# Patient Record
Sex: MALE | Race: WHITE | NOT HISPANIC OR LATINO | Employment: OTHER | URBAN - METROPOLITAN AREA
[De-identification: names, ages, dates, MRNs, and addresses within clinical notes are randomized per-mention and may not be internally consistent; named-entity substitution may affect disease eponyms.]

---

## 2019-09-03 ENCOUNTER — TRANSCRIBE ORDERS (OUTPATIENT)
Dept: ADMINISTRATIVE | Facility: HOSPITAL | Age: 64
End: 2019-09-03

## 2019-09-03 ENCOUNTER — TRANSCRIBE ORDERS (OUTPATIENT)
Dept: CARDIOLOGY | Facility: HOSPITAL | Age: 64
End: 2019-09-03

## 2019-09-03 DIAGNOSIS — I20.0 UNSTABLE ANGINA (HCC): Primary | ICD-10-CM

## 2019-09-06 ENCOUNTER — APPOINTMENT (OUTPATIENT)
Dept: GENERAL RADIOLOGY | Facility: HOSPITAL | Age: 64
End: 2019-09-06

## 2019-09-06 ENCOUNTER — HOSPITAL ENCOUNTER (INPATIENT)
Facility: HOSPITAL | Age: 64
LOS: 11 days | Discharge: HOME | End: 2019-09-17
Attending: INTERNAL MEDICINE | Admitting: INTERNAL MEDICINE

## 2019-09-06 ENCOUNTER — APPOINTMENT (OUTPATIENT)
Dept: CARDIOLOGY | Facility: HOSPITAL | Age: 64
End: 2019-09-06

## 2019-09-06 ENCOUNTER — APPOINTMENT (OUTPATIENT)
Dept: PULMONOLOGY | Facility: HOSPITAL | Age: 64
End: 2019-09-06

## 2019-09-06 DIAGNOSIS — I25.118 CORONARY ARTERY DISEASE INVOLVING NATIVE CORONARY ARTERY OF NATIVE HEART WITH OTHER FORM OF ANGINA PECTORIS (HCC): Primary | ICD-10-CM

## 2019-09-06 DIAGNOSIS — I20.0 UNSTABLE ANGINA (HCC): ICD-10-CM

## 2019-09-06 DIAGNOSIS — E11.59 TYPE 2 DIABETES MELLITUS WITH OTHER CIRCULATORY COMPLICATION, WITHOUT LONG-TERM CURRENT USE OF INSULIN (HCC): ICD-10-CM

## 2019-09-06 PROBLEM — I25.10 CORONARY ARTERY DISEASE: Status: ACTIVE | Noted: 2019-09-03

## 2019-09-06 PROBLEM — I10 BENIGN ESSENTIAL HTN: Chronic | Status: ACTIVE | Noted: 2019-09-06

## 2019-09-06 LAB
ANION GAP SERPL CALCULATED.3IONS-SCNC: 13 MMOL/L (ref 5–15)
APTT PPP: 34.2 SECONDS (ref 24–37)
BUN BLD-MCNC: 23 MG/DL (ref 8–23)
BUN BLDA-MCNC: 22 MG/DL (ref 8–26)
BUN/CREAT SERPL: 24 (ref 7–25)
CA-I BLDA-SCNC: 1.23 MMOL/L (ref 1.2–1.32)
CALCIUM SPEC-SCNC: 9.4 MG/DL (ref 8.6–10.5)
CHLORIDE BLDA-SCNC: 100 MMOL/L (ref 98–109)
CHLORIDE SERPL-SCNC: 98 MMOL/L (ref 98–107)
CO2 BLDA-SCNC: 25 MMOL/L (ref 24–29)
CO2 SERPL-SCNC: 25 MMOL/L (ref 22–29)
CREAT BLD-MCNC: 0.96 MG/DL (ref 0.76–1.27)
CREAT BLDA-MCNC: 0.9 MG/DL (ref 0.6–1.3)
DEPRECATED RDW RBC AUTO: 39.4 FL (ref 37–54)
ERYTHROCYTE [DISTWIDTH] IN BLOOD BY AUTOMATED COUNT: 12.2 % (ref 12.3–15.4)
GFR SERPL CREATININE-BSD FRML MDRD: 79 ML/MIN/1.73
GLUCOSE BLD-MCNC: 296 MG/DL (ref 65–99)
GLUCOSE BLDC GLUCOMTR-MCNC: 245 MG/DL (ref 70–130)
GLUCOSE BLDC GLUCOMTR-MCNC: 249 MG/DL (ref 70–130)
GLUCOSE BLDC GLUCOMTR-MCNC: 259 MG/DL (ref 70–130)
GLUCOSE BLDC GLUCOMTR-MCNC: 291 MG/DL (ref 70–130)
GLUCOSE BLDC GLUCOMTR-MCNC: 333 MG/DL (ref 70–130)
HCT VFR BLD AUTO: 40.9 % (ref 37.5–51)
HCT VFR BLDA CALC: 39 % (ref 38–51)
HGB BLD-MCNC: 14.1 G/DL (ref 13–17.7)
HGB BLDA-MCNC: 13.3 G/DL (ref 12–17)
INR PPP: 1.12 (ref 0.85–1.16)
MCH RBC QN AUTO: 30.8 PG (ref 26.6–33)
MCHC RBC AUTO-ENTMCNC: 34.5 G/DL (ref 31.5–35.7)
MCV RBC AUTO: 89.3 FL (ref 79–97)
PA ADP PRP-ACNC: 231 PRU
PLATELET # BLD AUTO: 219 10*3/MM3 (ref 140–450)
PMV BLD AUTO: 9.1 FL (ref 6–12)
POTASSIUM BLD-SCNC: 4.1 MMOL/L (ref 3.5–5.2)
POTASSIUM BLDA-SCNC: 4.2 MMOL/L (ref 3.5–4.9)
PROTHROMBIN TIME: 13.9 SECONDS (ref 11.2–14.3)
RBC # BLD AUTO: 4.58 10*6/MM3 (ref 4.14–5.8)
SODIUM BLD-SCNC: 136 MMOL/L (ref 136–145)
SODIUM BLDA-SCNC: 137 MMOL/L (ref 138–146)
WBC NRBC COR # BLD: 4.99 10*3/MM3 (ref 3.4–10.8)

## 2019-09-06 PROCEDURE — A9270 NON-COVERED ITEM OR SERVICE: HCPCS | Performed by: INTERNAL MEDICINE

## 2019-09-06 PROCEDURE — 63710000001 INSULIN LISPRO (HUMAN) PER 5 UNITS: Performed by: INTERNAL MEDICINE

## 2019-09-06 PROCEDURE — 63710000001 INSULIN LISPRO (HUMAN) PER 5 UNITS: Performed by: PHYSICIAN ASSISTANT

## 2019-09-06 PROCEDURE — B2151ZZ FLUOROSCOPY OF LEFT HEART USING LOW OSMOLAR CONTRAST: ICD-10-PCS | Performed by: INTERNAL MEDICINE

## 2019-09-06 PROCEDURE — 25010000002 FENTANYL CITRATE (PF) 100 MCG/2ML SOLUTION: Performed by: INTERNAL MEDICINE

## 2019-09-06 PROCEDURE — 85576 BLOOD PLATELET AGGREGATION: CPT | Performed by: PHYSICIAN ASSISTANT

## 2019-09-06 PROCEDURE — 0 IOPAMIDOL PER 1 ML: Performed by: INTERNAL MEDICINE

## 2019-09-06 PROCEDURE — 94010 BREATHING CAPACITY TEST: CPT

## 2019-09-06 PROCEDURE — 80048 BASIC METABOLIC PNL TOTAL CA: CPT

## 2019-09-06 PROCEDURE — 63710000001 DICYCLOMINE PER 20 MG: Performed by: INTERNAL MEDICINE

## 2019-09-06 PROCEDURE — 93458 L HRT ARTERY/VENTRICLE ANGIO: CPT | Performed by: INTERNAL MEDICINE

## 2019-09-06 PROCEDURE — 63710000001 AMLODIPINE 5 MG TABLET: Performed by: INTERNAL MEDICINE

## 2019-09-06 PROCEDURE — G0108 DIAB MANAGE TRN  PER INDIV: HCPCS

## 2019-09-06 PROCEDURE — 99223 1ST HOSP IP/OBS HIGH 75: CPT | Performed by: THORACIC SURGERY (CARDIOTHORACIC VASCULAR SURGERY)

## 2019-09-06 PROCEDURE — 71045 X-RAY EXAM CHEST 1 VIEW: CPT

## 2019-09-06 PROCEDURE — 63710000001 BUSPIRONE 10 MG TABLET: Performed by: INTERNAL MEDICINE

## 2019-09-06 PROCEDURE — B2111ZZ FLUOROSCOPY OF MULTIPLE CORONARY ARTERIES USING LOW OSMOLAR CONTRAST: ICD-10-PCS | Performed by: INTERNAL MEDICINE

## 2019-09-06 PROCEDURE — 85027 COMPLETE CBC AUTOMATED: CPT

## 2019-09-06 PROCEDURE — 63710000001 INSULIN DETEMIR PER 5 UNITS: Performed by: INTERNAL MEDICINE

## 2019-09-06 PROCEDURE — 63710000001 HYDROCHLOROTHIAZIDE 25 MG TABLET: Performed by: INTERNAL MEDICINE

## 2019-09-06 PROCEDURE — 85610 PROTHROMBIN TIME: CPT | Performed by: PHYSICIAN ASSISTANT

## 2019-09-06 PROCEDURE — 85730 THROMBOPLASTIN TIME PARTIAL: CPT | Performed by: PHYSICIAN ASSISTANT

## 2019-09-06 PROCEDURE — A9270 NON-COVERED ITEM OR SERVICE: HCPCS | Performed by: PHYSICIAN ASSISTANT

## 2019-09-06 PROCEDURE — 94010 BREATHING CAPACITY TEST: CPT | Performed by: INTERNAL MEDICINE

## 2019-09-06 PROCEDURE — 25010000002 MIDAZOLAM PER 1 MG: Performed by: INTERNAL MEDICINE

## 2019-09-06 PROCEDURE — 99223 1ST HOSP IP/OBS HIGH 75: CPT | Performed by: INTERNAL MEDICINE

## 2019-09-06 PROCEDURE — 36415 COLL VENOUS BLD VENIPUNCTURE: CPT

## 2019-09-06 PROCEDURE — 63710000001 LOSARTAN 50 MG TABLET: Performed by: INTERNAL MEDICINE

## 2019-09-06 PROCEDURE — 4A023N7 MEASUREMENT OF CARDIAC SAMPLING AND PRESSURE, LEFT HEART, PERCUTANEOUS APPROACH: ICD-10-PCS | Performed by: INTERNAL MEDICINE

## 2019-09-06 PROCEDURE — 63710000001 TAMSULOSIN 0.4 MG CAPSULE: Performed by: INTERNAL MEDICINE

## 2019-09-06 PROCEDURE — 63710000001 ATENOLOL 50 MG TABLET: Performed by: INTERNAL MEDICINE

## 2019-09-06 PROCEDURE — C1769 GUIDE WIRE: HCPCS | Performed by: INTERNAL MEDICINE

## 2019-09-06 PROCEDURE — C1894 INTRO/SHEATH, NON-LASER: HCPCS | Performed by: INTERNAL MEDICINE

## 2019-09-06 PROCEDURE — 82962 GLUCOSE BLOOD TEST: CPT

## 2019-09-06 PROCEDURE — 93306 TTE W/DOPPLER COMPLETE: CPT

## 2019-09-06 PROCEDURE — 25010000002 HEPARIN (PORCINE) PER 1000 UNITS: Performed by: INTERNAL MEDICINE

## 2019-09-06 PROCEDURE — 80047 BASIC METABLC PNL IONIZED CA: CPT

## 2019-09-06 PROCEDURE — 85014 HEMATOCRIT: CPT

## 2019-09-06 RX ORDER — NITROGLYCERIN 0.4 MG/1
0.4 TABLET SUBLINGUAL
Status: DISCONTINUED | OUTPATIENT
Start: 2019-09-06 | End: 2019-09-17 | Stop reason: HOSPADM

## 2019-09-06 RX ORDER — HYDROCODONE BITARTRATE AND ACETAMINOPHEN 5; 325 MG/1; MG/1
1 TABLET ORAL EVERY 4 HOURS PRN
Status: DISCONTINUED | OUTPATIENT
Start: 2019-09-06 | End: 2019-09-10 | Stop reason: DRUGHIGH

## 2019-09-06 RX ORDER — AMLODIPINE BESYLATE 10 MG/1
10 TABLET ORAL DAILY
COMMUNITY
End: 2019-09-17 | Stop reason: HOSPADM

## 2019-09-06 RX ORDER — ASPIRIN 81 MG/1
81 TABLET ORAL DAILY
Status: DISCONTINUED | OUTPATIENT
Start: 2019-09-07 | End: 2019-09-10

## 2019-09-06 RX ORDER — ASPIRIN 81 MG/1
81 TABLET ORAL DAILY
COMMUNITY

## 2019-09-06 RX ORDER — ATENOLOL 50 MG/1
100 TABLET ORAL DAILY
Status: DISCONTINUED | OUTPATIENT
Start: 2019-09-06 | End: 2019-09-07

## 2019-09-06 RX ORDER — LOSARTAN POTASSIUM 100 MG/1
100 TABLET ORAL DAILY
COMMUNITY
End: 2019-09-17 | Stop reason: HOSPADM

## 2019-09-06 RX ORDER — SODIUM CHLORIDE 9 MG/ML
250 INJECTION, SOLUTION INTRAVENOUS CONTINUOUS
Status: ACTIVE | OUTPATIENT
Start: 2019-09-06 | End: 2019-09-06

## 2019-09-06 RX ORDER — TAMSULOSIN HYDROCHLORIDE 0.4 MG/1
1 CAPSULE ORAL DAILY
COMMUNITY

## 2019-09-06 RX ORDER — NALOXONE HCL 0.4 MG/ML
0.4 VIAL (ML) INJECTION
Status: DISCONTINUED | OUTPATIENT
Start: 2019-09-06 | End: 2019-09-10

## 2019-09-06 RX ORDER — OMEPRAZOLE 40 MG/1
40 CAPSULE, DELAYED RELEASE ORAL DAILY
COMMUNITY

## 2019-09-06 RX ORDER — TAMSULOSIN HYDROCHLORIDE 0.4 MG/1
0.4 CAPSULE ORAL DAILY
Status: DISCONTINUED | OUTPATIENT
Start: 2019-09-06 | End: 2019-09-10

## 2019-09-06 RX ORDER — AMLODIPINE BESYLATE 5 MG/1
10 TABLET ORAL DAILY
Status: DISCONTINUED | OUTPATIENT
Start: 2019-09-06 | End: 2019-09-10

## 2019-09-06 RX ORDER — PRAVASTATIN SODIUM 40 MG
40 TABLET ORAL DAILY
Status: ON HOLD | COMMUNITY
End: 2021-01-19

## 2019-09-06 RX ORDER — MORPHINE SULFATE 2 MG/ML
1 INJECTION, SOLUTION INTRAMUSCULAR; INTRAVENOUS EVERY 4 HOURS PRN
Status: DISCONTINUED | OUTPATIENT
Start: 2019-09-06 | End: 2019-09-12

## 2019-09-06 RX ORDER — ACETAMINOPHEN 325 MG/1
650 TABLET ORAL EVERY 4 HOURS PRN
Status: DISCONTINUED | OUTPATIENT
Start: 2019-09-06 | End: 2019-09-17 | Stop reason: HOSPADM

## 2019-09-06 RX ORDER — ALBUTEROL SULFATE 90 UG/1
2 AEROSOL, METERED RESPIRATORY (INHALATION) EVERY 4 HOURS PRN
COMMUNITY

## 2019-09-06 RX ORDER — GLIMEPIRIDE 4 MG/1
4 TABLET ORAL
COMMUNITY
End: 2021-09-30 | Stop reason: SDUPTHER

## 2019-09-06 RX ORDER — ATENOLOL 100 MG/1
100 TABLET ORAL DAILY
COMMUNITY
End: 2019-09-17 | Stop reason: HOSPADM

## 2019-09-06 RX ORDER — NITROGLYCERIN 0.4 MG/1
0.4 TABLET SUBLINGUAL
COMMUNITY

## 2019-09-06 RX ORDER — HYDROCHLOROTHIAZIDE 50 MG/1
50 TABLET ORAL DAILY
COMMUNITY
End: 2022-01-28

## 2019-09-06 RX ORDER — LIDOCAINE HYDROCHLORIDE 10 MG/ML
INJECTION, SOLUTION EPIDURAL; INFILTRATION; INTRACAUDAL; PERINEURAL AS NEEDED
Status: DISCONTINUED | OUTPATIENT
Start: 2019-09-06 | End: 2019-09-06 | Stop reason: HOSPADM

## 2019-09-06 RX ORDER — BUSPIRONE HYDROCHLORIDE 10 MG/1
10 TABLET ORAL 2 TIMES DAILY
Status: DISCONTINUED | OUTPATIENT
Start: 2019-09-06 | End: 2019-09-10

## 2019-09-06 RX ORDER — CETIRIZINE HYDROCHLORIDE 10 MG/1
10 TABLET ORAL DAILY PRN
COMMUNITY

## 2019-09-06 RX ORDER — DICYCLOMINE HCL 20 MG
20 TABLET ORAL DAILY
COMMUNITY
End: 2022-01-28

## 2019-09-06 RX ORDER — TEMAZEPAM 7.5 MG/1
7.5 CAPSULE ORAL NIGHTLY PRN
Status: DISCONTINUED | OUTPATIENT
Start: 2019-09-06 | End: 2019-09-10

## 2019-09-06 RX ORDER — ERGOCALCIFEROL (VITAMIN D2) 10 MCG
400 TABLET ORAL DAILY
COMMUNITY

## 2019-09-06 RX ORDER — BUSPIRONE HYDROCHLORIDE 10 MG/1
10 TABLET ORAL 3 TIMES DAILY
COMMUNITY

## 2019-09-06 RX ORDER — FENTANYL CITRATE 50 UG/ML
INJECTION, SOLUTION INTRAMUSCULAR; INTRAVENOUS AS NEEDED
Status: DISCONTINUED | OUTPATIENT
Start: 2019-09-06 | End: 2019-09-06 | Stop reason: HOSPADM

## 2019-09-06 RX ORDER — MIDAZOLAM HYDROCHLORIDE 1 MG/ML
INJECTION INTRAMUSCULAR; INTRAVENOUS AS NEEDED
Status: DISCONTINUED | OUTPATIENT
Start: 2019-09-06 | End: 2019-09-06 | Stop reason: HOSPADM

## 2019-09-06 RX ORDER — DICYCLOMINE HCL 20 MG
20 TABLET ORAL EVERY 6 HOURS
Status: DISCONTINUED | OUTPATIENT
Start: 2019-09-06 | End: 2019-09-06

## 2019-09-06 RX ORDER — NICOTINE POLACRILEX 4 MG
15 LOZENGE BUCCAL
Status: DISCONTINUED | OUTPATIENT
Start: 2019-09-06 | End: 2019-09-12

## 2019-09-06 RX ORDER — ALPRAZOLAM 0.25 MG/1
0.25 TABLET ORAL 3 TIMES DAILY PRN
Status: DISCONTINUED | OUTPATIENT
Start: 2019-09-06 | End: 2019-09-10

## 2019-09-06 RX ORDER — DICYCLOMINE HCL 20 MG
20 TABLET ORAL DAILY
Status: DISCONTINUED | OUTPATIENT
Start: 2019-09-07 | End: 2019-09-10

## 2019-09-06 RX ORDER — HYDROCHLOROTHIAZIDE 25 MG/1
25 TABLET ORAL DAILY
Status: DISCONTINUED | OUTPATIENT
Start: 2019-09-06 | End: 2019-09-10

## 2019-09-06 RX ORDER — DEXTROSE MONOHYDRATE 25 G/50ML
25 INJECTION, SOLUTION INTRAVENOUS
Status: DISCONTINUED | OUTPATIENT
Start: 2019-09-06 | End: 2019-09-12

## 2019-09-06 RX ORDER — LOSARTAN POTASSIUM 50 MG/1
100 TABLET ORAL DAILY
Status: DISCONTINUED | OUTPATIENT
Start: 2019-09-06 | End: 2019-09-10

## 2019-09-06 RX ORDER — ASPIRIN 325 MG
325 TABLET, DELAYED RELEASE (ENTERIC COATED) ORAL DAILY
Status: DISCONTINUED | OUTPATIENT
Start: 2019-09-06 | End: 2019-09-06 | Stop reason: SDUPTHER

## 2019-09-06 RX ADMIN — HYDROCHLOROTHIAZIDE 25 MG: 25 TABLET ORAL at 11:30

## 2019-09-06 RX ADMIN — AMLODIPINE BESYLATE 10 MG: 5 TABLET ORAL at 11:29

## 2019-09-06 RX ADMIN — BUSPIRONE HYDROCHLORIDE 10 MG: 10 TABLET ORAL at 11:30

## 2019-09-06 RX ADMIN — LOSARTAN POTASSIUM 100 MG: 50 TABLET ORAL at 11:30

## 2019-09-06 RX ADMIN — INSULIN LISPRO 5 UNITS: 100 INJECTION, SOLUTION INTRAVENOUS; SUBCUTANEOUS at 17:26

## 2019-09-06 RX ADMIN — INSULIN DETEMIR 6 UNITS: 100 INJECTION, SOLUTION SUBCUTANEOUS at 15:16

## 2019-09-06 RX ADMIN — TAMSULOSIN HYDROCHLORIDE 0.4 MG: 0.4 CAPSULE ORAL at 11:30

## 2019-09-06 RX ADMIN — INSULIN LISPRO 3 UNITS: 100 INJECTION, SOLUTION INTRAVENOUS; SUBCUTANEOUS at 17:27

## 2019-09-06 RX ADMIN — ALPRAZOLAM 0.25 MG: 0.25 TABLET ORAL at 21:28

## 2019-09-06 RX ADMIN — INSULIN LISPRO 5 UNITS: 100 INJECTION, SOLUTION INTRAVENOUS; SUBCUTANEOUS at 21:30

## 2019-09-06 RX ADMIN — ATENOLOL 100 MG: 50 TABLET ORAL at 11:29

## 2019-09-06 RX ADMIN — BUSPIRONE HYDROCHLORIDE 10 MG: 10 TABLET ORAL at 21:28

## 2019-09-06 RX ADMIN — DICYCLOMINE HYDROCHLORIDE 20 MG: 20 TABLET ORAL at 11:30

## 2019-09-06 RX ADMIN — ASPIRIN 325 MG: 325 TABLET, DELAYED RELEASE ORAL at 07:32

## 2019-09-06 RX ADMIN — INSULIN DETEMIR 10 UNITS: 100 INJECTION, SOLUTION SUBCUTANEOUS at 21:28

## 2019-09-06 RX ADMIN — INSULIN LISPRO 3 UNITS: 100 INJECTION, SOLUTION INTRAVENOUS; SUBCUTANEOUS at 11:29

## 2019-09-06 NOTE — H&P
Pre-Cardiac Catheterization Report  Cardiovascular Laboratory  [facility]      Patient:  Khoa Arnold  :  1955  PCP:  Shirlene Sharpe MD  PHONE:  [phone]    DATE: 2019    BRIEF HPI:  Khoa Arnold is a 64 y.o. male with hypertension, hypercholesterolemia, diabetes mellitus, obesity, family history of premature coronary artery disease, and known coronary artery disease.  He is post previous stents.  He is complaining of chest pain which he describes as a heaviness radiating to his left arm with associated fatigue.  He states it lasts minutes and is moderate in severity.  He says he can feel his chest pain increase as his blood pressure goes up.  He now presents for left heart catheterization with possible intervention.    Cardiac Risk Factors:  advanced age (older than 55 for men, 65 for women), diabetes mellitus, dyslipidemia, family history of premature cardiovascular disease, hypertension, male gender, obesity (BMI >= 30 kg/m2)    Anginal class in last 2 weeks:  CCS class III    CHF Class in last 2 weeks:  NYHA Class II    Cardiogenic shock:  no    Cardiac arrest <24 hours:  no    Stress test within last 6 months:   no   Details:    Previous cardiac catheterization:  yes  Details:     Previous CABG:  no  Details:      Allergies:     IV contrast allergy:  no  Allergies   Allergen Reactions   • Lisinopril Itching and Rash     On feet       MEDICATIONS:  Prior to Admission medications    Not on File       Past medical & surgical history, social and family history reviewed in the electronic medical record.    ROS:  Cardiovascular ROS: positive for - chest pain and shortness of breath    Physical Exam:    Vitals: There were no vitals filed for this visit. There were no vitals filed for this visit.    General Appearance:    Alert, cooperative, in no acute distress   Head:    Normocephalic, without obvious abnormality, atraumatic   Eyes:            Lids and lashes normal,  conjunctivae and sclerae normal, no   icterus, no pallor, corneas clear, PERRLA   Ears:    Ears appear intact with no abnormalities noted   Neck:   No adenopathy, supple, trachea midline, no thyromegaly, +   carotid bruit, no JVD   Back:     No kyphosis present, no scoliosis present, range of motion normal   Lungs:     Clear to auscultation,respirations regular, even and                  unlabored    Heart:    Regular rhythm and normal rate, normal S1 and S2, no            murmur, no gallop, no rub, no click   Chest Wall:    No abnormalities observed   Abdomen:     Normal bowel sounds, no masses, no organomegaly, soft        non-tender, non-distended, no guarding, no rebound                tenderness   Rectal:     Deferred   Extremities:   Moves all extremities well, trace edema, no cyanosis, no             redness   Pulses:   Pulses palpable and equal bilaterally   Skin:   No bleeding, bruising or rash   Neurologic:   Cranial nerves 2 - 12 grossly intact, sensation intact     Barbaeu Test:  Left: Normal  (oxymetric Allens) Right: Not Assessed     Results from last 7 days   Lab Units 09/06/19  0701   CREATININE mg/dL 0.90     Results from last 7 days   Lab Units 09/06/19  0701   HEMOGLOBIN, POC g/dL 13.3   HEMATOCRIT POC % 39     No results found for: CHLPL, TRIG, HDL, LDLDIRECT, AST, ALT        Impression      · Unstable angina    Plan     · Procedure to perform: [facility]  · Planned access: Left radial artery              JANAK Sanches  09/06/19  7:15 AM

## 2019-09-06 NOTE — CONSULTS
"Consults    Patient Care Team:  Shirlene Sharpe MD as PCP - General (Internal Medicine)    Chief complaint:  Medical management    Subjective     History of Present Illness  64 year old man with recent chest pain, had LHC this morning showing MVCAD.  He has seen Dr. Goldsmith of CT Surgery.  Plan is CABG on Tuesday, 9/10.  Hospitalists are consulted for medical management.     Review of Systems   Gen- No fevers, chills  CV- No current chest pain, palpitations  Resp- No cough or hemoptysis.  COTA.  Minimal cigar smoking, remote; no cigarette history.   GI- No N/V/D, abd pain  Endo:  On 3 oral meds for DM.   Glucoses over 200 fasting.  Frequent blurred vision.  \"I don't want to take insulin when I leave here.\"  Rare neuropathic pain in feet; no numbness.   Neuro - hist of CVA x 2.     All other systems reviewed and negative except any additional pertinent positives and negatives discussed in HPI.       Objective      Vital Signs  Temp:  [97.8 °F (36.6 °C)-98.3 °F (36.8 °C)] 98.3 °F (36.8 °C)  Heart Rate:  [54-70] 56  Resp:  [14-16] 16  BP: (120-158)/(56-82) 130/79    Physical Exam   Gen:  Obese male in NAD, sister and wife present.  Chatty.  Fair historian  Neuro: alert and oriented, clear speech, follows commands, grossly nonfocal  HEENT:  NC/AT PERRL, OP benign  Neck:  Supple, no LAD  Heart RRR no murmur, rub, or gallop  Lungs diffusely decreased, no W R R, nonlabored on RA at rest.   Abd:  Soft, nontender, no rebound or guarding, pos BS  Extrem:  No c/c/e.  Sensation in feet intact to light touch.   Skin tanned, warm and dry      Results Review:  I have reviewed the patient's clinical results.          Assessment/Plan       Unstable angina (CMS/HCC)    Coronary artery disease    MVCAD - plan is CABG tues.   - Cards and CTS managing.     DM2  - uncontrolled  - check A1C  - start insulin - basal/bolus/SSI. Adjust as indicated.  - resistant to going home on insulin but will clearly need it.  Diabetes education. "  Will shoot for a one-shot-a day regimen supplemented by orals.     Hist of CVA  - would check carotid US given risk factors     HTN - continue current meds  HL - continue statin      Sarah Kunz MD  09/06/19  1:39 PM

## 2019-09-06 NOTE — CONSULTS
CTS Consult    Patient Care Team:  Shirlene Sharpe MD as PCP - General (Internal Medicine)      Reason for Consult: Evaluate for coronary surgery    HPI  Patient is a 64 y.o. male presents with worsening shortness of breath and fatigue.  He states that with only a moderate amount of exertion he has some substernal chest pain that he describes primarily as a heaviness that radiates into his left shoulder.  During this episode and immediately thereafter the patient states he feels very fatigued and washed out.  He has had a history of previous coronary stents and has known diabetes obesity hypercholesterolemia and a family history of premature coronary disease.  Cardiac catheterization today demonstrates left main as well as three-vessel coronary disease and the patient is pain-free when he sees me today      Review of Systems  Constitutional: Negative for malaise but positive for/fatigue.  Negative for chills, fever, night sweats and weight loss.  HENT: Negative for hearing loss, odynophagia and sore throat.    Cardiovascular: Negative for claudication positive for dyspnea on exertion.   for chest pain but negative for, leg swelling, orthopnea and palpitations.  Respiratory: Positive for shortness of breath.  Negative for cough and hemoptysis.  Endocrine:  Negative for cold intolerance, heat intolerance, polydipsia, polyphagia and polyuria.  Hematologic/Lymphatic: Negative for easy bruising/bleeding.  Musculoskeletal: Negative for joint pain, joint swelling and myalgias.  Gastrointestinal: Negative for abdominal pain, constipation, diarrhea, hematemesis, hematochezia, melena, nausea and vomiting.  Genitourinary: Positive for dysuria and is on Flomax  Neurological: Negative for focal weakness, headaches, numbness and seizures.  Psychiatric/Behavioral: Negative for depression and suicidal ideas.  The patient is not nervous/anxious.  All other systems are reviewed and are negative.      History  Past Medical  History:   Diagnosis Date   • Asthma    • Coronary artery disease    • Diabetes mellitus (CMS/HCC)    • Hyperlipidemia    • Hypertension    • Stroke (CMS/HCC)     2008?     Past Surgical History:   Procedure Laterality Date   • CARDIAC CATHETERIZATION      3 stents    • CHOLECYSTECTOMY     • COLONOSCOPY     • UMBILICAL HERNIA REPAIR       History reviewed. No pertinent family history.  Social History     Tobacco Use   • Smoking status: Never Smoker   • Smokeless tobacco: Former User     Types: Chew   Substance Use Topics   • Alcohol use: No     Frequency: Never   • Drug use: No     Medications Prior to Admission   Medication Sig Dispense Refill Last Dose   • albuterol sulfate  (90 Base) MCG/ACT inhaler Inhale 2 puffs Every 4 (Four) Hours As Needed for Wheezing.   Past Week at Unknown time   • amLODIPine (NORVASC) 10 MG tablet Take 10 mg by mouth Daily.   9/5/2019 at 0900   • aspirin 81 MG EC tablet Take 81 mg by mouth Daily.   9/5/2019 at 0900   • atenolol (TENORMIN) 100 MG tablet Take 100 mg by mouth Daily.   9/5/2019 at 0900   • busPIRone (BUSPAR) 10 MG tablet Take 10 mg by mouth 2 (Two) Times a Day.   9/5/2019 at 2100   • cetirizine (zyrTEC) 10 MG tablet Take 10 mg by mouth Daily.   9/5/2019 at 0900   • dicyclomine (BENTYL) 20 MG tablet Take 20 mg by mouth Every 6 (Six) Hours.   9/5/2019 at 0900   • Fluticasone Furoate-Vilanterol (BREO ELLIPTA) 100-25 MCG/INH inhaler Inhale 1 puff Daily.   9/6/2019 at 0500   • glimepiride (AMARYL) 4 MG tablet Take 4 mg by mouth Every Morning Before Breakfast.   9/6/2019 at 0900   • hydrochlorothiazide (HYDRODIURIL) 25 MG tablet Take 25 mg by mouth Daily.   9/5/2019 at 0900   • losartan (COZAAR) 100 MG tablet Take 100 mg by mouth Daily.   9/5/2019 at 0900   • metFORMIN (GLUCOPHAGE) 1000 MG tablet Take 1,000 mg by mouth 2 (Two) Times a Day With Meals.   9/4/2019 at 1800   • omeprazole (priLOSEC) 40 MG capsule Take 40 mg by mouth Daily.   9/5/2019 at 2100   • pravastatin  (PRAVACHOL) 40 MG tablet Take 40 mg by mouth Daily.   9/5/2019 at 2100   • SITagliptin (JANUVIA) 100 MG tablet Take 100 mg by mouth Daily.   9/5/2019 at 0900   • tamsulosin (FLOMAX) 0.4 MG capsule 24 hr capsule Take 1 capsule by mouth Daily.   9/5/2019 at 0900   • Vitamin D, Cholecalciferol, (CHOLECALCIFEROL) 400 units tablet Take 400 Units by mouth Daily.   9/5/2019 at 0900   • linaclotide (LINZESS) 290 MCG capsule capsule Take 290 mcg by mouth Daily As Needed.   More than a month at Unknown time   • nitroglycerin (NITROSTAT) 0.4 MG SL tablet Place 0.4 mg under the tongue Every 5 (Five) Minutes As Needed for Chest Pain. Take no more than 3 doses in 15 minutes.   More than a month at Unknown time     Allergies:  Lisinopril    Objective    Vital Signs  Temp:  [97.8 °F (36.6 °C)-98.3 °F (36.8 °C)] 98.3 °F (36.8 °C)  Heart Rate:  [54-70] 56  Resp:  [14-16] 16  BP: (120-158)/(56-82) 130/79    Physical Exam: CONSTITUTIONAL: Alert and conversant, in his hospital bed and obese well nourished, No acute distress  EYES: Sclera clean, Anicteric, Pupils equal  ENT: No nasal deviation, Trachea midline  NECK: No neck masses, Supple  LUNGS: No wheezing, Cough, non-congested  HEART: No rubs, No murmurs  GASTROINTESTINAL: Soft, non-distended, No masses, Non tender  to palpation, normal bowel sounds  NEURO: No motor deficits, No sensory deficits, Cranial Nerves 2 through 12 grossly intact  PSYCHIATRIC: Oriented to person, place and time, No memory deficits, Mood appropriate  VASCULAR: No carotid bruits, Femoral pulses palpable and symmetric  MUSKULOSKELETAL: No extremity trauma or extremity asymmetry            Data Review:  Results from last 7 days   Lab Units 09/06/19  0709   WBC 10*3/mm3 4.99   HEMOGLOBIN g/dL 14.1   HEMATOCRIT % 40.9   PLATELETS 10*3/mm3 219     Results from last 7 days   Lab Units 09/06/19  0709   SODIUM mmol/L 136   POTASSIUM mmol/L 4.1   CHLORIDE mmol/L 98   CO2 mmol/L 25.0   BUN mg/dL 23   CREATININE mg/dL  0.96   GLUCOSE mg/dL 296*   CALCIUM mg/dL 9.4     Coagulation: No results found for: INR, APTT  Cardiac markers:     ABGs:       Invalid input(s): PO2        Radiology:      Imaging Results (last 72 hours)     ** No results found for the last 72 hours. **            Assessment:        Unstable angina (CMS/[facility])  DIABETES: I will manage the patient's blood sugars closely, both intraoperatively and postoperatively.  This patient may require a continuous insulin infusion to maintain strict serum glucose control.  I will coordinate the patient's glucose management with the hospital endocrinologist or hospitalist service if the management becomes problematic. The patient is aware that diabetes can complicate their postoperative course and contribute to infection or wound- healing issues.  HYPERTENSION: The patient has known hypertension. I will manage the blood pressure closely intraoperatively and postoperatively to maintain end organ perfusion, but also to mitigate against bleeding caused by extreme hypertension.  Will evaluate for beta blockade prior to anesthesia. Hypertension postoperatively will complicate bleeding problems.  HYPERLIPIDEMIA: The patient's statin medication will be continued up to and including the day of surgery. Dietary changes have been discussed.      Previous history of a stroke  Obesity  Left main and three-vessel coronary disease  Previous coronary stents    Plan:  Plan for coronary bypass grafting surgery at 7 AM on Tuesday of next week.  Patient is aware of the risk of stroke bleeding infection death renal failure and is agreeable to proceed.    Please note that portions of this note were completed with a voice recognition program. Efforts were made to edit the dictations, but occasionally words are mistranscribed.    Denys Goldsmith MD  09/06/19  10:46 AM

## 2019-09-06 NOTE — CONSULTS
After obtaining permission,  discussed and taught patient about type 2 diabetes self-management, risk factors, and importance of blood glucose control to reduce complications. Mr. Arnold has a 10+ year history with diabetes and is currently taking Amaryl, Metformin, and Januvia for diabetes management.  He states his meter at home is a few years old.  He was provided a donated One Touch meter and demonstrated use.  He was able to teach back.  Mr. Arnold expresses concern about going home on insulin.  We discussed insulin and how it will help him feel better with less side effects than some oral agents. Target blood glucose readings and A1c goals per ADA were reviewed. We discussed using postprandial glucoses to problem solve meal choices.  We discussed hypoglycemia treatment. Lifestyle changes such as physical activity with MD approval and healthy eating were encouraged. Mr. Arnold is looking forward to feeling better and getting to walk more.  Stressed the importance of strict blood sugar control after surgery to prevent complications.  Pt instructed to  check blood sugar 2-3 times per day and to call PCP if  blood glucose is higher than 180 two times or more.  Patient was encouraged to keep record of blood glucose readings to take to follow up appointment with PCP.  Pt was offered a free op follow up appointment however declined at this time.

## 2019-09-06 NOTE — PAYOR COMM NOTE
"Ref # [id number]  María Tam RN, BSN  Phone # [phone]  Fax # [phone]  Khoa South (64 y.o. Male)     Date of Birth Social Security Number Address Home Phone MRN    1955  [address] [phone] [id number]    [place] Marital Status          Hindu        Admission Date Admission Type Admitting Provider Attending Provider Department, Room/Bed    19 Urgent Jonathan Pineda MD Skinner, William Hal, MD [facility],     Discharge Date Discharge Disposition Discharge Destination                       Attending Provider:  Jonathan Pineda MD    Allergies:  Lisinopril    Isolation:  None   Infection:  None   Code Status:  CPR    Ht:  167.6 cm (66\")   Wt:  116 kg (256 lb 3.2 oz)    Admission Cmt:  None   Principal Problem:  Unstable angina (CMS/HCC) [I20.0] More...                 Active Insurance as of 2019     Primary Coverage     Payor Plan Insurance Group Employer/Plan Group    ANTHEM MEDICARE REPLACEMENT ANTHEM MEDICARE ADVANTAGE [id number]     Payor Plan Address Payor Plan Phone Number Payor Plan Fax Number Effective Dates    [phone]  2019 - None Entered    [facility] [id number]       Subscriber Name Subscriber Birth Date Member ID       KHOA SOUTH 1955 [id number]                 Emergency Contacts      (Rel.) Home Phone Work Phone Mobile Phone    Sarah South (Spouse) [phone] -- [phone]               History & Physical      Alejandro Banks PA at 2019  7:15 AM          Pre-Cardiac Catheterization Report  Cardiovascular Laboratory  [facility]      Patient:  Khoa South  :  1955  PCP:  Shirlene Sharpe MD  PHONE:  [phone]    DATE: 2019    BRIEF HPI:  Khoa South is a 64 y.o. male with hypertension, hypercholesterolemia, diabetes mellitus, obesity, family history of premature coronary " artery disease, and known coronary artery disease.  He is post previous stents.  He is complaining of chest pain which he describes as a heaviness radiating to his left arm with associated fatigue.  He states it lasts minutes and is moderate in severity.  He says he can feel his chest pain increase as his blood pressure goes up.  He now presents for left heart catheterization with possible intervention.    Cardiac Risk Factors:  advanced age (older than 55 for men, 65 for women), diabetes mellitus, dyslipidemia, family history of premature cardiovascular disease, hypertension, male gender, obesity (BMI >= 30 kg/m2)    Anginal class in last 2 weeks:  CCS class III    CHF Class in last 2 weeks:  NYHA Class II    Cardiogenic shock:  no    Cardiac arrest <24 hours:  no    Stress test within last 6 months:   no   Details:    Previous cardiac catheterization:  yes  Details:     Previous CABG:  no  Details:      Allergies:     IV contrast allergy:  no  Allergies   Allergen Reactions   • Lisinopril Itching and Rash     On feet       MEDICATIONS:  Prior to Admission medications    Not on File       Past medical & surgical history, social and family history reviewed in the electronic medical record.    ROS:  Cardiovascular ROS: positive for - chest pain and shortness of breath    Physical Exam:    Vitals: There were no vitals filed for this visit. There were no vitals filed for this visit.    General Appearance:    Alert, cooperative, in no acute distress   Head:    Normocephalic, without obvious abnormality, atraumatic   Eyes:            Lids and lashes normal, conjunctivae and sclerae normal, no   icterus, no pallor, corneas clear, PERRLA   Ears:    Ears appear intact with no abnormalities noted   Neck:   No adenopathy, supple, trachea midline, no thyromegaly, +   carotid bruit, no JVD   Back:     No kyphosis present, no scoliosis present, range of motion normal   Lungs:     Clear to auscultation,respirations regular, even  "and                  unlabored    Heart:    Regular rhythm and normal rate, normal S1 and S2, no            murmur, no gallop, no rub, no click   Chest Wall:    No abnormalities observed   Abdomen:     Normal bowel sounds, no masses, no organomegaly, soft        non-tender, non-distended, no guarding, no rebound                tenderness   Rectal:     Deferred   Extremities:   Moves all extremities well, trace edema, no cyanosis, no             redness   Pulses:   Pulses palpable and equal bilaterally   Skin:   No bleeding, bruising or rash   Neurologic:   Cranial nerves 2 - 12 grossly intact, sensation intact     Barbaeu Test:  Left: Normal  (oxymetric Allens) Right: Not Assessed     Results from last 7 days   Lab Units 19  0701   CREATININE mg/dL 0.90     Results from last 7 days   Lab Units 19  0701   HEMOGLOBIN, POC g/dL 13.3   HEMATOCRIT POC % 39     No results found for: CHLPL, TRIG, HDL, LDLDIRECT, AST, ALT        Impression      · Unstable angina    Plan     · Procedure to perform: LHC  · Planned access: Left radial artery              JANAK Sanches  19  7:15 AM    Electronically signed by Jonathan Pineda MD at 2019  7:21 AM       [facility] CATH LAB  [address] [id number] [phone]             Khoa Arnold   Cardiac Catheterization/Vascular Study   Order# [id number]   Reading physician: Jonathan Pineda MD Ordering physician: Jonathan Pineda MD Study date: 19    Procedures     Left Heart Cath      Patient Information     Patient Name  Khoa Arnold MRN  [id number] Sex  Male  (Age)  1955 (64 y.o.)   Race Ethnicity Encounter Category   White or  Not  or  Urgent   Patient Hx Of Height, Weight, and Vitals     Height Weight BSA (Calculated - sq m) BMI (kg/m2) Pulse BP   167.6 cm (66\") 116 kg (256 lb 3.2 oz) 2.22 sq meters 41.44 57 130/78   Physicians     Panel Physicians Referring " Physician Case Authorizing Physician   Jonathan Pineda MD (Primary)  Jonathan Pineda MD   Admission Information     Admission Date/Time Discharge Date/Time Room/Bed   09/06/19  0638  N611/1   Indications     Unstable angina (CMS/HCC) [I20.0 (ICD-10-CM)]   Pre-procedure Diagnosis     Unstable angina (CMS/HCC) [I20.0]       Conclusion     Cardiac Catheterization     Referring Provider:  Shirlene Corrigan MD     Indication(s) for this Procedure:  [place]     Procedure(s) Performed: Left heart catheterization, coronary angiography, left ventriculography     Description of the Procedure:  Informed consent was obtained.  A sheath was placed a left radial artery and selective angiography of the right left coronary arteries as well as left heart catheterization left ventriculography was performed.  Procedure is terminated and the sheath was removed with the TR band and there were no early complications.                     Angiographic Findings:  Coronary dominance, right  · LM:   75% distal bifurcation stenosis  · LAD: Diffuse ostial/proximal plaque up to 70%, first diagonal 70% in its origin, patent mid vessel stent and second diagonal stent  · LCX: Ostial 70% stenosis  · RCA: Distal 90% stenosis     LV: LVEF 70%        Hemodynamic Findings:  Ao pressure: 130/70 mmHg  LVEDP: 10 mmHg        EBL: None  Specimen(s): None obtained     Complications: There were no early complications.     Final Impression(s):       Significant left main and three-vessel CAD  Normal LV systolic function     Recommendations:       CABG to the LAD the first diagonal circumflex obtuse marginal and right PDA

## 2019-09-06 NOTE — PLAN OF CARE
Problem: Patient Care Overview  Goal: Plan of Care Review  Outcome: Ongoing (interventions implemented as appropriate)   09/06/19 1502   Coping/Psychosocial   Plan of Care Reviewed With patient;family   OTHER   Outcome Summary VSS. On room air. No complaints of chest pain. Waiting for CABG on either Monday or Tuesday. Will continue to monitor.    Plan of Care Review   Progress no change       Problem: Cardiac: ACS (Acute Coronary Syndrome) (Adult)  Goal: Signs and Symptoms of Listed Potential Problems Will be Absent, Minimized or Managed (Cardiac: ACS)  Outcome: Ongoing (interventions implemented as appropriate)   09/06/19 1502   Goal/Outcome Evaluation   Problems Assessed (Acute Coronary Syndrome) all   Problems Present (Acute Coronary Syn) none

## 2019-09-07 LAB
ANION GAP SERPL CALCULATED.3IONS-SCNC: 9 MMOL/L (ref 5–15)
BUN BLD-MCNC: 22 MG/DL (ref 8–23)
BUN/CREAT SERPL: 22 (ref 7–25)
CALCIUM SPEC-SCNC: 10.1 MG/DL (ref 8.6–10.5)
CHLORIDE SERPL-SCNC: 101 MMOL/L (ref 98–107)
CO2 SERPL-SCNC: 30 MMOL/L (ref 22–29)
CREAT BLD-MCNC: 1 MG/DL (ref 0.76–1.27)
GFR SERPL CREATININE-BSD FRML MDRD: 75 ML/MIN/1.73
GLUCOSE BLD-MCNC: 228 MG/DL (ref 65–99)
GLUCOSE BLDC GLUCOMTR-MCNC: 221 MG/DL (ref 70–130)
GLUCOSE BLDC GLUCOMTR-MCNC: 307 MG/DL (ref 70–130)
GLUCOSE BLDC GLUCOMTR-MCNC: 335 MG/DL (ref 70–130)
GLUCOSE BLDC GLUCOMTR-MCNC: 341 MG/DL (ref 70–130)
GLUCOSE BLDC GLUCOMTR-MCNC: 346 MG/DL (ref 70–130)
HBA1C MFR BLD: 9.7 % (ref 4.8–5.6)
POTASSIUM BLD-SCNC: 5.1 MMOL/L (ref 3.5–5.2)
SODIUM BLD-SCNC: 140 MMOL/L (ref 136–145)

## 2019-09-07 PROCEDURE — 86901 BLOOD TYPING SEROLOGIC RH(D): CPT

## 2019-09-07 PROCEDURE — 63710000001 INSULIN DETEMIR PER 5 UNITS: Performed by: INTERNAL MEDICINE

## 2019-09-07 PROCEDURE — 99231 SBSQ HOSP IP/OBS SF/LOW 25: CPT | Performed by: THORACIC SURGERY (CARDIOTHORACIC VASCULAR SURGERY)

## 2019-09-07 PROCEDURE — 99233 SBSQ HOSP IP/OBS HIGH 50: CPT | Performed by: INTERNAL MEDICINE

## 2019-09-07 PROCEDURE — 80048 BASIC METABOLIC PNL TOTAL CA: CPT | Performed by: INTERNAL MEDICINE

## 2019-09-07 PROCEDURE — 82962 GLUCOSE BLOOD TEST: CPT

## 2019-09-07 PROCEDURE — 86900 BLOOD TYPING SEROLOGIC ABO: CPT

## 2019-09-07 PROCEDURE — 83036 HEMOGLOBIN GLYCOSYLATED A1C: CPT | Performed by: INTERNAL MEDICINE

## 2019-09-07 PROCEDURE — 99232 SBSQ HOSP IP/OBS MODERATE 35: CPT | Performed by: INTERNAL MEDICINE

## 2019-09-07 RX ORDER — ATENOLOL 50 MG/1
50 TABLET ORAL DAILY
Status: DISCONTINUED | OUTPATIENT
Start: 2019-09-07 | End: 2019-09-10

## 2019-09-07 RX ADMIN — BUSPIRONE HYDROCHLORIDE 10 MG: 10 TABLET ORAL at 09:05

## 2019-09-07 RX ADMIN — INSULIN LISPRO 5 UNITS: 100 INJECTION, SOLUTION INTRAVENOUS; SUBCUTANEOUS at 13:04

## 2019-09-07 RX ADMIN — TEMAZEPAM 7.5 MG: 7.5 CAPSULE ORAL at 22:13

## 2019-09-07 RX ADMIN — INSULIN DETEMIR 15 UNITS: 100 INJECTION, SOLUTION SUBCUTANEOUS at 20:57

## 2019-09-07 RX ADMIN — HYDROCHLOROTHIAZIDE 25 MG: 25 TABLET ORAL at 09:05

## 2019-09-07 RX ADMIN — INSULIN LISPRO 5 UNITS: 100 INJECTION, SOLUTION INTRAVENOUS; SUBCUTANEOUS at 18:07

## 2019-09-07 RX ADMIN — ALPRAZOLAM 0.25 MG: 0.25 TABLET ORAL at 22:13

## 2019-09-07 RX ADMIN — ATENOLOL 50 MG: 50 TABLET ORAL at 13:04

## 2019-09-07 RX ADMIN — ASPIRIN 81 MG: 81 TABLET, COATED ORAL at 09:05

## 2019-09-07 RX ADMIN — INSULIN LISPRO 5 UNITS: 100 INJECTION, SOLUTION INTRAVENOUS; SUBCUTANEOUS at 09:06

## 2019-09-07 RX ADMIN — LOSARTAN POTASSIUM 100 MG: 50 TABLET ORAL at 09:05

## 2019-09-07 RX ADMIN — DICYCLOMINE HYDROCHLORIDE 20 MG: 20 TABLET ORAL at 09:05

## 2019-09-07 RX ADMIN — BUSPIRONE HYDROCHLORIDE 10 MG: 10 TABLET ORAL at 20:57

## 2019-09-07 RX ADMIN — TAMSULOSIN HYDROCHLORIDE 0.4 MG: 0.4 CAPSULE ORAL at 09:05

## 2019-09-07 RX ADMIN — INSULIN LISPRO 5 UNITS: 100 INJECTION, SOLUTION INTRAVENOUS; SUBCUTANEOUS at 20:58

## 2019-09-07 RX ADMIN — INSULIN LISPRO 3 UNITS: 100 INJECTION, SOLUTION INTRAVENOUS; SUBCUTANEOUS at 09:06

## 2019-09-07 RX ADMIN — INSULIN LISPRO 5 UNITS: 100 INJECTION, SOLUTION INTRAVENOUS; SUBCUTANEOUS at 13:03

## 2019-09-07 RX ADMIN — AMLODIPINE BESYLATE 10 MG: 5 TABLET ORAL at 09:05

## 2019-09-07 NOTE — PROGRESS NOTES
[facility] at [facility]  IP Progress Note      Chief Complaint/Reason for service: #1 unstable angina with severe CAD #2 hypertension    Subjective   Subjective: The patient states he is feeling well.  Leading up to his presentation he was having severe dyspnea on exertion and arm pain.  He is never been told he had valvular heart disease.  I read his echocardiogram and it shows significant aortic stenosis.    Past medical, surgical, social and family history reviewed in the patient's electronic medical record.    Objective     Vital Sign Min/Max for last 24 hours  Temp  Min: 97.6 °F (36.4 °C)  Max: 98.5 °F (36.9 °C)   BP  Min: 129/79  Max: 149/74   Pulse  Min: 47  Max: 58   Resp  Min: 16  Max: 18   No Data Recorded   No Data Recorded      Intake/Output Summary (Last 24 hours) at 9/7/2019 1133  Last data filed at 9/7/2019 0900  Gross per 24 hour   Intake 360 ml   Output --   Net 360 ml             Current Facility-Administered Medications:   •  acetaminophen (TYLENOL) tablet 650 mg, 650 mg, Oral, Q4H PRN, Jonathan Pineda MD  •  ALPRAZolam (XANAX) tablet 0.25 mg, 0.25 mg, Oral, TID PRN, Jonathan Pineda MD, 0.25 mg at 09/06/19 2128  •  amLODIPine (NORVASC) tablet 10 mg, 10 mg, Oral, Daily, Jonathan Pineda MD, 10 mg at 09/07/19 0905  •  aspirin EC tablet 81 mg, 81 mg, Oral, Daily, Jonathan Pineda MD, 81 mg at 09/07/19 0905  •  atenolol (TENORMIN) tablet 100 mg, 100 mg, Oral, Daily, Jonathan Pineda MD, 100 mg at 09/06/19 1129  •  busPIRone (BUSPAR) tablet 10 mg, 10 mg, Oral, BID, Jonathan Pineda MD, 10 mg at 09/07/19 0905  •  dextrose (D50W) 25 g/ 50mL Intravenous Solution 25 g, 25 g, Intravenous, Q15 Min PRN, Alejandro Banks PA  •  dextrose (GLUTOSE) oral gel 15 g, 15 g, Oral, Q15 Min PRN, Alejandro Banks PA  •  dicyclomine (BENTYL) tablet 20 mg, 20 mg, Oral, Daily, Alejandro Banks PA, 20 mg at 09/07/19 0905  •  glucagon (human recombinant)  (GLUCAGEN DIAGNOSTIC) injection 1 mg, 1 mg, Subcutaneous, Q15 Min PRN, Alejandro Banks PA  •  hydrochlorothiazide (HYDRODIURIL) tablet 25 mg, 25 mg, Oral, Daily, Jonathan Pineda MD, 25 mg at 09/07/19 0905  •  HYDROcodone-acetaminophen (NORCO) 5-325 MG per tablet 1 tablet, 1 tablet, Oral, Q4H PRN, Jonathan Pineda MD  •  insulin detemir (LEVEMIR) injection 15 Units, 15 Units, Subcutaneous, Nightly, Kimmy Marlow MD  •  insulin lispro (humaLOG) injection 0-7 Units, 0-7 Units, Subcutaneous, 4x Daily With Meals & Nightly, Alejandro Banks PA, 3 Units at 09/07/19 0906  •  insulin lispro (humaLOG) injection 5 Units, 5 Units, Subcutaneous, TID With Meals, Sarah Kunz MD, 5 Units at 09/07/19 0906  •  losartan (COZAAR) tablet 100 mg, 100 mg, Oral, Daily, Jonathan Pineda MD, 100 mg at 09/07/19 0905  •  morphine injection 1 mg, 1 mg, Intravenous, Q4H PRN **AND** naloxone (NARCAN) injection 0.4 mg, 0.4 mg, Intravenous, Q5 Min PRN, Jonathan Pineda MD  •  nitroglycerin (NITROSTAT) SL tablet 0.4 mg, 0.4 mg, Sublingual, Q5 Min PRN, Jonathan Pineda MD  •  Pharmacy Consult - [facility], , Does not apply, Daily, Giuseppe Rodriguez, [facility]  •  tamsulosin (FLOMAX) 24 hr capsule 0.4 mg, 0.4 mg, Oral, Daily, Jonathan Pineda MD, 0.4 mg at 09/07/19 0905  •  temazepam (RESTORIL) capsule 7.5 mg, 7.5 mg, Oral, Nightly PRN, Jonathan Pineda MD    Physical Exam: General obese white male sitting on the couch no acute distress current heart rate 50 blood pressure 171 systolic        HEENT: No JVP       Respiratory: Equal bilateral symmetrical expansion clear to auscultation bilaterally       Cardiovascular: Regular rate and rhythm with a grade 4/6 to 5/6 systolic ejection murmur loudest at the right upper sternal border, no edema       GI: Obese with positive bowel sounds       Lower Extremities: No lesions       Neuro: Inspection of the face reveals symmetrical bilateral facial expressions and is  able to move all 4 extremities and ambulate without difficulty       Skin: Warm and dry with no edema to palpation       Psych: Pleasant affect    Results Review: Eyes and nose are essentially equal.  Renal function is normal.    Radiology Results:  Imaging Results (last 72 hours)     Procedure Component Value Units Date/Time    XR Chest 1 View [[id number]] Collected:  09/06/19 1213     Updated:  09/06/19 1749    Narrative:       EXAMINATION: XR CHEST 1 VW-09/06/2019:      INDICATION: PNEUMOTHORAX.      COMPARISON: Chest x-ray 02/10/2012.     FINDINGS: Cardiac size borderline enlarged and similar to prior without  focal opacification or consolidation despite background atelectasis and  scarring. No pneumothorax or pleural effusion. Degenerative changes of  the spine.           Impression:       No acute cardiopulmonary process.     D:  09/06/2019  E:  09/06/2019     This report was finalized on 9/6/2019 5:46 PM by Dr. Timoteo Lee.             EKG: Sinus rhythm    ECHO: I reviewed the patient's echocardiogram and he has mild LV E.  He has significant calcification and thickening of the aortic valve with reduced excursion and a mean gradient of 32 mmHg consistent with moderate aortic stenosis and a valve area 0.71 cm².  There is also thickening of the mitral valve leaflets, mitral annular calcification, and reduced excursion but no significant mitral stenosis was noted.  However there is mild to moderate MR    Tele: Sinus bradycardia    Assessment   Assessment/Plan: 1 severe CAD-the patient is scheduled for bypass surgery on Tuesday however now there is a new finding of moderate aortic stenosis with a mean gradient of 32 but an aortic valve area of 0.71 cm².  Dr. Goldsmith will be notified of this.  2 patient is having bradycardia which nurse is concerned about so I will decrease his atenolol to 50 mg daily  3 hypertension his blood pressures overall been well controlled except for  this morning blood pressure is 171.   Can use as needed clonidine for elevated blood pressure  We will discuss case with Dr. Goldsmith to see if he would like the patient have a CARLYN to better assess the mitral valve.    Denys Carranza MD  09/07/19  11:33 AM

## 2019-09-07 NOTE — PROGRESS NOTES
[facility] Medicine Services  PROGRESS NOTE    Patient Name: Khoa Arnold  : 1955  MRN: [id number]    Date of Admission: 2019  Length of Stay: 1  Primary Care Physician: Shirlene Sharpe MD    Subjective   Subjective     CC:  F/u med mgmt    HPI:  Doing okay this am, denies any chest pain.     Review of Systems  Gen- No fevers, chills  CV- No chest pain, palpitations  Resp- No cough, dyspnea  GI- No N/V/D, abd pain    All other systems reviewed and negative except any additional pertinent positives and negatives discussed in HPI.     Objective   Objective     Vital Signs:   Temp:  [97.6 °F (36.4 °C)-98.5 °F (36.9 °C)] 97.6 °F (36.4 °C)  Heart Rate:  [47-58] 52  Resp:  [16-18] 16  BP: (129-149)/(67-89) 132/67        Physical Exam:  Constitutional: No acute distress, awake, alert, obese, sitting up on couch at bedside  HENT: NCAT, mucous membranes moist  Respiratory: Clear to auscultation bilaterally, respiratory effort normal   Cardiovascular: RRR, no murmurs, rubs, or gallops, palpable pedal pulses bilaterally  Gastrointestinal: Positive bowel sounds, soft, nontender, nondistended  Musculoskeletal: No bilateral ankle edema  Psychiatric: Appropriate affect, cooperative  Neurologic: Oriented x 3, strength symmetric in all extremities, Cranial Nerves grossly intact to confrontation, speech clear  Skin: No rashes  Results Reviewed:    Results from last 7 days   Lab Units 19  1100 19  0719  0701   WBC 10*3/mm3  --  4.99  --    HEMOGLOBIN g/dL  --  14.1  --    HEMOGLOBIN, POC g/dL  --   --  13.3   HEMATOCRIT %  --  40.9  --    HEMATOCRIT POC %  --   --  39   PLATELETS 10*3/mm3  --  219  --    INR  1.12  --   --      Results from last 7 days   Lab Units 19  0535 19  0709 19  0701   SODIUM mmol/L 140 136  --    POTASSIUM mmol/L 5.1 4.1  --    CHLORIDE mmol/L 101 98  --    CO2 mmol/L 30.0* 25.0  --    BUN mg/dL 22 23  --    CREATININE  mg/dL 1.00 0.96 0.90   GLUCOSE mg/dL 228* 296*  --    CALCIUM mg/dL 10.1 9.4  --      Estimated Creatinine Clearance: 88.2 mL/min (by C-G formula based on SCr of 1 mg/dL).    Microbiology Results Abnormal     None          Imaging Results (last 24 hours)     Procedure Component Value Units Date/Time    XR Chest 1 View [[id number]] Collected:  09/06/19 1213     Updated:  09/06/19 6762    Narrative:       EXAMINATION: XR CHEST 1 VW-09/06/2019:      INDICATION: PNEUMOTHORAX.      COMPARISON: Chest x-ray 02/10/2012.     FINDINGS: Cardiac size borderline enlarged and similar to prior without  focal opacification or consolidation despite background atelectasis and  scarring. No pneumothorax or pleural effusion. Degenerative changes of  the spine.           Impression:       No acute cardiopulmonary process.     D:  09/06/2019  E:  09/06/2019     This report was finalized on 9/6/2019 5:46 PM by Dr. Timoteo Lee.                  I have reviewed the medications:  Scheduled Meds:  amLODIPine 10 mg Oral Daily   aspirin 81 mg Oral Daily   atenolol 100 mg Oral Daily   busPIRone 10 mg Oral BID   dicyclomine 20 mg Oral Daily   hydrochlorothiazide 25 mg Oral Daily   insulin detemir 15 Units Subcutaneous Nightly   insulin lispro 0-7 Units Subcutaneous 4x Daily With Meals & Nightly   insulin lispro 5 Units Subcutaneous TID With Meals   losartan 100 mg Oral Daily   pharmacy consult - MTM  Does not apply Daily   tamsulosin 0.4 mg Oral Daily     Continuous Infusions:   PRN Meds:.•  acetaminophen  •  ALPRAZolam  •  dextrose  •  dextrose  •  glucagon (human recombinant)  •  HYDROcodone-acetaminophen  •  Morphine **AND** naloxone  •  nitroglycerin  •  temazepam      Assessment/Plan   Assessment / Plan     Active Hospital Problems    Diagnosis  POA   • **Unstable angina (CMS/HCC) [I20.0]  Unknown   • Type 2 diabetes mellitus with circulatory disorder, without long-term current use of insulin (CMS/HCC) [E11.59]  Unknown   • Benign essential  HTN [I10]  Yes   • Coronary artery disease [I25.10]  Unknown      Resolved Hospital Problems   No resolved problems to display.        Brief Hospital Course to date:  Khoa Arnold is a 64 y.o. male with PMH of T2DM, HTN, and CAD who presented with unstable angina s/p C which showed MVCAD and plan is for CABG on Tuesday, 9/10. Hospitalists were consulted for T2DM.    Plan:    MVCAD - plan is CABG tues.   - Cards and CTS managing.      DM2  - uncontrolled  - hb A1C is 9.7%  - started insulin - basal/bolus/SSI. Increased this am due to high FSBS.  - resistant to going home on insulin but will clearly need it.  Diabetes education.  Will shoot for a one-shot-a day regimen supplemented by orals.      Hist of CVA  - would check carotid US given risk factors      HTN - continue current meds  HL - continue statin          Disposition: I expect the patient to be discharged per primary service    CODE STATUS:   Code Status and Medical Interventions:   Ordered at: 09/06/19 0994     Level Of Support Discussed With:    Patient     Code Status:    CPR     Medical Interventions (Level of Support Prior to Arrest):    Full         Electronically signed by Kimmy Marlow MD, 09/07/19, 10:29 AM.

## 2019-09-07 NOTE — PLAN OF CARE
Problem: Patient Care Overview  Goal: Plan of Care Review  Outcome: Ongoing (interventions implemented as appropriate)   09/07/19 2851   Coping/Psychosocial   Plan of Care Reviewed With patient   OTHER   Outcome Summary VSS. Sinus tong on the monitor (in the low 50's). Decreased his Atenolol. Patient will possibly need an aortic valve replacement along with his CABG which is scheduled for Tuesday morning. He has walked the halls most of the day. No c/o chest pain or soa. Will continue to monitor.    Plan of Care Review   Progress no change

## 2019-09-07 NOTE — PROGRESS NOTES
"Khoa Arnold  [id number]  1955     LOS: 1 day   Patient Care Team:  Shirlene Sharpe MD as PCP - General (Internal Medicine)    Chief Complaint: Coronary artery disease      Subjective: No chest pain or discomfort or shortness of breath overnight    Objective:     Vital Sign Min/Max for last 24 hours  Temp  Min: 97.7 °F (36.5 °C)  Max: 98.5 °F (36.9 °C)   BP  Min: 120/74  Max: 152/75   Pulse  Min: 47  Max: 70   Resp  Min: 16  Max: 18   SpO2  Min: 90 %  Max: 92 %   No Data Recorded   Weight  Min: 116 kg (256 lb)  Max: 116 kg (256 lb)     Flowsheet Rows      First Filed Value   Admission Height  167.6 cm (66\") Documented at 09/06/2019 0655   Admission Weight  116 kg (256 lb 3.2 oz) Documented at 09/06/2019 0655          Physical Exam: Vital signs stable, no diaphoresis or shortness of breath    Wound:    Pulses:     Mediastinal and Chest Tube Drainage:       Results Review:   Results from last 7 days   Lab Units 09/06/19  0709   WBC 10*3/mm3 4.99   HEMOGLOBIN g/dL 14.1   HEMATOCRIT % 40.9   PLATELETS 10*3/mm3 219     Results from last 7 days   Lab Units 09/07/19  0535   SODIUM mmol/L 140   POTASSIUM mmol/L 5.1   CHLORIDE mmol/L 101   CO2 mmol/L 30.0*   BUN mg/dL 22   CREATININE mg/dL 1.00   GLUCOSE mg/dL 228*   CALCIUM mg/dL 10.1             Assessment      Unstable angina (CMS/HCC)    Coronary artery disease    Type 2 diabetes mellitus with circulatory disorder, without long-term current use of insulin (CMS/[facility])    Benign essential HTN      Seen patient for coronary artery disease.  Patient remained stable at this point.  No chest pain.  Continue with current therapy.  Discussed with patient and wife.        Cristi Astorga MD  09/07/19  7:49 AM      Please note that portions of this note were completed with a voice recognition program. Efforts were made to edit the dictations, but words may be mistranscribed  "

## 2019-09-08 LAB
GLUCOSE BLDC GLUCOMTR-MCNC: 261 MG/DL (ref 70–130)
GLUCOSE BLDC GLUCOMTR-MCNC: 310 MG/DL (ref 70–130)
GLUCOSE BLDC GLUCOMTR-MCNC: 321 MG/DL (ref 70–130)
GLUCOSE BLDC GLUCOMTR-MCNC: 332 MG/DL (ref 70–130)

## 2019-09-08 PROCEDURE — 99233 SBSQ HOSP IP/OBS HIGH 50: CPT | Performed by: NURSE PRACTITIONER

## 2019-09-08 PROCEDURE — 99232 SBSQ HOSP IP/OBS MODERATE 35: CPT | Performed by: INTERNAL MEDICINE

## 2019-09-08 PROCEDURE — 99024 POSTOP FOLLOW-UP VISIT: CPT | Performed by: THORACIC SURGERY (CARDIOTHORACIC VASCULAR SURGERY)

## 2019-09-08 PROCEDURE — 63710000001 INSULIN DETEMIR PER 5 UNITS: Performed by: NURSE PRACTITIONER

## 2019-09-08 PROCEDURE — 25010000002 ONDANSETRON PER 1 MG: Performed by: INTERNAL MEDICINE

## 2019-09-08 PROCEDURE — 82962 GLUCOSE BLOOD TEST: CPT

## 2019-09-08 RX ORDER — ISOSORBIDE MONONITRATE 30 MG/1
30 TABLET, EXTENDED RELEASE ORAL
Status: DISCONTINUED | OUTPATIENT
Start: 2019-09-08 | End: 2019-09-10

## 2019-09-08 RX ORDER — ONDANSETRON 2 MG/ML
4 INJECTION INTRAMUSCULAR; INTRAVENOUS EVERY 6 HOURS PRN
Status: DISCONTINUED | OUTPATIENT
Start: 2019-09-08 | End: 2019-09-10

## 2019-09-08 RX ORDER — ECHINACEA PURPUREA EXTRACT 125 MG
2 TABLET ORAL AS NEEDED
Status: DISCONTINUED | OUTPATIENT
Start: 2019-09-08 | End: 2019-09-12

## 2019-09-08 RX ORDER — PANTOPRAZOLE SODIUM 40 MG/1
40 TABLET, DELAYED RELEASE ORAL
Status: DISCONTINUED | OUTPATIENT
Start: 2019-09-08 | End: 2019-09-10

## 2019-09-08 RX ADMIN — ASPIRIN 81 MG: 81 TABLET, COATED ORAL at 08:15

## 2019-09-08 RX ADMIN — BUSPIRONE HYDROCHLORIDE 10 MG: 10 TABLET ORAL at 08:15

## 2019-09-08 RX ADMIN — INSULIN LISPRO 5 UNITS: 100 INJECTION, SOLUTION INTRAVENOUS; SUBCUTANEOUS at 17:00

## 2019-09-08 RX ADMIN — INSULIN LISPRO 5 UNITS: 100 INJECTION, SOLUTION INTRAVENOUS; SUBCUTANEOUS at 08:15

## 2019-09-08 RX ADMIN — INSULIN LISPRO 5 UNITS: 100 INJECTION, SOLUTION INTRAVENOUS; SUBCUTANEOUS at 12:15

## 2019-09-08 RX ADMIN — SALINE NASAL SPRAY 2 SPRAY: 1.5 SOLUTION NASAL at 08:40

## 2019-09-08 RX ADMIN — INSULIN LISPRO 4 UNITS: 100 INJECTION, SOLUTION INTRAVENOUS; SUBCUTANEOUS at 08:17

## 2019-09-08 RX ADMIN — ISOSORBIDE MONONITRATE 30 MG: 30 TABLET, EXTENDED RELEASE ORAL at 12:33

## 2019-09-08 RX ADMIN — TAMSULOSIN HYDROCHLORIDE 0.4 MG: 0.4 CAPSULE ORAL at 08:15

## 2019-09-08 RX ADMIN — LOSARTAN POTASSIUM 100 MG: 50 TABLET ORAL at 08:15

## 2019-09-08 RX ADMIN — TEMAZEPAM 7.5 MG: 7.5 CAPSULE ORAL at 22:15

## 2019-09-08 RX ADMIN — DICYCLOMINE HYDROCHLORIDE 20 MG: 20 TABLET ORAL at 08:14

## 2019-09-08 RX ADMIN — AMLODIPINE BESYLATE 10 MG: 5 TABLET ORAL at 08:15

## 2019-09-08 RX ADMIN — HYDROCHLOROTHIAZIDE 25 MG: 25 TABLET ORAL at 08:14

## 2019-09-08 RX ADMIN — PANTOPRAZOLE SODIUM 40 MG: 40 TABLET, DELAYED RELEASE ORAL at 08:40

## 2019-09-08 RX ADMIN — INSULIN LISPRO 5 UNITS: 100 INJECTION, SOLUTION INTRAVENOUS; SUBCUTANEOUS at 20:31

## 2019-09-08 RX ADMIN — ALPRAZOLAM 0.25 MG: 0.25 TABLET ORAL at 22:15

## 2019-09-08 RX ADMIN — INSULIN LISPRO 5 UNITS: 100 INJECTION, SOLUTION INTRAVENOUS; SUBCUTANEOUS at 17:01

## 2019-09-08 RX ADMIN — INSULIN DETEMIR 20 UNITS: 100 INJECTION, SOLUTION SUBCUTANEOUS at 20:31

## 2019-09-08 RX ADMIN — BUSPIRONE HYDROCHLORIDE 10 MG: 10 TABLET ORAL at 20:31

## 2019-09-08 RX ADMIN — ONDANSETRON 4 MG: 2 INJECTION INTRAMUSCULAR; INTRAVENOUS at 08:15

## 2019-09-08 NOTE — PLAN OF CARE
Problem: Patient Care Overview  Goal: Plan of Care Review  Outcome: Ongoing (interventions implemented as appropriate)   09/08/19 7180   Coping/Psychosocial   Plan of Care Reviewed With patient;spouse   OTHER   Outcome Summary No c/o chest pain. Up and ambulating in halls today. C/o nausea & sinus drainage this AM, received orders for PRN Zofran, Protonix & nasal spray. Held Atenolol today r/t bradycardia in 50's. Multiple family members at bedside today. Anxious to move forward with surgery plans. Will monitor.    Plan of Care Review   Progress no change

## 2019-09-08 NOTE — PROGRESS NOTES
[facility] Medicine Services  PROGRESS NOTE    Patient Name: Khoa Arnold  : 1955  MRN: [id number]    Date of Admission: 2019  Length of Stay: 2  Primary Care Physician: Shirlene Sharpe MD    Subjective   Subjective     CC:  Follow up medical management of diabetes    HPI:  Patient sitting up in chair, family member at bedside.  Reports nausea this am which he feels is likely due to sinus drainage and lack of having daily prilosec.  He denies any chest pain but has ongoing COTA.  He is eager to get through his surgery scheduled for Tuesday.      Review of Systems  Gen- No fevers, chills  CV- No chest pain, palpitations  Resp- No cough, + ongoing dyspnea with exertion  GI- +N, no V/D, abd pain    All other systems reviewed and negative except any additional pertinent positives and negatives discussed in HPI.     Objective   Objective     Vital Signs:   Temp:  [98 °F (36.7 °C)-98.5 °F (36.9 °C)] 98.4 °F (36.9 °C)  Heart Rate:  [48-72] 50  Resp:  [18] 18  BP: (140-171)/(73-85) 170/85        Physical Exam:  Constitutional: No acute distress, awake, alert sitting in chair  HENT: NCAT, mucous membranes moist, signs of PND noted of oropharynx  Respiratory: Clear to auscultation bilaterally, respiratory effort normal   Cardiovascular: RRR, no murmurs, rubs, or gallops, palpable pedal pulses bilaterally  Gastrointestinal: Positive bowel sounds, soft, nontender, nondistended  Musculoskeletal: No bilateral ankle edema  Psychiatric: Appropriate affect, cooperative  Neurologic: Oriented x 3, follows commands, speech clear  Skin: No rashes noted to exposed areas of skin    Results Reviewed:    Results from last 7 days   Lab Units 19  1100 19  0709 19  0701   WBC 10*3/mm3  --  4.99  --    HEMOGLOBIN g/dL  --  14.1  --    HEMOGLOBIN, POC g/dL  --   --  13.3   HEMATOCRIT %  --  40.9  --    HEMATOCRIT POC %  --   --  39   PLATELETS 10*3/mm3  --  219  --    INR  1.12   --   --      Results from last 7 days   Lab Units 09/07/19  0535 09/06/19  0709 09/06/19  0701   SODIUM mmol/L 140 136  --    POTASSIUM mmol/L 5.1 4.1  --    CHLORIDE mmol/L 101 98  --    CO2 mmol/L 30.0* 25.0  --    BUN mg/dL 22 23  --    CREATININE mg/dL 1.00 0.96 0.90   GLUCOSE mg/dL 228* 296*  --    CALCIUM mg/dL 10.1 9.4  --      Estimated Creatinine Clearance: 88.2 mL/min (by C-G formula based on SCr of 1 mg/dL).    Microbiology Results Abnormal     None          Imaging Results (last 24 hours)     ** No results found for the last 24 hours. **               I have reviewed the medications:  Scheduled Meds:  amLODIPine 10 mg Oral Daily   aspirin 81 mg Oral Daily   atenolol 50 mg Oral Daily   busPIRone 10 mg Oral BID   dicyclomine 20 mg Oral Daily   hydrochlorothiazide 25 mg Oral Daily   insulin detemir 15 Units Subcutaneous Nightly   insulin lispro 0-7 Units Subcutaneous 4x Daily With Meals & Nightly   insulin lispro 5 Units Subcutaneous TID With Meals   losartan 100 mg Oral Daily   pantoprazole 40 mg Oral Q AM   pharmacy consult - MTM  Does not apply Daily   tamsulosin 0.4 mg Oral Daily     Continuous Infusions:   PRN Meds:.•  acetaminophen  •  ALPRAZolam  •  dextrose  •  dextrose  •  glucagon (human recombinant)  •  HYDROcodone-acetaminophen  •  Morphine **AND** naloxone  •  nitroglycerin  •  ondansetron  •  sodium chloride  •  temazepam      Assessment/Plan   Assessment / Plan     Active Hospital Problems    Diagnosis  POA   • **Unstable angina (CMS/[facility]) [I20.0]  Unknown   • Type 2 diabetes mellitus with circulatory disorder, without long-term current use of insulin (CMS/HCC) [E11.59]  Unknown   • Benign essential HTN [I10]  Yes   • Coronary artery disease [I25.10]  Unknown      Resolved Hospital Problems   No resolved problems to display.        Brief Hospital Course to date:  Khoa Arnold is a 64 y.o. male with PMH significant for T2DM, HTN and CAD who presented to [facility] on 9/6/19 with unstable angina.   S/p LHC which showed MVCAD and plan is for CABG on Tuesday, 9/10.  Hospitalists consulted for management of T2DM.    MVCAD  - Cards and CTS managing  - plan for CABG on 9/10    T2DM  - Current A1c 9.7%, uncontrolled  -24hr glucose range 261-307  - increased levemir to 20 units qhs, continue meal time insulin and ssi  - continue to monitor and adjust as needed  - discussed with patient the importance of keeping glucose low as this plays role on overall health as well as cardiac health and recovery from surgery    Nausea  PND  - added daily protonix, prn zofran and saline NS.      H/O CVA  - carotid US recommended given risk factors    HTN  HL  - continue current medication regimen        Disposition: I expect the patient to be discharged per primary service.     CODE STATUS:   Code Status and Medical Interventions:   Ordered at: 09/06/19 0953     Level Of Support Discussed With:    Patient     Code Status:    CPR     Medical Interventions (Level of Support Prior to Arrest):    Full         Electronically signed by ELENO Dennis, 09/08/19, 10:40 AM.

## 2019-09-08 NOTE — PROGRESS NOTES
"Khoa Arnold  [id number]  1955     LOS: 2 days   Patient Care Team:  Shirlene Sharpe MD as PCP - General (Internal Medicine)    Chief Complaint: Coronary artery disease      Subjective: No chest pain or shortness of breath or diaphoresis    Objective:     Vital Sign Min/Max for last 24 hours  Temp  Min: 98 °F (36.7 °C)  Max: 98.5 °F (36.9 °C)   BP  Min: 140/83  Max: 171/79   Pulse  Min: 48  Max: 72   Resp  Min: 18  Max: 18   No Data Recorded   No Data Recorded   No Data Recorded     Flowsheet Rows      First Filed Value   Admission Height  167.6 cm (66\") Documented at 09/06/2019 0655   Admission Weight  116 kg (256 lb 3.2 oz) Documented at 09/06/2019 0655          Physical Exam: Vital signs signs stable, no shortness of breath or diaphoresis    Wound:    Pulses:     Mediastinal and Chest Tube Drainage:       Results Review:   Results from last 7 days   Lab Units 09/06/19  0709   WBC 10*3/mm3 4.99   HEMOGLOBIN g/dL 14.1   HEMATOCRIT % 40.9   PLATELETS 10*3/mm3 219     Results from last 7 days   Lab Units 09/07/19  0535   SODIUM mmol/L 140   POTASSIUM mmol/L 5.1   CHLORIDE mmol/L 101   CO2 mmol/L 30.0*   BUN mg/dL 22   CREATININE mg/dL 1.00   GLUCOSE mg/dL 228*   CALCIUM mg/dL 10.1             Assessment      Unstable angina (CMS/HCC)    Coronary artery disease    Type 2 diabetes mellitus with circulatory disorder, without long-term current use of insulin (CMS/HCC)    Benign essential HTN      Coronary artery disease.  Patient appears to be stable overnight.  No new problems.  Answered all patient's questions.        Cristi Astorga MD  09/08/19  8:02 AM      Please note that portions of this note were completed with a voice recognition program. Efforts were made to edit the dictations, but words may be mistranscribed  "

## 2019-09-08 NOTE — PROGRESS NOTES
[facility] at [facility]  IP Progress Note      Chief Complaint/Reason for service: Unstable angina #2 aortic stenosis #3 hypertension    Subjective   Subjective: The patient is complaining of some left-sided discomfort.  Atypical in the sense that it lasts for about an hour.  It occurs at rest and with exertion.  He does state however similar to what he had with his previous heart problem.  The patient has never been told before that he has an aortic valve disorder.  He denies shortness of breath.    Past medical, surgical, social and family history reviewed in the patient's electronic medical record.    Objective     Vital Sign Min/Max for last 24 hours  Temp  Min: 97.7 °F (36.5 °C)  Max: 98.5 °F (36.9 °C)   BP  Min: 125/71  Max: 170/85   Pulse  Min: 48  Max: 57   Resp  Min: 18  Max: 18   No Data Recorded   No Data Recorded      Intake/Output Summary (Last 24 hours) at 9/8/2019 1154  Last data filed at 9/8/2019 0900  Gross per 24 hour   Intake 720 ml   Output 150 ml   Net 570 ml             Current Facility-Administered Medications:   •  acetaminophen (TYLENOL) tablet 650 mg, 650 mg, Oral, Q4H PRN, Jonathan Pineda MD  •  ALPRAZolam (XANAX) tablet 0.25 mg, 0.25 mg, Oral, TID PRN, Jonathan Pineda MD, 0.25 mg at 09/07/19 2213  •  amLODIPine (NORVASC) tablet 10 mg, 10 mg, Oral, Daily, Jonathan Pineda MD, 10 mg at 09/08/19 0815  •  aspirin EC tablet 81 mg, 81 mg, Oral, Daily, Jonathan Pineda MD, 81 mg at 09/08/19 0815  •  atenolol (TENORMIN) tablet 50 mg, 50 mg, Oral, Daily, Denys Carranza MD, 50 mg at 09/07/19 1304  •  busPIRone (BUSPAR) tablet 10 mg, 10 mg, Oral, BID, Jonathan Pineda MD, 10 mg at 09/08/19 0815  •  dextrose (D50W) 25 g/ 50mL Intravenous Solution 25 g, 25 g, Intravenous, Q15 Min PRN, Alejandro Banks PA  •  dextrose (GLUTOSE) oral gel 15 g, 15 g, Oral, Q15 Min PRN, Alejandro Banks PA  •  dicyclomine (BENTYL) tablet 20 mg, 20 mg, Oral,  Daily, Alejandro Banks PA, 20 mg at 09/08/19 0814  •  glucagon (human recombinant) (GLUCAGEN DIAGNOSTIC) injection 1 mg, 1 mg, Subcutaneous, Q15 Min PRN, Alejandro Banks PA  •  hydrochlorothiazide (HYDRODIURIL) tablet 25 mg, 25 mg, Oral, Daily, Jonathan Pineda MD, 25 mg at 09/08/19 0814  •  HYDROcodone-acetaminophen (NORCO) 5-325 MG per tablet 1 tablet, 1 tablet, Oral, Q4H PRN, Jonathan Pineda MD  •  insulin detemir (LEVEMIR) injection 20 Units, 20 Units, Subcutaneous, Nightly, Meera Howell, APRN  •  insulin lispro (humaLOG) injection 0-7 Units, 0-7 Units, Subcutaneous, 4x Daily With Meals & Nightly, Alejandro Banks PA, 4 Units at 09/08/19 0817  •  insulin lispro (humaLOG) injection 5 Units, 5 Units, Subcutaneous, TID With Meals, Sarah Kunz MD, 5 Units at 09/08/19 0815  •  losartan (COZAAR) tablet 100 mg, 100 mg, Oral, Daily, Jonathan Pineda MD, 100 mg at 09/08/19 0815  •  morphine injection 1 mg, 1 mg, Intravenous, Q4H PRN **AND** naloxone (NARCAN) injection 0.4 mg, 0.4 mg, Intravenous, Q5 Min PRN, Jonathan Pineda MD  •  nitroglycerin (NITROSTAT) SL tablet 0.4 mg, 0.4 mg, Sublingual, Q5 Min PRN, Jonathan Pineda MD  •  ondansetron (ZOFRAN) injection 4 mg, 4 mg, Intravenous, Q6H PRN, Kimmy Marlow MD, 4 mg at 09/08/19 0815  •  pantoprazole (PROTONIX) EC tablet 40 mg, 40 mg, Oral, Q AM, Meera Howell, APRN, 40 mg at 09/08/19 0840  •  Pharmacy Consult - MTM, , Does not apply, Daily, Giuseppe Rodriguez, [facility]  •  sodium chloride nasal spray 2 spray, 2 spray, Each Nare, PRN, Kimmy Marlow MD, 2 spray at 09/08/19 0840  •  tamsulosin (FLOMAX) 24 hr capsule 0.4 mg, 0.4 mg, Oral, Daily, Jonathan Pineda MD, 0.4 mg at 09/08/19 0815  •  temazepam (RESTORIL) capsule 7.5 mg, 7.5 mg, Oral, Nightly PRN, Jonathan Pineda MD, 7.5 mg at 09/07/19 7675    Physical Exam: General Pleasant obese white male sitting in a chair no acute distress         HEENT: No JVP no icterus       Respiratory: Clear bilaterally       Cardiovascular: Regular rate and rhythm with a grade 4 over systolic ejection murmur loudest at the right upper sternal border       GI:        Lower Extremities: No lesions       Neuro: Inspection of the facial symmetrical facial expression bilaterally and he is able to move all 4 extremities       Skin: Warm and dry       Psych: Pleasant affect    Results Review: Heart rate is still as low as 47.  Blood pressure is labile from 1 25-1 70.  Electrolytes  were normal yesterday.    Radiology Results:  Imaging Results (last 72 hours)     Procedure Component Value Units Date/Time    XR Chest 1 View [[id number]] Collected:  09/06/19 1213     Updated:  09/06/19 1749    Narrative:       EXAMINATION: XR CHEST 1 VW-09/06/2019:      INDICATION: PNEUMOTHORAX.      COMPARISON: Chest x-ray 02/10/2012.     FINDINGS: Cardiac size borderline enlarged and similar to prior without  focal opacification or consolidation despite background atelectasis and  scarring. No pneumothorax or pleural effusion. Degenerative changes of  the spine.           Impression:       No acute cardiopulmonary process.     D:  09/06/2019  E:  09/06/2019     This report was finalized on 9/6/2019 5:46 PM by Dr. Timoteo Lee.             EKG: Sinus rhythm    ECHO: Moderate aortic stenosis with a mean gradient of 32 and aortic valve area 0.71 cm²    Tele: Sinus bradycardia no arrhythmias    Assessment   Assessment/Plan: 1 unstable angina with severe ischemic heart disease scheduled for surgery on Tuesday.  He is having some atypical symptoms but I cannot exclude ischemia so I will go ahead and put him on Imdur  2 moderate early severe stenosis-Dr. Goldsmith's been notified of this finding  3 bradycardia-atenolol dose was reduced yesterday and will take 2-3 more days of up with a higher dose to wear off.    Denys Carranza MD  09/08/19  11:54 AM

## 2019-09-08 NOTE — PLAN OF CARE
Problem: Patient Care Overview  Goal: Plan of Care Review  Outcome: Ongoing (interventions implemented as appropriate)   09/08/19 0318   Coping/Psychosocial   Plan of Care Reviewed With patient   OTHER   Outcome Summary VSS, NSR/Sinus tong on the monitor. Pt waiting to see about a possible CARLYN, arotic valve replacement? CABG 1st case on Tuesday. No c/o pain. Pt slept more tonight. Will continue to monitor.    Plan of Care Review   Progress no change       Problem: Cardiac: ACS (Acute Coronary Syndrome) (Adult)  Goal: Signs and Symptoms of Listed Potential Problems Will be Absent, Minimized or Managed (Cardiac: ACS)  Outcome: Ongoing (interventions implemented as appropriate)

## 2019-09-09 ENCOUNTER — APPOINTMENT (OUTPATIENT)
Dept: CARDIOLOGY | Facility: HOSPITAL | Age: 64
End: 2019-09-09

## 2019-09-09 ENCOUNTER — ANESTHESIA EVENT (OUTPATIENT)
Dept: PERIOP | Facility: HOSPITAL | Age: 64
End: 2019-09-09

## 2019-09-09 LAB
ABO GROUP BLD: NORMAL
ABO GROUP BLD: NORMAL
AMPHET+METHAMPHET UR QL: NEGATIVE
AMPHETAMINES UR QL: NEGATIVE
BARBITURATES UR QL SCN: NEGATIVE
BENZODIAZ UR QL SCN: POSITIVE
BH CV ECHO MEAS - AO MAX PG (FULL): 52.1 MMHG
BH CV ECHO MEAS - AO MAX PG: 55 MMHG
BH CV ECHO MEAS - AO MEAN PG (FULL): 30.9 MMHG
BH CV ECHO MEAS - AO MEAN PG: 32.5 MMHG
BH CV ECHO MEAS - AO ROOT AREA (BSA CORRECTED): 1.3
BH CV ECHO MEAS - AO ROOT AREA: 6.7 CM^2
BH CV ECHO MEAS - AO ROOT DIAM: 2.9 CM
BH CV ECHO MEAS - AO V2 MAX: 372 CM/SEC
BH CV ECHO MEAS - AO V2 MEAN: 270.4 CM/SEC
BH CV ECHO MEAS - AO V2 VTI: 96.1 CM
BH CV ECHO MEAS - AVA(I,A): 0.68 CM^2
BH CV ECHO MEAS - AVA(I,D): 0.68 CM^2
BH CV ECHO MEAS - AVA(V,A): 0.7 CM^2
BH CV ECHO MEAS - AVA(V,D): 0.7 CM^2
BH CV ECHO MEAS - BSA(HAYCOCK): 2.4 M^2
BH CV ECHO MEAS - BSA: 2.2 M^2
BH CV ECHO MEAS - BZI_BMI: 41.3 KILOGRAMS/M^2
BH CV ECHO MEAS - BZI_METRIC_HEIGHT: 167.6 CM
BH CV ECHO MEAS - BZI_METRIC_WEIGHT: 116.1 KG
BH CV ECHO MEAS - EDV(CUBED): 139.9 ML
BH CV ECHO MEAS - EDV(TEICH): 129 ML
BH CV ECHO MEAS - EF(CUBED): 79 %
BH CV ECHO MEAS - EF(TEICH): 71 %
BH CV ECHO MEAS - ESV(CUBED): 29.4 ML
BH CV ECHO MEAS - ESV(TEICH): 37.5 ML
BH CV ECHO MEAS - FS: 40.6 %
BH CV ECHO MEAS - IVS/LVPW: 0.95
BH CV ECHO MEAS - IVSD: 1.1 CM
BH CV ECHO MEAS - LA DIMENSION: 3.9 CM
BH CV ECHO MEAS - LA/AO: 1.4
BH CV ECHO MEAS - LAD MAJOR: 5.2 CM
BH CV ECHO MEAS - LAT PEAK E' VEL: 5.8 CM/SEC
BH CV ECHO MEAS - LATERAL E/E' RATIO: 24.5
BH CV ECHO MEAS - LV MASS(C)D: 237.2 GRAMS
BH CV ECHO MEAS - LV MASS(C)DI: 106.8 GRAMS/M^2
BH CV ECHO MEAS - LV MAX PG: 2.9 MMHG
BH CV ECHO MEAS - LV MEAN PG: 1.6 MMHG
BH CV ECHO MEAS - LV V1 MAX: 85 CM/SEC
BH CV ECHO MEAS - LV V1 MEAN: 59.1 CM/SEC
BH CV ECHO MEAS - LV V1 VTI: 21.6 CM
BH CV ECHO MEAS - LVIDD: 5.2 CM
BH CV ECHO MEAS - LVIDS: 3.1 CM
BH CV ECHO MEAS - LVOT AREA (M): 3.1 CM^2
BH CV ECHO MEAS - LVOT AREA: 3 CM^2
BH CV ECHO MEAS - LVOT DIAM: 2 CM
BH CV ECHO MEAS - LVPWD: 1.2 CM
BH CV ECHO MEAS - MED PEAK E' VEL: 7.5 CM/SEC
BH CV ECHO MEAS - MEDIAL E/E' RATIO: 19.1
BH CV ECHO MEAS - MV A MAX VEL: 116.3 CM/SEC
BH CV ECHO MEAS - MV DEC SLOPE: 642.5 CM/SEC^2
BH CV ECHO MEAS - MV DEC TIME: 0.26 SEC
BH CV ECHO MEAS - MV E MAX VEL: 144.2 CM/SEC
BH CV ECHO MEAS - MV E/A: 1.2
BH CV ECHO MEAS - MV MAX PG: 12.8 MMHG
BH CV ECHO MEAS - MV MEAN PG: 3.5 MMHG
BH CV ECHO MEAS - MV P1/2T MAX VEL: 181.9 CM/SEC
BH CV ECHO MEAS - MV P1/2T: 82.9 MSEC
BH CV ECHO MEAS - MV V2 MAX: 179.2 CM/SEC
BH CV ECHO MEAS - MV V2 MEAN: 82.5 CM/SEC
BH CV ECHO MEAS - MV V2 VTI: 59.2 CM
BH CV ECHO MEAS - MVA P1/2T LCG: 1.2 CM^2
BH CV ECHO MEAS - MVA(P1/2T): 2.7 CM^2
BH CV ECHO MEAS - MVA(VTI): 1.1 CM^2
BH CV ECHO MEAS - PA ACC SLOPE: 824.8 CM/SEC^2
BH CV ECHO MEAS - PA ACC TIME: 0.12 SEC
BH CV ECHO MEAS - PA MAX PG: 4.3 MMHG
BH CV ECHO MEAS - PA PR(ACCEL): 26.8 MMHG
BH CV ECHO MEAS - PA V2 MAX: 103.2 CM/SEC
BH CV ECHO MEAS - RAP SYSTOLE: 3 MMHG
BH CV ECHO MEAS - RVSP: 23 MMHG
BH CV ECHO MEAS - SI(AO): 288.4 ML/M^2
BH CV ECHO MEAS - SI(CUBED): 49.8 ML/M^2
BH CV ECHO MEAS - SI(LVOT): 29.6 ML/M^2
BH CV ECHO MEAS - SI(TEICH): 41.2 ML/M^2
BH CV ECHO MEAS - SV(AO): 640.7 ML
BH CV ECHO MEAS - SV(CUBED): 110.6 ML
BH CV ECHO MEAS - SV(LVOT): 65.8 ML
BH CV ECHO MEAS - SV(TEICH): 91.6 ML
BH CV ECHO MEAS - TAPSE (>1.6): 3.4 CM2
BH CV ECHO MEAS - TR MAX PG: 20 MMHG
BH CV ECHO MEAS - TR MAX VEL: 220.3 CM/SEC
BH CV ECHO MEASUREMENTS AVERAGE E/E' RATIO: 21.68
BH CV VAS BP RIGHT ARM: NORMAL MMHG
BH CV XLRA - RV BASE: 4.1 CM
BH CV XLRA - RV LENGTH: 7.4 CM
BH CV XLRA - RV MID: 3.6 CM
BH CV XLRA - TDI S': 14.6 CM/SEC
BLD GP AB SCN SERPL QL: NEGATIVE
BUPRENORPHINE SERPL-MCNC: NEGATIVE NG/ML
CANNABINOIDS SERPL QL: NEGATIVE
COCAINE UR QL: NEGATIVE
GLUCOSE BLDC GLUCOMTR-MCNC: 204 MG/DL (ref 70–130)
GLUCOSE BLDC GLUCOMTR-MCNC: 264 MG/DL (ref 70–130)
GLUCOSE BLDC GLUCOMTR-MCNC: 267 MG/DL (ref 70–130)
GLUCOSE BLDC GLUCOMTR-MCNC: 367 MG/DL (ref 70–130)
LEFT ATRIUM VOLUME INDEX: 25.2 ML/M^2
LEFT ATRIUM VOLUME: 56 ML
LV EF 2D ECHO EST: 55 %
METHADONE UR QL SCN: NEGATIVE
OPIATES UR QL: NEGATIVE
OXYCODONE UR QL SCN: NEGATIVE
PCP UR QL SCN: NEGATIVE
PROPOXYPH UR QL: NEGATIVE
RH BLD: NEGATIVE
RH BLD: NEGATIVE
T&S EXPIRATION DATE: NORMAL
TRICYCLICS UR QL SCN: NEGATIVE

## 2019-09-09 PROCEDURE — 86901 BLOOD TYPING SEROLOGIC RH(D): CPT | Performed by: PHYSICIAN ASSISTANT

## 2019-09-09 PROCEDURE — 99232 SBSQ HOSP IP/OBS MODERATE 35: CPT | Performed by: NURSE PRACTITIONER

## 2019-09-09 PROCEDURE — 99024 POSTOP FOLLOW-UP VISIT: CPT | Performed by: THORACIC SURGERY (CARDIOTHORACIC VASCULAR SURGERY)

## 2019-09-09 PROCEDURE — 93880 EXTRACRANIAL BILAT STUDY: CPT

## 2019-09-09 PROCEDURE — 86900 BLOOD TYPING SEROLOGIC ABO: CPT | Performed by: PHYSICIAN ASSISTANT

## 2019-09-09 PROCEDURE — 80306 DRUG TEST PRSMV INSTRMNT: CPT | Performed by: PHYSICIAN ASSISTANT

## 2019-09-09 PROCEDURE — 86923 COMPATIBILITY TEST ELECTRIC: CPT

## 2019-09-09 PROCEDURE — 86850 RBC ANTIBODY SCREEN: CPT | Performed by: PHYSICIAN ASSISTANT

## 2019-09-09 PROCEDURE — 82962 GLUCOSE BLOOD TEST: CPT

## 2019-09-09 PROCEDURE — 63710000001 INSULIN DETEMIR PER 5 UNITS: Performed by: NURSE PRACTITIONER

## 2019-09-09 RX ORDER — CHLORHEXIDINE GLUCONATE 0.12 MG/ML
15 RINSE ORAL EVERY 12 HOURS SCHEDULED
Status: DISCONTINUED | OUTPATIENT
Start: 2019-09-09 | End: 2019-09-11

## 2019-09-09 RX ORDER — LIDOCAINE HYDROCHLORIDE 10 MG/ML
0.5 INJECTION, SOLUTION EPIDURAL; INFILTRATION; INTRACAUDAL; PERINEURAL ONCE AS NEEDED
Status: CANCELLED | OUTPATIENT
Start: 2019-09-09

## 2019-09-09 RX ORDER — SODIUM CHLORIDE, SODIUM LACTATE, POTASSIUM CHLORIDE, CALCIUM CHLORIDE 600; 310; 30; 20 MG/100ML; MG/100ML; MG/100ML; MG/100ML
9 INJECTION, SOLUTION INTRAVENOUS CONTINUOUS
Status: CANCELLED | OUTPATIENT
Start: 2019-09-10

## 2019-09-09 RX ORDER — SODIUM CHLORIDE 0.9 % (FLUSH) 0.9 %
3-10 SYRINGE (ML) INJECTION AS NEEDED
Status: CANCELLED | OUTPATIENT
Start: 2019-09-09

## 2019-09-09 RX ORDER — SODIUM CHLORIDE 0.9 % (FLUSH) 0.9 %
3 SYRINGE (ML) INJECTION EVERY 12 HOURS SCHEDULED
Status: CANCELLED | OUTPATIENT
Start: 2019-09-10

## 2019-09-09 RX ADMIN — DICYCLOMINE HYDROCHLORIDE 20 MG: 20 TABLET ORAL at 08:29

## 2019-09-09 RX ADMIN — INSULIN LISPRO 5 UNITS: 100 INJECTION, SOLUTION INTRAVENOUS; SUBCUTANEOUS at 13:08

## 2019-09-09 RX ADMIN — INSULIN LISPRO 6 UNITS: 100 INJECTION, SOLUTION INTRAVENOUS; SUBCUTANEOUS at 17:36

## 2019-09-09 RX ADMIN — ASPIRIN 81 MG: 81 TABLET, COATED ORAL at 08:29

## 2019-09-09 RX ADMIN — INSULIN LISPRO 4 UNITS: 100 INJECTION, SOLUTION INTRAVENOUS; SUBCUTANEOUS at 08:30

## 2019-09-09 RX ADMIN — ALPRAZOLAM 0.25 MG: 0.25 TABLET ORAL at 22:20

## 2019-09-09 RX ADMIN — PANTOPRAZOLE SODIUM 40 MG: 40 TABLET, DELAYED RELEASE ORAL at 05:58

## 2019-09-09 RX ADMIN — AMLODIPINE BESYLATE 10 MG: 5 TABLET ORAL at 08:30

## 2019-09-09 RX ADMIN — INSULIN LISPRO 5 UNITS: 100 INJECTION, SOLUTION INTRAVENOUS; SUBCUTANEOUS at 08:30

## 2019-09-09 RX ADMIN — INSULIN LISPRO 5 UNITS: 100 INJECTION, SOLUTION INTRAVENOUS; SUBCUTANEOUS at 17:36

## 2019-09-09 RX ADMIN — ISOSORBIDE MONONITRATE 30 MG: 30 TABLET, EXTENDED RELEASE ORAL at 08:29

## 2019-09-09 RX ADMIN — ACETAMINOPHEN 650 MG: 325 TABLET ORAL at 15:26

## 2019-09-09 RX ADMIN — LOSARTAN POTASSIUM 100 MG: 50 TABLET ORAL at 08:30

## 2019-09-09 RX ADMIN — CHLORHEXIDINE GLUCONATE 15 ML: 1.2 RINSE ORAL at 22:20

## 2019-09-09 RX ADMIN — MUPIROCIN CALCIUM: 20 OINTMENT TOPICAL at 22:20

## 2019-09-09 RX ADMIN — ATENOLOL 50 MG: 50 TABLET ORAL at 13:08

## 2019-09-09 RX ADMIN — BUSPIRONE HYDROCHLORIDE 10 MG: 10 TABLET ORAL at 21:10

## 2019-09-09 RX ADMIN — TAMSULOSIN HYDROCHLORIDE 0.4 MG: 0.4 CAPSULE ORAL at 08:30

## 2019-09-09 RX ADMIN — BUSPIRONE HYDROCHLORIDE 10 MG: 10 TABLET ORAL at 08:30

## 2019-09-09 RX ADMIN — INSULIN LISPRO 4 UNITS: 100 INJECTION, SOLUTION INTRAVENOUS; SUBCUTANEOUS at 13:09

## 2019-09-09 RX ADMIN — INSULIN DETEMIR 15 UNITS: 100 INJECTION, SOLUTION SUBCUTANEOUS at 21:10

## 2019-09-09 RX ADMIN — TEMAZEPAM 7.5 MG: 7.5 CAPSULE ORAL at 22:20

## 2019-09-09 RX ADMIN — HYDROCHLOROTHIAZIDE 25 MG: 25 TABLET ORAL at 08:29

## 2019-09-09 RX ADMIN — INSULIN LISPRO 3 UNITS: 100 INJECTION, SOLUTION INTRAVENOUS; SUBCUTANEOUS at 21:10

## 2019-09-09 NOTE — PROGRESS NOTES
CTS Progress Note       LOS: 3 days   Patient Care Team:  Shirlene Sharpe MD as PCP - General (Internal Medicine)    Chief Complaint: Unstable angina (CMS/HCC)    Vital Signs:  Temp:  [97.2 °F (36.2 °C)-98 °F (36.7 °C)] 97.2 °F (36.2 °C)  Heart Rate:  [50-76] 76  Resp:  [18] 18  BP: (113-170)/(63-98) 116/98    Physical Exam:  No further angina.  Breathing unlabored.  Abdomen obese soft and nontender.     Results:   Results from last 7 days   Lab Units 09/06/19  0709   WBC 10*3/mm3 4.99   HEMOGLOBIN g/dL 14.1   HEMATOCRIT % 40.9   PLATELETS 10*3/mm3 219     Results from last 7 days   Lab Units 09/07/19  0535   SODIUM mmol/L 140   POTASSIUM mmol/L 5.1   CHLORIDE mmol/L 101   CO2 mmol/L 30.0*   BUN mg/dL 22   CREATININE mg/dL 1.00   GLUCOSE mg/dL 228*   CALCIUM mg/dL 10.1           Imaging Results (last 24 hours)     ** No results found for the last 24 hours. **          Assessment      Unstable angina (CMS/HCC)    Coronary artery disease    Type 2 diabetes mellitus with circulatory disorder, without long-term current use of insulin (CMS/HCC)    Benign essential HTN    This patient is currently scheduled for coronary bypass grafting tomorrow at 7 AM.  However I discussed his case over the weekend with Dr. Carranza who read his echocardiogram demonstrating there may be aortic valvular disease.  Also discussed this on 2 occasions with Dr. Pineda today.  I reviewed the echocardiogram images and report there appears to be a significant gradient across the valve.  I have now discussed with the family how this changes our operative approach and we will perform a combined aortic valve replacement and coronary surgery.  Patient is aware that this increases his risk of stroke bleeding infection and death no guarantees were made as to outcome.  He is agreeable to proceed.  We discussed the bioprosthetic valve and he is agreeable to proceed with that  Plan   Preop for coronary bypass grafting with aortic valve  replacement for tomorrow    Please note that portions of this note were completed with a voice recognition program. Efforts were made to edit the dictations, but occasionally words are mistranscribed.    Denys Goldsmith MD  09/09/19  7:14 AM

## 2019-09-09 NOTE — PROGRESS NOTES
Continued Stay Note  [facility]     Patient Name: Khoa Arnold  MRN: [id number]  Today's Date: 9/9/2019    Admit Date: 9/6/2019    Discharge Plan     Row Name 09/09/19 1453       Plan    Plan  Home    Patient/Family in Agreement with Plan  yes    Plan Comments  Discussed in rounds that Pt to have CABG and AVR on Tuesday 9/10. CM will follow up post op regarding d/c needs.         Discharge Codes    No documentation.       Expected Discharge Date and Time     Expected Discharge Date Expected Discharge Time    Sep 14, 2019             Gertrude Greene, RN

## 2019-09-09 NOTE — CONSULTS
Diabetes educator follow up from visit on 9/6/19 to instruct on insulin administration.  Discussed with Mr. Arnold about going home on insulin. He stated that he hadn't yet decided on taking insulin. He states his BG levels haven't been any better here that at home on oral medications. Discussed how illness and stress can elevate BG levels while hospitalized.  Reviewed His A1c result of 9.7% and the significance of this result. Stressed importance of keeping BG levels well controlled post CABG, which he is scheduled for tomorrow, to prevent potential complications and promote healing of incision. Mr. Arnold states he wants to wait until after his surgery to be taught insulin administration, if he decides to take it at home. Spoke with his RN, Liliana, of his wanting to wait until post op. Please re consult post op as needed.

## 2019-09-09 NOTE — PROGRESS NOTES
[facility] Medicine Services  PROGRESS NOTE    Patient Name: Khoa Arnold  : 1955  MRN: [id number]    Date of Admission: 2019  Length of Stay: 3  Primary Care Physician: Shirlene Sharpe MD    Subjective   Subjective   CC:  Follow up medical management of diabetes    HPI:  Patient sitting up in chair with family and friends in the room talking to him.  Denies any chest pain at this time, but does complain of mild burning under left arm.  Does have  dyspnea on exertion, so he is not working with PT/OT at this time.  Patient is scheduled for CABG in the morning.  Positive BM yesterday.  No adverse events overnight.    Review of Systems  Gen- No fevers, chills  CV- No chest pain, palpitations; +Mild burning under left arm  Resp- No cough, + ongoing dyspnea with exertion  GI- no N/V/D, abd pain  All other systems reviewed and negative except any additional pertinent positives and negatives discussed in HPI.     Objective   Objective   Vital Signs:   Temp:  [97.2 °F (36.2 °C)-98.1 °F (36.7 °C)] 98.1 °F (36.7 °C)  Heart Rate:  [50-76] 52  Resp:  [16-18] 16  BP: (109-125)/(63-99) 109/99  Physical Exam:  Constitutional: Alert, WD, obese male in NAD  Eyes: EOMI, sclerae anicteric, no conjunctival injection  Head: NCAT  ENT: [place], moist mucous membranes   Respiratory: Non-labored, symmetrical chest expansion, CTAB  Cardiovascular: RRR, no R/G, +murmur, +DP pulses bilaterally  Gastrointestinal: Obese, soft, NT, ND +BS  Musculoskeletal: RODRIGUEZ; no LE edema bilaterally  Neurologic: Oriented x4, strength symmetric in all extremities, follows all commands, speech clear  Skin: No rashes on exposed skin  Psychiatric: Pleasant and cooperative; normal affect    Results Reviewed:  Results from last 7 days   Lab Units 19  1100 19  0709 19  0701   WBC 10*3/mm3  --  4.99  --    HEMOGLOBIN g/dL  --  14.1  --    HEMOGLOBIN, POC g/dL  --   --  13.3   HEMATOCRIT %  --  40.9  --     HEMATOCRIT POC %  --   --  39   PLATELETS 10*3/mm3  --  219  --    INR  1.12  --   --      Results from last 7 days   Lab Units 09/07/19  0535 09/06/19  0709 09/06/19  0701   SODIUM mmol/L 140 136  --    POTASSIUM mmol/L 5.1 4.1  --    CHLORIDE mmol/L 101 98  --    CO2 mmol/L 30.0* 25.0  --    BUN mg/dL 22 23  --    CREATININE mg/dL 1.00 0.96 0.90   GLUCOSE mg/dL 228* 296*  --    CALCIUM mg/dL 10.1 9.4  --      Estimated Creatinine Clearance: 88.2 mL/min (by C-G formula based on SCr of 1 mg/dL).    Microbiology Results Abnormal     None        Imaging Results (last 24 hours)     ** No results found for the last 24 hours. **        Results for orders placed during the hospital encounter of 09/06/19   Adult Transthoracic Echo Complete W/ Cont if Necessary Per Protocol    Narrative · Moderate aortic valve stenosis is present.  · Peak velocity of the flow distal to the aortic valve is 372.0 cm/s.  · Aortic valve maximum pressure gradient is 55.0 mmHg.  · Aortic valve mean pressure gradient is 32.5 mmHg.  · Aortic valve area is 0.68 cm2.  · Mild mitral valve regurgitation is present  · Calculated right ventricular systolic pressure from tricuspid   regurgitation is 23 mmHg.  · Estimated EF = 55%.  · Left ventricular systolic function is normal.        I have reviewed the medications:  Scheduled Meds:    amLODIPine 10 mg Oral Daily   aspirin 81 mg Oral Daily   atenolol 50 mg Oral Daily   busPIRone 10 mg Oral BID   dicyclomine 20 mg Oral Daily   hydrochlorothiazide 25 mg Oral Daily   insulin detemir 20 Units Subcutaneous Nightly   insulin lispro 0-7 Units Subcutaneous 4x Daily With Meals & Nightly   insulin lispro 5 Units Subcutaneous TID With Meals   isosorbide mononitrate 30 mg Oral Q24H   losartan 100 mg Oral Daily   pantoprazole 40 mg Oral Q AM   pharmacy consult - MTM  Does not apply Daily   tamsulosin 0.4 mg Oral Daily     Continuous Infusions:   PRN Meds:.•  acetaminophen  •  ALPRAZolam  •  dextrose  •   dextrose  •  glucagon (human recombinant)  •  HYDROcodone-acetaminophen  •  Morphine **AND** naloxone  •  nitroglycerin  •  ondansetron  •  sodium chloride  •  temazepam    Assessment/Plan   Assessment / Plan     Active Hospital Problems    Diagnosis  POA   • **Unstable angina (CMS/HCC) [I20.0]  Unknown   • Type 2 diabetes mellitus with circulatory disorder, without long-term current use of insulin (CMS/HCC) [E11.59]  Unknown   • Benign essential HTN [I10]  Yes   • Coronary artery disease [I25.10]  Unknown      Resolved Hospital Problems   No resolved problems to display.     Brief Hospital Course to date:  Khoa Arnold is a 64 y.o. male with PMH significant for T2DM, HTN and CAD who presented to [facility] on 9/6/19 with unstable angina.  S/p LHC which showed MVCAD and plan is for CABG on Tuesday, 9/10.  Hospitalists consulted for management of T2DM.    MVCAD  - Cards and CTS managing  - plan for CABG on 9/10  --NPO after MN  --AM labs    T2DM  - Current A1c 9.7%, uncontrolled  -24hr glucose range 261-332  - changed levemir to BID, continue meal time insulin and SSI  - continue to monitor and adjust as needed  - discussed with patient the importance of keeping glucose low as this plays role on overall health as well as cardiac health and recovery from surgery  --Patient states A1c was done around 7 when he was walking daily; is interested on going home with oral medication; not insulin    Nausea  PND  - added daily protonix, prn zofran and saline NS.      H/O CVA  - carotid US recommended given risk factors    HTN  HL  --continue home dose amlodipine, atenolol, Imdur hydrochlorothiazide, Cozaar  --currently not on a statin in hospital, but takes pravastatin at home    Disposition: I expect the patient to be discharged per primary service.     CODE STATUS:   Code Status and Medical Interventions:   Ordered at: 09/06/19 0956     Level Of Support Discussed With:    Patient     Code Status:    CPR     Medical  Interventions (Level of Support Prior to Arrest):    Full     Electronically signed by ELENO Ricci, 09/09/19, 10:26 AM.

## 2019-09-09 NOTE — PLAN OF CARE
Problem: Patient Care Overview  Goal: Plan of Care Review   09/09/19 0407   Coping/Psychosocial   Plan of Care Reviewed With patient;spouse   OTHER   Outcome Summary VSS. No c/o chest pain today. No c/o nausea during shift. Spouse at bedside. Pt rested well throughout night. Will continue to monitor.   Plan of Care Review   Progress no change     Goal: Individualization and Mutuality  Outcome: Ongoing (interventions implemented as appropriate)    Goal: Discharge Needs Assessment  Outcome: Ongoing (interventions implemented as appropriate)    Goal: Interprofessional Rounds/Family Conf  Outcome: Ongoing (interventions implemented as appropriate)      Problem: Cardiac: ACS (Acute Coronary Syndrome) (Adult)  Goal: Signs and Symptoms of Listed Potential Problems Will be Absent, Minimized or Managed (Cardiac: ACS)  Outcome: Ongoing (interventions implemented as appropriate)

## 2019-09-09 NOTE — PROGRESS NOTES
[facility]       LOS: 3 days   Patient Care Team:  Shirlene Sharpe MD as PCP - General (Internal Medicine)        Subjective       Patient Denies:  Cp, sob, palpitations.      Vital Signs  Temp:  [97.2 °F (36.2 °C)-98.1 °F (36.7 °C)] 98.1 °F (36.7 °C)  Heart Rate:  [50-76] 52  Resp:  [16-18] 16  BP: (109-125)/(63-99) 109/99    Intake/Output Summary (Last 24 hours) at 9/9/2019 0932  Last data filed at 9/8/2019 2357  Gross per 24 hour   Intake 720 ml   Output 350 ml   Net 370 ml     No intake/output data recorded.    Physical Exam:     General Appearance:    Alert, cooperative, in no acute distress       Neck:   No adenopathy, supple, trachea midline, no thyromegaly, no JVD       Lungs:     Clear to auscultation,respirations regular, even and                  unlabored    Heart:    Regular rhythm and normal rate, normal S1 and S2, 2/6            murmur, no gallop, no rub, no click   Chest Wall:    No abnormalities observed   Abdomen:     Normal bowel sounds, no masses, no organomegaly, soft        non-tender, non-distended       Extremities:   Moves all extremities well, no edema, no cyanosis, no             redness   Pulses:   Pulses palpable and equal bilaterally     Results Review:     I reviewed the patient's new clinical results.      WBC No results found for: WBC         HGB No results found for: HGB        HCT No results found for: HCT         Platlets No results found for: PLT  Sodium  Sodium   Date/Time Value Ref Range Status   09/07/2019 0535 140 136 - 145 mmol/L Final     Potassium  Potassium   Date/Time Value Ref Range Status   09/07/2019 0535 5.1 3.5 - 5.2 mmol/L Final     Chloride  Chloride   Date/Time Value Ref Range Status   09/07/2019 0535 101 98 - 107 mmol/L Final     BicarbonateNo results found for: PLASMABICARB    BUN BUN   Date/Time Value Ref Range Status   09/07/2019 0535 22 8 - 23 mg/dL Final      Creatinine Creatinine    Date/Time Value Ref Range Status   09/07/2019 0535 1.00 0.76 - 1.27 mg/dL Final      Calcium Calcium   Date/Time Value Ref Range Status   09/07/2019 0535 10.1 8.6 - 10.5 mg/dL Final      Mag @RESULFAST(MG:3)@        PT/INR:       Lab Results   Component Value Date    PROTIME 13.9 09/06/2019    INR 1.12 09/06/2019      Troponin I:  No results found for: TROPONINI No results found for: CKTOTAL, CKMB, CKMBINDEX, POCRTROPI, TROPONINT      amLODIPine 10 mg Oral Daily   aspirin 81 mg Oral Daily   atenolol 50 mg Oral Daily   busPIRone 10 mg Oral BID   dicyclomine 20 mg Oral Daily   hydrochlorothiazide 25 mg Oral Daily   insulin detemir 20 Units Subcutaneous Nightly   insulin lispro 0-7 Units Subcutaneous 4x Daily With Meals & Nightly   insulin lispro 5 Units Subcutaneous TID With Meals   isosorbide mononitrate 30 mg Oral Q24H   losartan 100 mg Oral Daily   pantoprazole 40 mg Oral Q AM   pharmacy consult - MTM  Does not apply Daily   tamsulosin 0.4 mg Oral Daily          Assessment/Plan     Patient Active Problem List   Diagnosis Code   • Unstable angina (CMS/[facility]) I20.0   • Coronary artery disease I25.10   • Type 2 diabetes mellitus with circulatory disorder, without long-term current use of insulin (CMS/[facility]) E11.59   • Benign essential HTN I10     CV stable  Normal EF  CABG/AVR in am    JANAK Sanches  09/09/19  9:32 AM

## 2019-09-09 NOTE — NURSING NOTE
Pt. Referred for Phase II Cardiac Rehab. Staff discussed benefits of exercise, program protocol, and educational material provided. Teach back verified.  Patient would like for staff to follow up with him after surgery to further discuss the program.  Staff left brochure with patient and will follow up with him after his surgery.

## 2019-09-10 ENCOUNTER — ANCILLARY PROCEDURE (OUTPATIENT)
Dept: PERIOP | Facility: HOSPITAL | Age: 64
End: 2019-09-10

## 2019-09-10 ENCOUNTER — ANESTHESIA (OUTPATIENT)
Dept: PERIOP | Facility: HOSPITAL | Age: 64
End: 2019-09-10

## 2019-09-10 ENCOUNTER — APPOINTMENT (OUTPATIENT)
Dept: GENERAL RADIOLOGY | Facility: HOSPITAL | Age: 64
End: 2019-09-10

## 2019-09-10 LAB
ACT BLD: 103 SECONDS (ref 82–152)
ACT BLD: 109 SECONDS (ref 82–152)
ACT BLD: 411 SECONDS (ref 82–152)
ACT BLD: 444 SECONDS (ref 82–152)
ACT BLD: 488 SECONDS (ref 82–152)
ACT BLD: 538 SECONDS (ref 82–152)
ACT BLD: 675 SECONDS (ref 82–152)
ACT BLD: 714 SECONDS (ref 82–152)
ALBUMIN SERPL-MCNC: 3.4 G/DL (ref 3.5–5.2)
ALBUMIN SERPL-MCNC: 3.9 G/DL (ref 3.5–5.2)
ANION GAP SERPL CALCULATED.3IONS-SCNC: 13 MMOL/L (ref 5–15)
ANION GAP SERPL CALCULATED.3IONS-SCNC: 14 MMOL/L (ref 5–15)
ANION GAP SERPL CALCULATED.3IONS-SCNC: 14 MMOL/L (ref 5–15)
APTT PPP: 27.7 SECONDS (ref 24–37)
ARTERIAL PATENCY WRIST A: ABNORMAL
ARTERIAL PATENCY WRIST A: ABNORMAL
ATMOSPHERIC PRESS: ABNORMAL MM[HG]
ATMOSPHERIC PRESS: ABNORMAL MM[HG]
BASE EXCESS BLDA CALC-SCNC: -0.4 MMOL/L (ref 0–2)
BASE EXCESS BLDA CALC-SCNC: -2 MMOL/L (ref -5–5)
BASE EXCESS BLDA CALC-SCNC: -5 MMOL/L (ref -5–5)
BASE EXCESS BLDA CALC-SCNC: 0 MMOL/L (ref -5–5)
BASE EXCESS BLDA CALC-SCNC: 0.5 MMOL/L (ref 0–2)
BASE EXCESS BLDA CALC-SCNC: 1 MMOL/L (ref -5–5)
BASE EXCESS BLDA CALC-SCNC: 2 MMOL/L (ref -5–5)
BASOPHILS # BLD AUTO: 0.05 10*3/MM3 (ref 0–0.2)
BASOPHILS NFR BLD AUTO: 0.8 % (ref 0–1.5)
BDY SITE: ABNORMAL
BDY SITE: ABNORMAL
BH CV XLRA MEAS LEFT CCA RATIO VEL: 79.5 CM/SEC
BH CV XLRA MEAS LEFT DIST CCA EDV: 20.1 CM/SEC
BH CV XLRA MEAS LEFT DIST CCA PSV: 80.3 CM/SEC
BH CV XLRA MEAS LEFT DIST ICA EDV: 33.9 CM/SEC
BH CV XLRA MEAS LEFT DIST ICA PSV: 84.2 CM/SEC
BH CV XLRA MEAS LEFT ICA RATIO VEL: 81.2 CM/SEC
BH CV XLRA MEAS LEFT ICA/CCA RATIO: 1
BH CV XLRA MEAS LEFT MID CCA EDV: 22.7 CM/SEC
BH CV XLRA MEAS LEFT MID CCA PSV: 86.4 CM/SEC
BH CV XLRA MEAS LEFT MID ICA EDV: 16.3 CM/SEC
BH CV XLRA MEAS LEFT MID ICA PSV: 71 CM/SEC
BH CV XLRA MEAS LEFT PROX CCA EDV: 26.2 CM/SEC
BH CV XLRA MEAS LEFT PROX CCA PSV: 103.9 CM/SEC
BH CV XLRA MEAS LEFT PROX ECA PSV: 146.5 CM/SEC
BH CV XLRA MEAS LEFT PROX ICA EDV: 31.4 CM/SEC
BH CV XLRA MEAS LEFT PROX ICA PSV: 82.1 CM/SEC
BH CV XLRA MEAS LEFT PROX SCLA PSV: 176.8 CM/SEC
BH CV XLRA MEAS LEFT VERTEBRAL A PSV: 46.5 CM/SEC
BH CV XLRA MEAS RIGHT CCA RATIO VEL: 84.9 CM/SEC
BH CV XLRA MEAS RIGHT DIST CCA EDV: 19.6 CM/SEC
BH CV XLRA MEAS RIGHT DIST CCA PSV: 85.6 CM/SEC
BH CV XLRA MEAS RIGHT DIST ICA EDV: 17 CM/SEC
BH CV XLRA MEAS RIGHT DIST ICA PSV: 51 CM/SEC
BH CV XLRA MEAS RIGHT ICA RATIO VEL: 92.7 CM/SEC
BH CV XLRA MEAS RIGHT ICA/CCA RATIO: 1.1
BH CV XLRA MEAS RIGHT MID CCA EDV: 20.4 CM/SEC
BH CV XLRA MEAS RIGHT MID CCA PSV: 68.4 CM/SEC
BH CV XLRA MEAS RIGHT MID ICA EDV: 24.8 CM/SEC
BH CV XLRA MEAS RIGHT MID ICA PSV: 93.2 CM/SEC
BH CV XLRA MEAS RIGHT PROX CCA EDV: 12.6 CM/SEC
BH CV XLRA MEAS RIGHT PROX CCA PSV: 164.7 CM/SEC
BH CV XLRA MEAS RIGHT PROX ECA PSV: 143.7 CM/SEC
BH CV XLRA MEAS RIGHT PROX ICA EDV: 18 CM/SEC
BH CV XLRA MEAS RIGHT PROX ICA PSV: 58.8 CM/SEC
BH CV XLRA MEAS RIGHT PROX SCLA PSV: 150.3 CM/SEC
BH CV XLRA MEAS RIGHT VERTEBRAL A PSV: 35.8 CM/SEC
BODY TEMPERATURE: 98.6 C
BODY TEMPERATURE: 98.6 C
BUN BLD-MCNC: 21 MG/DL (ref 8–23)
BUN BLD-MCNC: 22 MG/DL (ref 8–23)
BUN BLD-MCNC: 25 MG/DL (ref 8–23)
BUN/CREAT SERPL: 17.9 (ref 7–25)
BUN/CREAT SERPL: 20.2 (ref 7–25)
BUN/CREAT SERPL: 20.3 (ref 7–25)
CA-I BLDA-SCNC: 0.87 MMOL/L (ref 1.2–1.32)
CA-I BLDA-SCNC: 0.99 MMOL/L (ref 1.2–1.32)
CA-I BLDA-SCNC: 1.03 MMOL/L (ref 1.2–1.32)
CA-I BLDA-SCNC: 1.04 MMOL/L (ref 1.2–1.32)
CA-I BLDA-SCNC: 1.17 MMOL/L (ref 1.2–1.32)
CA-I BLDA-SCNC: 1.2 MMOL/L (ref 1.2–1.32)
CA-I BLDA-SCNC: 1.28 MMOL/L (ref 1.2–1.32)
CA-I SERPL ISE-MCNC: 1.3 MMOL/L (ref 1.12–1.32)
CALCIUM SPEC-SCNC: 8.6 MG/DL (ref 8.6–10.5)
CALCIUM SPEC-SCNC: 9.1 MG/DL (ref 8.6–10.5)
CALCIUM SPEC-SCNC: 9.7 MG/DL (ref 8.6–10.5)
CHLORIDE SERPL-SCNC: 103 MMOL/L (ref 98–107)
CHLORIDE SERPL-SCNC: 105 MMOL/L (ref 98–107)
CHLORIDE SERPL-SCNC: 99 MMOL/L (ref 98–107)
CO2 BLDA-SCNC: 23 MMOL/L (ref 24–29)
CO2 BLDA-SCNC: 25 MMOL/L (ref 24–29)
CO2 BLDA-SCNC: 26.2 MMOL/L (ref 23–27)
CO2 BLDA-SCNC: 26.8 MMOL/L (ref 23–27)
CO2 BLDA-SCNC: 28 MMOL/L (ref 24–29)
CO2 BLDA-SCNC: 29 MMOL/L (ref 24–29)
CO2 BLDA-SCNC: 29 MMOL/L (ref 24–29)
CO2 SERPL-SCNC: 25 MMOL/L (ref 22–29)
CO2 SERPL-SCNC: 25 MMOL/L (ref 22–29)
CO2 SERPL-SCNC: 27 MMOL/L (ref 22–29)
COHGB MFR BLD: 1.1 % (ref 0–2)
COHGB MFR BLD: 1.2 % (ref 0–2)
CPAP: 5 CMH2O
CREAT BLD-MCNC: 1.09 MG/DL (ref 0.76–1.27)
CREAT BLD-MCNC: 1.17 MG/DL (ref 0.76–1.27)
CREAT BLD-MCNC: 1.23 MG/DL (ref 0.76–1.27)
DEPRECATED RDW RBC AUTO: 41.3 FL (ref 37–54)
DEPRECATED RDW RBC AUTO: 41.3 FL (ref 37–54)
DEPRECATED RDW RBC AUTO: 42.4 FL (ref 37–54)
DEPRECATED RDW RBC AUTO: 43.1 FL (ref 37–54)
EOSINOPHIL # BLD AUTO: 0.23 10*3/MM3 (ref 0–0.4)
EOSINOPHIL NFR BLD AUTO: 3.9 % (ref 0.3–6.2)
ERYTHROCYTE [DISTWIDTH] IN BLOOD BY AUTOMATED COUNT: 12.6 % (ref 12.3–15.4)
ERYTHROCYTE [DISTWIDTH] IN BLOOD BY AUTOMATED COUNT: 12.6 % (ref 12.3–15.4)
ERYTHROCYTE [DISTWIDTH] IN BLOOD BY AUTOMATED COUNT: 12.8 % (ref 12.3–15.4)
ERYTHROCYTE [DISTWIDTH] IN BLOOD BY AUTOMATED COUNT: 13 % (ref 12.3–15.4)
GFR SERPL CREATININE-BSD FRML MDRD: 59 ML/MIN/1.73
GFR SERPL CREATININE-BSD FRML MDRD: 63 ML/MIN/1.73
GFR SERPL CREATININE-BSD FRML MDRD: 68 ML/MIN/1.73
GLUCOSE BLD-MCNC: 151 MG/DL (ref 65–99)
GLUCOSE BLD-MCNC: 161 MG/DL (ref 65–99)
GLUCOSE BLD-MCNC: 274 MG/DL (ref 65–99)
GLUCOSE BLDC GLUCOMTR-MCNC: 123 MG/DL (ref 70–130)
GLUCOSE BLDC GLUCOMTR-MCNC: 124 MG/DL (ref 70–130)
GLUCOSE BLDC GLUCOMTR-MCNC: 136 MG/DL (ref 70–130)
GLUCOSE BLDC GLUCOMTR-MCNC: 139 MG/DL (ref 70–130)
GLUCOSE BLDC GLUCOMTR-MCNC: 140 MG/DL (ref 70–130)
GLUCOSE BLDC GLUCOMTR-MCNC: 142 MG/DL (ref 70–130)
GLUCOSE BLDC GLUCOMTR-MCNC: 148 MG/DL (ref 70–130)
GLUCOSE BLDC GLUCOMTR-MCNC: 150 MG/DL (ref 70–130)
GLUCOSE BLDC GLUCOMTR-MCNC: 157 MG/DL (ref 70–130)
GLUCOSE BLDC GLUCOMTR-MCNC: 158 MG/DL (ref 70–130)
GLUCOSE BLDC GLUCOMTR-MCNC: 160 MG/DL (ref 70–130)
GLUCOSE BLDC GLUCOMTR-MCNC: 166 MG/DL (ref 70–130)
GLUCOSE BLDC GLUCOMTR-MCNC: 265 MG/DL (ref 70–130)
HCO3 BLDA-SCNC: 21.2 MMOL/L (ref 22–26)
HCO3 BLDA-SCNC: 23.6 MMOL/L (ref 22–26)
HCO3 BLDA-SCNC: 24.9 MMOL/L (ref 20–26)
HCO3 BLDA-SCNC: 25.5 MMOL/L (ref 20–26)
HCO3 BLDA-SCNC: 26.2 MMOL/L (ref 22–26)
HCO3 BLDA-SCNC: 26.6 MMOL/L (ref 22–26)
HCO3 BLDA-SCNC: 27 MMOL/L (ref 22–26)
HCO3 BLDA-SCNC: 27.3 MMOL/L (ref 22–26)
HCO3 BLDA-SCNC: 27.6 MMOL/L (ref 22–26)
HCT VFR BLD AUTO: 23.9 % (ref 37.5–51)
HCT VFR BLD AUTO: 24.6 % (ref 37.5–51)
HCT VFR BLD AUTO: 24.8 % (ref 37.5–51)
HCT VFR BLD AUTO: 41.4 % (ref 37.5–51)
HCT VFR BLD CALC: 26.5 %
HCT VFR BLD CALC: 26.5 %
HCT VFR BLDA CALC: 24 % (ref 38–51)
HCT VFR BLDA CALC: 24 % (ref 38–51)
HCT VFR BLDA CALC: 26 % (ref 38–51)
HCT VFR BLDA CALC: 33 % (ref 38–51)
HCT VFR BLDA CALC: 37 % (ref 38–51)
HGB BLD-MCNC: 13.9 G/DL (ref 13–17.7)
HGB BLD-MCNC: 8 G/DL (ref 13–17.7)
HGB BLD-MCNC: 8.4 G/DL (ref 13–17.7)
HGB BLD-MCNC: 8.5 G/DL (ref 13–17.7)
HGB BLDA-MCNC: 11.2 G/DL (ref 12–17)
HGB BLDA-MCNC: 12.6 G/DL (ref 12–17)
HGB BLDA-MCNC: 8.2 G/DL (ref 12–17)
HGB BLDA-MCNC: 8.2 G/DL (ref 12–17)
HGB BLDA-MCNC: 8.6 G/DL (ref 13.5–17.5)
HGB BLDA-MCNC: 8.7 G/DL (ref 13.5–17.5)
HGB BLDA-MCNC: 8.8 G/DL (ref 12–17)
HOROWITZ INDEX BLD+IHG-RTO: 100 %
HOROWITZ INDEX BLD+IHG-RTO: 50 %
IMM GRANULOCYTES # BLD AUTO: 0.02 10*3/MM3 (ref 0–0.05)
IMM GRANULOCYTES NFR BLD AUTO: 0.3 % (ref 0–0.5)
INR PPP: 1.43 (ref 0.85–1.16)
LYMPHOCYTES # BLD AUTO: 2.13 10*3/MM3 (ref 0.7–3.1)
LYMPHOCYTES NFR BLD AUTO: 36 % (ref 19.6–45.3)
MAGNESIUM SERPL-MCNC: 1.9 MG/DL (ref 1.6–2.4)
MAGNESIUM SERPL-MCNC: 2.2 MG/DL (ref 1.6–2.4)
MCH RBC QN AUTO: 30.5 PG (ref 26.6–33)
MCH RBC QN AUTO: 31.1 PG (ref 26.6–33)
MCH RBC QN AUTO: 31.2 PG (ref 26.6–33)
MCH RBC QN AUTO: 31.4 PG (ref 26.6–33)
MCHC RBC AUTO-ENTMCNC: 33.5 G/DL (ref 31.5–35.7)
MCHC RBC AUTO-ENTMCNC: 33.6 G/DL (ref 31.5–35.7)
MCHC RBC AUTO-ENTMCNC: 34.1 G/DL (ref 31.5–35.7)
MCHC RBC AUTO-ENTMCNC: 34.3 G/DL (ref 31.5–35.7)
MCV RBC AUTO: 90.8 FL (ref 79–97)
MCV RBC AUTO: 91.4 FL (ref 79–97)
MCV RBC AUTO: 91.5 FL (ref 79–97)
MCV RBC AUTO: 93 FL (ref 79–97)
METHGB BLD QL: 1.3 % (ref 0–1.5)
METHGB BLD QL: 1.7 % (ref 0–1.5)
MODALITY: ABNORMAL
MODALITY: ABNORMAL
MONOCYTES # BLD AUTO: 0.55 10*3/MM3 (ref 0.1–0.9)
MONOCYTES NFR BLD AUTO: 9.3 % (ref 5–12)
NEUTROPHILS # BLD AUTO: 2.94 10*3/MM3 (ref 1.7–7)
NEUTROPHILS NFR BLD AUTO: 49.7 % (ref 42.7–76)
NOTE: ABNORMAL
NOTE: ABNORMAL
NRBC BLD AUTO-RTO: 0 /100 WBC (ref 0–0.2)
OXYHGB MFR BLDV: 94.6 % (ref 94–99)
OXYHGB MFR BLDV: 97 % (ref 94–99)
PCO2 BLDA: 41.8 MM HG
PCO2 BLDA: 42.7 MM HG
PCO2 BLDA: 44.4 MM HG (ref 35–45)
PCO2 BLDA: 45.3 MM HG (ref 35–45)
PCO2 BLDA: 45.4 MM HG (ref 35–45)
PCO2 BLDA: 45.7 MM HG (ref 35–45)
PCO2 BLDA: 47.7 MM HG (ref 35–45)
PCO2 BLDA: 47.9 MM HG (ref 35–45)
PCO2 BLDA: 51.4 MM HG (ref 35–45)
PCO2 TEMP ADJ BLD: 41.8 MM HG (ref 35–48)
PCO2 TEMP ADJ BLD: 42.7 MM HG (ref 35–48)
PEEP RESPIRATORY: 5 CM[H2O]
PH BLDA: 7.29 PH UNITS (ref 7.35–7.6)
PH BLDA: 7.32 PH UNITS (ref 7.35–7.6)
PH BLDA: 7.32 PH UNITS (ref 7.35–7.6)
PH BLDA: 7.36 PH UNITS (ref 7.35–7.6)
PH BLDA: 7.37 PH UNITS (ref 7.35–7.45)
PH BLDA: 7.37 PH UNITS (ref 7.35–7.6)
PH BLDA: 7.38 PH UNITS (ref 7.35–7.6)
PH BLDA: 7.38 PH UNITS (ref 7.35–7.6)
PH BLDA: 7.39 PH UNITS (ref 7.35–7.45)
PH, TEMP CORRECTED: 7.37 PH UNITS
PH, TEMP CORRECTED: 7.39 PH UNITS
PHOSPHATE SERPL-MCNC: 3.3 MG/DL (ref 2.5–4.5)
PHOSPHATE SERPL-MCNC: 3.4 MG/DL (ref 2.5–4.5)
PLATELET # BLD AUTO: 157 10*3/MM3 (ref 140–450)
PLATELET # BLD AUTO: 184 10*3/MM3 (ref 140–450)
PLATELET # BLD AUTO: 186 10*3/MM3 (ref 140–450)
PLATELET # BLD AUTO: 227 10*3/MM3 (ref 140–450)
PMV BLD AUTO: 9.1 FL (ref 6–12)
PMV BLD AUTO: 9.3 FL (ref 6–12)
PMV BLD AUTO: 9.4 FL (ref 6–12)
PMV BLD AUTO: 9.4 FL (ref 6–12)
PO2 BLDA: 256 MM HG (ref 83–108)
PO2 BLDA: 260 MMHG (ref 80–105)
PO2 BLDA: 265 MMHG (ref 80–105)
PO2 BLDA: 280 MMHG (ref 80–105)
PO2 BLDA: 392 MMHG (ref 80–105)
PO2 BLDA: 394 MMHG (ref 80–105)
PO2 BLDA: 68 MMHG (ref 80–105)
PO2 BLDA: 85.5 MM HG (ref 83–108)
PO2 BLDA: 91 MMHG (ref 80–105)
PO2 TEMP ADJ BLD: 256 MM HG (ref 83–108)
PO2 TEMP ADJ BLD: 85.5 MM HG (ref 83–108)
POTASSIUM BLD-SCNC: 3.5 MMOL/L (ref 3.5–5.2)
POTASSIUM BLD-SCNC: 3.7 MMOL/L (ref 3.5–5.2)
POTASSIUM BLD-SCNC: 4.7 MMOL/L (ref 3.5–5.2)
POTASSIUM BLDA-SCNC: 3.5 MMOL/L (ref 3.5–4.9)
POTASSIUM BLDA-SCNC: 3.9 MMOL/L (ref 3.5–4.9)
POTASSIUM BLDA-SCNC: 4.2 MMOL/L (ref 3.5–4.9)
POTASSIUM BLDA-SCNC: 4.2 MMOL/L (ref 3.5–4.9)
POTASSIUM BLDA-SCNC: 4.3 MMOL/L (ref 3.5–4.9)
POTASSIUM BLDA-SCNC: 4.7 MMOL/L (ref 3.5–4.9)
POTASSIUM BLDA-SCNC: 4.7 MMOL/L (ref 3.5–4.9)
PROTHROMBIN TIME: 16.8 SECONDS (ref 11.2–14.3)
PSV: 10 CMH2O
PSV: 10 CMH2O
RBC # BLD AUTO: 2.57 10*6/MM3 (ref 4.14–5.8)
RBC # BLD AUTO: 2.69 10*6/MM3 (ref 4.14–5.8)
RBC # BLD AUTO: 2.71 10*6/MM3 (ref 4.14–5.8)
RBC # BLD AUTO: 4.56 10*6/MM3 (ref 4.14–5.8)
RIGHT ARM BP: NORMAL MMHG
SAO2 % BLDA: 100 % (ref 95–98)
SAO2 % BLDA: 92 % (ref 95–98)
SAO2 % BLDA: 97 % (ref 95–98)
SET MECH RESP RATE: 16
SODIUM BLD-SCNC: 139 MMOL/L (ref 136–145)
SODIUM BLD-SCNC: 142 MMOL/L (ref 136–145)
SODIUM BLD-SCNC: 144 MMOL/L (ref 136–145)
SODIUM BLDA-SCNC: 131 MMOL/L (ref 138–146)
SODIUM BLDA-SCNC: 137 MMOL/L (ref 138–146)
SODIUM BLDA-SCNC: 138 MMOL/L (ref 138–146)
SODIUM BLDA-SCNC: 138 MMOL/L (ref 138–146)
SODIUM BLDA-SCNC: 139 MMOL/L (ref 138–146)
SODIUM BLDA-SCNC: 139 MMOL/L (ref 138–146)
SODIUM BLDA-SCNC: 140 MMOL/L (ref 138–146)
TOTAL RATE: 16 BREATHS/MINUTE
VENTILATOR MODE: ABNORMAL
VENTILATOR MODE: ABNORMAL
VT ON VENT VENT: 660 ML
WBC NRBC COR # BLD: 3.88 10*3/MM3 (ref 3.4–10.8)
WBC NRBC COR # BLD: 4.96 10*3/MM3 (ref 3.4–10.8)
WBC NRBC COR # BLD: 5.6 10*3/MM3 (ref 3.4–10.8)
WBC NRBC COR # BLD: 5.92 10*3/MM3 (ref 3.4–10.8)

## 2019-09-10 PROCEDURE — 25010000002 FENTANYL CITRATE (PF) 100 MCG/2ML SOLUTION: Performed by: THORACIC SURGERY (CARDIOTHORACIC VASCULAR SURGERY)

## 2019-09-10 PROCEDURE — 25010000002 CEFUROXIME: Performed by: THORACIC SURGERY (CARDIOTHORACIC VASCULAR SURGERY)

## 2019-09-10 PROCEDURE — 85730 THROMBOPLASTIN TIME PARTIAL: CPT | Performed by: PHYSICIAN ASSISTANT

## 2019-09-10 PROCEDURE — 33533 CABG ARTERIAL SINGLE: CPT | Performed by: THORACIC SURGERY (CARDIOTHORACIC VASCULAR SURGERY)

## 2019-09-10 PROCEDURE — 25010000002 PROPOFOL 10 MG/ML EMULSION: Performed by: THORACIC SURGERY (CARDIOTHORACIC VASCULAR SURGERY)

## 2019-09-10 PROCEDURE — 85027 COMPLETE CBC AUTOMATED: CPT | Performed by: THORACIC SURGERY (CARDIOTHORACIC VASCULAR SURGERY)

## 2019-09-10 PROCEDURE — 02100Z9 BYPASS CORONARY ARTERY, ONE ARTERY FROM LEFT INTERNAL MAMMARY, OPEN APPROACH: ICD-10-PCS | Performed by: THORACIC SURGERY (CARDIOTHORACIC VASCULAR SURGERY)

## 2019-09-10 PROCEDURE — 84132 ASSAY OF SERUM POTASSIUM: CPT

## 2019-09-10 PROCEDURE — 82330 ASSAY OF CALCIUM: CPT

## 2019-09-10 PROCEDURE — 82947 ASSAY GLUCOSE BLOOD QUANT: CPT

## 2019-09-10 PROCEDURE — 021209W BYPASS CORONARY ARTERY, THREE ARTERIES FROM AORTA WITH AUTOLOGOUS VENOUS TISSUE, OPEN APPROACH: ICD-10-PCS | Performed by: THORACIC SURGERY (CARDIOTHORACIC VASCULAR SURGERY)

## 2019-09-10 PROCEDURE — C1894 INTRO/SHEATH, NON-LASER: HCPCS | Performed by: THORACIC SURGERY (CARDIOTHORACIC VASCULAR SURGERY)

## 2019-09-10 PROCEDURE — 82330 ASSAY OF CALCIUM: CPT | Performed by: PHYSICIAN ASSISTANT

## 2019-09-10 PROCEDURE — 25010000003 CEFUROXIME SODIUM 1.5 G RECONSTITUTED SOLUTION

## 2019-09-10 PROCEDURE — 93010 ELECTROCARDIOGRAM REPORT: CPT | Performed by: INTERNAL MEDICINE

## 2019-09-10 PROCEDURE — 82805 BLOOD GASES W/O2 SATURATION: CPT

## 2019-09-10 PROCEDURE — C1751 CATH, INF, PER/CENT/MIDLINE: HCPCS | Performed by: ANESTHESIOLOGY

## 2019-09-10 PROCEDURE — 25010000002 PAPAVERINE PER 60 MG: Performed by: THORACIC SURGERY (CARDIOTHORACIC VASCULAR SURGERY)

## 2019-09-10 PROCEDURE — 82803 BLOOD GASES ANY COMBINATION: CPT

## 2019-09-10 PROCEDURE — 25010000002 HEPARIN (PORCINE) PER 1000 UNITS: Performed by: THORACIC SURGERY (CARDIOTHORACIC VASCULAR SURGERY)

## 2019-09-10 PROCEDURE — 25010000002 PROTAMINE SULFATE PER 10 MG: Performed by: ANESTHESIOLOGY

## 2019-09-10 PROCEDURE — 5A1221Z PERFORMANCE OF CARDIAC OUTPUT, CONTINUOUS: ICD-10-PCS | Performed by: THORACIC SURGERY (CARDIOTHORACIC VASCULAR SURGERY)

## 2019-09-10 PROCEDURE — A4648 IMPLANTABLE TISSUE MARKER: HCPCS | Performed by: THORACIC SURGERY (CARDIOTHORACIC VASCULAR SURGERY)

## 2019-09-10 PROCEDURE — C1751 CATH, INF, PER/CENT/MIDLINE: HCPCS | Performed by: THORACIC SURGERY (CARDIOTHORACIC VASCULAR SURGERY)

## 2019-09-10 PROCEDURE — 85610 PROTHROMBIN TIME: CPT | Performed by: PHYSICIAN ASSISTANT

## 2019-09-10 PROCEDURE — 94799 UNLISTED PULMONARY SVC/PX: CPT

## 2019-09-10 PROCEDURE — P9041 ALBUMIN (HUMAN),5%, 50ML: HCPCS | Performed by: ANESTHESIOLOGY

## 2019-09-10 PROCEDURE — 25010000002 ALBUMIN HUMAN 5% PER 50 ML

## 2019-09-10 PROCEDURE — 25010000002 FENTANYL CITRATE (PF) 100 MCG/2ML SOLUTION: Performed by: ANESTHESIOLOGY

## 2019-09-10 PROCEDURE — 93005 ELECTROCARDIOGRAM TRACING: CPT | Performed by: ANESTHESIOLOGY

## 2019-09-10 PROCEDURE — 63710000001 INSULIN REGULAR HUMAN PER 5 UNITS: Performed by: PHYSICIAN ASSISTANT

## 2019-09-10 PROCEDURE — 25810000003 DEXTROSE 5 % WITH KCL 20 MEQ 20-5 MEQ/L-% SOLUTION: Performed by: PHYSICIAN ASSISTANT

## 2019-09-10 PROCEDURE — B24BZZ4 ULTRASONOGRAPHY OF HEART WITH AORTA, TRANSESOPHAGEAL: ICD-10-PCS | Performed by: ANESTHESIOLOGY

## 2019-09-10 PROCEDURE — 02RF08Z REPLACEMENT OF AORTIC VALVE WITH ZOOPLASTIC TISSUE, OPEN APPROACH: ICD-10-PCS | Performed by: THORACIC SURGERY (CARDIOTHORACIC VASCULAR SURGERY)

## 2019-09-10 PROCEDURE — 85027 COMPLETE CBC AUTOMATED: CPT | Performed by: PHYSICIAN ASSISTANT

## 2019-09-10 PROCEDURE — 25010000002 DOBUTAMINE PER 250 MG: Performed by: PHYSICIAN ASSISTANT

## 2019-09-10 PROCEDURE — 88305 TISSUE EXAM BY PATHOLOGIST: CPT | Performed by: THORACIC SURGERY (CARDIOTHORACIC VASCULAR SURGERY)

## 2019-09-10 PROCEDURE — 85347 COAGULATION TIME ACTIVATED: CPT

## 2019-09-10 PROCEDURE — 63710000001 INSULIN REGULAR HUMAN PER 5 UNITS: Performed by: THORACIC SURGERY (CARDIOTHORACIC VASCULAR SURGERY)

## 2019-09-10 PROCEDURE — 25010000002 PROPOFOL 10 MG/ML EMULSION: Performed by: ANESTHESIOLOGY

## 2019-09-10 PROCEDURE — 33519 CABG ARTERY-VEIN THREE: CPT | Performed by: THORACIC SURGERY (CARDIOTHORACIC VASCULAR SURGERY)

## 2019-09-10 PROCEDURE — 25010000002 ONDANSETRON PER 1 MG: Performed by: PHYSICIAN ASSISTANT

## 2019-09-10 PROCEDURE — 85025 COMPLETE CBC W/AUTO DIFF WBC: CPT | Performed by: NURSE PRACTITIONER

## 2019-09-10 PROCEDURE — 25010000002 PROTAMINE SULFATE PER 10 MG

## 2019-09-10 PROCEDURE — P9035 PLATELET PHERES LEUKOREDUCED: HCPCS

## 2019-09-10 PROCEDURE — 94002 VENT MGMT INPAT INIT DAY: CPT

## 2019-09-10 PROCEDURE — 25010000002 ALBUMIN HUMAN 5% PER 50 ML: Performed by: ANESTHESIOLOGY

## 2019-09-10 PROCEDURE — 84295 ASSAY OF SERUM SODIUM: CPT

## 2019-09-10 PROCEDURE — 83735 ASSAY OF MAGNESIUM: CPT | Performed by: PHYSICIAN ASSISTANT

## 2019-09-10 PROCEDURE — 25010000003 POTASSIUM CHLORIDE PER 2 MEQ: Performed by: PHYSICIAN ASSISTANT

## 2019-09-10 PROCEDURE — 85014 HEMATOCRIT: CPT

## 2019-09-10 PROCEDURE — 25010000002 MORPHINE PER 10 MG: Performed by: THORACIC SURGERY (CARDIOTHORACIC VASCULAR SURGERY)

## 2019-09-10 PROCEDURE — 06BP4ZZ EXCISION OF RIGHT SAPHENOUS VEIN, PERCUTANEOUS ENDOSCOPIC APPROACH: ICD-10-PCS | Performed by: THORACIC SURGERY (CARDIOTHORACIC VASCULAR SURGERY)

## 2019-09-10 PROCEDURE — 99024 POSTOP FOLLOW-UP VISIT: CPT | Performed by: THORACIC SURGERY (CARDIOTHORACIC VASCULAR SURGERY)

## 2019-09-10 PROCEDURE — 99233 SBSQ HOSP IP/OBS HIGH 50: CPT | Performed by: INTERNAL MEDICINE

## 2019-09-10 PROCEDURE — P9041 ALBUMIN (HUMAN),5%, 50ML: HCPCS

## 2019-09-10 PROCEDURE — 33508 ENDOSCOPIC VEIN HARVEST: CPT | Performed by: THORACIC SURGERY (CARDIOTHORACIC VASCULAR SURGERY)

## 2019-09-10 PROCEDURE — 33405 REPLACEMENT AORTIC VALVE OPN: CPT | Performed by: THORACIC SURGERY (CARDIOTHORACIC VASCULAR SURGERY)

## 2019-09-10 PROCEDURE — 80048 BASIC METABOLIC PNL TOTAL CA: CPT | Performed by: NURSE PRACTITIONER

## 2019-09-10 PROCEDURE — 88311 DECALCIFY TISSUE: CPT | Performed by: THORACIC SURGERY (CARDIOTHORACIC VASCULAR SURGERY)

## 2019-09-10 PROCEDURE — 80069 RENAL FUNCTION PANEL: CPT | Performed by: PHYSICIAN ASSISTANT

## 2019-09-10 PROCEDURE — 71045 X-RAY EXAM CHEST 1 VIEW: CPT

## 2019-09-10 PROCEDURE — 25010000002 HEPARIN (PORCINE) PER 1000 UNITS: Performed by: ANESTHESIOLOGY

## 2019-09-10 PROCEDURE — 93318 ECHO TRANSESOPHAGEAL INTRAOP: CPT | Performed by: ANESTHESIOLOGY

## 2019-09-10 PROCEDURE — 36430 TRANSFUSION BLD/BLD COMPNT: CPT

## 2019-09-10 DEVICE — DISK-SHAPED STYLE, SILICONE (1 PER STERILE PKG)
Type: IMPLANTABLE DEVICE | Site: HEART | Status: FUNCTIONAL
Brand: SCANLAN® RADIOMARK® GRAFT MARKERS

## 2019-09-10 DEVICE — VLV TRIFECTA W/GLIDE TECH AORT TISS 21MM: Type: IMPLANTABLE DEVICE | Site: HEART | Status: FUNCTIONAL

## 2019-09-10 RX ORDER — SUFENTANIL CITRATE 50 UG/ML
INJECTION EPIDURAL; INTRAVENOUS AS NEEDED
Status: DISCONTINUED | OUTPATIENT
Start: 2019-09-10 | End: 2019-09-10 | Stop reason: SURG

## 2019-09-10 RX ORDER — POTASSIUM CHLORIDE, DEXTROSE MONOHYDRATE 150; 5 MG/100ML; G/100ML
30 INJECTION, SOLUTION INTRAVENOUS CONTINUOUS
Status: DISCONTINUED | OUTPATIENT
Start: 2019-09-10 | End: 2019-09-11

## 2019-09-10 RX ORDER — NALOXONE HCL 0.4 MG/ML
0.4 VIAL (ML) INJECTION
Status: DISCONTINUED | OUTPATIENT
Start: 2019-09-10 | End: 2019-09-12

## 2019-09-10 RX ORDER — PROPOFOL 10 MG/ML
VIAL (ML) INTRAVENOUS CONTINUOUS PRN
Status: DISCONTINUED | OUTPATIENT
Start: 2019-09-10 | End: 2019-09-10 | Stop reason: SURG

## 2019-09-10 RX ORDER — FENTANYL CITRATE 50 UG/ML
INJECTION, SOLUTION INTRAMUSCULAR; INTRAVENOUS AS NEEDED
Status: DISCONTINUED | OUTPATIENT
Start: 2019-09-10 | End: 2019-09-10 | Stop reason: SURG

## 2019-09-10 RX ORDER — DOPAMINE HYDROCHLORIDE 160 MG/100ML
2-20 INJECTION, SOLUTION INTRAVENOUS CONTINUOUS PRN
Status: DISCONTINUED | OUTPATIENT
Start: 2019-09-10 | End: 2019-09-12

## 2019-09-10 RX ORDER — MEPERIDINE HYDROCHLORIDE 25 MG/ML
25 INJECTION INTRAMUSCULAR; INTRAVENOUS; SUBCUTANEOUS EVERY 4 HOURS PRN
Status: ACTIVE | OUTPATIENT
Start: 2019-09-10 | End: 2019-09-11

## 2019-09-10 RX ORDER — SODIUM CHLORIDE, SODIUM LACTATE, POTASSIUM CHLORIDE, CALCIUM CHLORIDE 600; 310; 30; 20 MG/100ML; MG/100ML; MG/100ML; MG/100ML
9 INJECTION, SOLUTION INTRAVENOUS CONTINUOUS
Status: DISCONTINUED | OUTPATIENT
Start: 2019-09-10 | End: 2019-09-10

## 2019-09-10 RX ORDER — SODIUM CHLORIDE 9 MG/ML
INJECTION, SOLUTION INTRAVENOUS AS NEEDED
Status: DISCONTINUED | OUTPATIENT
Start: 2019-09-10 | End: 2019-09-10 | Stop reason: HOSPADM

## 2019-09-10 RX ORDER — BISACODYL 10 MG
10 SUPPOSITORY, RECTAL RECTAL DAILY PRN
Status: DISCONTINUED | OUTPATIENT
Start: 2019-09-11 | End: 2019-09-13

## 2019-09-10 RX ORDER — ALBUTEROL SULFATE 2.5 MG/3ML
2.5 SOLUTION RESPIRATORY (INHALATION) EVERY 4 HOURS PRN
Status: ACTIVE | OUTPATIENT
Start: 2019-09-10 | End: 2019-09-11

## 2019-09-10 RX ORDER — ETOMIDATE 2 MG/ML
INJECTION INTRAVENOUS AS NEEDED
Status: DISCONTINUED | OUTPATIENT
Start: 2019-09-10 | End: 2019-09-10 | Stop reason: SURG

## 2019-09-10 RX ORDER — ALBUMIN, HUMAN INJ 5% 5 %
500 SOLUTION INTRAVENOUS AS NEEDED
Status: DISCONTINUED | OUTPATIENT
Start: 2019-09-10 | End: 2019-09-17 | Stop reason: HOSPADM

## 2019-09-10 RX ORDER — SODIUM CHLORIDE 9 MG/ML
INJECTION, SOLUTION INTRAVENOUS CONTINUOUS PRN
Status: DISCONTINUED | OUTPATIENT
Start: 2019-09-10 | End: 2019-09-10 | Stop reason: SURG

## 2019-09-10 RX ORDER — NOREPINEPHRINE BITARTRATE 1 MG/ML
INJECTION, SOLUTION INTRAVENOUS CONTINUOUS PRN
Status: DISCONTINUED | OUTPATIENT
Start: 2019-09-10 | End: 2019-09-10 | Stop reason: SURG

## 2019-09-10 RX ORDER — DOBUTAMINE HYDROCHLORIDE 100 MG/100ML
2-20 INJECTION INTRAVENOUS CONTINUOUS PRN
Status: DISCONTINUED | OUTPATIENT
Start: 2019-09-10 | End: 2019-09-12

## 2019-09-10 RX ORDER — SODIUM CHLORIDE 0.9 % (FLUSH) 0.9 %
3-10 SYRINGE (ML) INJECTION AS NEEDED
Status: DISCONTINUED | OUTPATIENT
Start: 2019-09-10 | End: 2019-09-10 | Stop reason: HOSPADM

## 2019-09-10 RX ORDER — SODIUM CHLORIDE 9 MG/ML
30 INJECTION, SOLUTION INTRAVENOUS CONTINUOUS PRN
Status: DISCONTINUED | OUTPATIENT
Start: 2019-09-10 | End: 2019-09-10

## 2019-09-10 RX ORDER — FAMOTIDINE 20 MG/1
20 TABLET, FILM COATED ORAL ONCE
Status: DISCONTINUED | OUTPATIENT
Start: 2019-09-10 | End: 2019-09-10 | Stop reason: HOSPADM

## 2019-09-10 RX ORDER — METOPROLOL TARTRATE 5 MG/5ML
2.5 INJECTION INTRAVENOUS EVERY 6 HOURS SCHEDULED
Status: DISPENSED | OUTPATIENT
Start: 2019-09-10 | End: 2019-09-11

## 2019-09-10 RX ORDER — ONDANSETRON 2 MG/ML
4 INJECTION INTRAMUSCULAR; INTRAVENOUS EVERY 6 HOURS PRN
Status: DISCONTINUED | OUTPATIENT
Start: 2019-09-10 | End: 2019-09-17 | Stop reason: HOSPADM

## 2019-09-10 RX ORDER — NITROGLYCERIN 20 MG/100ML
5-200 INJECTION INTRAVENOUS CONTINUOUS PRN
Status: DISCONTINUED | OUTPATIENT
Start: 2019-09-10 | End: 2019-09-13

## 2019-09-10 RX ORDER — ATORVASTATIN CALCIUM 40 MG/1
40 TABLET, FILM COATED ORAL NIGHTLY
Status: DISCONTINUED | OUTPATIENT
Start: 2019-09-10 | End: 2019-09-17 | Stop reason: HOSPADM

## 2019-09-10 RX ORDER — PAPAVERINE HYDROCHLORIDE 30 MG/ML
INJECTION INTRAMUSCULAR; INTRAVENOUS AS NEEDED
Status: DISCONTINUED | OUTPATIENT
Start: 2019-09-10 | End: 2019-09-10 | Stop reason: HOSPADM

## 2019-09-10 RX ORDER — CALCIUM CHLORIDE 100 MG/ML
INJECTION INTRAVENOUS; INTRAVENTRICULAR AS NEEDED
Status: DISCONTINUED | OUTPATIENT
Start: 2019-09-10 | End: 2019-09-10 | Stop reason: SURG

## 2019-09-10 RX ORDER — FAMOTIDINE 10 MG/ML
20 INJECTION, SOLUTION INTRAVENOUS ONCE
Status: DISCONTINUED | OUTPATIENT
Start: 2019-09-10 | End: 2019-09-10

## 2019-09-10 RX ORDER — ASPIRIN 325 MG
325 TABLET ORAL ONCE
Status: COMPLETED | OUTPATIENT
Start: 2019-09-10 | End: 2019-09-10

## 2019-09-10 RX ORDER — LIDOCAINE HYDROCHLORIDE 20 MG/ML
INJECTION, SOLUTION INFILTRATION; PERINEURAL AS NEEDED
Status: DISCONTINUED | OUTPATIENT
Start: 2019-09-10 | End: 2019-09-10 | Stop reason: SURG

## 2019-09-10 RX ORDER — ROCURONIUM BROMIDE 10 MG/ML
INJECTION, SOLUTION INTRAVENOUS AS NEEDED
Status: DISCONTINUED | OUTPATIENT
Start: 2019-09-10 | End: 2019-09-10 | Stop reason: SURG

## 2019-09-10 RX ORDER — HEPARIN SODIUM 1000 [USP'U]/ML
INJECTION, SOLUTION INTRAVENOUS; SUBCUTANEOUS AS NEEDED
Status: DISCONTINUED | OUTPATIENT
Start: 2019-09-10 | End: 2019-09-10 | Stop reason: SURG

## 2019-09-10 RX ORDER — NOREPINEPHRINE BIT/0.9 % NACL 8 MG/250ML
.02-.3 INFUSION BOTTLE (ML) INTRAVENOUS CONTINUOUS PRN
Status: DISCONTINUED | OUTPATIENT
Start: 2019-09-10 | End: 2019-09-13

## 2019-09-10 RX ORDER — POTASSIUM CHLORIDE 1.5 G/1.77G
40 POWDER, FOR SOLUTION ORAL AS NEEDED
Status: DISCONTINUED | OUTPATIENT
Start: 2019-09-10 | End: 2019-09-13

## 2019-09-10 RX ORDER — FENTANYL CITRATE 50 UG/ML
25 INJECTION, SOLUTION INTRAMUSCULAR; INTRAVENOUS
Status: DISCONTINUED | OUTPATIENT
Start: 2019-09-10 | End: 2019-09-12

## 2019-09-10 RX ORDER — ALBUMIN, HUMAN INJ 5% 5 %
SOLUTION INTRAVENOUS
Status: COMPLETED
Start: 2019-09-10 | End: 2019-09-10

## 2019-09-10 RX ORDER — THROMBIN HUMAN AND FIBRINOGEN 2; 5.5 [USP'U]/1; MG/1
PATCH TOPICAL AS NEEDED
Status: DISCONTINUED | OUTPATIENT
Start: 2019-09-10 | End: 2019-09-10 | Stop reason: HOSPADM

## 2019-09-10 RX ORDER — PROTAMINE SULFATE 10 MG/ML
INJECTION, SOLUTION INTRAVENOUS AS NEEDED
Status: DISCONTINUED | OUTPATIENT
Start: 2019-09-10 | End: 2019-09-10 | Stop reason: SURG

## 2019-09-10 RX ORDER — DOCUSATE SODIUM 100 MG/1
100 CAPSULE, LIQUID FILLED ORAL 2 TIMES DAILY PRN
Status: DISCONTINUED | OUTPATIENT
Start: 2019-09-10 | End: 2019-09-17 | Stop reason: HOSPADM

## 2019-09-10 RX ORDER — OXYCODONE HYDROCHLORIDE AND ACETAMINOPHEN 5; 325 MG/1; MG/1
2 TABLET ORAL EVERY 4 HOURS PRN
Status: DISCONTINUED | OUTPATIENT
Start: 2019-09-10 | End: 2019-09-17 | Stop reason: HOSPADM

## 2019-09-10 RX ORDER — MAGNESIUM SULFATE HEPTAHYDRATE 40 MG/ML
4 INJECTION, SOLUTION INTRAVENOUS AS NEEDED
Status: DISCONTINUED | OUTPATIENT
Start: 2019-09-10 | End: 2019-09-13

## 2019-09-10 RX ORDER — PROTAMINE SULFATE 10 MG/ML
INJECTION, SOLUTION INTRAVENOUS
Status: COMPLETED
Start: 2019-09-10 | End: 2019-09-10

## 2019-09-10 RX ORDER — SENNA AND DOCUSATE SODIUM 50; 8.6 MG/1; MG/1
2 TABLET, FILM COATED ORAL 2 TIMES DAILY
Status: DISCONTINUED | OUTPATIENT
Start: 2019-09-10 | End: 2019-09-17 | Stop reason: HOSPADM

## 2019-09-10 RX ORDER — SODIUM CHLORIDE 0.9 % (FLUSH) 0.9 %
30 SYRINGE (ML) INJECTION ONCE AS NEEDED
Status: DISCONTINUED | OUTPATIENT
Start: 2019-09-10 | End: 2019-09-10

## 2019-09-10 RX ORDER — POTASSIUM CHLORIDE 29.8 MG/ML
20 INJECTION INTRAVENOUS
Status: DISCONTINUED | OUTPATIENT
Start: 2019-09-10 | End: 2019-09-12

## 2019-09-10 RX ORDER — ALBUTEROL SULFATE 2.5 MG/3ML
2.5 SOLUTION RESPIRATORY (INHALATION) EVERY 4 HOURS PRN
Status: DISCONTINUED | OUTPATIENT
Start: 2019-09-10 | End: 2019-09-10

## 2019-09-10 RX ORDER — MAGNESIUM SULFATE HEPTAHYDRATE 40 MG/ML
2 INJECTION, SOLUTION INTRAVENOUS AS NEEDED
Status: DISCONTINUED | OUTPATIENT
Start: 2019-09-10 | End: 2019-09-13

## 2019-09-10 RX ORDER — LIDOCAINE HYDROCHLORIDE 10 MG/ML
0.5 INJECTION, SOLUTION EPIDURAL; INFILTRATION; INTRACAUDAL; PERINEURAL ONCE AS NEEDED
Status: DISCONTINUED | OUTPATIENT
Start: 2019-09-10 | End: 2019-09-10 | Stop reason: HOSPADM

## 2019-09-10 RX ORDER — BISACODYL 5 MG/1
10 TABLET, DELAYED RELEASE ORAL DAILY PRN
Status: DISCONTINUED | OUTPATIENT
Start: 2019-09-10 | End: 2019-09-17 | Stop reason: HOSPADM

## 2019-09-10 RX ORDER — PHENYLEPHRINE HCL IN 0.9% NACL 0.5 MG/5ML
.5-3 SYRINGE (ML) INTRAVENOUS CONTINUOUS PRN
Status: DISCONTINUED | OUTPATIENT
Start: 2019-09-10 | End: 2019-09-13

## 2019-09-10 RX ORDER — PROTAMINE SULFATE 10 MG/ML
50 INJECTION, SOLUTION INTRAVENOUS ONCE
Status: COMPLETED | OUTPATIENT
Start: 2019-09-10 | End: 2019-09-10

## 2019-09-10 RX ORDER — ALBUMIN, HUMAN INJ 5% 5 %
SOLUTION INTRAVENOUS CONTINUOUS PRN
Status: DISCONTINUED | OUTPATIENT
Start: 2019-09-10 | End: 2019-09-10 | Stop reason: SURG

## 2019-09-10 RX ORDER — BUDESONIDE AND FORMOTEROL FUMARATE DIHYDRATE 160; 4.5 UG/1; UG/1
2 AEROSOL RESPIRATORY (INHALATION)
Status: DISCONTINUED | OUTPATIENT
Start: 2019-09-10 | End: 2019-09-17 | Stop reason: HOSPADM

## 2019-09-10 RX ORDER — ASPIRIN 325 MG
325 TABLET, DELAYED RELEASE (ENTERIC COATED) ORAL DAILY
Status: DISCONTINUED | OUTPATIENT
Start: 2019-09-11 | End: 2019-09-16

## 2019-09-10 RX ORDER — HYDROCODONE BITARTRATE AND ACETAMINOPHEN 7.5; 325 MG/1; MG/1
1 TABLET ORAL EVERY 4 HOURS PRN
Status: DISCONTINUED | OUTPATIENT
Start: 2019-09-10 | End: 2019-09-17 | Stop reason: HOSPADM

## 2019-09-10 RX ORDER — MORPHINE SULFATE 2 MG/ML
2 INJECTION, SOLUTION INTRAMUSCULAR; INTRAVENOUS
Status: DISCONTINUED | OUTPATIENT
Start: 2019-09-10 | End: 2019-09-12

## 2019-09-10 RX ORDER — SODIUM CHLORIDE 0.9 % (FLUSH) 0.9 %
3 SYRINGE (ML) INJECTION EVERY 12 HOURS SCHEDULED
Status: DISCONTINUED | OUTPATIENT
Start: 2019-09-10 | End: 2019-09-10 | Stop reason: HOSPADM

## 2019-09-10 RX ORDER — AMINOCAPROIC ACID 250 MG/ML
INJECTION, SOLUTION INTRAVENOUS AS NEEDED
Status: DISCONTINUED | OUTPATIENT
Start: 2019-09-10 | End: 2019-09-10 | Stop reason: SURG

## 2019-09-10 RX ORDER — POTASSIUM CHLORIDE 750 MG/1
40 CAPSULE, EXTENDED RELEASE ORAL AS NEEDED
Status: DISCONTINUED | OUTPATIENT
Start: 2019-09-10 | End: 2019-09-13

## 2019-09-10 RX ADMIN — DEXMEDETOMIDINE 1.2 MCG/KG/HR: 100 INJECTION, SOLUTION, CONCENTRATE INTRAVENOUS at 23:00

## 2019-09-10 RX ADMIN — MORPHINE SULFATE 2 MG: 2 INJECTION, SOLUTION INTRAMUSCULAR; INTRAVENOUS at 20:47

## 2019-09-10 RX ADMIN — NOREPINEPHRINE BITARTRATE 0.1 MCG/KG/MIN: 1 INJECTION, SOLUTION, CONCENTRATE INTRAVENOUS at 10:40

## 2019-09-10 RX ADMIN — PROPOFOL 40 MCG/KG/MIN: 10 INJECTION, EMULSION INTRAVENOUS at 14:57

## 2019-09-10 RX ADMIN — CEFUROXIME 1.5 G: 1.5 INJECTION, POWDER, FOR SOLUTION INTRAVENOUS at 11:19

## 2019-09-10 RX ADMIN — PROTAMINE SULFATE 50 MG: 10 INJECTION, SOLUTION INTRAVENOUS at 12:10

## 2019-09-10 RX ADMIN — ETOMIDATE 35.08 MG: 2 INJECTION, SOLUTION INTRAVENOUS at 07:05

## 2019-09-10 RX ADMIN — SUFENTANIL CITRATE 25 MCG: 50 INJECTION, SOLUTION EPIDURAL; INTRAVENOUS at 07:14

## 2019-09-10 RX ADMIN — POTASSIUM CHLORIDE 20 MEQ: 29.8 INJECTION, SOLUTION INTRAVENOUS at 18:21

## 2019-09-10 RX ADMIN — PROPOFOL 25 MCG/KG/MIN: 10 INJECTION, EMULSION INTRAVENOUS at 12:01

## 2019-09-10 RX ADMIN — POTASSIUM CHLORIDE AND DEXTROSE MONOHYDRATE 30 ML/HR: 150; 5 INJECTION, SOLUTION INTRAVENOUS at 13:24

## 2019-09-10 RX ADMIN — CEFUROXIME 1.5 G: 1.5 INJECTION, POWDER, FOR SOLUTION INTRAVENOUS at 07:28

## 2019-09-10 RX ADMIN — SODIUM CHLORIDE 5 UNITS/HR: 9 INJECTION, SOLUTION INTRAVENOUS at 07:28

## 2019-09-10 RX ADMIN — SUFENTANIL CITRATE 50 MCG: 50 INJECTION, SOLUTION EPIDURAL; INTRAVENOUS at 07:50

## 2019-09-10 RX ADMIN — POTASSIUM CHLORIDE 20 MEQ: 29.8 INJECTION, SOLUTION INTRAVENOUS at 16:52

## 2019-09-10 RX ADMIN — ONDANSETRON 4 MG: 2 INJECTION INTRAMUSCULAR; INTRAVENOUS at 20:04

## 2019-09-10 RX ADMIN — ROCURONIUM BROMIDE 100 MG: 10 INJECTION INTRAVENOUS at 07:05

## 2019-09-10 RX ADMIN — PANTOPRAZOLE SODIUM 40 MG: 40 TABLET, DELAYED RELEASE ORAL at 05:12

## 2019-09-10 RX ADMIN — DEXMEDETOMIDINE 0.2 MCG/KG/HR: 100 INJECTION, SOLUTION, CONCENTRATE INTRAVENOUS at 15:47

## 2019-09-10 RX ADMIN — FENTANYL CITRATE 25 MCG: 50 INJECTION INTRAMUSCULAR; INTRAVENOUS at 16:53

## 2019-09-10 RX ADMIN — SODIUM CHLORIDE 7.2 UNITS/HR: 9 INJECTION, SOLUTION INTRAVENOUS at 23:16

## 2019-09-10 RX ADMIN — FENTANYL CITRATE 100 MCG: 50 INJECTION, SOLUTION INTRAMUSCULAR; INTRAVENOUS at 07:03

## 2019-09-10 RX ADMIN — ASPIRIN 325 MG ORAL TABLET 325 MG: 325 PILL ORAL at 14:57

## 2019-09-10 RX ADMIN — SODIUM CHLORIDE 1.5 G: 900 INJECTION INTRAVENOUS at 20:27

## 2019-09-10 RX ADMIN — CHLORHEXIDINE GLUCONATE 15 ML: 1.2 RINSE ORAL at 20:27

## 2019-09-10 RX ADMIN — PROPOFOL 25 MCG/KG/MIN: 10 INJECTION, EMULSION INTRAVENOUS at 11:36

## 2019-09-10 RX ADMIN — POTASSIUM CHLORIDE AND DEXTROSE MONOHYDRATE 30 ML/HR: 150; 5 INJECTION, SOLUTION INTRAVENOUS at 13:23

## 2019-09-10 RX ADMIN — LIDOCAINE HYDROCHLORIDE 50 MG: 20 INJECTION, SOLUTION INFILTRATION; PERINEURAL at 07:05

## 2019-09-10 RX ADMIN — DOBUTAMINE HYDROCHLORIDE 2 MCG/KG/MIN: 100 INJECTION INTRAVENOUS at 20:17

## 2019-09-10 RX ADMIN — SUFENTANIL CITRATE 50 MCG: 50 INJECTION, SOLUTION EPIDURAL; INTRAVENOUS at 08:30

## 2019-09-10 RX ADMIN — CALCIUM CHLORIDE 1 G: 100 INJECTION INTRAVENOUS; INTRAVENTRICULAR at 10:47

## 2019-09-10 RX ADMIN — MAGNESIUM SULFATE HEPTAHYDRATE 4 G: 40 INJECTION, SOLUTION INTRAVENOUS at 18:22

## 2019-09-10 RX ADMIN — SUFENTANIL CITRATE 25 MCG: 50 INJECTION, SOLUTION EPIDURAL; INTRAVENOUS at 07:11

## 2019-09-10 RX ADMIN — SUFENTANIL CITRATE 25 MCG: 50 INJECTION, SOLUTION EPIDURAL; INTRAVENOUS at 07:10

## 2019-09-10 RX ADMIN — ALBUMIN, HUMAN INJ 5% 750 ML: 5 SOLUTION at 13:24

## 2019-09-10 RX ADMIN — MORPHINE SULFATE 2 MG: 2 INJECTION, SOLUTION INTRAMUSCULAR; INTRAVENOUS at 22:38

## 2019-09-10 RX ADMIN — ALBUMIN HUMAN: 0.05 INJECTION, SOLUTION INTRAVENOUS at 11:28

## 2019-09-10 RX ADMIN — SODIUM CHLORIDE: 9 INJECTION, SOLUTION INTRAVENOUS at 07:01

## 2019-09-10 RX ADMIN — PROTAMINE SULFATE 300 MG: 10 INJECTION, SOLUTION INTRAVENOUS at 10:47

## 2019-09-10 RX ADMIN — HYDROCODONE BITARTRATE AND ACETAMINOPHEN 1 TABLET: 7.5; 325 TABLET ORAL at 15:48

## 2019-09-10 RX ADMIN — SUFENTANIL CITRATE 50 MCG: 50 INJECTION, SOLUTION EPIDURAL; INTRAVENOUS at 07:47

## 2019-09-10 RX ADMIN — SODIUM CHLORIDE, POTASSIUM CHLORIDE, SODIUM LACTATE AND CALCIUM CHLORIDE 9 ML/HR: 600; 310; 30; 20 INJECTION, SOLUTION INTRAVENOUS at 06:00

## 2019-09-10 RX ADMIN — AMINOCAPROIC ACID 10 G: 250 INJECTION, SOLUTION INTRAVENOUS at 07:27

## 2019-09-10 RX ADMIN — SUFENTANIL CITRATE 25 MCG: 50 INJECTION, SOLUTION EPIDURAL; INTRAVENOUS at 07:13

## 2019-09-10 RX ADMIN — METOPROLOL TARTRATE 2.5 MG: 5 INJECTION INTRAVENOUS at 18:50

## 2019-09-10 RX ADMIN — ALBUMIN HUMAN 750 ML: 0.05 INJECTION, SOLUTION INTRAVENOUS at 13:24

## 2019-09-10 RX ADMIN — OXYCODONE HYDROCHLORIDE AND ACETAMINOPHEN 2 TABLET: 5; 325 TABLET ORAL at 18:12

## 2019-09-10 RX ADMIN — HEPARIN SODIUM 40000 UNITS: 1000 INJECTION, SOLUTION INTRAVENOUS; SUBCUTANEOUS at 08:04

## 2019-09-10 NOTE — OP NOTE
Operative Report    Preop Diagnosis: Obesity.  Coronary artery disease.  Aortic valve stenosis.  Previous stroke.  Diabetes that is poorly controlled      Postoperative Diagnosis: Same      Procedure: Coronary artery bypass grafting x4 with endoscopic harvesting of the right great saphenous vein.  Left internal mammary artery to left anterior descending.  Saphenous vein graft to the obtuse marginal branch the circumflex.  Saphenous vein graft to a diagonal branch the LAD.  Saphenous vein graft to the posterior descending of the right    Aortic valve replacement #21 bioprosthetic trifecta valve        Surgeons: Denys Goldsmith MD surgeon.  Bette Rai PA-C Assistant        Indication: Patient referred the above medical problems he understood surgery had a risk of stroke bleeding infection death he understood our valve choice of a bioprosthetic valve and agreed to proceed absolutely no guarantees been made as to outcome.        Description: Supine position sterile prep and drape.  Right great saphenous vein harvested endoscopically median sternotomy performed in the left internal mammary was harvested as a pedicle graft.  Patient fully heparinized.  Cannulas placed the a sending aorta and right atrial appendage and patient begun on cardiopulmonary bypass.  Aortic cross-clamp antegrade cardioplegia given for saphenous vein graft sutured to a 2.25 mm obtuse marginal branch the circumflex and the second saphenous vein graft sutured to 1.75 mm posterior descending the right.  Third saphenous vein graft to a very small 1 mm diagonal branch LAD there was 2 diagonal branches both of which appeared to be small diffusely diseased and only 1 of these was even graftable.  The left internal mammary was sutured to the left anterior descending coronary and the aorta was opened heavily calcified valve was excised CO2 was used in the operative field to minimize risk of air emboli.  A size 21 trifecta bioprosthetic valve was  sutured in place and the aorta closed with 2 layers of Prolene suture.  3 proximal vein graft attached to the a sending aorta and the heart was de-aired through the aortic root vent and the apex.  Patient was weaned from bypass without difficulty initially paced but subsequently sinus rhythm.  Transesophageal echo demonstrated normal prostatic valvular function and a very trivial paravalvular leak.  Sternum was closed with wire fashion skin with suture and the sponge and needle count reported as correct      Please note that portions of this note were completed with a voice recognition program. Efforts were made to edit the dictations, but occasionally words are mistranscribed.

## 2019-09-10 NOTE — PROGRESS NOTES
INTENSIVIST / PULMONARY FOLLOW UP NOTE     Hospital:  LOS: 4 days   Mr. Khoa Arnold, 64 y.o. male is followed for:     Unstable angina (CMS/HCC)    Coronary artery disease    Type 2 diabetes mellitus with circulatory disorder, without long-term current use of insulin (CMS/HCC)    Benign essential HTN          SUBJECTIVE   Patient is post op cabg + AVR on vent    The patient's relevant past medical, surgical, family, and social history were reviewed    Allergies and medications were reviewed    ROS:  Per subjective, all other systems were reviewed and were negative        OBJECTIVE     Vital Sign Min/Max for last 24 hours:  Temp  Min: 97.4 °F (36.3 °C)  Max: 98.7 °F (37.1 °C)   BP  Min: 119/68  Max: 141/95   Pulse  Min: 51  Max: 82   Resp  Min: 16  Max: 20   SpO2  Min: 95 %  Max: 97 %   No Data Recorded     Physical Exam:  General Appearance:  Intubated, in no acute distress  Eyes:  No scleral icterus or pallor, pupils normal  Ears, Nose, Mouth, Throat:  Atraumatic, oral endotracheal tube  Neck:  Trachea midline, thyroid normal  Respiratory:  Clear to auscultation bilaterally, normal effort  Cardiovascular:  Regular rate and rhythm, no murmurs, no peripheral edema  Gastrointestinal:  Soft, non-tender, non-distended, no hepatosplenomegaly  Skin:  Normal temperature, no rash  Psychiatric:  Intubated, sedated, no agitation  Neuro:  No new focal neurologic deficits observed      Telemetry:              Hemodynamics:   CVP:     PAP:     PAOP:     CO:     CI:     SVI:     SVR:       SpO2: 95 % SpO2  Min: 95 %  Max: 97 %   Device:      Flow Rate:   No Data Recorded     Mechanical Ventilator Settings:       Vt (Set, L): 0.66 L    Resp Rate (Set): 16 Resp Rate (Observed) Vent: 16   FiO2 (%): 100 %    PEEP/CPAP (cm H2O): 5 cm H20      Minute Ventilation (L/min) (Obs): 9.63 L/min    I:E Ratio (Obs): 1:2.70     Intake/Ouptut 24 hrs (7:00AM - 6:59 AM)  Intake & Output (last 3 days)       09/07 0701 - 09/08 0700 09/08 0701 -  09/09 0700 09/09 0701 - 09/10 0700 09/10 0701 - 09/11 0700    P.O. 720 1080 360     Blood    427    Total Intake(mL/kg) 720 (6.2) 1080 (9.3) 360 (3.1) 427 (3.6)    Urine (mL/kg/hr) 450 (0.2) 350 (0.1)  650 (1)    Blood    800    Total Output 450 350  1450    Net +270 +730 +360 -1023            Urine Unmeasured Occurrence 1 x 4 x            Lines, Drains & Airways    Active LDAs     Name:   Placement date:   Placement time:   Site:   Days:    Pulmonary Artery Catheter - Triple Lumen 09/10/19 Right Internal jugular   09/10/19    0800     less than 1    CVC Single Lumen 09/10/19   09/10/19    0813    --   less than 1    CVC Double Lumen 09/10/19 Right Internal jugular   09/10/19    0812 created via procedure documentation    Internal jugular   less than 1    Peripheral IV 09/06/19 0710 Right;Anterior Forearm   09/06/19    0710    Forearm   4    Peripheral IV 09/09/19 2005 Right Forearm   09/09/19 2005    Forearm   less than 1    Chest Tube 4 Left Pleural   09/10/19    1143    Pleural   less than 1    Chest Tube 3 Anterior Mediastinal   09/10/19    1143    Mediastinal   less than 1    Urethral Catheter Temperature probe;Silicone 16 Fr.   09/10/19    --     less than 1    Y Chest Tube 1 and 2 1 Mediastinal 32 Fr. 2 Mediastinal 32 Fr.   09/10/19    --     less than 1    ETT    09/10/19    0708 created via procedure documentation     less than 1    Arterial Line 09/10/19 Right Femoral   09/10/19    --    Femoral   less than 1    Arterial Line 09/10/19 Right Radial   09/10/19    0810 created via procedure documentation    Radial   less than 1    Pacer Wires   09/10/19    1100    Ventricular   less than 1                Hematology:  Results from last 7 days   Lab Units 09/10/19  1115 09/10/19  1018 09/10/19  0940 09/10/19  0907 09/10/19  0838 09/10/19  0814 09/10/19  0708 09/10/19  0530 09/06/19  0709   WBC 10*3/mm3  --   --   --   --   --   --   --  5.92 4.99   HEMOGLOBIN g/dL  --   --   --   --   --   --   --  13.9 14.1    HEMOGLOBIN, POC g/dL 8.2* 8.2* 8.8* 8.8* 8.8* 11.2* 12.6  --   --    HEMATOCRIT %  --   --   --   --   --   --   --  41.4 40.9   HEMATOCRIT POC % 24* 24* 26* 26* 26* 33* 37*  --   --    PLATELETS 10*3/mm3  --   --   --   --   --   --   --  227 219     Electrolytes, Magnesium and Phosphorus:  Results from last 7 days   Lab Units 09/10/19  0530 09/07/19  0535 09/06/19  0709   SODIUM mmol/L 139 140 136   CHLORIDE mmol/L 99 101 98   POTASSIUM mmol/L 4.7 5.1 4.1   CO2 mmol/L 27.0 30.0* 25.0     Renal:  Results from last 7 days   Lab Units 09/10/19  0530 09/07/19  0535 09/06/19  0709 09/06/19  0701   CREATININE mg/dL 1.23 1.00 0.96 0.90   BUN mg/dL 25* 22 23  --      Estimated Creatinine Clearance: 72.1 mL/min (by C-G formula based on SCr of 1.23 mg/dL).  Hepatic:      Arterial Blood Gases:  Results from last 7 days   Lab Units 09/10/19  1115 09/10/19  1018 09/10/19  0940 09/10/19  0907 09/10/19  0838 09/10/19  0814 09/10/19  0708   PH, ARTERIAL pH units 7.32* 7.37 7.38 7.36 7.29* 7.32* 7.38       Results from last 7 days   Lab Units 09/07/19  0535   HEMOGLOBIN A1C % 9.70*       No results found for: LACTATE    Relevant imaging studies and labs from 09/10/19 were reviewed and interpreted by me    Medications (drips):    lactated ringers Last Rate: 9 mL/hr (09/10/19 0600)         albumin human      protamine      amLODIPine 10 mg Oral Daily   atenolol 50 mg Oral Daily   busPIRone 10 mg Oral BID   chlorhexidine 15 mL Mouth/Throat Q12H   dicyclomine 20 mg Oral Daily   hydrochlorothiazide 25 mg Oral Daily   insulin detemir 15 Units Subcutaneous Q12H   insulin lispro 0-7 Units Subcutaneous 4x Daily With Meals & Nightly   insulin lispro 5 Units Subcutaneous TID With Meals   isosorbide mononitrate 30 mg Oral Q24H   losartan 100 mg Oral Daily   norepinephrine (LEVOPHED) 32 mcg/mL (8 mg/250 mL) infusion 0.02-0.3 mcg/kg/min Intravenous Once   pantoprazole 40 mg Oral Q AM   pharmacy consult - MTM  Does not apply Daily   tamsulosin  0.4 mg Oral Daily       Assessment/Plan   IMPRESSION / PLAN     Inpatient Problem List:  64 y.o.male:  Active Hospital Problems    Diagnosis   • **Unstable angina (CMS/[facility])     Added automatically from request for surgery [id number]     • Type 2 diabetes mellitus with circulatory disorder, without long-term current use of insulin (CMS/[facility])   • Benign essential HTN   • Coronary artery disease     Added automatically from request for surgery [id number]          Impression:  64 y.o.male with relevant PMH of Obesity, HTN, HLD, CAD, prior stents, FH of premature CAD, T2DM A1c 9.7, prior CVA 2009 w/ no residual deficit, Asthma (data deficient), non-smoker admitted 9/6/2019 w/ Unstable Angina.  Cath performed 9/6 showed 75% LM in distal bifurcation, 70% ostial/proximal LAD, 70% Diagonal, 70% LCx, 90% Distal RCA.  Echo w/ EF 55% and at least moderate AS.  Patient is now post op 4vCABG and Bioprosthetic AVR by Dr. Goldsmith on 9/10.    Plan:  Post op care - per CTS    CAD / HTN / HLD - per Cardiology    Mechanical Ventilation - wean per protocol, monitor for TAMELA d/t body habitus post extubation, h/o asthma --> prn albuterol.  Symbicort (on Breo at home).    T2DM A1c 9.7 / Stress Hyperglycemia - insulin gtt.  On oral hypoglycemics at home which will be held in ICU.  Given A1C will need to go home on Insulin.  DM educator after extubation, more stabilized, etc.    Continue appropriate home meds    Nutrition - NPO Diet    Plan of care and goals reviewed with mulitdisciplinary team at daily rounds           Waldemar Tam MD  Intensive Care Medicine  09/10/19 12:23 PM

## 2019-09-10 NOTE — ANESTHESIA PROCEDURE NOTES
Procedure Performed: Emergent/Open-Heart Anesthesia CARLYN      Start Time:        End Time:   9/10/2019 8:42 AM    Preanesthesia Checklist:  Patient identified, IV assessed, risks and benefits discussed, monitors and equipment assessed, procedure being performed at surgeon's request and anesthesia consent obtained.    General Procedure Information  Diagnostic Indications for Echo:  assessment of ascending aorta, assessment of surgical repair and hemodynamic monitoring  Physician Requesting Echo: Denys Goldsmith MD  Location performed:  OR  Intubated  Bite block not placed  Heart visualized  Probe Insertion:  Easy  Probe Type:  Multiplane  Modalities:  2D only, color flow mapping, continuous wave Doppler and pulse wave Doppler    Echocardiographic and Doppler Measurements    Ventricles    Right Ventricle:  Cavity size dilated.  Hypertrophy not present.  Thrombus not present.  Global function normal.    Left Ventricle:  Cavity size normal.  Diastolic dimension 1.89 cm.  Hypertrophy present.  Thrombus not present.  Global Function mildly impaired.  Ejection Fraction 50%.      Ventricular Regional Function:  1- Basal Anteroseptal:  normal  2- Basal Anterior:  normal  3- Basal Anterolateral:  normal  4- Basal Inferolateral:  normal  5- Basal Inferior:  normal  6- Basal Inferoseptal:  normal  7- Mid Anteroseptal:  normal  8- Mid Anterior:  normal  9- Mid Anterolateral:  normal  10- Mid Inferolateral:  normal  11- Mid Inferior:  normal  12- Mid Inferoseptal:  normal  13- Apical Anterior:  normal  14- Apical Lateral:  normal  15- Apical Inferior:  normal  16- Apical Septal:  normal  17- [place]:  normal      Valves    Aortic Valve:  Annulus calcified.  Stenosis moderate.  Mean Gradient: 28 mmHg.  Regurgitation absent.  Leaflets calcified.  Leaflet motions restricted.  Specific leaflet segments with abnormal motions are described in the following comments:       all    Mitral Valve:  Annulus normal and calcified.  Stenosis  mild.  Regurgitation trace.  Leaflets calcified.      Tricuspid Valve:  Annulus normal.  Stenosis not present.  Regurgitation absent.  Leaflets normal.    Pulmonic Valve:  Annulus normal.  Stenosis not present.  Regurgitation absent.          Aorta    Ascending Aorta:  Size normal.  Dissection not present.  Plaque thickness less than 3 mm.  Mobile plaque not present.    Aortic Arch:  Size normal.  Dissection not present.  Plaque thickness less than 3 mm.  Mobile plaque not present.    Descending Aorta:  Size normal.  Dissection not present.  Plaque thickness less than 3 mm.  Mobile plaque not present.          Atria    Right Atrium:  Size normal.  Spontaneous echo contrast not present.  Thrombus not present.  Tumor not present.  Device not present.      Left Atrium:  Size normal.  Spontaneous echo contrast not present.  Thrombus not present.  Tumor not present.  Device not present.    Left atrial appendage normal.      Septa    Atrial Septum:  Intra-atrial septal morphology normal.      Ventricular Septum:  Intra-ventricular septum morphology normal.      Diastolic Function Measurements:  Diastolic Dysfunction Grade= I  E= ms  A= ms  E/A Ratio=   DT= ms  S/D= 0.8  IVRT=    Other Findings  Pericardium:  normal  Pleural Effusion:  none  Pulmonary Arteries:  normal      Anesthesia Information      Echocardiogram Comments:       Tricuspid aortic valve restricted motion severe as by plenimetery  Unable to get good views to measure gradient max gradient measured 18 mmhg mean  lvh present   lv function preserved   Post cpb stented bioprostetic valve in aortic position  Mean gradient 10 mmhg  Small paravalvular leak beside the septum  Good lvf

## 2019-09-10 NOTE — RESEARCH
RISK SCORES Procedure: Isolated CAB CALCULATE   Risk of Mortality:  0.944%    Renal Failure:  1.369%    Permanent Stroke:  0.498%    Prolonged Ventilation:  4.251%    DSW Infection:  0.594%    Reoperation:  1.391%    Morbidity or Mortality:  6.975%    Short Length of Stay:  53.442%    Long Length of Stay:  2.739%

## 2019-09-10 NOTE — ANESTHESIA POSTPROCEDURE EVALUATION
Patient: Khoa Arnold    Procedure Summary     Date:  09/10/19 Room / Location:   [facility]  [facility]  [facility]    Anesthesia Start:  0701 Anesthesia Stop:  1201    Procedure:  CARLYN PER ANESTHESIA, MEDIAN STERNOTOMY, CORONARY ARTERY BYPASS GRAFT X 4, UTILIZING THE LEFT INTERNAL MAMMARY ARTERY, AND EVH OF THE RIGHT GREATER SAPHENOUS VEIN,   AORTIC VALVE REPLACEMENT (N/A Chest) Diagnosis:       Coronary artery disease involving native coronary artery of native heart with other form of angina pectoris (CMS/HCC)      (Coronary artery disease involving native coronary artery of native heart with other form of angina pectoris (CMS/HCC) [I25.118])    Surgeon:  Denys Goldsmith MD Provider:  Khoa Anedrs MD    Anesthesia Type:  general ASA Status:  4          Anesthesia Type: general  Last vitals  BP   130/68 (09/10/19 0611)   Temp   97.4 °F (36.3 °C) (09/10/19 0607)   Pulse   59 (09/10/19 0607)   Resp   18 (09/10/19 0607)     SpO2   95 % (09/10/19 0607)     Post Anesthesia Care and Evaluation    Patient location during evaluation: PACU  Patient participation: complete - patient participated  Level of consciousness: obtunded/minimal responses  Pain score: 0  Pain management: adequate  Airway patency: patent  Anesthetic complications: No anesthetic complications  PONV Status: none  Cardiovascular status: acceptable  Respiratory status: acceptable  Hydration status: acceptable

## 2019-09-10 NOTE — ANESTHESIA PROCEDURE NOTES
Airway  Urgency: elective    Date/Time: 9/10/2019 7:08 AM  Airway not difficult    General Information and Staff    Patient location during procedure: OR    Indications and Patient Condition  Indications for airway management: airway protection    Preoxygenated: yes  MILS not maintained throughout  Mask difficulty assessment: 1 - vent by mask    Final Airway Details  Final airway type: endotracheal airway      Successful airway: ETT  Cuffed: yes   Successful intubation technique: direct laryngoscopy  Endotracheal tube insertion site: oral  Blade: Kelsi  Blade size: 3  ETT size (mm): 7.0  Cormack-Lehane Classification: grade I - full view of glottis  Placement verified by: chest auscultation and capnometry   Measured from: lips  ETT/EBT  to lips (cm): 20  Number of attempts at approach: 1    Additional Comments  Negative epigastric sounds, Breath sound equal bilaterally with symmetric chest rise and fall

## 2019-09-10 NOTE — ANESTHESIA PROCEDURE NOTES
Central Line      Patient reassessed immediately prior to procedure    Patient location during procedure: OR  Stop Time:9/10/2019 7:25 AM  Indications: vascular access  Staff  Anesthesiologist: Khoa Anders MD  Preanesthetic Checklist  Completed: patient identified, site marked, surgical consent, pre-op evaluation, timeout performed, IV checked, risks and benefits discussed and monitors and equipment checked  Central Line Prep  Sterile Tech:cap, gloves, gown, mask and sterile barriers  Prep: chloraprep  Patient monitoring: blood pressure monitoring, continuous pulse oximetry and EKG  Central Line Procedure  Laterality:right  Location:internal jugular  Catheter Type:Cordis and Fitzpatrick-Aline  Catheter Size:9 Fr  Guidance:ultrasound guided  PROCEDURE NOTE/ULTRASOUND INTERPRETATION.  Using ultrasound guidance the potential vascular sites for insertion of the catheter were visualized to determine the patency of the vessel to be used for vascular access.  After selecting the appropriate site for insertion, the needle was visualized under ultrasound being inserted into the internal jugular vein, followed by ultrasound confirmation of wire and catheter placement. There were no abnormalities seen on ultrasound; an image was taken; and the patient tolerated the procedure with no complications. Images: still images obtained, printed/placed on chart  Assessment  Post procedure:biopatch applied, line sutured, occlusive dressing applied and secured with tape  Assessement:blood return through all ports, free fluid flow and chest x-ray ordered  Complications:no  Patient Tolerance:patient tolerated the procedure well with no apparent complications

## 2019-09-10 NOTE — ANESTHESIA PREPROCEDURE EVALUATION
Anesthesia Evaluation     Patient summary reviewed and Nursing notes reviewed   NPO Solid Status: > 8 hours  NPO Liquid Status: > 8 hours           Airway   Mallampati: I  TM distance: >3 FB  No difficulty expected  Dental    (+) edentulous    Pulmonary     breath sounds clear to auscultation  Cardiovascular   Exercise tolerance: poor (<4 METS)    Rhythm: regular  Rate: normal    (+) murmur,       Neuro/Psych  GI/Hepatic/Renal/Endo      Musculoskeletal     Abdominal    Substance History      OB/GYN          Other            Phys Exam Other: Loud systolic murmur                Anesthesia Plan    ASA 4     general     intravenous induction   Anesthetic plan, all risks, benefits, and alternatives have been provided, discussed and informed consent has been obtained with: patient.    A line pa cath antonio  Return to icu post op post op ventilation

## 2019-09-10 NOTE — ANESTHESIA PROCEDURE NOTES
Arterial Line      Patient reassessed immediately prior to procedure    Patient location during procedure: pre-op  Stop Time:9/10/2019 6:40 AM       Line placed for hemodynamic monitoring.  Performed By   Anesthesiologist: Khoa Anders MD  Preanesthetic Checklist  Completed: patient identified, site marked, surgical consent, pre-op evaluation, timeout performed, IV checked, risks and benefits discussed and monitors and equipment checked  Arterial Line Prep   Sterile Tech: cap, gloves and sterile barriers  Prep: ChloraPrep  Patient monitoring: blood pressure monitoring, continuous pulse oximetry and EKG  Arterial Line Procedure   Laterality:right  Location:  radial artery  Catheter size: 20 G   Guidance: palpation technique  Number of attempts: 1  Successful placement: yes  Post Assessment   Dressing Type: line sutured, occlusive dressing applied, secured with tape and wrist guard applied.   Complications no  Circ/Move/Sens Assessment: normal and unchanged.   Patient Tolerance: patient tolerated the procedure well with no apparent complications

## 2019-09-10 NOTE — PROGRESS NOTES
CTS Progress Note       LOS: 4 days   Patient Care Team:  Shirlene Sharpe MD as PCP - General (Internal Medicine)    Chief Complaint: Unstable angina (CMS/HCC)    Vital Signs:  Temp:  [97.4 °F (36.3 °C)-98.7 °F (37.1 °C)] 97.4 °F (36.3 °C)  Heart Rate:  [51-82] 59  Resp:  [16-20] 18  BP: (109-141)/(68-99) 130/68    Physical Exam: Alert and conversant breathing unlabored no chest pain       Results:   Results from last 7 days   Lab Units 09/06/19  0709   WBC 10*3/mm3 4.99   HEMOGLOBIN g/dL 14.1   HEMATOCRIT % 40.9   PLATELETS 10*3/mm3 219     Results from last 7 days   Lab Units 09/07/19  0535   SODIUM mmol/L 140   POTASSIUM mmol/L 5.1   CHLORIDE mmol/L 101   CO2 mmol/L 30.0*   BUN mg/dL 22   CREATININE mg/dL 1.00   GLUCOSE mg/dL 228*   CALCIUM mg/dL 10.1           Imaging Results (last 24 hours)     ** No results found for the last 24 hours. **          Assessment      Unstable angina (CMS/HCC)    Coronary artery disease    Type 2 diabetes mellitus with circulatory disorder, without long-term current use of insulin (CMS/HCC)    Benign essential HTN    For aortic valve replacement and four-vessel coronary bypass grafting today.  Patient has been explained the risk of stroke bleeding infection death and the bioprosthetic valve choices on multiple occasions and he agrees to proceed    Plan   For surgery today    Please note that portions of this note were completed with a voice recognition program. Efforts were made to edit the dictations, but occasionally words are mistranscribed.    Denys Goldsmith MD  09/10/19  6:49 AM

## 2019-09-10 NOTE — PLAN OF CARE
Problem: Patient Care Overview  Goal: Plan of Care Review   09/10/19 0306   OTHER   Outcome Summary VSS. Pt rested well throughout night. CABG scheduled for this morning. Will continue to monitor.     Goal: Individualization and Mutuality  Outcome: Ongoing (interventions implemented as appropriate)    Goal: Discharge Needs Assessment  Outcome: Ongoing (interventions implemented as appropriate)    Goal: Interprofessional Rounds/Family Conf  Outcome: Ongoing (interventions implemented as appropriate)      Problem: Cardiac: ACS (Acute Coronary Syndrome) (Adult)  Goal: Signs and Symptoms of Listed Potential Problems Will be Absent, Minimized or Managed (Cardiac: ACS)  Outcome: Ongoing (interventions implemented as appropriate)

## 2019-09-10 NOTE — PROGRESS NOTES
[facility]       LOS: 4 days   Patient Care Team:  Shirlene Sharpe MD as PCP - General (Internal Medicine)        Subjective       Patient Denies:  Intubated      Vital Signs  Temp:  [97.4 °F (36.3 °C)-98.7 °F (37.1 °C)] 97.4 °F (36.3 °C)  Heart Rate:  [51-82] 59  Resp:  [16-20] 18  BP: (119-141)/(68-95) 130/68  FiO2 (%):  [100 %] 100 %    Intake/Output Summary (Last 24 hours) at 9/10/2019 1218  Last data filed at 9/10/2019 1040  Gross per 24 hour   Intake 787 ml   Output 1450 ml   Net -663 ml     I/O this shift:  In: 427 [Blood:427]  Out: 1450 [Urine:650; Blood:800]    Physical Exam:     General Appearance:    Sedated, in no acute distress       Neck:   No adenopathy, supple, trachea midline, no thyromegaly, no JVD       Lungs:     Clear to auscultation anteriorly,respirations regular, even and                  unlabored    Heart:    Regular rhythm and normal rate, normal S1 and S2, 2/6            murmur, no gallop, no rub, no click   Chest Wall:    No abnormalities observed   Abdomen:     Normal bowel sounds, no masses, no organomegaly, soft               Extremities:   No edema, no cyanosis, no redness   Pulses:   Pulses palpable and equal bilaterally     Results Review:     I reviewed the patient's new clinical results.      WBC WBC   Date/Time Value Ref Range Status   09/10/2019 0530 5.92 3.40 - 10.80 10*3/mm3 Final            HGB Hemoglobin   Date/Time Value Ref Range Status   09/10/2019 1115 8.2 (L) 12.0 - 17.0 g/dL Final   09/10/2019 1018 8.2 (L) 12.0 - 17.0 g/dL Final   09/10/2019 0940 8.8 (L) 12.0 - 17.0 g/dL Final   09/10/2019 0907 8.8 (L) 12.0 - 17.0 g/dL Final   09/10/2019 0838 8.8 (L) 12.0 - 17.0 g/dL Final   09/10/2019 0814 11.2 (L) 12.0 - 17.0 g/dL Final   09/10/2019 0708 12.6 12.0 - 17.0 g/dL Final   09/10/2019 0530 13.9 13.0 - 17.7 g/dL Final           HCT Hematocrit   Date/Time Value Ref Range Status   09/10/2019 1115 24 (L)  38 - 51 % Final   09/10/2019 1018 24 (L) 38 - 51 % Final   09/10/2019 0940 26 (L) 38 - 51 % Final   09/10/2019 0907 26 (L) 38 - 51 % Final   09/10/2019 0838 26 (L) 38 - 51 % Final   09/10/2019 0814 33 (L) 38 - 51 % Final   09/10/2019 0708 37 (L) 38 - 51 % Final   09/10/2019 0530 41.4 37.5 - 51.0 % Final            Platlets Platelets   Date/Time Value Ref Range Status   09/10/2019 0530 227 140 - 450 10*3/mm3 Final     Sodium  Sodium   Date/Time Value Ref Range Status   09/10/2019 0530 139 136 - 145 mmol/L Final     Potassium  Potassium   Date/Time Value Ref Range Status   09/10/2019 0530 4.7 3.5 - 5.2 mmol/L Final     Chloride  Chloride   Date/Time Value Ref Range Status   09/10/2019 0530 99 98 - 107 mmol/L Final     BicarbonateNo results found for: PLASMABICARB    BUN BUN   Date/Time Value Ref Range Status   09/10/2019 0530 25 (H) 8 - 23 mg/dL Final      Creatinine Creatinine   Date/Time Value Ref Range Status   09/10/2019 0530 1.23 0.76 - 1.27 mg/dL Final      Calcium Calcium   Date/Time Value Ref Range Status   09/10/2019 0530 9.7 8.6 - 10.5 mg/dL Final      Mag @RESULFAST(MG:3)@        PT/INR:       Lab Results   Component Value Date    PROTIME 13.9 09/06/2019    INR 1.12 09/06/2019      Troponin I:  No results found for: TROPONINI No results found for: CKTOTAL, CKMB, CKMBINDEX, POCRTROPI, TROPONINT      albumin human      protamine      amLODIPine 10 mg Oral Daily   atenolol 50 mg Oral Daily   busPIRone 10 mg Oral BID   chlorhexidine 15 mL Mouth/Throat Q12H   dicyclomine 20 mg Oral Daily   hydrochlorothiazide 25 mg Oral Daily   insulin detemir 15 Units Subcutaneous Q12H   insulin lispro 0-7 Units Subcutaneous 4x Daily With Meals & Nightly   insulin lispro 5 Units Subcutaneous TID With Meals   isosorbide mononitrate 30 mg Oral Q24H   losartan 100 mg Oral Daily   norepinephrine (LEVOPHED) 32 mcg/mL (8 mg/250 mL) infusion 0.02-0.3 mcg/kg/min Intravenous Once   pantoprazole 40 mg Oral Q AM   pharmacy consult - MTM   Does not apply Daily   tamsulosin 0.4 mg Oral Daily       lactated ringers 9 mL/hr Last Rate: 9 mL/hr (09/10/19 0600)       Assessment/Plan     Patient Active Problem List   Diagnosis Code   • Unstable angina (CMS/[facility]) I20.0   • Coronary artery disease I25.10   • Type 2 diabetes mellitus with circulatory disorder, without long-term current use of insulin (CMS/[facility]) E11.59   • Benign essential HTN I10     CV stable early post 4 vessel CABG and AVR  Normal EF        JANAK Palmer MD  09/10/19  12:18 PM

## 2019-09-11 ENCOUNTER — APPOINTMENT (OUTPATIENT)
Dept: GENERAL RADIOLOGY | Facility: HOSPITAL | Age: 64
End: 2019-09-11

## 2019-09-11 LAB
ABO + RH BLD: NORMAL
ABO + RH BLD: NORMAL
ACT BLD: 114 SECONDS (ref 82–152)
ALBUMIN SERPL-MCNC: 3.6 G/DL (ref 3.5–5.2)
ANION GAP SERPL CALCULATED.3IONS-SCNC: 14 MMOL/L (ref 5–15)
BASOPHILS # BLD MANUAL: 0 10*3/MM3 (ref 0–0.2)
BASOPHILS NFR BLD AUTO: 0 % (ref 0–1.5)
BH BB BLOOD EXPIRATION DATE: NORMAL
BH BB BLOOD EXPIRATION DATE: NORMAL
BH BB BLOOD TYPE BARCODE: 5100
BH BB BLOOD TYPE BARCODE: 6200
BH BB DISPENSE STATUS: NORMAL
BH BB DISPENSE STATUS: NORMAL
BH BB PRODUCT CODE: NORMAL
BH BB PRODUCT CODE: NORMAL
BH BB UNIT NUMBER: NORMAL
BH BB UNIT NUMBER: NORMAL
BUN BLD-MCNC: 27 MG/DL (ref 8–23)
BUN/CREAT SERPL: 18.2 (ref 7–25)
CALCIUM SPEC-SCNC: 9 MG/DL (ref 8.6–10.5)
CHLORIDE SERPL-SCNC: 104 MMOL/L (ref 98–107)
CO2 SERPL-SCNC: 22 MMOL/L (ref 22–29)
CREAT BLD-MCNC: 1.48 MG/DL (ref 0.76–1.27)
DEPRECATED RDW RBC AUTO: 43.1 FL (ref 37–54)
EOSINOPHIL # BLD MANUAL: 0 10*3/MM3 (ref 0–0.4)
EOSINOPHIL NFR BLD MANUAL: 0 % (ref 0.3–6.2)
ERYTHROCYTE [DISTWIDTH] IN BLOOD BY AUTOMATED COUNT: 13.1 % (ref 12.3–15.4)
GFR SERPL CREATININE-BSD FRML MDRD: 48 ML/MIN/1.73
GLUCOSE BLD-MCNC: 129 MG/DL (ref 65–99)
GLUCOSE BLDC GLUCOMTR-MCNC: 129 MG/DL (ref 70–130)
GLUCOSE BLDC GLUCOMTR-MCNC: 136 MG/DL (ref 70–130)
GLUCOSE BLDC GLUCOMTR-MCNC: 137 MG/DL (ref 70–130)
GLUCOSE BLDC GLUCOMTR-MCNC: 138 MG/DL (ref 70–130)
GLUCOSE BLDC GLUCOMTR-MCNC: 142 MG/DL (ref 70–130)
GLUCOSE BLDC GLUCOMTR-MCNC: 145 MG/DL (ref 70–130)
GLUCOSE BLDC GLUCOMTR-MCNC: 145 MG/DL (ref 70–130)
GLUCOSE BLDC GLUCOMTR-MCNC: 150 MG/DL (ref 70–130)
GLUCOSE BLDC GLUCOMTR-MCNC: 151 MG/DL (ref 70–130)
GLUCOSE BLDC GLUCOMTR-MCNC: 152 MG/DL (ref 70–130)
GLUCOSE BLDC GLUCOMTR-MCNC: 152 MG/DL (ref 70–130)
GLUCOSE BLDC GLUCOMTR-MCNC: 153 MG/DL (ref 70–130)
GLUCOSE BLDC GLUCOMTR-MCNC: 265 MG/DL (ref 70–130)
GLUCOSE BLDC GLUCOMTR-MCNC: 293 MG/DL (ref 70–130)
HCT VFR BLD AUTO: 23.4 % (ref 37.5–51)
HGB BLD-MCNC: 7.9 G/DL (ref 13–17.7)
INR PPP: 1.25 (ref 0.85–1.16)
LYMPHOCYTES # BLD MANUAL: 0.54 10*3/MM3 (ref 0.7–3.1)
LYMPHOCYTES NFR BLD MANUAL: 6 % (ref 5–12)
LYMPHOCYTES NFR BLD MANUAL: 8 % (ref 19.6–45.3)
MAGNESIUM SERPL-MCNC: 2.5 MG/DL (ref 1.6–2.4)
MCH RBC QN AUTO: 31 PG (ref 26.6–33)
MCHC RBC AUTO-ENTMCNC: 33.8 G/DL (ref 31.5–35.7)
MCV RBC AUTO: 91.8 FL (ref 79–97)
MONOCYTES # BLD AUTO: 0.4 10*3/MM3 (ref 0.1–0.9)
NEUTROPHILS # BLD AUTO: 5.75 10*3/MM3 (ref 1.7–7)
NEUTROPHILS NFR BLD MANUAL: 70 % (ref 42.7–76)
NEUTS BAND NFR BLD MANUAL: 16 % (ref 0–5)
PHOSPHATE SERPL-MCNC: 2.7 MG/DL (ref 2.5–4.5)
PLAT MORPH BLD: NORMAL
PLATELET # BLD AUTO: 161 10*3/MM3 (ref 140–450)
PMV BLD AUTO: 9.8 FL (ref 6–12)
POTASSIUM BLD-SCNC: 4.4 MMOL/L (ref 3.5–5.2)
POTASSIUM BLD-SCNC: 4.6 MMOL/L (ref 3.5–5.2)
PROTHROMBIN TIME: 15.1 SECONDS (ref 11.2–14.3)
RBC # BLD AUTO: 2.55 10*6/MM3 (ref 4.14–5.8)
RBC MORPH BLD: NORMAL
SODIUM BLD-SCNC: 140 MMOL/L (ref 136–145)
UNIT  ABO: NORMAL
UNIT  ABO: NORMAL
UNIT  RH: NORMAL
UNIT  RH: NORMAL
WBC MORPH BLD: NORMAL
WBC NRBC COR # BLD: 6.69 10*3/MM3 (ref 3.4–10.8)

## 2019-09-11 PROCEDURE — 99233 SBSQ HOSP IP/OBS HIGH 50: CPT | Performed by: INTERNAL MEDICINE

## 2019-09-11 PROCEDURE — 36430 TRANSFUSION BLD/BLD COMPNT: CPT

## 2019-09-11 PROCEDURE — 71045 X-RAY EXAM CHEST 1 VIEW: CPT

## 2019-09-11 PROCEDURE — 84132 ASSAY OF SERUM POTASSIUM: CPT | Performed by: THORACIC SURGERY (CARDIOTHORACIC VASCULAR SURGERY)

## 2019-09-11 PROCEDURE — 25010000002 ONDANSETRON PER 1 MG: Performed by: PHYSICIAN ASSISTANT

## 2019-09-11 PROCEDURE — 93010 ELECTROCARDIOGRAM REPORT: CPT | Performed by: INTERNAL MEDICINE

## 2019-09-11 PROCEDURE — 25010000002 PROMETHAZINE PER 50 MG: Performed by: INTERNAL MEDICINE

## 2019-09-11 PROCEDURE — 25010000002 MORPHINE PER 10 MG: Performed by: THORACIC SURGERY (CARDIOTHORACIC VASCULAR SURGERY)

## 2019-09-11 PROCEDURE — 25010000003 CEFUROXIME SODIUM 1.5 G RECONSTITUTED SOLUTION

## 2019-09-11 PROCEDURE — 94640 AIRWAY INHALATION TREATMENT: CPT

## 2019-09-11 PROCEDURE — 85025 COMPLETE CBC W/AUTO DIFF WBC: CPT | Performed by: PHYSICIAN ASSISTANT

## 2019-09-11 PROCEDURE — 93005 ELECTROCARDIOGRAM TRACING: CPT | Performed by: PHYSICIAN ASSISTANT

## 2019-09-11 PROCEDURE — 94799 UNLISTED PULMONARY SVC/PX: CPT

## 2019-09-11 PROCEDURE — 80069 RENAL FUNCTION PANEL: CPT | Performed by: PHYSICIAN ASSISTANT

## 2019-09-11 PROCEDURE — 63710000001 INSULIN LISPRO (HUMAN) PER 5 UNITS: Performed by: INTERNAL MEDICINE

## 2019-09-11 PROCEDURE — 85610 PROTHROMBIN TIME: CPT | Performed by: PHYSICIAN ASSISTANT

## 2019-09-11 PROCEDURE — 86900 BLOOD TYPING SEROLOGIC ABO: CPT

## 2019-09-11 PROCEDURE — 63710000001 INSULIN DETEMIR PER 5 UNITS: Performed by: INTERNAL MEDICINE

## 2019-09-11 PROCEDURE — 82962 GLUCOSE BLOOD TEST: CPT

## 2019-09-11 PROCEDURE — 97162 PT EVAL MOD COMPLEX 30 MIN: CPT

## 2019-09-11 PROCEDURE — 99024 POSTOP FOLLOW-UP VISIT: CPT | Performed by: THORACIC SURGERY (CARDIOTHORACIC VASCULAR SURGERY)

## 2019-09-11 PROCEDURE — 83735 ASSAY OF MAGNESIUM: CPT | Performed by: PHYSICIAN ASSISTANT

## 2019-09-11 PROCEDURE — 85007 BL SMEAR W/DIFF WBC COUNT: CPT | Performed by: PHYSICIAN ASSISTANT

## 2019-09-11 PROCEDURE — P9016 RBC LEUKOCYTES REDUCED: HCPCS

## 2019-09-11 RX ORDER — BUSPIRONE HYDROCHLORIDE 10 MG/1
10 TABLET ORAL 2 TIMES DAILY
Status: DISCONTINUED | OUTPATIENT
Start: 2019-09-11 | End: 2019-09-17 | Stop reason: HOSPADM

## 2019-09-11 RX ORDER — DICYCLOMINE HCL 20 MG
20 TABLET ORAL DAILY
Status: DISCONTINUED | OUTPATIENT
Start: 2019-09-11 | End: 2019-09-17 | Stop reason: HOSPADM

## 2019-09-11 RX ORDER — PROMETHAZINE HYDROCHLORIDE 25 MG/ML
12.5 INJECTION, SOLUTION INTRAMUSCULAR; INTRAVENOUS ONCE
Status: COMPLETED | OUTPATIENT
Start: 2019-09-11 | End: 2019-09-11

## 2019-09-11 RX ORDER — DIPHENHYDRAMINE HCL 25 MG
25 CAPSULE ORAL EVERY 6 HOURS PRN
Status: DISCONTINUED | OUTPATIENT
Start: 2019-09-11 | End: 2019-09-16

## 2019-09-11 RX ORDER — TAMSULOSIN HYDROCHLORIDE 0.4 MG/1
0.4 CAPSULE ORAL DAILY
Status: DISCONTINUED | OUTPATIENT
Start: 2019-09-11 | End: 2019-09-17 | Stop reason: HOSPADM

## 2019-09-11 RX ORDER — PANTOPRAZOLE SODIUM 40 MG/1
40 TABLET, DELAYED RELEASE ORAL NIGHTLY
Status: DISCONTINUED | OUTPATIENT
Start: 2019-09-11 | End: 2019-09-17 | Stop reason: HOSPADM

## 2019-09-11 RX ORDER — CETIRIZINE HYDROCHLORIDE 10 MG/1
5 TABLET ORAL DAILY
Status: DISCONTINUED | OUTPATIENT
Start: 2019-09-11 | End: 2019-09-17 | Stop reason: HOSPADM

## 2019-09-11 RX ORDER — DICYCLOMINE HCL 20 MG
20 TABLET ORAL EVERY 6 HOURS
Status: DISCONTINUED | OUTPATIENT
Start: 2019-09-11 | End: 2019-09-11

## 2019-09-11 RX ORDER — ALPRAZOLAM 1 MG/1
1 TABLET ORAL 3 TIMES DAILY PRN
Status: DISCONTINUED | OUTPATIENT
Start: 2019-09-11 | End: 2019-09-14

## 2019-09-11 RX ADMIN — SODIUM CHLORIDE 1.5 G: 900 INJECTION INTRAVENOUS at 19:50

## 2019-09-11 RX ADMIN — MORPHINE SULFATE 2 MG: 2 INJECTION, SOLUTION INTRAMUSCULAR; INTRAVENOUS at 05:42

## 2019-09-11 RX ADMIN — INSULIN LISPRO 6 UNITS: 100 INJECTION, SOLUTION INTRAVENOUS; SUBCUTANEOUS at 19:00

## 2019-09-11 RX ADMIN — SENNOSIDES, DOCUSATE SODIUM 2 TABLET: 50; 8.6 TABLET, FILM COATED ORAL at 12:36

## 2019-09-11 RX ADMIN — ATORVASTATIN CALCIUM 40 MG: 40 TABLET, FILM COATED ORAL at 20:31

## 2019-09-11 RX ADMIN — BUDESONIDE AND FORMOTEROL FUMARATE DIHYDRATE 2 PUFF: 160; 4.5 AEROSOL RESPIRATORY (INHALATION) at 07:31

## 2019-09-11 RX ADMIN — POLYETHYLENE GLYCOL 3350 17 G: 17 POWDER, FOR SOLUTION ORAL at 20:31

## 2019-09-11 RX ADMIN — PROMETHAZINE HYDROCHLORIDE 12.5 MG: 25 INJECTION INTRAMUSCULAR; INTRAVENOUS at 10:24

## 2019-09-11 RX ADMIN — TAMSULOSIN HYDROCHLORIDE 0.4 MG: 0.4 CAPSULE ORAL at 12:36

## 2019-09-11 RX ADMIN — ONDANSETRON 4 MG: 2 INJECTION INTRAMUSCULAR; INTRAVENOUS at 07:37

## 2019-09-11 RX ADMIN — ALPRAZOLAM 1 MG: 1 TABLET ORAL at 16:21

## 2019-09-11 RX ADMIN — OXYCODONE HYDROCHLORIDE AND ACETAMINOPHEN 2 TABLET: 5; 325 TABLET ORAL at 12:36

## 2019-09-11 RX ADMIN — POLYETHYLENE GLYCOL 3350 17 G: 17 POWDER, FOR SOLUTION ORAL at 12:35

## 2019-09-11 RX ADMIN — PANTOPRAZOLE SODIUM 40 MG: 40 TABLET, DELAYED RELEASE ORAL at 21:01

## 2019-09-11 RX ADMIN — SENNOSIDES, DOCUSATE SODIUM 2 TABLET: 50; 8.6 TABLET, FILM COATED ORAL at 20:31

## 2019-09-11 RX ADMIN — INSULIN DETEMIR 15 UNITS: 100 INJECTION, SOLUTION SUBCUTANEOUS at 20:32

## 2019-09-11 RX ADMIN — MORPHINE SULFATE 2 MG: 2 INJECTION, SOLUTION INTRAMUSCULAR; INTRAVENOUS at 23:10

## 2019-09-11 RX ADMIN — MORPHINE SULFATE 2 MG: 2 INJECTION, SOLUTION INTRAMUSCULAR; INTRAVENOUS at 07:37

## 2019-09-11 RX ADMIN — BUSPIRONE HYDROCHLORIDE 10 MG: 10 TABLET ORAL at 20:31

## 2019-09-11 RX ADMIN — CETIRIZINE HYDROCHLORIDE 5 MG: 10 TABLET, FILM COATED ORAL at 12:37

## 2019-09-11 RX ADMIN — INSULIN DETEMIR 15 UNITS: 100 INJECTION, SOLUTION SUBCUTANEOUS at 12:35

## 2019-09-11 RX ADMIN — SODIUM CHLORIDE 1.5 G: 900 INJECTION INTRAVENOUS at 12:37

## 2019-09-11 RX ADMIN — BUDESONIDE AND FORMOTEROL FUMARATE DIHYDRATE 2 PUFF: 160; 4.5 AEROSOL RESPIRATORY (INHALATION) at 18:47

## 2019-09-11 RX ADMIN — OXYCODONE HYDROCHLORIDE AND ACETAMINOPHEN 2 TABLET: 5; 325 TABLET ORAL at 06:57

## 2019-09-11 RX ADMIN — HYDROCODONE BITARTRATE AND ACETAMINOPHEN 1 TABLET: 7.5; 325 TABLET ORAL at 04:01

## 2019-09-11 RX ADMIN — OXYCODONE HYDROCHLORIDE AND ACETAMINOPHEN 2 TABLET: 5; 325 TABLET ORAL at 16:21

## 2019-09-11 RX ADMIN — INSULIN LISPRO 2 UNITS: 100 INJECTION, SOLUTION INTRAVENOUS; SUBCUTANEOUS at 12:37

## 2019-09-11 RX ADMIN — ASPIRIN 325 MG: 325 TABLET, DELAYED RELEASE ORAL at 12:37

## 2019-09-11 RX ADMIN — DICYCLOMINE HYDROCHLORIDE 20 MG: 20 TABLET ORAL at 12:36

## 2019-09-11 RX ADMIN — NITROGLYCERIN 55 MCG/MIN: 20 INJECTION INTRAVENOUS at 19:27

## 2019-09-11 RX ADMIN — SODIUM CHLORIDE 1.5 G: 900 INJECTION INTRAVENOUS at 03:38

## 2019-09-11 RX ADMIN — OXYCODONE HYDROCHLORIDE AND ACETAMINOPHEN 2 TABLET: 5; 325 TABLET ORAL at 23:09

## 2019-09-11 RX ADMIN — INSULIN LISPRO 6 UNITS: 100 INJECTION, SOLUTION INTRAVENOUS; SUBCUTANEOUS at 21:03

## 2019-09-11 NOTE — THERAPY EVALUATION
[facility] PALLIATIVE MEDICINE FOLLOW-UP  02/15/18    FOLLOW-UP  F/U for goals of care.  -------------------------------------------------------------------------------------------------------------------------------    SUBJECTIVE  Continues to report poor appetite. Denies pain, dyspnea, nausea. Does not want NG tube but is ok with G tube.   -------------------------------------------------------------------------------------------------------------------------------    OBJECTIVE    PHYSICAL EXAM  General appearance: Alert & oriented x 3. Appears comfortable lying in bed. No acute distress. Flat affect.  Respiratory: Respirations unlabored. Supplemental oxygen is not in use.  Extremities: Warm and dry. Able to move all extremities. Bilateral lower extremity edema is present.     Weight Trends:  Wt Readings from Last 10 Encounters:   05/30/17 81.6 kg   04/18/17 79.8 kg   04/18/17 80.1 kg   01/05/17 74.7 kg   12/20/16 74.1 kg   12/20/16 81.3 kg   11/29/16 75.1 kg   11/01/16 73.3 kg   10/04/16 71.2 kg   06/02/16 71.3 kg       Labs:    Recent Labs  Lab 02/12/18  0534   SODIUM 136   POTASSIUM 3.3*   BUN 16   CREATININE 7.02*   ALBUMIN 2.1*   AST 20   GPT 9   ALKPT 143*   BILIRUBIN 0.7       Recent Labs  Lab 02/15/18  0503 02/14/18  0652 02/13/18  0449 02/12/18  0534   WBC  --   --   --  8.4   HGB  --   --   --  11.0*   PLT  --   --   --  437   INR 2.2 1.8 1.5 1.3     -------------------------------------------------------------------------------------------------------------------------------    IMPRESSION  1. Protein Calorie Malnutrition-Albumin 1.7 on Marinol  2. ESRD on HD s/p failed renal transplant  3. Pancreatic cyst/IPMN s/p lap distal pancreatectomy and splenectomy 8/22/17  4. Other Palliative issues by domain:  · Physical symptoms: Denies pain and dyspnea. Admits to anorexia.  · Emotional/Psych/Mental: Denies depressed mood.  · Autonomy/Advanced Directive: Own decision maker. No Advance Directive on file. Indicates  Patient Name: Khoa Arnold  : 1955    MRN: [id number]                              Today's Date: 2019       Admit Date: 2019    Visit Dx:     ICD-10-CM ICD-9-CM   1. Coronary artery disease involving native coronary artery of native heart with other form of angina pectoris (CMS/[facility]) I25.118 414.01     413.9   2. Unstable angina (CMS/[facility]) I20.0 411.1     Patient Active Problem List   Diagnosis   • Unstable angina (CMS/[facility])   • Coronary artery disease   • Type 2 diabetes mellitus with circulatory disorder, without long-term current use of insulin (CMS/[facility])   • Benign essential HTN     Past Medical History:   Diagnosis Date   • Asthma    • Coronary artery disease    • Diabetes mellitus (CMS/[facility])    • Hyperlipidemia    • Hypertension    • Stroke (CMS/[facility])     , no residual   • Type 2 diabetes mellitus with circulatory disorder, without long-term current use of insulin (CMS/[facility]) 2019     Past Surgical History:   Procedure Laterality Date   • CARDIAC CATHETERIZATION      3 stents    • CARDIAC CATHETERIZATION N/A 2019    Procedure: Left Heart Cath;  Surgeon: Jonathan Pineda MD;  Location:  [facility] CATH INVASIVE LOCATION;  Service: Cardiovascular   • CHOLECYSTECTOMY     • COLONOSCOPY     • CORONARY ANGIOPLASTY WITH STENT PLACEMENT      x 3, last one    • CORONARY ARTERY BYPASS GRAFT WITH AORTIC VALVE REPAIR/REPLACEMENT N/A 9/10/2019    Procedure: CARLYN PER ANESTHESIA, MEDIAN STERNOTOMY, CORONARY ARTERY BYPASS GRAFT X 4, UTILIZING THE LEFT INTERNAL MAMMARY ARTERY, AND EVH OF THE RIGHT GREATER SAPHENOUS VEIN,   AORTIC VALVE REPLACEMENT;  Surgeon: Denys Goldsmith MD;  Location: [facility] OR;  Service: Cardiothoracic   • UMBILICAL HERNIA REPAIR       General Information     Row Name 19 1505          PT Evaluation Time/Intention    Document Type  evaluation  -MARCO     Mode of Treatment  physical therapy  -MARCO     Row Name 19 1505          General Information    Patient Profile  Reviewed?  yes  -MARCO     Prior Level of Function  independent:;ADL's;all household mobility Pt states he did not drive prior.   -MARCO     Existing Precautions/Restrictions  spinal;cardiac;fall;oxygen therapy device and L/min  -MARCO     Barriers to Rehab  medically complex  -MARCO     Row Name 09/11/19 1505          Relationship/Environment    Lives With  spouse  -MARCO     Row Name 09/11/19 1505          Resource/Environmental Concerns    Current Living Arrangements  home/apartment/condo  -MARCO     Row Name 09/11/19 1505          Home Main Entrance    Number of Stairs, Main Entrance  one  -MARCO     Stair Railings, Main Entrance  none  -MARCO     Row Name 09/11/19 1505          Stairs Within Home, Primary    Number of Stairs, Within Home, Primary  none  -MARCO     Row Name 09/11/19 1505          Cognitive Assessment/Intervention- PT/OT    Orientation Status (Cognition)  oriented x 4  -[facility] Name 09/11/19 1505          Safety Issues, Functional Mobility    Safety Issues Affecting Function (Mobility)  ability to follow commands;safety precautions follow-through/compliance;safety precaution awareness;awareness of need for assistance  -MARCO     Impairments Affecting Function (Mobility)  balance;endurance/activity tolerance;motor control;strength;pain  -MARCO       User Key  (r) = Recorded By, (t) = Taken By, (c) = Cosigned By    Initials Name Provider Type    Jackie Gallego, PT Physical Therapist        Mobility     Row Name 09/11/19 1507          Bed Mobility Assessment/Treatment    Comment (Bed Mobility)  Deferred, pt UIC.   -[facility] Name 09/11/19 1507          Transfer Assessment/Treatment    Comment (Transfers)  Attempted sit to stand, pt pain worsened and pt got sick. Nsg present.   -MARCO     Row Name 09/11/19 1507          Sit-Stand Transfer    Sit-Stand [place] (Transfers)  unable to assess Pt became nauseated and began vomitting.   -     Row Name 09/11/19 1507          Gait/Stairs Assessment/Training    Comment  he would want his sister Mckenzie to be his decision-maker if he is unable.   · Coping/closure: Needs further assessment  · Economic: Needs further assessment  · Transcendent: Needs further assessment  · Prognosis: Poor, depending on nutrition status      RECOMMENDATIONS  1. [place] is a Full Code at this time.  2. He does not want an NG tube place due to negative prior experience with NG. He is open to a G tube if that is an option. Recommend G tube placement as that is in line with patient goals.  3. Marinol increased yesterday.  4. Prozac 10mg qd added  5. Indicates he would choose his sister Mckenzie to make decisions for him if he is unable to.   -------------------------------------------------------------------------------------------------------------------------------    Thank you for allowing us to participate in the care of Nilton Bermudez.     Total time today was 15 minutes, majority spent counseling and coordinating care regarding the above.    Dian KHAN  [facility] Palliative Medicine  Office: [phone]  Pager: [phone]  Pt seen. Agree with above. The pt is sitting up in his chair.  Dmitri Anguiano,            "(Gait/Stairs)  Unable to assess due to pt increased pain and nausea.   -MARCO       User Key  (r) = Recorded By, (t) = Taken By, (c) = Cosigned By    Initials Name Provider Type    Jackie Gallego PT Physical Therapist        Obj/Interventions     Row Name 09/11/19 1509          General ROM    GENERAL ROM COMMENTS  LEs WLF  -MARCO     Row Name 09/11/19 1509          MMT (Manual Muscle Testing)    General MMT Comments  LEs grossly 4/5  -MARCO     Row Name 09/11/19 1509          Therapeutic Exercise    Comment (Therapeutic Exercise)  Deferred due to nausea and vomitting.   -MARCO     Row Name 09/11/19 1509          Static Sitting Balance    Level of [place] (Unsupported Sitting, Static Balance)  moderate assist, 50 to 74% patient effort  -MARCO     Sitting Position (Unsupported Sitting, Static Balance)  sitting in chair  -MARCO     Time Able to Maintain Position (Unsupported Sitting, Static Balance)  more than 5 minutes  -MARCO     Comment (Unsupported Sitting, Static Balance)  Pt demo anterior lean at times, would not correct despite cues. Pt also req verbal cues to maintain sternal precautions, would push and pull on armrests of chair.   -     Row Name 09/11/19 1504          Sensory Assessment/Intervention    Sensory General Assessment  no sensation deficits identified LE light touch sensation intact, however, pt reported his feet felt \"stingy\". Denied complaint later in session.   -MARCO       User Key  (r) = Recorded By, (t) = Taken By, (c) = Cosigned By    Initials Name Provider Type    Jackie Gallego PT Physical Therapist        Goals/Plan     Row Name 09/11/19 1515          Bed Mobility Goal 1 (PT)    Activity/Assistive Device (Bed Mobility Goal 1, PT)  sit to supine/supine to sit  -MARCO     [place] Level/Cues Needed (Bed Mobility Goal 1, PT)  conditional independence  -MAROC     Time Frame (Bed Mobility Goal 1, PT)  2 weeks  -MARCO     Row Name 09/11/19 1514          Transfer Goal 1 (PT)    Activity/Assistive Device " (Transfer Goal 1, PT)  sit-to-stand/stand-to-sit  -MARCO     [place] Level/Cues Needed (Transfer Goal 1, PT)  conditional independence  -MARCO     Time Frame (Transfer Goal 1, PT)  2 weeks  -MARCO     Row Name 09/11/19 1515          Gait Training Goal 1 (PT)    Activity/Assistive Device (Gait Training Goal 1, PT)  gait (walking locomotion)  -MARCO     [place] Level (Gait Training Goal 1, PT)  conditional independence  -MARCO     Distance (Gait Goal 1, PT)  250  -MARCO     Time Frame (Gait Training Goal 1, PT)  2 weeks  -MARCO     Row Name 09/11/19 1515          Patient Education Goal (PT)    Activity (Patient Education Goal, PT)  HEP  -MARCO     [place]/Cues/Accuracy (Memory Goal 2, PT)  demonstrates adequately;verbalizes understanding  -MARCO     Time Frame (Patient Education Goal, PT)  2 weeks  -MARCO       User Key  (r) = Recorded By, (t) = Taken By, (c) = Cosigned By    Initials Name Provider Type    Jackie Gallego, PT Physical Therapist        Clinical Impression     Row Name 09/11/19 1511          Pain Assessment    Additional Documentation  Pain Scale: Numbers Pre/Post-Treatment (Group)  -MARCO     Row Name 09/11/19 1511          Pain Scale: Numbers Pre/Post-Treatment    Pain Scale: Numbers, Pretreatment  8/10  -MARCO     Pain Scale: Numbers, Post-Treatment  8/10  -MARCO     Pain Location - Side  Bilateral  -MARCO     Pain Location - Orientation  incisional  -MARCO     Pain Location  back and throat from NG.   -MARCO     Pre/Post Treatment Pain Comment  Nsg aware of pt pain. Incisional from chest tubes.   -MARCO     Pain Intervention(s)  Repositioned;Ambulation/increased activity  -MARCO     Row Name 09/11/19 1511          Plan of Care Review    Plan of Care Reviewed With  patient;spouse  -[facility] Name 09/11/19 1511          Physical Therapy Clinical Impression    Criteria for Skilled Interventions Met (PT Clinical Impression)  yes;treatment indicated  -MARCO     Rehab Potential (PT Clinical Summary)  good, to achieve stated therapy goals   -MARCO     Row Name 09/11/19 1511          Vital Signs    Pre Systolic BP Rehab  144  -MARCO     Pre Treatment Diastolic BP  66  -MARCO     Post Systolic BP Rehab  132  -MARCO     Post Treatment Diastolic BP  67  -MARCO     Pretreatment Heart Rate (beats/min)  75  -MARCO     Posttreatment Heart Rate (beats/min)  80  -MARCO     Pre SpO2 (%)  94  -MARCO     O2 Delivery Pre Treatment  nasal cannula  -MARCO     O2 Delivery Intra Treatment  nasal cannula  -MARCO     Post SpO2 (%)  92  -MARCO     O2 Delivery Post Treatment  nasal cannula  -MARCO     Pre Patient Position  Sitting  -MARCO     Intra Patient Position  Sitting  -MARCO     Post Patient Position  Sitting  -MARCO     Row Name 09/11/19 1511          Positioning and Restraints    Pre-Treatment Position  sitting in chair/recliner  -MARCO     Post Treatment Position  chair  -MARCO     In Chair  notified nsg;with nsg;reclined;call light within reach;encouraged to call for assist  -MARCO       User Key  (r) = Recorded By, (t) = Taken By, (c) = Cosigned By    Initials Name Provider Type    Jackie Gallego PT Physical Therapist        Outcome Measures     Row Name 09/11/19 1522          How much help from another person do you currently need...    Turning from your back to your side while in flat bed without using bedrails?  3  -MARCO     Moving from lying on back to sitting on the side of a flat bed without bedrails?  2  -MARCO     Moving to and from a bed to a chair (including a wheelchair)?  2  -MARCO     Standing up from a chair using your arms (e.g., wheelchair, bedside chair)?  2  -MARCO     Climbing 3-5 steps with a railing?  1  -MARCO     To walk in hospital room?  1  -MARCO     AM-PAC 6 Clicks Score (PT)  11  -MARCO     Row Name 09/11/19 1522          Functional Assessment    Outcome Measure Options  AM-PAC 6 Clicks Basic Mobility (PT)  -MARCO       User Key  (r) = Recorded By, (t) = Taken By, (c) = Cosigned By    Initials Name Provider Type    Jackie Gallego PT Physical Therapist        Physical Therapy Education     Title: PT  OT SLP Therapies (In Progress)     Topic: Physical Therapy (In Progress)     Point: Mobility training (Done)     Learning Progress Summary           Patient Acceptance, E, VU,NR by MARCO at 9/11/2019  3:16 PM   Significant Other Acceptance, E, VU,NR by MARCO at 9/11/2019  3:16 PM                   Point: Body mechanics (Done)     Learning Progress Summary           Patient Acceptance, E, VU,NR by MARCO at 9/11/2019  3:16 PM   Significant Other Acceptance, E, VU,NR by MARCO at 9/11/2019  3:16 PM                   Point: Precautions (Done)     Learning Progress Summary           Patient Acceptance, E, VU,NR by MARCO at 9/11/2019  3:16 PM   Significant Other Acceptance, E, VU,NR by MARCO at 9/11/2019  3:16 PM                               User Key     Initials Effective Dates Name Provider Type Discipline    MARCO 08/30/18 -  Jackie Oscar, PT Physical Therapist PT              PT Recommendation and Plan  Planned Therapy Interventions (PT Eval): balance training, bed mobility training, gait training, home exercise program, neuromuscular re-education, motor coordination training, patient/family education, postural re-education, ROM (range of motion), stair training, strengthening, stretching, transfer training  Outcome Summary/Treatment Plan (PT)  Anticipated Discharge Disposition (PT): home with assist, home with home health  Plan of Care Reviewed With: patient, spouse  Progress: no change  Outcome Summary: PT evaluation completed. Pt s/p CABGx4, presents with decreased strength, endurance, and functional mobility. Pt limited this session by pain and nausea. Nsg present during evaluation. Pt req mulitple cues to maintain sternal precautions when trying to reposition in chair. Pt unable to attempt sit to stand, upon leaning forward pt became nauseous and began vomitting. Pt req min/modA with maintaining static seated balance. Pt expected to progress quickly once pain and nausea decrease. Recommend home with assist and  PT upon DC.        Time Calculation:   PT Charges     Row Name 09/11/19 1000             Time Calculation    Start Time  1000  -MARCO      PT Received On  09/11/19  -MARCO      PT Goal Re-Cert Due Date  09/21/19  -MARCO        User Key  (r) = Recorded By, (t) = Taken By, (c) = Cosigned By    Initials Name Provider Type    Jackie Gallego, PT Physical Therapist        Therapy Charges for Today     Code Description Service Date Service Provider Modifiers Qty    03740331615 HC PT EVAL MOD COMPLEXITY 4 9/11/2019 Jackie Oscar, PT GP 1    70373457558 HC PT THER SUPP EA 15 MIN 9/11/2019 Jackie Oscar, PT GP 2          PT G-Codes  Outcome Measure Options: AM-PAC 6 Clicks Basic Mobility (PT)  AM-PAC 6 Clicks Score (PT): 11    Jackie Oscar, PT  9/11/2019

## 2019-09-11 NOTE — PROGRESS NOTES
CTS Progress Note       LOS: 5 days   Patient Care Team:  Shrilene Sharpe MD as PCP - General (Internal Medicine)    Chief Complaint: Unstable angina (CMS/HCC)    Vital Signs:  Temp:  [96.3 °F (35.7 °C)-100.9 °F (38.3 °C)] 100 °F (37.8 °C)  Heart Rate:  [61-82] 64  Resp:  [16-22] 22  BP: ()/(46-86) 107/57  Arterial Line BP: ()/(34-63) 94/42  FiO2 (%):  [50 %-100 %] 50 %    Physical Exam:extubated no neuro deficits2       Results:   Results from last 7 days   Lab Units 09/11/19  0447   WBC 10*3/mm3 6.69   HEMOGLOBIN g/dL 7.9*   HEMATOCRIT % 23.4*   PLATELETS 10*3/mm3 161     Results from last 7 days   Lab Units 09/11/19 0447   SODIUM mmol/L 140   POTASSIUM mmol/L 4.4   CHLORIDE mmol/L 104   CO2 mmol/L 22.0   BUN mg/dL 27*   CREATININE mg/dL 1.48*   GLUCOSE mg/dL 129*   CALCIUM mg/dL 9.0           Imaging Results (last 24 hours)     Procedure Component Value Units Date/Time    XR Chest 1 View [[id number]] Updated:  09/11/19 0349    XR Chest 1 View [[id number]] Collected:  09/10/19 1256     Updated:  09/10/19 1259    Narrative:       EXAMINATION: XR CHEST 1 VW- 09/10/2019     INDICATION: Post-Op Check Line & Tube Placement;  I25.118-Atherosclerotic heart disease of native coronary artery with  other forms of angina pectoris; I20.0-Unstable angina      COMPARISON: Chest x-ray 09/06/2019     FINDINGS: Postsurgical appearance of the chest status post median  sternotomy with support hardware including endotracheal tube placed  above the level of the maeve. Right internal jugular approach pulmonary  arterial catheter terminates within the proximal right main pulmonary  artery. Mediastinal and pleural drains in place. Decreased lung volumes  with hypoventilatory findings as well as increased central pulmonary  vascularity. No pneumothorax or large effusion.           Impression:       Postsurgical appearance status post median sternotomy with  support hardware in satisfactory position as detailed above.  Low lung  volumes with hypoventilatory findings as well as increased pulmonary  vascularity however no overt edema or effusion.     D:  09/10/2019  E:  09/10/2019                Assessment      Unstable angina (CMS/[facility])    Coronary artery disease    Type 2 diabetes mellitus with circulatory disorder, without long-term current use of insulin (CMS/[facility])    Benign essential HTN        Plan   tranfuse 1 unit pRBCs       Please note that portions of this note were completed with a voice recognition program. Efforts were made to edit the dictations, but occasionally words are mistranscribed.    Denys Goldsmith MD  09/11/19  6:28 AM

## 2019-09-11 NOTE — PROGRESS NOTES
Clinical Nutrition     Nutrition Assessment  Reason for Visit:   ZAN SERRATO    Patient Name: Khoa Arnold  YOB: 1955  MRN: [id number]  Date of Encounter: 09/11/19 12:00 PM  Admission date: 9/6/2019    Nutrition Assessment   Admission Diagnosis         Unstable angina (CMS/HCC)    Coronary artery disease    Type 2 diabetes mellitus with circulatory disorder, without long-term current use of insulin (CMS/[facility])    Benign essential HTN    s/p CABG x4, AVR 9-10-19    PMH: He  has a past medical history of Asthma, Coronary artery disease, Diabetes mellitus (CMS/[facility]), Hyperlipidemia, Hypertension, Stroke (CMS/[facility]), and Type 2 diabetes mellitus with circulatory disorder, without long-term current use of insulin (CMS/[facility]) (9/6/2019).   PSxH: He  has a past surgical history that includes Cardiac catheterization; Cholecystectomy; Umbilical hernia repair; Colonoscopy; Cardiac catheterization (N/A, 9/6/2019); Coronary angioplasty with stent; and coronary artery bypass graft with aortic valve repair/replacement (N/A, 9/10/2019).       Reported/Observed/Food/Nutrition Related History:     Pt sitting up in chair, reports N/V earlier,  c/o neck pain from IV line    Per RN: pt passed dysphagia eval    Anthropometrics     Height: 66in  Last filed wt: 256lb  Weight Method: Standing scale    BMI: 41  Obese Class III extreme obesity: > or equal to 40kg/m2    Labs reviewed     Results from last 7 days   Lab Units 09/11/19  0447   SODIUM mmol/L 140   POTASSIUM mmol/L 4.4   CHLORIDE mmol/L 104   CO2 mmol/L 22.0   BUN mg/dL 27*   CREATININE mg/dL 1.48*   CALCIUM mg/dL 9.0   GLUCOSE mg/dL 129*       Results from last 7 days   Lab Units 09/11/19  1116 09/11/19  1040 09/11/19  0905 09/11/19  0810 09/11/19  0650 09/11/19  0606   GLUCOSE mg/dL 153* 145* 152* 151* 152* 145*     Lab Results   Lab Value Date/Time    HGBA1C 9.70 (H) 09/07/2019 0535         Medications reviewed   Pertinent:  Abx, insulin, senokot,  miralax, dobutamine, nitro         GI: N/V    SKIN: surgical sites, blisters at EVH site per RN    Current Nutrition Prescription     PO: Diet Clear Liquid; Cardiac, Consistent Carbohydrate  No active supplement orders      Intake: 0% 1 meal    (Intake prior to surgery 100% of 5 meals)       Nutrition Diagnosis     Problem Food and nutrition knowledge deficit   Etiology Uncontrolled DM   Signs/Symptoms Ha1c 9.7       Nutrition Intervention     Interventions Goal    General: Nutrition support treatment     Tolerate PO    RD to f/u for DM Eduction when appropriate    Nutrition Interventions   1.  Follow treatment progress    Monitor/ Evaluation    Per protocol, PO intake, Pertinent labs, Weight, Skin status, GI status, Symptoms        Gale Arriola, BAY  Time Spent: 30min

## 2019-09-11 NOTE — PLAN OF CARE
Problem: Patient Care Overview  Goal: Plan of Care Review  Outcome: Ongoing (interventions implemented as appropriate)   09/11/19 1411   Coping/Psychosocial   Plan of Care Reviewed With patient;family   OTHER   Outcome Summary Patient consult for blistered areas on RLE noted after removing ACE wraps this morning-16 cm x 9 cm area noted with several fluid-filled blisters. Areas opened by CURLY Rey RN, WOCN-cleaned with sterile normal saline, swabbed with povidone-iodine, covered with xeroform and optifoam dressing. Please see wound care orders for daily dressing changes. WOC will continue to follow.   Plan of Care Review   Progress improving

## 2019-09-11 NOTE — PLAN OF CARE
Problem: Patient Care Overview  Goal: Plan of Care Review  Outcome: Ongoing (interventions implemented as appropriate)   09/10/19 2000 09/11/19 0630   Coping/Psychosocial   Plan of Care Reviewed With patient;family --    OTHER   Outcome Summary --  Pt extubated within window. Drowsy over night, oriented x4. Pt on 4L NC, NSR. Pt remains on dobutamine and nitro, insulin gtt. Pt pain managed with PO and IV meds, nauseous over night. VSS. Will continue to monitor.    Plan of Care Review   Progress --  improving       Problem: Cardiac Surgery (Adult)  Goal: Signs and Symptoms of Listed Potential Problems Will be Absent, Minimized or Managed (Cardiac Surgery)  Outcome: Ongoing (interventions implemented as appropriate)   09/11/19 0630   Goal/Outcome Evaluation   Problems Assessed (Cardiac Surgery) all   Problems Present (Cardiac Surgery) postoperative nausea and vomiting;pain;situational response     Goal: Anesthesia/Sedation Recovery  Outcome: Ongoing (interventions implemented as appropriate)   09/11/19 0630   Goal/Outcome Evaluation   Anesthesia/Sedation Recovery progressing toward baseline       Problem: Skin Injury Risk (Adult)  Goal: Identify Related Risk Factors and Signs and Symptoms  Outcome: Ongoing (interventions implemented as appropriate)   09/11/19 0630   Skin Injury Risk (Adult)   Related Risk Factors (Skin Injury Risk) body weight extremes;critical care admission;edema;medication     Goal: Skin Health and Integrity  Outcome: Ongoing (interventions implemented as appropriate)   09/11/19 0630   Skin Injury Risk (Adult)   Skin Health and Integrity making progress toward outcome       Problem: Ventilation, Mechanical Invasive (Adult)  Goal: Signs and Symptoms of Listed Potential Problems Will be Absent, Minimized or Managed (Ventilation, Mechanical Invasive)  Outcome: Outcome(s) achieved Date Met: 09/11/19 09/11/19 0630   Goal/Outcome Evaluation   Problems Assessed (Mechanical Ventilation, Invasive) all    Problems Present (Mech Vent, Invasive) none

## 2019-09-11 NOTE — PLAN OF CARE
Problem: Patient Care Overview  Goal: Plan of Care Review  Outcome: Ongoing (interventions implemented as appropriate)   09/11/19 1000   Coping/Psychosocial   Plan of Care Reviewed With patient;spouse   OTHER   Outcome Summary PT evaluation completed. Pt s/p CABGx4, presents with decreased strength, endurance, and functional mobility. Pt limited this session by pain and nausea. Nsg present during evaluation. Pt req mulitple cues to maintain sternal precautions when trying to reposition in chair. Pt unable to attempt sit to stand, upon leaning forward pt became nauseous and began vomitting. Pt req min/modA with maintaining static seated balance, demo anterior lean at times. Pt expected to progress quickly once pain and nausea decrease. Recommend home with assist and  PT upon DC.    Plan of Care Review   Progress no change

## 2019-09-11 NOTE — PROGRESS NOTES
INTENSIVIST / PULMONARY FOLLOW UP NOTE     Hospital:  LOS: 5 days   Mr. Khoa Arnold, 64 y.o. male is followed for:     Unstable angina (CMS/HCC)    Coronary artery disease    Type 2 diabetes mellitus with circulatory disorder, without long-term current use of insulin (CMS/HCC)    Benign essential HTN          SUBJECTIVE   Extubated, has a lot of nausea, pain, and itching    The patient's relevant past medical, surgical, family, and social history were reviewed    Allergies and medications were reviewed    ROS:  Per subjective, all other systems were reviewed and were negative        OBJECTIVE     Vital Sign Min/Max for last 24 hours:  Temp  Min: 96.3 °F (35.7 °C)  Max: 100.9 °F (38.3 °C)   BP  Min: 72/53  Max: 130/61   Pulse  Min: 61  Max: 82   Resp  Min: 16  Max: 28   SpO2  Min: 90 %  Max: 100 %   No Data Recorded     Physical Exam:  General Appearance:  Alert, in no acute distress  Eyes:  No scleral icterus or pallor, pupils normal  Ears, Nose, Mouth, Throat:  Atraumatic, oropharynx clear  Neck:  Trachea midline, thyroid normal  Respiratory:  Clear to auscultation bilaterally, normal effort  Cardiovascular:  Regular rate and rhythm, no murmurs, no peripheral edema  Gastrointestinal:  Soft, non-tender, non-distended, no hepatosplenomegaly  Skin:  Normal temperature, blisters near evh site RLE  Psychiatric:  Normal mood and affect, normal judgement and insight  Neuro:  No new focal neurologic deficits observed        Telemetry:              Hemodynamics:   CVP: CVP (mmHg): 12 mmHg   PAP: PAP: 29/12   PAOP: PCWP (mmHg): 12 mmHg(pad)   CO: CO (L/min): 5.4 L/min   CI: CI (L/min/m2): 2.42 L/min/m2   SVI: SVI: 31.43   SVR: SVR (dyne*sec)/cm5: 906.67     SpO2: 96 % SpO2  Min: 90 %  Max: 100 %   Device:      Flow Rate:   No Data Recorded       Intake/Ouptut 24 hrs (7:00AM - 6:59 AM)  Intake & Output (last 3 days)       09/08 0701 - 09/09 0700 09/09 0701 - 09/10 0700 09/10 0701 - 09/11 0700 09/11 0701 - 09/12 0700     P.O. 1080 360      I.V. (mL/kg)   673.3 (5.8) 1327.3 (11.3)    Blood   427 360    Other   100     IV Piggyback   817.5 168    Total Intake(mL/kg) 1080 (9.3) 360 (3.1) 2017.8 (17.2) 1855.3 (15.9)    Urine (mL/kg/hr) 350 (0.1)  2920 (1)     Emesis/NG output   350     Blood   800     Chest Tube   955     Total Output 350  5025     Net +730 +360 -3007.2 +1855.3            Urine Unmeasured Occurrence 4 x             Lines, Drains & Airways    Active LDAs     Name:   Placement date:   Placement time:   Site:   Days:    Pulmonary Artery Catheter - Triple Lumen 09/10/19 Right Internal jugular   09/10/19    0800     less than 1    CVC Single Lumen 09/10/19   09/10/19    0813    --   less than 1    CVC Double Lumen 09/10/19 Right Internal jugular   09/10/19    0812 created via procedure documentation    Internal jugular   less than 1    Peripheral IV 09/06/19 0710 Right;Anterior Forearm   09/06/19    0710    Forearm   4    Peripheral IV 09/09/19 2005 Right Forearm   09/09/19 2005    Forearm   less than 1    Chest Tube 4 Left Pleural   09/10/19    1143    Pleural   less than 1    Chest Tube 3 Anterior Mediastinal   09/10/19    1143    Mediastinal   less than 1    Urethral Catheter Temperature probe;Silicone 16 Fr.   09/10/19    --     less than 1    Y Chest Tube 1 and 2 1 Mediastinal 32 Fr. 2 Mediastinal 32 Fr.   09/10/19    --     less than 1    ETT    09/10/19    0708 created via procedure documentation     less than 1    Arterial Line 09/10/19 Right Femoral   09/10/19    --    Femoral   less than 1    Arterial Line 09/10/19 Right Radial   09/10/19    0810 created via procedure documentation    Radial   less than 1    Pacer Wires   09/10/19    1100    Ventricular   less than 1                Hematology:  Results from last 7 days   Lab Units 09/11/19  0447 09/10/19  1957 09/10/19  1612 09/10/19  1236 09/10/19  1115 09/10/19  1018 09/10/19  0940  09/10/19  0530 09/06/19  0709   WBC 10*3/mm3 6.69 5.60 3.88 4.96  --   --   --    --  5.92 4.99   HEMOGLOBIN g/dL 7.9* 8.5* 8.0* 8.4*  --   --   --   --  13.9 14.1   HEMOGLOBIN, POC g/dL  --   --   --   --  8.2* 8.2* 8.8*   < >  --   --    HEMATOCRIT % 23.4* 24.8* 23.9* 24.6*  --   --   --   --  41.4 40.9   HEMATOCRIT POC %  --   --   --   --  24* 24* 26*   < >  --   --    PLATELETS 10*3/mm3 161 186 157 184  --   --   --   --  227 219   MONOCYTES % % 6.0  --   --   --   --   --   --   --   --   --     < > = values in this interval not displayed.     Electrolytes, Magnesium and Phosphorus:  Results from last 7 days   Lab Units 09/11/19  0447 09/11/19  0143 09/10/19  1612 09/10/19  1236 09/10/19  0530 09/07/19  0535 09/06/19  0709   SODIUM mmol/L 140  --  144 142 139 140 136   CHLORIDE mmol/L 104  --  105 103 99 101 98   POTASSIUM mmol/L 4.4 4.6 3.5 3.7 4.7 5.1 4.1   CO2 mmol/L 22.0  --  25.0 25.0 27.0 30.0* 25.0   MAGNESIUM mg/dL 2.5*  --  1.9 2.2  --   --   --    PHOSPHORUS mg/dL 2.7  --  3.4 3.3  --   --   --      Renal:  Results from last 7 days   Lab Units 09/11/19  0447 09/10/19  1612 09/10/19  1236 09/10/19  0530 09/07/19  0535 09/06/19  0709 09/06/19  0701   CREATININE mg/dL 1.48* 1.17 1.09 1.23 1.00 0.96 0.90   BUN mg/dL 27* 21 22 25* 22 23  --      Estimated Creatinine Clearance: 59.9 mL/min (A) (by C-G formula based on SCr of 1.48 mg/dL (H)).  Hepatic:      Arterial Blood Gases:  Results from last 7 days   Lab Units 09/10/19  1722 09/10/19  1224 09/10/19  1115 09/10/19  1018 09/10/19  0940 09/10/19  0907 09/10/19  0838   PH, ARTERIAL pH units 7.374 7.394 7.32* 7.37 7.38 7.36 7.29*   PCO2, ARTERIAL mm Hg 42.7 41.8  --   --   --   --   --    PO2 ART mm Hg 85.5 256.0*  --   --   --   --   --    FIO2 % 50 100  --   --   --   --   --        Results from last 7 days   Lab Units 09/07/19  0535   HEMOGLOBIN A1C % 9.70*       No results found for: LACTATE    Relevant imaging studies and labs from 09/11/19 were reviewed and interpreted by me    Medications (drips):    dexmedetomidine Last Rate:  Stopped (09/11/19 0400)   DOBUTamine Last Rate: 2 mcg/kg/min (09/10/19 2017)   DOPamine    EPINEPHrine    insulin Last Rate: 10 Units/hr (09/11/19 0700)   niCARdipine    nitroglycerin Last Rate: 25 mcg/min (09/11/19 0703)   norepinephrine Last Rate: 0.03 mcg/kg/min (09/10/19 1201)   phenylephrine    propofol Last Rate: Stopped (09/10/19 1620)         aspirin  mg Oral Daily   atorvastatin 40 mg Oral Nightly   budesonide-formoterol 2 puff Inhalation BID - RT   busPIRone 10 mg Oral BID   cefuroxime 1.5 g Intravenous Q8H   cetirizine 5 mg Oral Daily   dicyclomine 20 mg Oral Daily   insulin detemir 15 Units Subcutaneous Q12H   insulin lispro 0-9 Units Subcutaneous 4x Daily With Meals & Nightly   metoprolol tartrate 2.5 mg Intravenous Q6H   ondansetron 8 mg Intravenous Q8H   pharmacy consult - MTM  Does not apply Daily   polyethylene glycol 17 g Oral BID   sennosides-docusate sodium 2 tablet Oral BID   tamsulosin 0.4 mg Oral Daily       Assessment/Plan   IMPRESSION / PLAN     Inpatient Problem List:  64 y.o.male:  Active Hospital Problems    Diagnosis   • **Unstable angina (CMS/[facility])     Added automatically from request for surgery [id number]     • Type 2 diabetes mellitus with circulatory disorder, without long-term current use of insulin (CMS/[facility])   • Benign essential HTN   • Coronary artery disease     Added automatically from request for surgery [id number]          Impression:  64 y.o.male with relevant PMH of Obesity, HTN, HLD, CAD, prior stents, FH of premature CAD, T2DM A1c 9.7, prior CVA 2009 w/ no residual deficit, Asthma (data deficient), non-smoker admitted 9/6/2019 w/ Unstable Angina.  Cath performed 9/6 showed 75% LM in distal bifurcation, 70% ostial/proximal LAD, 70% Diagonal, 70% LCx, 90% Distal RCA.  Echo w/ EF 55% and at least moderate AS.  Patient is now post op 4vCABG and Bioprosthetic AVR by Dr. Goldsmith on 9/10.    Plan:  Post op care - per CTS    CAD / HTN / HLD - per Cardiology    Asthma --> prn  albuterol.  Symbicort (on Breo at home).    T2DM A1c 9.7 / Stress Hyperglycemia - insulin gtt.  On oral hypoglycemics at home which will be held in ICU.  Given A1C will need to go home on Insulin.  DM educator after extubation, more stabilized, etc.  Transition to SQ insulin today, d/c dextrose in IVF.    SILVINA - monitor, avoid nephrotoxins / NSAIDs    Acute Blood Loss Anemia - getting 1 unit PRBC today    Nausea - scheduled zofran, prn phenergan    Pruritis - prn benadyrl    Stool softeners    Resume appropriate home meds    Continue appropriate home meds    Nutrition - Diet Clear Liquid; Cardiac, Consistent Carbohydrate    Plan of care and goals reviewed with mulitdisciplinary team at daily rounds           Waldemar Tam MD  Intensive Care Medicine  09/11/19 10:52 AM

## 2019-09-11 NOTE — PROGRESS NOTES
[facility]       LOS: 5 days   Patient Care Team:  Shirlene Sharpe MD as PCP - General (Internal Medicine)        Subjective       Patient Denies:  Sob, palpitations.  Very sore.  Up in chair      Vital Signs  Temp:  [96.3 °F (35.7 °C)-100.9 °F (38.3 °C)] 99.2 °F (37.3 °C)  Heart Rate:  [61-82] 77  Resp:  [16-28] 18  BP: ()/(46-86) 111/49  Arterial Line BP: ()/(34-63) 111/49  FiO2 (%):  [50 %-100 %] 50 %    Intake/Output Summary (Last 24 hours) at 9/11/2019 0936  Last data filed at 9/11/2019 0703  Gross per 24 hour   Intake 3513.1 ml   Output 4375 ml   Net -861.9 ml     I/O this shift:  In: 1495.3 [I.V.:1327.3; IV Piggyback:168]  Out: -     Physical Exam:     General Appearance:    A&O, in no acute distress       Neck:   No adenopathy, supple, trachea midline, no thyromegaly, no JVD       Lungs:     Clear to auscultation anteriorly,respirations regular, even and                  unlabored    Heart:    Regular rhythm and normal rate, normal S1 and S2, 2/6            murmur, no gallop, no rub, no click   Chest Wall:    No abnormalities observed   Abdomen:     Normal bowel sounds, no masses, no organomegaly, soft               Extremities:   1-2+ edema, no cyanosis, no redness blood blisters noted on right medial thigh/upper calf     Pulses:   Pulses palpable and equal bilaterally     Results Review:     I reviewed the patient's new clinical results.      WBC WBC   Date/Time Value Ref Range Status   09/11/2019 0447 6.69 3.40 - 10.80 10*3/mm3 Final   09/10/2019 1957 5.60 3.40 - 10.80 10*3/mm3 Final   09/10/2019 1612 3.88 3.40 - 10.80 10*3/mm3 Final   09/10/2019 1236 4.96 3.40 - 10.80 10*3/mm3 Final   09/10/2019 0530 5.92 3.40 - 10.80 10*3/mm3 Final            HGB Hemoglobin   Date/Time Value Ref Range Status   09/11/2019 0447 7.9 (L) 13.0 - 17.7 g/dL Final   09/10/2019 1957 8.5 (L) 13.0 - 17.7 g/dL Final   09/10/2019 1612 8.0 (L) 13.0 -  17.7 g/dL Final   09/10/2019 1236 8.4 (L) 13.0 - 17.7 g/dL Final   09/10/2019 1115 8.2 (L) 12.0 - 17.0 g/dL Final   09/10/2019 1018 8.2 (L) 12.0 - 17.0 g/dL Final   09/10/2019 0940 8.8 (L) 12.0 - 17.0 g/dL Final   09/10/2019 0907 8.8 (L) 12.0 - 17.0 g/dL Final   09/10/2019 0838 8.8 (L) 12.0 - 17.0 g/dL Final   09/10/2019 0814 11.2 (L) 12.0 - 17.0 g/dL Final   09/10/2019 0708 12.6 12.0 - 17.0 g/dL Final   09/10/2019 0530 13.9 13.0 - 17.7 g/dL Final           HCT Hematocrit   Date/Time Value Ref Range Status   09/11/2019 0447 23.4 (L) 37.5 - 51.0 % Final   09/10/2019 1957 24.8 (L) 37.5 - 51.0 % Final   09/10/2019 1612 23.9 (L) 37.5 - 51.0 % Final   09/10/2019 1236 24.6 (L) 37.5 - 51.0 % Final   09/10/2019 1115 24 (L) 38 - 51 % Final   09/10/2019 1018 24 (L) 38 - 51 % Final   09/10/2019 0940 26 (L) 38 - 51 % Final   09/10/2019 0907 26 (L) 38 - 51 % Final   09/10/2019 0838 26 (L) 38 - 51 % Final   09/10/2019 0814 33 (L) 38 - 51 % Final   09/10/2019 0708 37 (L) 38 - 51 % Final   09/10/2019 0530 41.4 37.5 - 51.0 % Final            Platlets Platelets   Date/Time Value Ref Range Status   09/11/2019 0447 161 140 - 450 10*3/mm3 Final   09/10/2019 1957 186 140 - 450 10*3/mm3 Final   09/10/2019 1612 157 140 - 450 10*3/mm3 Final   09/10/2019 1236 184 140 - 450 10*3/mm3 Final   09/10/2019 0530 227 140 - 450 10*3/mm3 Final     Sodium  Sodium   Date/Time Value Ref Range Status   09/11/2019 0447 140 136 - 145 mmol/L Final   09/10/2019 1612 144 136 - 145 mmol/L Final   09/10/2019 1236 142 136 - 145 mmol/L Final   09/10/2019 0530 139 136 - 145 mmol/L Final     Potassium  Potassium   Date/Time Value Ref Range Status   09/11/2019 0447 4.4 3.5 - 5.2 mmol/L Final   09/11/2019 0143 4.6 3.5 - 5.2 mmol/L Final   09/10/2019 1612 3.5 3.5 - 5.2 mmol/L Final   09/10/2019 1236 3.7 3.5 - 5.2 mmol/L Final   09/10/2019 0530 4.7 3.5 - 5.2 mmol/L Final     Chloride  Chloride   Date/Time Value Ref Range Status   09/11/2019 0447 104 98 - 107 mmol/L  Final   09/10/2019 1612 105 98 - 107 mmol/L Final   09/10/2019 1236 103 98 - 107 mmol/L Final   09/10/2019 0530 99 98 - 107 mmol/L Final     BicarbonateNo results found for: PLASMABICARB    BUN BUN   Date/Time Value Ref Range Status   09/11/2019 0447 27 (H) 8 - 23 mg/dL Final   09/10/2019 1612 21 8 - 23 mg/dL Final   09/10/2019 1236 22 8 - 23 mg/dL Final   09/10/2019 0530 25 (H) 8 - 23 mg/dL Final      Creatinine Creatinine   Date/Time Value Ref Range Status   09/11/2019 0447 1.48 (H) 0.76 - 1.27 mg/dL Final   09/10/2019 1612 1.17 0.76 - 1.27 mg/dL Final   09/10/2019 1236 1.09 0.76 - 1.27 mg/dL Final   09/10/2019 0530 1.23 0.76 - 1.27 mg/dL Final      Calcium Calcium   Date/Time Value Ref Range Status   09/11/2019 0447 9.0 8.6 - 10.5 mg/dL Final   09/10/2019 1612 8.6 8.6 - 10.5 mg/dL Final   09/10/2019 1236 9.1 8.6 - 10.5 mg/dL Final   09/10/2019 0530 9.7 8.6 - 10.5 mg/dL Final      Mag @RESULFAST(MG:3)@        PT/INR:       Lab Results   Component Value Date    PROTIME 15.1 (H) 09/11/2019    PROTIME 16.8 (H) 09/10/2019    PROTIME 13.9 09/06/2019    INR 1.25 (H) 09/11/2019    INR 1.43 (H) 09/10/2019    INR 1.12 09/06/2019      Troponin I:  No results found for: TROPONINI No results found for: CKTOTAL, CKMB, CKMBINDEX, POCRTROPI, TROPONINT      aspirin  mg Oral Daily   atorvastatin 40 mg Oral Nightly   budesonide-formoterol 2 puff Inhalation BID - RT   busPIRone 10 mg Oral BID   cefuroxime 1.5 g Intravenous Q8H   cetirizine 5 mg Oral Daily   dicyclomine 20 mg Oral Daily   metoprolol tartrate 2.5 mg Intravenous Q6H   ondansetron 8 mg Intravenous Q8H   pharmacy consult - MTM  Does not apply Daily   polyethylene glycol 17 g Oral BID   sennosides-docusate sodium 2 tablet Oral BID   tamsulosin 0.4 mg Oral Daily       dexmedetomidine 0.2-1.5 mcg/kg/hr Last Rate: Stopped (09/11/19 0400)   DOBUTamine 2-20 mcg/kg/min Last Rate: 2 mcg/kg/min (09/10/19 2017)   DOPamine 2-20 mcg/kg/min    EPINEPHrine 0.02-0.3 mcg/kg/min     insulin 0-50 Units/hr Last Rate: 10 Units/hr (09/11/19 0700)   niCARdipine 5-15 mg/hr    nitroglycerin 5-200 mcg/min Last Rate: 25 mcg/min (09/11/19 0703)   norepinephrine 0.02-0.3 mcg/kg/min Last Rate: 0.03 mcg/kg/min (09/10/19 1201)   phenylephrine 0.5-3 mcg/kg/min    propofol 5-50 mcg/kg/min Last Rate: Stopped (09/10/19 1620)       Assessment/Plan     Patient Active Problem List   Diagnosis Code   • Unstable angina (CMS/[facility]) I20.0   • Coronary artery disease I25.10   • Type 2 diabetes mellitus with circulatory disorder, without long-term current use of insulin (CMS/[facility]) E11.59   • Benign essential HTN I10     Progressing after 4 vessel CABG and AVR  Normal EF  Transfusing  Watch GFR    JANAK Palmer MD  09/11/19  9:36 AM

## 2019-09-12 ENCOUNTER — APPOINTMENT (OUTPATIENT)
Dept: GENERAL RADIOLOGY | Facility: HOSPITAL | Age: 64
End: 2019-09-12

## 2019-09-12 LAB
ABO + RH BLD: NORMAL
ABO + RH BLD: NORMAL
ANION GAP SERPL CALCULATED.3IONS-SCNC: 18 MMOL/L (ref 5–15)
BH BB BLOOD EXPIRATION DATE: NORMAL
BH BB BLOOD EXPIRATION DATE: NORMAL
BH BB BLOOD TYPE BARCODE: 600
BH BB BLOOD TYPE BARCODE: 600
BH BB DISPENSE STATUS: NORMAL
BH BB DISPENSE STATUS: NORMAL
BH BB PRODUCT CODE: NORMAL
BH BB PRODUCT CODE: NORMAL
BH BB UNIT NUMBER: NORMAL
BH BB UNIT NUMBER: NORMAL
BUN BLD-MCNC: 40 MG/DL (ref 8–23)
BUN/CREAT SERPL: 25.2 (ref 7–25)
CALCIUM SPEC-SCNC: 8.6 MG/DL (ref 8.6–10.5)
CHLORIDE SERPL-SCNC: 96 MMOL/L (ref 98–107)
CO2 SERPL-SCNC: 25 MMOL/L (ref 22–29)
CREAT BLD-MCNC: 1.59 MG/DL (ref 0.76–1.27)
CYTO UR: NORMAL
DEPRECATED RDW RBC AUTO: 48.9 FL (ref 37–54)
ERYTHROCYTE [DISTWIDTH] IN BLOOD BY AUTOMATED COUNT: 14.5 % (ref 12.3–15.4)
GFR SERPL CREATININE-BSD FRML MDRD: 44 ML/MIN/1.73
GLUCOSE BLD-MCNC: 311 MG/DL (ref 65–99)
GLUCOSE BLDC GLUCOMTR-MCNC: 309 MG/DL (ref 70–130)
GLUCOSE BLDC GLUCOMTR-MCNC: 312 MG/DL (ref 70–130)
GLUCOSE BLDC GLUCOMTR-MCNC: 341 MG/DL (ref 70–130)
GLUCOSE BLDC GLUCOMTR-MCNC: 348 MG/DL (ref 70–130)
HCT VFR BLD AUTO: 25.4 % (ref 37.5–51)
HGB BLD-MCNC: 8.4 G/DL (ref 13–17.7)
LAB AP CASE REPORT: NORMAL
LAB AP CLINICAL INFORMATION: NORMAL
MCH RBC QN AUTO: 30.5 PG (ref 26.6–33)
MCHC RBC AUTO-ENTMCNC: 33.1 G/DL (ref 31.5–35.7)
MCV RBC AUTO: 92.4 FL (ref 79–97)
PATH REPORT.FINAL DX SPEC: NORMAL
PATH REPORT.GROSS SPEC: NORMAL
PLATELET # BLD AUTO: 149 10*3/MM3 (ref 140–450)
PMV BLD AUTO: 9.9 FL (ref 6–12)
POTASSIUM BLD-SCNC: 4.9 MMOL/L (ref 3.5–5.2)
RBC # BLD AUTO: 2.75 10*6/MM3 (ref 4.14–5.8)
SODIUM BLD-SCNC: 139 MMOL/L (ref 136–145)
UNIT  ABO: NORMAL
UNIT  ABO: NORMAL
UNIT  RH: NORMAL
UNIT  RH: NORMAL
WBC NRBC COR # BLD: 8.48 10*3/MM3 (ref 3.4–10.8)

## 2019-09-12 PROCEDURE — 71045 X-RAY EXAM CHEST 1 VIEW: CPT

## 2019-09-12 PROCEDURE — 97530 THERAPEUTIC ACTIVITIES: CPT

## 2019-09-12 PROCEDURE — 25010000002 HEPARIN (PORCINE) PER 1000 UNITS: Performed by: INTERNAL MEDICINE

## 2019-09-12 PROCEDURE — 82962 GLUCOSE BLOOD TEST: CPT

## 2019-09-12 PROCEDURE — 94799 UNLISTED PULMONARY SVC/PX: CPT

## 2019-09-12 PROCEDURE — 63710000001 INSULIN DETEMIR PER 5 UNITS: Performed by: INTERNAL MEDICINE

## 2019-09-12 PROCEDURE — 25010000002 MORPHINE PER 10 MG: Performed by: THORACIC SURGERY (CARDIOTHORACIC VASCULAR SURGERY)

## 2019-09-12 PROCEDURE — 25010000002 ONDANSETRON PER 1 MG: Performed by: PHYSICIAN ASSISTANT

## 2019-09-12 PROCEDURE — 85027 COMPLETE CBC AUTOMATED: CPT | Performed by: PHYSICIAN ASSISTANT

## 2019-09-12 PROCEDURE — 93005 ELECTROCARDIOGRAM TRACING: CPT | Performed by: PHYSICIAN ASSISTANT

## 2019-09-12 PROCEDURE — 99233 SBSQ HOSP IP/OBS HIGH 50: CPT | Performed by: INTERNAL MEDICINE

## 2019-09-12 PROCEDURE — 80048 BASIC METABOLIC PNL TOTAL CA: CPT | Performed by: PHYSICIAN ASSISTANT

## 2019-09-12 PROCEDURE — 93010 ELECTROCARDIOGRAM REPORT: CPT | Performed by: INTERNAL MEDICINE

## 2019-09-12 PROCEDURE — 99024 POSTOP FOLLOW-UP VISIT: CPT | Performed by: THORACIC SURGERY (CARDIOTHORACIC VASCULAR SURGERY)

## 2019-09-12 PROCEDURE — 25010000002 ONDANSETRON PER 1 MG: Performed by: INTERNAL MEDICINE

## 2019-09-12 RX ORDER — HEPARIN SODIUM 5000 [USP'U]/ML
5000 INJECTION, SOLUTION INTRAVENOUS; SUBCUTANEOUS EVERY 8 HOURS SCHEDULED
Status: DISCONTINUED | OUTPATIENT
Start: 2019-09-12 | End: 2019-09-16

## 2019-09-12 RX ORDER — MORPHINE SULFATE 2 MG/ML
2 INJECTION, SOLUTION INTRAMUSCULAR; INTRAVENOUS
Status: DISCONTINUED | OUTPATIENT
Start: 2019-09-12 | End: 2019-09-17 | Stop reason: HOSPADM

## 2019-09-12 RX ORDER — SODIUM CHLORIDE, SODIUM LACTATE, POTASSIUM CHLORIDE, CALCIUM CHLORIDE 600; 310; 30; 20 MG/100ML; MG/100ML; MG/100ML; MG/100ML
100 INJECTION, SOLUTION INTRAVENOUS CONTINUOUS
Status: ACTIVE | OUTPATIENT
Start: 2019-09-12 | End: 2019-09-12

## 2019-09-12 RX ADMIN — ATORVASTATIN CALCIUM 40 MG: 40 TABLET, FILM COATED ORAL at 20:16

## 2019-09-12 RX ADMIN — INSULIN LISPRO 16 UNITS: 100 INJECTION, SOLUTION INTRAVENOUS; SUBCUTANEOUS at 23:58

## 2019-09-12 RX ADMIN — HEPARIN SODIUM 5000 UNITS: 5000 INJECTION, SOLUTION INTRAVENOUS; SUBCUTANEOUS at 14:51

## 2019-09-12 RX ADMIN — POLYETHYLENE GLYCOL 3350 17 G: 17 POWDER, FOR SOLUTION ORAL at 20:17

## 2019-09-12 RX ADMIN — ONDANSETRON 4 MG: 2 INJECTION INTRAMUSCULAR; INTRAVENOUS at 21:43

## 2019-09-12 RX ADMIN — PANTOPRAZOLE SODIUM 40 MG: 40 TABLET, DELAYED RELEASE ORAL at 20:09

## 2019-09-12 RX ADMIN — METOPROLOL TARTRATE 12.5 MG: 25 TABLET, FILM COATED ORAL at 11:10

## 2019-09-12 RX ADMIN — TAMSULOSIN HYDROCHLORIDE 0.4 MG: 0.4 CAPSULE ORAL at 09:23

## 2019-09-12 RX ADMIN — INSULIN DETEMIR 25 UNITS: 100 INJECTION, SOLUTION SUBCUTANEOUS at 20:21

## 2019-09-12 RX ADMIN — INSULIN DETEMIR 25 UNITS: 100 INJECTION, SOLUTION SUBCUTANEOUS at 11:10

## 2019-09-12 RX ADMIN — SENNOSIDES, DOCUSATE SODIUM 2 TABLET: 50; 8.6 TABLET, FILM COATED ORAL at 09:22

## 2019-09-12 RX ADMIN — POLYETHYLENE GLYCOL 3350 17 G: 17 POWDER, FOR SOLUTION ORAL at 09:22

## 2019-09-12 RX ADMIN — INSULIN LISPRO 16 UNITS: 100 INJECTION, SOLUTION INTRAVENOUS; SUBCUTANEOUS at 11:12

## 2019-09-12 RX ADMIN — HEPARIN SODIUM 5000 UNITS: 5000 INJECTION, SOLUTION INTRAVENOUS; SUBCUTANEOUS at 21:43

## 2019-09-12 RX ADMIN — SODIUM CHLORIDE, POTASSIUM CHLORIDE, SODIUM LACTATE AND CALCIUM CHLORIDE 100 ML/HR: 600; 310; 30; 20 INJECTION, SOLUTION INTRAVENOUS at 12:15

## 2019-09-12 RX ADMIN — MORPHINE SULFATE 2 MG: 2 INJECTION, SOLUTION INTRAMUSCULAR; INTRAVENOUS at 21:42

## 2019-09-12 RX ADMIN — INSULIN LISPRO 16 UNITS: 100 INJECTION, SOLUTION INTRAVENOUS; SUBCUTANEOUS at 17:50

## 2019-09-12 RX ADMIN — BUSPIRONE HYDROCHLORIDE 10 MG: 10 TABLET ORAL at 20:16

## 2019-09-12 RX ADMIN — CETIRIZINE HYDROCHLORIDE 5 MG: 10 TABLET, FILM COATED ORAL at 09:23

## 2019-09-12 RX ADMIN — INSULIN LISPRO 7 UNITS: 100 INJECTION, SOLUTION INTRAVENOUS; SUBCUTANEOUS at 09:24

## 2019-09-12 RX ADMIN — BUDESONIDE AND FORMOTEROL FUMARATE DIHYDRATE 2 PUFF: 160; 4.5 AEROSOL RESPIRATORY (INHALATION) at 07:32

## 2019-09-12 RX ADMIN — BUSPIRONE HYDROCHLORIDE 10 MG: 10 TABLET ORAL at 09:22

## 2019-09-12 RX ADMIN — SENNOSIDES, DOCUSATE SODIUM 2 TABLET: 50; 8.6 TABLET, FILM COATED ORAL at 20:17

## 2019-09-12 RX ADMIN — MORPHINE SULFATE 2 MG: 2 INJECTION, SOLUTION INTRAMUSCULAR; INTRAVENOUS at 00:28

## 2019-09-12 RX ADMIN — ASPIRIN 325 MG: 325 TABLET, DELAYED RELEASE ORAL at 09:23

## 2019-09-12 RX ADMIN — METOPROLOL TARTRATE 25 MG: 25 TABLET ORAL at 20:16

## 2019-09-12 RX ADMIN — DICYCLOMINE HYDROCHLORIDE 20 MG: 20 TABLET ORAL at 09:22

## 2019-09-12 RX ADMIN — ONDANSETRON 8 MG: 2 INJECTION INTRAMUSCULAR; INTRAVENOUS at 02:21

## 2019-09-12 RX ADMIN — BUDESONIDE AND FORMOTEROL FUMARATE DIHYDRATE 2 PUFF: 160; 4.5 AEROSOL RESPIRATORY (INHALATION) at 19:05

## 2019-09-12 RX ADMIN — OXYCODONE HYDROCHLORIDE AND ACETAMINOPHEN 2 TABLET: 5; 325 TABLET ORAL at 04:02

## 2019-09-12 NOTE — PROGRESS NOTES
CTS Progress Note       LOS: 6 days   Patient Care Team:  Shirlene Sharpe MD as PCP - General (Internal Medicine)    Chief Complaint: Unstable angina (CMS/HCC)    Vital Signs:  Temp:  [97.6 °F (36.4 °C)-99.2 °F (37.3 °C)] 97.6 °F (36.4 °C)  Heart Rate:  [70-99] 95  Resp:  [12-22] 18  BP: ()/(49-82) 97/67  Arterial Line BP: (106-133)/(48-57) 117/53    Physical Exam: Alert conversant breathing unlabored no neurologic deficits       Results:   Results from last 7 days   Lab Units 09/12/19  0413   WBC 10*3/mm3 8.48   HEMOGLOBIN g/dL 8.4*   HEMATOCRIT % 25.4*   PLATELETS 10*3/mm3 149     Results from last 7 days   Lab Units 09/12/19  0413   SODIUM mmol/L 139   POTASSIUM mmol/L 4.9   CHLORIDE mmol/L 96*   CO2 mmol/L 25.0   BUN mg/dL 40*   CREATININE mg/dL 1.59*   GLUCOSE mg/dL 311*   CALCIUM mg/dL 8.6           Imaging Results (last 24 hours)     Procedure Component Value Units Date/Time    XR Chest 1 View [[id number]] Updated:  09/12/19 0401    XR Chest 1 View [[id number]] Collected:  09/11/19 0840     Updated:  09/11/19 1102    Narrative:       EXAMINATION: XR CHEST 1 VW-      INDICATION: Postop heart surgery; I25.118-Atherosclerotic heart disease  of native coronary artery with other forms of angina pectoris;  I20.0-Unstable angina.      COMPARISON: 09/10/2019.     FINDINGS: The heart is large. A Brandywine-Aline catheter is well positioned as  is a left chest tube and mediastinal tube. There is minimal patchy  airspace disease in the left lower lobe. There has been no significant  change since the previous examination.           Impression:       Patient has been extubated. Otherwise, there has been no  interval change since the previous examination of the previous day.     D:  09/11/2019  E:  09/11/2019     This report was finalized on 9/11/2019 10:59 AM by Dr. Khoa Zamora MD.             Assessment      Unstable angina (CMS/HCC)    Coronary artery disease    Type 2 diabetes mellitus with circulatory disorder,  without long-term current use of insulin (CMS/HCC)    Benign essential HTN        Plan   Discontinue chest tubes today.  Keep in the intensive care unit secondary to increased creatinine and frailty    Please note that portions of this note were completed with a voice recognition program. Efforts were made to edit the dictations, but occasionally words are mistranscribed.    Denys Goldsmith MD  09/12/19  6:37 AM

## 2019-09-12 NOTE — PLAN OF CARE
Problem: Patient Care Overview  Goal: Plan of Care Review  Outcome: Ongoing (interventions implemented as appropriate)   09/12/19 1825   Coping/Psychosocial   Plan of Care Reviewed With patient;spouse   OTHER   Outcome Summary chest tubes and pacing wires removed; no additional pain meds required; LR at 100 for 1 liter;Levemir and SSI increased, glucose remains in 300's despite poor appetite; increased metoprolol due to 's; SQ heparin started today   Plan of Care Review   Progress improving       Problem: Cardiac Surgery (Adult)  Goal: Signs and Symptoms of Listed Potential Problems Will be Absent, Minimized or Managed (Cardiac Surgery)  Outcome: Ongoing (interventions implemented as appropriate)   09/12/19 1825   Goal/Outcome Evaluation   Problems Assessed (Cardiac Surgery) all   Problems Present (Cardiac Surgery) pain;situational response       Problem: Skin Injury Risk (Adult)  Goal: Identify Related Risk Factors and Signs and Symptoms  Outcome: Ongoing (interventions implemented as appropriate)   09/12/19 1825   Skin Injury Risk (Adult)   Related Risk Factors (Skin Injury Risk) body weight extremes;critical care admission;edema;mobility impaired;medication

## 2019-09-12 NOTE — PLAN OF CARE
Problem: Patient Care Overview  Goal: Plan of Care Review  Outcome: Ongoing (interventions implemented as appropriate)   09/12/19 0659   Coping/Psychosocial   Plan of Care Reviewed With patient   OTHER   Outcome Summary Pt stable overnight on 4L/nc. Nitro gtt off this morning. Moderate CT output overnight; tubes out today per Dr Goldsmith. Adequate UOP, though creatinine trends up. H/H remain marginally low. Very weak and unsteady on feet (see note); unable to ambulate and up with 2-3 for transfers.   Plan of Care Review   Progress no change       Problem: Cardiac Surgery (Adult)  Goal: Signs and Symptoms of Listed Potential Problems Will be Absent, Minimized or Managed (Cardiac Surgery)  Outcome: Ongoing (interventions implemented as appropriate)   09/12/19 0659   Goal/Outcome Evaluation   Problems Assessed (Cardiac Surgery) all   Problems Present (Cardiac Surgery) functional deficit;pain;situational response     Goal: Anesthesia/Sedation Recovery  Outcome: Outcome(s) achieved Date Met: 09/12/19 09/12/19 0659   Goal/Outcome Evaluation   Anesthesia/Sedation Recovery recovered to baseline       Problem: Skin Injury Risk (Adult)  Goal: Identify Related Risk Factors and Signs and Symptoms  Outcome: Ongoing (interventions implemented as appropriate)   09/12/19 0659   Skin Injury Risk (Adult)   Related Risk Factors (Skin Injury Risk) critical care admission;edema;hospitalization prolonged;medication;mobility impaired     Goal: Skin Health and Integrity  Outcome: Ongoing (interventions implemented as appropriate)   09/12/19 0659   Skin Injury Risk (Adult)   Skin Health and Integrity making progress toward outcome

## 2019-09-12 NOTE — CONSULTS
Patients progress monitored for opportunity with DM education. Remains in ICU. May need further education prior to discharge.

## 2019-09-12 NOTE — PLAN OF CARE
Problem: Patient Care Overview  Goal: Plan of Care Review  Outcome: Ongoing (interventions implemented as appropriate)   09/12/19 0816   Coping/Psychosocial   Plan of Care Reviewed With patient   OTHER   Outcome Summary Pt ambulated 80ft with RW and Marie x2. Pt demonstrated slow terry with wide DANIEL and short steps. Pt required increased cueing to keep RW close with feet inside middle; demonstrated frequent periods of decreased coordination. Continue to progress as appropriate.    Plan of Care Review   Progress improving

## 2019-09-12 NOTE — PROGRESS NOTES
INTENSIVIST / PULMONARY FOLLOW UP NOTE     Hospital:  LOS: 6 days   Mr. Khoa Arnold, 64 y.o. male is followed for:     Unstable angina (CMS/HCC)    Coronary artery disease    Type 2 diabetes mellitus with circulatory disorder, without long-term current use of insulin (CMS/HCC)    Benign essential HTN          SUBJECTIVE   Nausea improved today    The patient's relevant past medical, surgical, family, and social history were reviewed    Allergies and medications were reviewed    ROS:  Per subjective, all other systems were reviewed and were negative        OBJECTIVE     Vital Sign Min/Max for last 24 hours:  Temp  Min: 97.6 °F (36.4 °C)  Max: 99 °F (37.2 °C)   BP  Min: 96/67  Max: 147/71   Pulse  Min: 79  Max: 99   Resp  Min: 10  Max: 22   SpO2  Min: 91 %  Max: 98 %   No Data Recorded     Physical Exam:  General Appearance:  Alert, in no acute distress  Eyes:  No scleral icterus or pallor, pupils normal  Ears, Nose, Mouth, Throat:  Atraumatic, oropharynx clear  Neck:  Trachea midline, thyroid normal  Respiratory:  Clear to auscultation bilaterally, normal effort  Cardiovascular:  Regular rate and rhythm, no murmurs, no peripheral edema  Gastrointestinal:  Soft, non-tender, non-distended, no hepatosplenomegaly  Skin:  Normal temperature, blisters near [facility] site RLE  Psychiatric:  Normal mood and affect, normal judgement and insight  Neuro:  No new focal neurologic deficits observed        Telemetry:                  SpO2: 94 % SpO2  Min: 91 %  Max: 98 %   Device:      Flow Rate:   No Data Recorded       Intake/Ouptut 24 hrs (7:00AM - 6:59 AM)  Intake & Output (last 3 days)       09/09 0701 - 09/10 0700 09/10 0701 - 09/11 0700 09/11 0701 - 09/12 0700 09/12 0701 - 09/13 0700    P.O. 360  240     I.V. (mL/kg)  673.3 (5.8) 1799.8 (15.4)     Blood  427 360     Other  100      IV Piggyback  817.5 418     Total Intake(mL/kg) 360 (3.1) 2017.8 (17.2) 2817.8 (24.1)     Urine (mL/kg/hr)  2920 (1) 935 (0.3)     Emesis/NG  output  350      Blood  800      Chest Tube  955 360     Total Output  5025 1295     Net +360 -3007.2 +1522.8                   Lines, Drains & Airways    Active LDAs     Name:   Placement date:   Placement time:   Site:   Days:    Pulmonary Artery Catheter - Triple Lumen 09/10/19 Right Internal jugular   09/10/19    0800     less than 1    CVC Single Lumen 09/10/19   09/10/19    0813    --   less than 1    CVC Double Lumen 09/10/19 Right Internal jugular   09/10/19    0812 created via procedure documentation    Internal jugular   less than 1    Peripheral IV 09/06/19 0710 Right;Anterior Forearm   09/06/19    0710    Forearm   4    Peripheral IV 09/09/19 2005 Right Forearm   09/09/19 2005    Forearm   less than 1    Chest Tube 4 Left Pleural   09/10/19    1143    Pleural   less than 1    Chest Tube 3 Anterior Mediastinal   09/10/19    1143    Mediastinal   less than 1    Urethral Catheter Temperature probe;Silicone 16 Fr.   09/10/19    --     less than 1    Y Chest Tube 1 and 2 1 Mediastinal 32 Fr. 2 Mediastinal 32 Fr.   09/10/19    --     less than 1    ETT    09/10/19    0708 created via procedure documentation     less than 1    Arterial Line 09/10/19 Right Femoral   09/10/19    --    Femoral   less than 1    Arterial Line 09/10/19 Right Radial   09/10/19    0810 created via procedure documentation    Radial   less than 1    Pacer Wires   09/10/19    1100    Ventricular   less than 1                Hematology:  Results from last 7 days   Lab Units 09/12/19  0413 09/11/19  0447 09/10/19  1957 09/10/19  1612 09/10/19  1236 09/10/19  1115 09/10/19  1018  09/10/19  0530 09/06/19  0709   WBC 10*3/mm3 8.48 6.69 5.60 3.88 4.96  --   --   --  5.92 4.99   HEMOGLOBIN g/dL 8.4* 7.9* 8.5* 8.0* 8.4*  --   --   --  13.9 14.1   HEMOGLOBIN, POC g/dL  --   --   --   --   --  8.2* 8.2*   < >  --   --    HEMATOCRIT % 25.4* 23.4* 24.8* 23.9* 24.6*  --   --   --  41.4 40.9   HEMATOCRIT POC %  --   --   --   --   --  24* 24*   < >   --   --    PLATELETS 10*3/mm3 149 161 186 157 184  --   --   --  227 219   MONOCYTES % %  --  6.0  --   --   --   --   --   --   --   --     < > = values in this interval not displayed.     Electrolytes, Magnesium and Phosphorus:  Results from last 7 days   Lab Units 09/12/19 0413 09/11/19 0447 09/11/19  0143 09/10/19  1612 09/10/19  1236 09/10/19  0530 09/07/19  0535 09/06/19  0709   SODIUM mmol/L 139 140  --  144 142 139 140 136   CHLORIDE mmol/L 96* 104  --  105 103 99 101 98   POTASSIUM mmol/L 4.9 4.4 4.6 3.5 3.7 4.7 5.1 4.1   CO2 mmol/L 25.0 22.0  --  25.0 25.0 27.0 30.0* 25.0   MAGNESIUM mg/dL  --  2.5*  --  1.9 2.2  --   --   --    PHOSPHORUS mg/dL  --  2.7  --  3.4 3.3  --   --   --      Renal:  Results from last 7 days   Lab Units 09/12/19 0413 09/11/19 0447 09/10/19  1612 09/10/19  1236 09/10/19  0530 09/07/19  0535 09/06/19  0709   CREATININE mg/dL 1.59* 1.48* 1.17 1.09 1.23 1.00 0.96   BUN mg/dL 40* 27* 21 22 25* 22 23     Estimated Creatinine Clearance: 55.8 mL/min (A) (by C-G formula based on SCr of 1.59 mg/dL (H)).  Hepatic:      Arterial Blood Gases:  Results from last 7 days   Lab Units 09/10/19  1722 09/10/19  1224 09/10/19  1115 09/10/19  1018 09/10/19  0940 09/10/19  0907 09/10/19  0838   PH, ARTERIAL pH units 7.374 7.394 7.32* 7.37 7.38 7.36 7.29*   PCO2, ARTERIAL mm Hg 42.7 41.8  --   --   --   --   --    PO2 ART mm Hg 85.5 256.0*  --   --   --   --   --    FIO2 % 50 100  --   --   --   --   --        Results from last 7 days   Lab Units 09/07/19  0535   HEMOGLOBIN A1C % 9.70*       No results found for: LACTATE    Relevant imaging studies and labs from 09/12/19 were reviewed and interpreted by me    Medications (drips):    niCARdipine    nitroglycerin Last Rate: Stopped (09/12/19 0510)   norepinephrine Last Rate: 0.03 mcg/kg/min (09/10/19 1201)   phenylephrine          aspirin  mg Oral Daily   atorvastatin 40 mg Oral Nightly   budesonide-formoterol 2 puff Inhalation BID - RT    busPIRone 10 mg Oral BID   cetirizine 5 mg Oral Daily   dicyclomine 20 mg Oral Daily   insulin detemir 25 Units Subcutaneous Q12H   insulin lispro 0-24 Units Subcutaneous 4x Daily With Meals & Nightly   metoprolol tartrate 12.5 mg Oral Q12H   pantoprazole 40 mg Oral Nightly   pharmacy consult - MTM  Does not apply Daily   polyethylene glycol 17 g Oral BID   sennosides-docusate sodium 2 tablet Oral BID   tamsulosin 0.4 mg Oral Daily       Assessment/Plan   IMPRESSION / PLAN     Inpatient Problem List:  64 y.o.male:  Active Hospital Problems    Diagnosis   • **Unstable angina (CMS/[facility])     Added automatically from request for surgery [id number]     • Type 2 diabetes mellitus with circulatory disorder, without long-term current use of insulin (CMS/[facility])   • Benign essential HTN   • Coronary artery disease     Added automatically from request for surgery [id number]          Impression:  64 y.o.male with relevant PMH of Obesity, HTN, HLD, CAD, prior stents, FH of premature CAD, T2DM A1c 9.7, prior CVA 2009 w/ no residual deficit, Asthma (data deficient), non-smoker admitted 9/6/2019 w/ Unstable Angina.  Cath performed 9/6 showed 75% LM in distal bifurcation, 70% ostial/proximal LAD, 70% Diagonal, 70% LCx, 90% Distal RCA.  Echo w/ EF 55% and at least moderate AS.  Patient is now post op 4vCABG and Bioprosthetic AVR by Dr. Goldsmith on 9/10.    Plan:  Post op care - per CTS    CAD / HTN / HLD - per Cardiology    Asthma --> prn albuterol.  Symbicort (on Breo at home).    T2DM A1c 9.7 / Stress Hyperglycemia - Titrate SQ insulin.  On oral hypoglycemics at home which will be held in ICU.  Given A1C will need to go home on Insulin.  DM educator consult.    SILVINA - monitor, avoid nephrotoxins / NSAIDs, received IV contrast 9/6 for cath, give 1 liter LR over 10 hours    Acute Blood Loss Anemia - monitor    Nausea - scheduled zofran, prn phenergan    Pruritis - prn benadyrl    Stool softeners    Continue appropriate home meds    SQ  heparin    Nutrition - Diet Regular; Cardiac, Consistent Carbohydrate    Plan of care and goals reviewed with mulitdisciplinary team at daily rounds           Waldemar Tam MD  Intensive Care Medicine  09/12/19 11:17 AM

## 2019-09-12 NOTE — NURSING NOTE
Attempted to take pt for his evening walk, for which he was amenable. When attempting to stand, pt was very unsteady on feet and unable to stand without support. Pt had also been noticeably shaky early in shift and was unable to  his water cup or feed himself with utensils. Decision was made to transfer to bed instead of walking, and a 3-person assist was required to move pt from chair to bed.

## 2019-09-12 NOTE — NURSING NOTE
Prior to central line removal, order for the removal of catheter was verified, patient was assessed, necessary materials were gathered and patient was educated regarding procedure .    Patient was positioned flat to ensure that the insertion site was at or below the level of the heart.    Hands were washed, clean gloves were applied and central line dressing was gently removed. Catheter exit site was not cultured.     A new pair of clean gloves were then applied. Insertion site was cleansed with 2% Chlorhexidine swab using a circular motion beginning at the insertion site and moving outward for 30 seconds and allowed to dry.     Clamp on line was present and clamped.     Patient was instructed to hold breath as catheter was withdrawn.     The central line was grasped at the insertion site and slowly pulled outward parallel to the skin. Resistance was not met.    After central line was completely removed, a sterile 4x4 gauze pad was used to apply light pressure until bleeding stopped. At that time, petroleum-based gauze and a sterile occlusive dressing was applied to exit site.     Patient was instructed to keep dressing in place for at least 24 hours and to remain in a flat or reclining position for 30 minutes post-removal.     Catheter was inspected after removal and was intact. Tip of central line was not sent for culture. Patient tolerated procedure.

## 2019-09-12 NOTE — PROGRESS NOTES
Clinical Nutrition     Multidisciplinary Rounds      Patient Name: Khoa Arnold  Date of Encounter: 09/12/19 10:01 AM  MRN: [id number]  Admission date: 9/6/2019      TREVER HERMOSILLO to continue to follow per protocol.     Current diet: Diet Regular; Cardiac, Consistent Carbohydrate  No active supplement orders    Intervention:  Follow treatment plan  Care plan reviewed  Diet advanced    Follow up:   Per protocol      Gale Arriola RD  10:01 AM  Time: 10min

## 2019-09-12 NOTE — THERAPY TREATMENT NOTE
Patient Name: Khoa Arnold  : 1955    MRN: [id number]                              Today's Date: 2019       Admit Date: 2019    Visit Dx:     ICD-10-CM ICD-9-CM   1. Coronary artery disease involving native coronary artery of native heart with other form of angina pectoris (CMS/[facility]) I25.118 414.01     413.9   2. Unstable angina (CMS/[facility]) I20.0 411.1     Patient Active Problem List   Diagnosis   • Unstable angina (CMS/[facility])   • Coronary artery disease   • Type 2 diabetes mellitus with circulatory disorder, without long-term current use of insulin (CMS/[facility])   • Benign essential HTN     Past Medical History:   Diagnosis Date   • Asthma    • Coronary artery disease    • Diabetes mellitus (CMS/[facility])    • Hyperlipidemia    • Hypertension    • Stroke (CMS/[facility])     , no residual   • Type 2 diabetes mellitus with circulatory disorder, without long-term current use of insulin (CMS/[facility]) 2019     Past Surgical History:   Procedure Laterality Date   • CARDIAC CATHETERIZATION      3 stents    • CARDIAC CATHETERIZATION N/A 2019    Procedure: Left Heart Cath;  Surgeon: Jonathan Pineda MD;  Location:  [facility];  Service: Cardiovascular   • CHOLECYSTECTOMY     • COLONOSCOPY     • CORONARY ANGIOPLASTY WITH STENT PLACEMENT      x 3, last one    • CORONARY ARTERY BYPASS GRAFT WITH AORTIC VALVE REPAIR/REPLACEMENT N/A 9/10/2019    Procedure: CARLYN PER ANESTHESIA, MEDIAN STERNOTOMY, CORONARY ARTERY BYPASS GRAFT X 4, UTILIZING THE LEFT INTERNAL MAMMARY ARTERY, AND EVH OF THE RIGHT GREATER SAPHENOUS VEIN,   AORTIC VALVE REPLACEMENT;  Surgeon: Denys Goldsmith MD;  Location: [facility] OR;  Service: Cardiothoracic   • UMBILICAL HERNIA REPAIR       General Information     Row Name 19 1107          PT Evaluation Time/Intention    Document Type  therapy note (daily note)  -KR     Mode of Treatment  physical therapy  -KR     Row Name 19 1107          General Information     Existing Precautions/Restrictions  cardiac;fall;sternal;oxygen therapy device and L/min  -KR     Row Name 09/12/19 1107          Cognitive Assessment/Intervention- PT/OT    Orientation Status (Cognition)  oriented x 4  -KR     Row Name 09/12/19 1107          Safety Issues, Functional Mobility    Safety Issues Affecting Function (Mobility)  awareness of need for assistance;insight into deficits/self awareness;safety precaution awareness;safety precautions follow-through/compliance  -KR     Impairments Affecting Function (Mobility)  balance;coordination;endurance/activity tolerance;shortness of breath;strength  -KR       User Key  (r) = Recorded By, (t) = Taken By, (c) = Cosigned By    Initials Name Provider Type    KR Charisma Sykes, PT Physical Therapist        Mobility     Row Name 09/12/19 1108          Bed Mobility Assessment/Treatment    Comment (Bed Mobility)  UIC  -KR     Row Name 09/12/19 1108          Transfer Assessment/Treatment    Comment (Transfers)  VC's for sequencing and hand placement to maintain sternal precautions.   -KR     Row Name 09/12/19 1108          Sit-Stand Transfer    Sit-Stand [place] (Transfers)  minimum assist (75% patient effort);2 person assist;verbal cues  -KR     Assistive Device (Sit-Stand Transfers)  walker, front-wheeled  -KR     Row Name 09/12/19 1108          Gait/Stairs Assessment/Training    Gait/Stairs Assessment/Training  gait/ambulation assistive device  -KR     [place] Level (Gait)  minimum assist (75% patient effort);2 person assist;1 person to manage equipment;verbal cues  -KR     Assistive Device (Gait)  walker, front-wheeled  -KR     Distance in Feet (Gait)  80  -KR     Pattern (Gait)  step-to  -KR     Deviations/Abnormal Patterns (Gait)  base of support, wide;terry decreased;stride length decreased  -KR     Bilateral Gait Deviations  forward flexed posture;heel strike decreased  -KR     Comment (Gait/Stairs)  Pt demonstrated step to gait pattern with  wide DANIEL and short, shuffled steps. Max verbal and tactile cues to keep RW close with feet inside middle. Pt with periods of unsteady gait and decreased coordination.   -KR       User Key  (r) = Recorded By, (t) = Taken By, (c) = Cosigned By    Initials Name Provider Type    Charisma Cote, PT Physical Therapist        Obj/Interventions     Row Name 09/12/19 1111          Therapeutic Exercise    Lower Extremity (Therapeutic Exercise)  LAQ (long arc quad), bilateral  -KR     Lower Extremity Range of Motion (Therapeutic Exercise)  ankle dorsiflexion/plantar flexion, bilateral  -KR     Exercise Type (Therapeutic Exercise)  AROM (active range of motion)  -KR     Position (Therapeutic Exercise)  seated  -KR     Sets/Reps (Therapeutic Exercise)  1/10  -KR     Row Name 09/12/19 1111          Static Sitting Balance    Level of [place] (Unsupported Sitting, Static Balance)  contact guard assist  -KR     Sitting Position (Unsupported Sitting, Static Balance)  sitting in chair  -KR     Row Name 09/12/19 1111          Static Standing Balance    Level of [place] (Supported Standing, Static Balance)  minimal assist, 75% patient effort  -KR     Assistive Device Utilized (Supported Standing, Static Balance)  walker, rolling  -KR     Row Name 09/12/19 1111          Dynamic Standing Balance    Level of [place], Reaches Outside Midline (Standing, Dynamic Balance)  minimal assist, 75% patient effort;2 person assist  -KR     Assistive Device Utilized (Supported Standing, Dynamic Balance)  walker, rolling  -KR       User Key  (r) = Recorded By, (t) = Taken By, (c) = Cosigned By    Initials Name Provider Type    Charisma Cote, PT Physical Therapist        Goals/Plan    No documentation.       Clinical Impression     Row Name 09/12/19 1112          Pain Assessment    Additional Documentation  Pain Scale: Numbers Pre/Post-Treatment (Group)  -KR     Row Name 09/12/19 1112          Pain Scale: Numbers  Pre/Post-Treatment    Pain Scale: Numbers, Pretreatment  0/10 - no pain  -KR     Pain Scale: Numbers, Post-Treatment  0/10 - no pain  -KR     Pre/Post Treatment Pain Comment  8/10 with coughing  -KR     Row Name 09/12/19 1112          Plan of Care Review    Plan of Care Reviewed With  patient  -KR     Row Name 09/12/19 1112          Vital Signs    Pre Systolic BP Rehab  127  -KR     Pre Treatment Diastolic BP  70  -KR     Post Systolic BP Rehab  135  -KR     Post Treatment Diastolic BP  94  -KR     Pretreatment Heart Rate (beats/min)  97  -KR     Posttreatment Heart Rate (beats/min)  104  -KR     Pre SpO2 (%)  95  -KR     O2 Delivery Pre Treatment  supplemental O2  -KR     Post SpO2 (%)  95  -KR     O2 Delivery Post Treatment  supplemental O2  -KR     Pre Patient Position  Sitting  -KR     Intra Patient Position  Standing  -KR     Post Patient Position  Sitting  -KR     Row Name 09/12/19 1112          Positioning and Restraints    Pre-Treatment Position  sitting in chair/recliner  -KR     Post Treatment Position  chair  -KR     In Chair  notified nsg;reclined;call light within reach;encouraged to call for assist;RUE elevated;LUE elevated;waffle cushion;legs elevated  -KR       User Key  (r) = Recorded By, (t) = Taken By, (c) = Cosigned By    Initials Name Provider Type    Charisma Cote, PT Physical Therapist        Outcome Measures     Row Name 09/12/19 1114          How much help from another person do you currently need...    Turning from your back to your side while in flat bed without using bedrails?  3  -KR     Moving from lying on back to sitting on the side of a flat bed without bedrails?  2  -KR     Moving to and from a bed to a chair (including a wheelchair)?  3  -KR     Standing up from a chair using your arms (e.g., wheelchair, bedside chair)?  3  -KR     Climbing 3-5 steps with a railing?  1  -KR     To walk in hospital room?  2  -KR     AM-PAC 6 Clicks Score (PT)  14  -KR     Row Name 09/12/19 1114           Functional Assessment    Outcome Measure Options  AM-PAC 6 Clicks Basic Mobility (PT)  -KR       User Key  (r) = Recorded By, (t) = Taken By, (c) = Cosigned By    Initials Name Provider Type    Charisma Cote, PT Physical Therapist        Physical Therapy Education     Title: PT OT SLP Therapies (In Progress)     Topic: Physical Therapy (In Progress)     Point: Mobility training (In Progress)     Learning Progress Summary           Patient Acceptance, E, NR by KR at 9/12/2019  8:24 AM    Acceptance, E, VU,NR by MARCO at 9/11/2019  3:16 PM   Significant Other Acceptance, E, VU,NR by MARCO at 9/11/2019  3:16 PM                   Point: Home exercise program (In Progress)     Learning Progress Summary           Patient Acceptance, E, NR by KR at 9/12/2019  8:24 AM                   Point: Body mechanics (In Progress)     Learning Progress Summary           Patient Acceptance, E, NR by KR at 9/12/2019  8:24 AM    Acceptance, E, VU,NR by MARCO at 9/11/2019  3:16 PM   Significant Other Acceptance, E, VU,NR by MARCO at 9/11/2019  3:16 PM                   Point: Precautions (In Progress)     Learning Progress Summary           Patient Acceptance, E, NR by KR at 9/12/2019  8:24 AM    Acceptance, E, VU,NR by MARCO at 9/11/2019  3:16 PM   Significant Other Acceptance, E, VU,NR by MARCO at 9/11/2019  3:16 PM                               User Key     Initials Effective Dates Name Provider Type Discipline     04/03/18 -  Charisma Sykes, PT Physical Therapist PT    MARCO 08/30/18 -  Jackie Oscar PT Physical Therapist PT              PT Recommendation and Plan     Plan of Care Reviewed With: patient  Progress: improving  Outcome Summary: Pt ambulated 80ft with RW and Marie x2. Pt demonstrated slow terry with wide DANIEL and short steps. Pt required increased cueing to keep RW close with feet inside middle; demonstrated frequent periods of decreased coordination. Continue to progress as appropriate.      Time Calculation:   PT  Charges     Row Name 09/12/19 0824             Time Calculation    Start Time  0824  -KR      PT Received On  09/12/19  -KR      PT Goal Re-Cert Due Date  09/21/19  -LITZY         Time Calculation- PT    Total Timed Code Minutes- PT  23 minute(s)  -KR         Timed Charges    15401 - PT Therapeutic Activity Minutes  23  -KR        User Key  (r) = Recorded By, (t) = Taken By, (c) = Cosigned By    Initials Name Provider Type    Charisma Cote, PT Physical Therapist        Therapy Charges for Today     Code Description Service Date Service Provider Modifiers Qty    63454201296  PT THERAPEUTIC ACT EA 15 MIN 9/12/2019 Charisma Sykes, PT GP 2    51799727263 HC PT THER SUPP EA 15 MIN 9/12/2019 Charisma Sykes, PT GP 2          PT G-Codes  Outcome Measure Options: AM-PAC 6 Clicks Basic Mobility (PT)  AM-PAC 6 Clicks Score (PT): 14    Hanna Sykes PT  9/12/2019

## 2019-09-12 NOTE — PROGRESS NOTES
[facility]       LOS: 6 days   Patient Care Team:  Shirlene Sharpe MD as PCP - General (Internal Medicine)        Subjective       Patient Denies:  Sob, palpitations.  Sore but feeling better.  Up in chair      Vital Signs  Temp:  [97.6 °F (36.4 °C)-99 °F (37.2 °C)] 97.6 °F (36.4 °C)  Heart Rate:  [71-99] 91  Resp:  [10-22] 10  BP: ()/(57-82) 128/72  Arterial Line BP: (106-133)/(50-57) 117/53    Intake/Output Summary (Last 24 hours) at 9/12/2019 0944  Last data filed at 9/12/2019 0600  Gross per 24 hour   Intake 1322.5 ml   Output 1260 ml   Net 62.5 ml     No intake/output data recorded.    Physical Exam:     General Appearance:    A&O, in no acute distress       Neck:   No adenopathy, supple, trachea midline, no thyromegaly, no JVD       Lungs:     Clear to auscultation,respirations regular, even and                  unlabored    Heart:    Regular rhythm and normal rate, normal S1 and S2, 2/6            murmur, no gallop, no rub, no click   Chest Wall:    No abnormalities observed   Abdomen:     Normal bowel sounds, no masses, no organomegaly, soft               Extremities:   1-2+ edema, no cyanosis, no redness/ blood blisters noted on right medial thigh/upper calf     Pulses:   Pulses palpable and equal bilaterally     Results Review:     I reviewed the patient's new clinical results.      WBC WBC   Date/Time Value Ref Range Status   09/12/2019 0413 8.48 3.40 - 10.80 10*3/mm3 Final   09/11/2019 0447 6.69 3.40 - 10.80 10*3/mm3 Final   09/10/2019 1957 5.60 3.40 - 10.80 10*3/mm3 Final   09/10/2019 1612 3.88 3.40 - 10.80 10*3/mm3 Final   09/10/2019 1236 4.96 3.40 - 10.80 10*3/mm3 Final   09/10/2019 0530 5.92 3.40 - 10.80 10*3/mm3 Final            HGB Hemoglobin   Date/Time Value Ref Range Status   09/12/2019 0413 8.4 (L) 13.0 - 17.7 g/dL Final   09/11/2019 0447 7.9 (L) 13.0 - 17.7 g/dL Final   09/10/2019 1957 8.5 (L) 13.0 - 17.7 g/dL Final    09/10/2019 1612 8.0 (L) 13.0 - 17.7 g/dL Final   09/10/2019 1236 8.4 (L) 13.0 - 17.7 g/dL Final   09/10/2019 1115 8.2 (L) 12.0 - 17.0 g/dL Final   09/10/2019 1018 8.2 (L) 12.0 - 17.0 g/dL Final   09/10/2019 0940 8.8 (L) 12.0 - 17.0 g/dL Final   09/10/2019 0907 8.8 (L) 12.0 - 17.0 g/dL Final   09/10/2019 0838 8.8 (L) 12.0 - 17.0 g/dL Final   09/10/2019 0814 11.2 (L) 12.0 - 17.0 g/dL Final   09/10/2019 0708 12.6 12.0 - 17.0 g/dL Final   09/10/2019 0530 13.9 13.0 - 17.7 g/dL Final           HCT Hematocrit   Date/Time Value Ref Range Status   09/12/2019 0413 25.4 (L) 37.5 - 51.0 % Final   09/11/2019 0447 23.4 (L) 37.5 - 51.0 % Final   09/10/2019 1957 24.8 (L) 37.5 - 51.0 % Final   09/10/2019 1612 23.9 (L) 37.5 - 51.0 % Final   09/10/2019 1236 24.6 (L) 37.5 - 51.0 % Final   09/10/2019 1115 24 (L) 38 - 51 % Final   09/10/2019 1018 24 (L) 38 - 51 % Final   09/10/2019 0940 26 (L) 38 - 51 % Final   09/10/2019 0907 26 (L) 38 - 51 % Final   09/10/2019 0838 26 (L) 38 - 51 % Final   09/10/2019 0814 33 (L) 38 - 51 % Final   09/10/2019 0708 37 (L) 38 - 51 % Final   09/10/2019 0530 41.4 37.5 - 51.0 % Final            Platlets Platelets   Date/Time Value Ref Range Status   09/12/2019 0413 149 140 - 450 10*3/mm3 Final   09/11/2019 0447 161 140 - 450 10*3/mm3 Final   09/10/2019 1957 186 140 - 450 10*3/mm3 Final   09/10/2019 1612 157 140 - 450 10*3/mm3 Final   09/10/2019 1236 184 140 - 450 10*3/mm3 Final   09/10/2019 0530 227 140 - 450 10*3/mm3 Final     Sodium  Sodium   Date/Time Value Ref Range Status   09/12/2019 0413 139 136 - 145 mmol/L Final   09/11/2019 0447 140 136 - 145 mmol/L Final   09/10/2019 1612 144 136 - 145 mmol/L Final   09/10/2019 1236 142 136 - 145 mmol/L Final   09/10/2019 0530 139 136 - 145 mmol/L Final     Potassium  Potassium   Date/Time Value Ref Range Status   09/12/2019 0413 4.9 3.5 - 5.2 mmol/L Final   09/11/2019 0447 4.4 3.5 - 5.2 mmol/L Final   09/11/2019 0143 4.6 3.5 - 5.2 mmol/L Final   09/10/2019 1612  3.5 3.5 - 5.2 mmol/L Final   09/10/2019 1236 3.7 3.5 - 5.2 mmol/L Final   09/10/2019 0530 4.7 3.5 - 5.2 mmol/L Final     Chloride  Chloride   Date/Time Value Ref Range Status   09/12/2019 0413 96 (L) 98 - 107 mmol/L Final   09/11/2019 0447 104 98 - 107 mmol/L Final   09/10/2019 1612 105 98 - 107 mmol/L Final   09/10/2019 1236 103 98 - 107 mmol/L Final   09/10/2019 0530 99 98 - 107 mmol/L Final     BicarbonateNo results found for: PLASMABICARB    BUN BUN   Date/Time Value Ref Range Status   09/12/2019 0413 40 (H) 8 - 23 mg/dL Final   09/11/2019 0447 27 (H) 8 - 23 mg/dL Final   09/10/2019 1612 21 8 - 23 mg/dL Final   09/10/2019 1236 22 8 - 23 mg/dL Final   09/10/2019 0530 25 (H) 8 - 23 mg/dL Final      Creatinine Creatinine   Date/Time Value Ref Range Status   09/12/2019 0413 1.59 (H) 0.76 - 1.27 mg/dL Final   09/11/2019 0447 1.48 (H) 0.76 - 1.27 mg/dL Final   09/10/2019 1612 1.17 0.76 - 1.27 mg/dL Final   09/10/2019 1236 1.09 0.76 - 1.27 mg/dL Final   09/10/2019 0530 1.23 0.76 - 1.27 mg/dL Final      Calcium Calcium   Date/Time Value Ref Range Status   09/12/2019 0413 8.6 8.6 - 10.5 mg/dL Final   09/11/2019 0447 9.0 8.6 - 10.5 mg/dL Final   09/10/2019 1612 8.6 8.6 - 10.5 mg/dL Final   09/10/2019 1236 9.1 8.6 - 10.5 mg/dL Final   09/10/2019 0530 9.7 8.6 - 10.5 mg/dL Final      Mag @RESULFAST(MG:3)@        PT/INR:       Lab Results   Component Value Date    PROTIME 15.1 (H) 09/11/2019    PROTIME 16.8 (H) 09/10/2019    PROTIME 13.9 09/06/2019    INR 1.25 (H) 09/11/2019    INR 1.43 (H) 09/10/2019    INR 1.12 09/06/2019      Troponin I:  No results found for: TROPONINI No results found for: CKTOTAL, CKMB, CKMBINDEX, POCRTROPI, TROPONINT      aspirin  mg Oral Daily   atorvastatin 40 mg Oral Nightly   budesonide-formoterol 2 puff Inhalation BID - RT   busPIRone 10 mg Oral BID   cetirizine 5 mg Oral Daily   dicyclomine 20 mg Oral Daily   insulin detemir 25 Units Subcutaneous Q12H   insulin lispro 0-9 Units Subcutaneous  4x Daily With Meals & Nightly   ondansetron 8 mg Intravenous Q8H   pantoprazole 40 mg Oral Nightly   pharmacy consult - MTM  Does not apply Daily   polyethylene glycol 17 g Oral BID   sennosides-docusate sodium 2 tablet Oral BID   tamsulosin 0.4 mg Oral Daily       dexmedetomidine 0.2-1.5 mcg/kg/hr Last Rate: Stopped (09/11/19 0400)   DOBUTamine 2-20 mcg/kg/min Last Rate: Stopped (09/11/19 1230)   DOPamine 2-20 mcg/kg/min    EPINEPHrine 0.02-0.3 mcg/kg/min    niCARdipine 5-15 mg/hr    nitroglycerin 5-200 mcg/min Last Rate: Stopped (09/12/19 0510)   norepinephrine 0.02-0.3 mcg/kg/min Last Rate: 0.03 mcg/kg/min (09/10/19 1201)   phenylephrine 0.5-3 mcg/kg/min    propofol 5-50 mcg/kg/min Last Rate: Stopped (09/10/19 1620)       Assessment/Plan     Patient Active Problem List   Diagnosis Code   • Unstable angina (CMS/[facility]) I20.0   • Coronary artery disease I25.10   • Type 2 diabetes mellitus with circulatory disorder, without long-term current use of insulin (CMS/[facility]) E11.59   • Benign essential HTN I10     Progressing after 4 vessel CABG and AVR  Normal EF  Watch GFR--creatinine up again today.  Ambulate     JANAK Palmer MD  09/12/19  9:44 AM

## 2019-09-12 NOTE — PROGRESS NOTES
Continued Stay Note  [facility]     Patient Name: Khoa Arnold  MRN: [id number]  Today's Date: 9/12/2019    Admit Date: 9/6/2019    Discharge Plan     Row Name 09/12/19 3795       Plan    Plan Home with spouse    Plan Comments Remains in ICU, improving.  Anticipate home with spouse at discharge, will plan to f/u with patient/spouse in am regarding home needs-previously felt he may benefit from home O2.  Carleen Gerber, Ext. [phone]    Final Discharge Disposition Code 01 - home or self-care        Discharge Codes    No documentation.       Expected Discharge Date and Time     Expected Discharge Date Expected Discharge Time    Sep 14, 2019             GLEN Lockhart

## 2019-09-13 ENCOUNTER — APPOINTMENT (OUTPATIENT)
Dept: GENERAL RADIOLOGY | Facility: HOSPITAL | Age: 64
End: 2019-09-13

## 2019-09-13 LAB
ALBUMIN SERPL-MCNC: 3.2 G/DL (ref 3.5–5.2)
ALBUMIN/GLOB SERPL: 1 G/DL
ALP SERPL-CCNC: 50 U/L (ref 39–117)
ALT SERPL W P-5'-P-CCNC: 51 U/L (ref 1–41)
ANION GAP SERPL CALCULATED.3IONS-SCNC: 12 MMOL/L (ref 5–15)
AST SERPL-CCNC: 51 U/L (ref 1–40)
BASOPHILS # BLD AUTO: 0.02 10*3/MM3 (ref 0–0.2)
BASOPHILS NFR BLD AUTO: 0.3 % (ref 0–1.5)
BILIRUB SERPL-MCNC: 0.6 MG/DL (ref 0.2–1.2)
BUN BLD-MCNC: 35 MG/DL (ref 8–23)
BUN/CREAT SERPL: 30.7 (ref 7–25)
CALCIUM SPEC-SCNC: 9 MG/DL (ref 8.6–10.5)
CHLORIDE SERPL-SCNC: 98 MMOL/L (ref 98–107)
CO2 SERPL-SCNC: 26 MMOL/L (ref 22–29)
CREAT BLD-MCNC: 1.14 MG/DL (ref 0.76–1.27)
DEPRECATED RDW RBC AUTO: 46.4 FL (ref 37–54)
EOSINOPHIL # BLD AUTO: 0.08 10*3/MM3 (ref 0–0.4)
EOSINOPHIL NFR BLD AUTO: 1.1 % (ref 0.3–6.2)
ERYTHROCYTE [DISTWIDTH] IN BLOOD BY AUTOMATED COUNT: 13.6 % (ref 12.3–15.4)
GFR SERPL CREATININE-BSD FRML MDRD: 64 ML/MIN/1.73
GLOBULIN UR ELPH-MCNC: 3.2 GM/DL
GLUCOSE BLD-MCNC: 248 MG/DL (ref 65–99)
GLUCOSE BLDC GLUCOMTR-MCNC: 210 MG/DL (ref 70–130)
GLUCOSE BLDC GLUCOMTR-MCNC: 230 MG/DL (ref 70–130)
GLUCOSE BLDC GLUCOMTR-MCNC: 248 MG/DL (ref 70–130)
GLUCOSE BLDC GLUCOMTR-MCNC: 294 MG/DL (ref 70–130)
HCT VFR BLD AUTO: 25.8 % (ref 37.5–51)
HGB BLD-MCNC: 8.6 G/DL (ref 13–17.7)
IMM GRANULOCYTES # BLD AUTO: 0.13 10*3/MM3 (ref 0–0.05)
IMM GRANULOCYTES NFR BLD AUTO: 1.7 % (ref 0–0.5)
LYMPHOCYTES # BLD AUTO: 1.43 10*3/MM3 (ref 0.7–3.1)
LYMPHOCYTES NFR BLD AUTO: 19.1 % (ref 19.6–45.3)
MAGNESIUM SERPL-MCNC: 2.2 MG/DL (ref 1.6–2.4)
MCH RBC QN AUTO: 30.8 PG (ref 26.6–33)
MCHC RBC AUTO-ENTMCNC: 33.3 G/DL (ref 31.5–35.7)
MCV RBC AUTO: 92.5 FL (ref 79–97)
MONOCYTES # BLD AUTO: 0.75 10*3/MM3 (ref 0.1–0.9)
MONOCYTES NFR BLD AUTO: 10 % (ref 5–12)
NEUTROPHILS # BLD AUTO: 5.07 10*3/MM3 (ref 1.7–7)
NEUTROPHILS NFR BLD AUTO: 67.8 % (ref 42.7–76)
NRBC BLD AUTO-RTO: 0 /100 WBC (ref 0–0.2)
PHOSPHATE SERPL-MCNC: 2.6 MG/DL (ref 2.5–4.5)
PLATELET # BLD AUTO: 143 10*3/MM3 (ref 140–450)
PMV BLD AUTO: 9.8 FL (ref 6–12)
POTASSIUM BLD-SCNC: 4.5 MMOL/L (ref 3.5–5.2)
PROT SERPL-MCNC: 6.4 G/DL (ref 6–8.5)
RBC # BLD AUTO: 2.79 10*6/MM3 (ref 4.14–5.8)
SODIUM BLD-SCNC: 136 MMOL/L (ref 136–145)
WBC NRBC COR # BLD: 7.48 10*3/MM3 (ref 3.4–10.8)

## 2019-09-13 PROCEDURE — 85025 COMPLETE CBC W/AUTO DIFF WBC: CPT | Performed by: INTERNAL MEDICINE

## 2019-09-13 PROCEDURE — 99024 POSTOP FOLLOW-UP VISIT: CPT | Performed by: THORACIC SURGERY (CARDIOTHORACIC VASCULAR SURGERY)

## 2019-09-13 PROCEDURE — 84100 ASSAY OF PHOSPHORUS: CPT | Performed by: INTERNAL MEDICINE

## 2019-09-13 PROCEDURE — 63710000001 INSULIN DETEMIR PER 5 UNITS: Performed by: INTERNAL MEDICINE

## 2019-09-13 PROCEDURE — 71045 X-RAY EXAM CHEST 1 VIEW: CPT

## 2019-09-13 PROCEDURE — 25010000002 HEPARIN (PORCINE) PER 1000 UNITS: Performed by: INTERNAL MEDICINE

## 2019-09-13 PROCEDURE — 63710000001 INSULIN LISPRO (HUMAN) PER 5 UNITS: Performed by: INTERNAL MEDICINE

## 2019-09-13 PROCEDURE — 99233 SBSQ HOSP IP/OBS HIGH 50: CPT | Performed by: INTERNAL MEDICINE

## 2019-09-13 PROCEDURE — 83735 ASSAY OF MAGNESIUM: CPT | Performed by: INTERNAL MEDICINE

## 2019-09-13 PROCEDURE — 80053 COMPREHEN METABOLIC PANEL: CPT | Performed by: INTERNAL MEDICINE

## 2019-09-13 PROCEDURE — 63710000001 INSULIN LISPRO (HUMAN) PER 5 UNITS: Performed by: THORACIC SURGERY (CARDIOTHORACIC VASCULAR SURGERY)

## 2019-09-13 PROCEDURE — 97530 THERAPEUTIC ACTIVITIES: CPT

## 2019-09-13 PROCEDURE — G0108 DIAB MANAGE TRN  PER INDIV: HCPCS | Performed by: REGISTERED NURSE

## 2019-09-13 PROCEDURE — 82962 GLUCOSE BLOOD TEST: CPT

## 2019-09-13 PROCEDURE — 94799 UNLISTED PULMONARY SVC/PX: CPT

## 2019-09-13 RX ORDER — METOPROLOL TARTRATE 50 MG/1
50 TABLET, FILM COATED ORAL EVERY 12 HOURS SCHEDULED
Status: DISCONTINUED | OUTPATIENT
Start: 2019-09-13 | End: 2019-09-16

## 2019-09-13 RX ORDER — SODIUM CHLORIDE 0.9 % (FLUSH) 0.9 %
10 SYRINGE (ML) INJECTION EVERY 8 HOURS
Status: DISCONTINUED | OUTPATIENT
Start: 2019-09-13 | End: 2019-09-17 | Stop reason: SDUPTHER

## 2019-09-13 RX ORDER — SODIUM CHLORIDE 0.9 % (FLUSH) 0.9 %
10 SYRINGE (ML) INJECTION EVERY 12 HOURS SCHEDULED
Status: DISCONTINUED | OUTPATIENT
Start: 2019-09-13 | End: 2019-09-17 | Stop reason: HOSPADM

## 2019-09-13 RX ORDER — IPRATROPIUM BROMIDE AND ALBUTEROL SULFATE 2.5; .5 MG/3ML; MG/3ML
3 SOLUTION RESPIRATORY (INHALATION) EVERY 4 HOURS PRN
Status: DISCONTINUED | OUTPATIENT
Start: 2019-09-13 | End: 2019-09-17 | Stop reason: HOSPADM

## 2019-09-13 RX ADMIN — SENNOSIDES, DOCUSATE SODIUM 2 TABLET: 50; 8.6 TABLET, FILM COATED ORAL at 20:56

## 2019-09-13 RX ADMIN — HEPARIN SODIUM 5000 UNITS: 5000 INJECTION, SOLUTION INTRAVENOUS; SUBCUTANEOUS at 16:36

## 2019-09-13 RX ADMIN — POLYETHYLENE GLYCOL 3350 17 G: 17 POWDER, FOR SOLUTION ORAL at 20:56

## 2019-09-13 RX ADMIN — PANTOPRAZOLE SODIUM 40 MG: 40 TABLET, DELAYED RELEASE ORAL at 20:56

## 2019-09-13 RX ADMIN — IPRATROPIUM BROMIDE AND ALBUTEROL SULFATE 3 ML: 2.5; .5 SOLUTION RESPIRATORY (INHALATION) at 21:17

## 2019-09-13 RX ADMIN — HEPARIN SODIUM 5000 UNITS: 5000 INJECTION, SOLUTION INTRAVENOUS; SUBCUTANEOUS at 06:30

## 2019-09-13 RX ADMIN — ATORVASTATIN CALCIUM 40 MG: 40 TABLET, FILM COATED ORAL at 20:56

## 2019-09-13 RX ADMIN — CETIRIZINE HYDROCHLORIDE 5 MG: 10 TABLET, FILM COATED ORAL at 08:56

## 2019-09-13 RX ADMIN — TAMSULOSIN HYDROCHLORIDE 0.4 MG: 0.4 CAPSULE ORAL at 08:58

## 2019-09-13 RX ADMIN — INSULIN LISPRO 8 UNITS: 100 INJECTION, SOLUTION INTRAVENOUS; SUBCUTANEOUS at 16:48

## 2019-09-13 RX ADMIN — MAGNESIUM HYDROXIDE 10 ML: 2400 SUSPENSION ORAL at 12:56

## 2019-09-13 RX ADMIN — INSULIN DETEMIR 25 UNITS: 100 INJECTION, SOLUTION SUBCUTANEOUS at 08:59

## 2019-09-13 RX ADMIN — HEPARIN SODIUM 5000 UNITS: 5000 INJECTION, SOLUTION INTRAVENOUS; SUBCUTANEOUS at 21:04

## 2019-09-13 RX ADMIN — ASPIRIN 325 MG: 325 TABLET, DELAYED RELEASE ORAL at 08:58

## 2019-09-13 RX ADMIN — INSULIN LISPRO 12 UNITS: 100 INJECTION, SOLUTION INTRAVENOUS; SUBCUTANEOUS at 12:45

## 2019-09-13 RX ADMIN — INSULIN LISPRO 8 UNITS: 100 INJECTION, SOLUTION INTRAVENOUS; SUBCUTANEOUS at 12:46

## 2019-09-13 RX ADMIN — INSULIN LISPRO 8 UNITS: 100 INJECTION, SOLUTION INTRAVENOUS; SUBCUTANEOUS at 08:59

## 2019-09-13 RX ADMIN — SENNOSIDES, DOCUSATE SODIUM 2 TABLET: 50; 8.6 TABLET, FILM COATED ORAL at 08:56

## 2019-09-13 RX ADMIN — METOPROLOL TARTRATE 50 MG: 50 TABLET ORAL at 08:58

## 2019-09-13 RX ADMIN — ALPRAZOLAM 1 MG: 1 TABLET ORAL at 21:58

## 2019-09-13 RX ADMIN — BUDESONIDE AND FORMOTEROL FUMARATE DIHYDRATE 2 PUFF: 160; 4.5 AEROSOL RESPIRATORY (INHALATION) at 20:25

## 2019-09-13 RX ADMIN — IPRATROPIUM BROMIDE AND ALBUTEROL SULFATE 3 ML: 2.5; .5 SOLUTION RESPIRATORY (INHALATION) at 00:44

## 2019-09-13 RX ADMIN — HYDROCODONE BITARTRATE AND ACETAMINOPHEN 1 TABLET: 7.5; 325 TABLET ORAL at 09:39

## 2019-09-13 RX ADMIN — METOPROLOL TARTRATE 50 MG: 50 TABLET ORAL at 20:56

## 2019-09-13 RX ADMIN — BUSPIRONE HYDROCHLORIDE 10 MG: 10 TABLET ORAL at 20:56

## 2019-09-13 RX ADMIN — ALPRAZOLAM 1 MG: 1 TABLET ORAL at 00:41

## 2019-09-13 RX ADMIN — SODIUM CHLORIDE, PRESERVATIVE FREE 10 ML: 5 INJECTION INTRAVENOUS at 17:41

## 2019-09-13 RX ADMIN — POLYETHYLENE GLYCOL 3350 17 G: 17 POWDER, FOR SOLUTION ORAL at 08:58

## 2019-09-13 RX ADMIN — DICYCLOMINE HYDROCHLORIDE 20 MG: 20 TABLET ORAL at 08:58

## 2019-09-13 RX ADMIN — BUSPIRONE HYDROCHLORIDE 10 MG: 10 TABLET ORAL at 08:58

## 2019-09-13 RX ADMIN — BUDESONIDE AND FORMOTEROL FUMARATE DIHYDRATE 2 PUFF: 160; 4.5 AEROSOL RESPIRATORY (INHALATION) at 07:42

## 2019-09-13 RX ADMIN — INSULIN LISPRO 8 UNITS: 100 INJECTION, SOLUTION INTRAVENOUS; SUBCUTANEOUS at 20:57

## 2019-09-13 RX ADMIN — Medication 10 MG: at 12:49

## 2019-09-13 RX ADMIN — INSULIN DETEMIR 25 UNITS: 100 INJECTION, SOLUTION SUBCUTANEOUS at 21:13

## 2019-09-13 NOTE — CONSULTS
Discussed and taught patient's wife and son about current hospital insulins including the onset, peak, and duration of the insulins. Mr. Arnold kept closing his eyes so only participated in less than half of the session. Taught patient, wife and sont the correct sites for insulin injections and the importance of rotating insulin injection site. Taught patient the correct storage for opened insulin at room temperature, storage for unopened insulin in the refrigerator, and expiration dates for insulin. Also, discussed and taught patient the correct disposable of sharps.  Demonstrated and taught patient how to use demo pen with appropriate injection technique on the demo pillow. Son was able to return demonstration without difficulty. Discussed and taught son and wifet the s/s of hypoglycemia and treatment measures for hypoglycemia. We turned on the ITGR videos and I encouraged them to watch the diabetes videos. Patient and family was encouraged to follow up with their primary caregiver and/or local pharmacist for any questions or concerns.  I provided my contact information and urged them to call prn.

## 2019-09-13 NOTE — PROGRESS NOTES
Clinical Nutrition     Nutrition Assessment  Reason for Visit:   MDR, Follow-up protocol, Need for education    Patient Name: Khoa Arnold  YOB: 1955  MRN: [id number]  Date of Encounter: 09/13/19 2:26 PM  Admission date: 9/6/2019    Nutrition Assessment   Admission Diagnosis         Unstable angina (CMS/HCC)    Coronary artery disease    Type 2 diabetes mellitus with circulatory disorder, without long-term current use of insulin (CMS/[facility])    Benign essential HTN    s/p CABG x4, AVR 9-10-19    PMH: He  has a past medical history of Asthma, Coronary artery disease, Diabetes mellitus (CMS/HCC), Hyperlipidemia, Hypertension, Stroke (CMS/HCC), and Type 2 diabetes mellitus with circulatory disorder, without long-term current use of insulin (CMS/[facility]) (9/6/2019).   PSxH: He  has a past surgical history that includes Cardiac catheterization; Cholecystectomy; Umbilical hernia repair; Colonoscopy; Cardiac catheterization (N/A, 9/6/2019); Coronary angioplasty with stent; and coronary artery bypass graft with aortic valve repair/replacement (N/A, 9/10/2019).       Reported/Observed/Food/Nutrition Related History:     Pt sitting up in chair, R. leg very edematous, blisters     pt reports appetite improved, states he  does not follow diabetic diet at home, per wife he checks blood sugars 2-3 times per day      Labs reviewed     Results from last 7 days   Lab Units 09/13/19  0453   SODIUM mmol/L 136   POTASSIUM mmol/L 4.5   CHLORIDE mmol/L 98   CO2 mmol/L 26.0   BUN mg/dL 35*   CREATININE mg/dL 1.14   CALCIUM mg/dL 9.0   BILIRUBIN mg/dL 0.6   ALK PHOS U/L 50   ALT (SGPT) U/L 51*   AST (SGOT) U/L 51*   GLUCOSE mg/dL 248*       Results from last 7 days   Lab Units 09/13/19  1112 09/13/19  0712 09/12/19  1955 09/12/19  1553 09/12/19  1105 09/12/19  0709   GLUCOSE mg/dL 294* 248* 312* 309* 341* 348*     Lab Results   Lab Value Date/Time    HGBA1C 9.70 (H) 09/07/2019 0535         Medications reviewed    Pertinent:insulin, senokot, miralax, heparin         GI: wnl, + bm    SKIN: surgical sites, RLE blisters, edema    Current Nutrition Prescription     PO: Diet Regular; Cardiac, Consistent Carbohydrate    Intake:25% of 3 meals         Nutrition Diagnosis     Problem Food and nutrition knowledge deficit   Etiology Uncontrolled DM   Signs/Symptoms Ha1c 9.7     Nutrition Intervention     Interventions Goal    General: Nutrition support treatment  Increase Intake  Education    Nutrition Interventions   1.  Follow treatment progress, Advised available snacks, Interview for preferences, Menu provided, Menu adjusted, Supplement provided  Boost GC 2x/day    DM Education provded    Monitor/ Evaluation    Per protocol, I&O, PO intake, Pertinent labs, Weight, Skin status, GI status, Symptoms      Gale Arriola, BAY  Time Spent: 45min

## 2019-09-13 NOTE — PROGRESS NOTES
CTS Progress Note       LOS: 7 days   Patient Care Team:  Shirlene Sharpe MD as PCP - General (Internal Medicine)    Chief Complaint: Unstable angina (CMS/HCC)    Vital Signs:  Temp:  [97.9 °F (36.6 °C)-98.6 °F (37 °C)] 97.9 °F (36.6 °C)  Heart Rate:  [] 89  Resp:  [16-24] 22  BP: (115-162)/(54-94) 123/68    Physical Exam: Alert and breathing unlabored and now ambulatory without difficulty       Results:   Results from last 7 days   Lab Units 09/13/19  0453   WBC 10*3/mm3 7.48   HEMOGLOBIN g/dL 8.6*   HEMATOCRIT % 25.8*   PLATELETS 10*3/mm3 143     Results from last 7 days   Lab Units 09/13/19  0453   SODIUM mmol/L 136   POTASSIUM mmol/L 4.5   CHLORIDE mmol/L 98   CO2 mmol/L 26.0   BUN mg/dL 35*   CREATININE mg/dL 1.14   GLUCOSE mg/dL 248*   CALCIUM mg/dL 9.0           Imaging Results (last 24 hours)     Procedure Component Value Units Date/Time    XR Chest 1 View [[id number]] Updated:  09/13/19 0400    XR Chest 1 View [[id number]] Collected:  09/12/19 0828     Updated:  09/12/19 1511    Narrative:       EXAMINATION: XR CHEST 1 VW-09/12/2019:      INDICATION: Post-Op Heart Surgery; I25.118-Atherosclerotic heart disease  of native coronary artery with other forms of angina pectoris;  I20.0-Unstable angina.      COMPARISON: 09/11/2019.     FINDINGS: PA catheter and NG tube have been removed.  The lungs are  clear except for mild persistent left basilar interstitial changes. No  pneumothorax or effusion is seen.           Impression:       Stable mild left basilar interstitial change. No  pneumothorax or other new chest pathology is seen.     D:  09/12/2019  E:  09/12/2019     This report was finalized on 9/12/2019 3:08 PM by DR. Elier Mtz MD.             Assessment      Unstable angina (CMS/HCC)    Coronary artery disease    Type 2 diabetes mellitus with circulatory disorder, without long-term current use of insulin (CMS/HCC)    Benign essential HTN        Plan   Transfer to telemetry    Please note that  portions of this note were completed with a voice recognition program. Efforts were made to edit the dictations, but occasionally words are mistranscribed.    Denys Goldsmith MD  09/13/19  6:57 AM

## 2019-09-13 NOTE — PROGRESS NOTES
[facility]       LOS: 7 days   Patient Care Team:  Shirlene Sharpe MD as PCP - General (Internal Medicine)        Subjective       Patient Denies:  Sob, palpitations.  Sore but feeling better.  Up in chair      Vital Signs  Temp:  [97.9 °F (36.6 °C)-98.6 °F (37 °C)] 97.9 °F (36.6 °C)  Heart Rate:  [] 89  Resp:  [16-24] 22  BP: (115-162)/(54-87) 123/68    Intake/Output Summary (Last 24 hours) at 9/13/2019 0759  Last data filed at 9/13/2019 0600  Gross per 24 hour   Intake 1267 ml   Output 1700 ml   Net -433 ml     No intake/output data recorded.    Physical Exam:     General Appearance:    A&O, in no acute distress       Neck:   No adenopathy, supple, trachea midline, no thyromegaly, no JVD       Lungs:     Clear to auscultation,respirations regular, even and                  unlabored    Heart:    Regular rhythm and normal rate, normal S1 and S2, 2/6            murmur, no gallop, no rub, no click   Chest Wall:    No abnormalities observed   Abdomen:     Normal bowel sounds, no masses, no organomegaly, soft               Extremities:   1-2+ edema, no cyanosis, no redness/ blood blisters noted on right medial thigh/upper calf     Pulses:   Pulses palpable and equal bilaterally     Results Review:     I reviewed the patient's new clinical results.      WBC WBC   Date/Time Value Ref Range Status   09/13/2019 0453 7.48 3.40 - 10.80 10*3/mm3 Final   09/12/2019 0413 8.48 3.40 - 10.80 10*3/mm3 Final   09/11/2019 0447 6.69 3.40 - 10.80 10*3/mm3 Final   09/10/2019 1957 5.60 3.40 - 10.80 10*3/mm3 Final   09/10/2019 1612 3.88 3.40 - 10.80 10*3/mm3 Final   09/10/2019 1236 4.96 3.40 - 10.80 10*3/mm3 Final            HGB Hemoglobin   Date/Time Value Ref Range Status   09/13/2019 0453 8.6 (L) 13.0 - 17.7 g/dL Final   09/12/2019 0413 8.4 (L) 13.0 - 17.7 g/dL Final   09/11/2019 0447 7.9 (L) 13.0 - 17.7 g/dL Final   09/10/2019 1957 8.5 (L) 13.0 - 17.7 g/dL  Final   09/10/2019 1612 8.0 (L) 13.0 - 17.7 g/dL Final   09/10/2019 1236 8.4 (L) 13.0 - 17.7 g/dL Final   09/10/2019 1115 8.2 (L) 12.0 - 17.0 g/dL Final   09/10/2019 1018 8.2 (L) 12.0 - 17.0 g/dL Final   09/10/2019 0940 8.8 (L) 12.0 - 17.0 g/dL Final   09/10/2019 0907 8.8 (L) 12.0 - 17.0 g/dL Final   09/10/2019 0838 8.8 (L) 12.0 - 17.0 g/dL Final   09/10/2019 0814 11.2 (L) 12.0 - 17.0 g/dL Final           HCT Hematocrit   Date/Time Value Ref Range Status   09/13/2019 0453 25.8 (L) 37.5 - 51.0 % Final   09/12/2019 0413 25.4 (L) 37.5 - 51.0 % Final   09/11/2019 0447 23.4 (L) 37.5 - 51.0 % Final   09/10/2019 1957 24.8 (L) 37.5 - 51.0 % Final   09/10/2019 1612 23.9 (L) 37.5 - 51.0 % Final   09/10/2019 1236 24.6 (L) 37.5 - 51.0 % Final   09/10/2019 1115 24 (L) 38 - 51 % Final   09/10/2019 1018 24 (L) 38 - 51 % Final   09/10/2019 0940 26 (L) 38 - 51 % Final   09/10/2019 0907 26 (L) 38 - 51 % Final   09/10/2019 0838 26 (L) 38 - 51 % Final   09/10/2019 0814 33 (L) 38 - 51 % Final            Platlets Platelets   Date/Time Value Ref Range Status   09/13/2019 0453 143 140 - 450 10*3/mm3 Final   09/12/2019 0413 149 140 - 450 10*3/mm3 Final   09/11/2019 0447 161 140 - 450 10*3/mm3 Final   09/10/2019 1957 186 140 - 450 10*3/mm3 Final   09/10/2019 1612 157 140 - 450 10*3/mm3 Final   09/10/2019 1236 184 140 - 450 10*3/mm3 Final     Sodium  Sodium   Date/Time Value Ref Range Status   09/13/2019 0453 136 136 - 145 mmol/L Final   09/12/2019 0413 139 136 - 145 mmol/L Final   09/11/2019 0447 140 136 - 145 mmol/L Final   09/10/2019 1612 144 136 - 145 mmol/L Final   09/10/2019 1236 142 136 - 145 mmol/L Final     Potassium  Potassium   Date/Time Value Ref Range Status   09/13/2019 0453 4.5 3.5 - 5.2 mmol/L Final   09/12/2019 0413 4.9 3.5 - 5.2 mmol/L Final   09/11/2019 0447 4.4 3.5 - 5.2 mmol/L Final   09/11/2019 0143 4.6 3.5 - 5.2 mmol/L Final   09/10/2019 1612 3.5 3.5 - 5.2 mmol/L Final   09/10/2019 1236 3.7 3.5 - 5.2 mmol/L Final      Chloride  Chloride   Date/Time Value Ref Range Status   09/13/2019 0453 98 98 - 107 mmol/L Final   09/12/2019 0413 96 (L) 98 - 107 mmol/L Final   09/11/2019 0447 104 98 - 107 mmol/L Final   09/10/2019 1612 105 98 - 107 mmol/L Final   09/10/2019 1236 103 98 - 107 mmol/L Final     BicarbonateNo results found for: PLASMABICARB    BUN BUN   Date/Time Value Ref Range Status   09/13/2019 0453 35 (H) 8 - 23 mg/dL Final   09/12/2019 0413 40 (H) 8 - 23 mg/dL Final   09/11/2019 0447 27 (H) 8 - 23 mg/dL Final   09/10/2019 1612 21 8 - 23 mg/dL Final   09/10/2019 1236 22 8 - 23 mg/dL Final      Creatinine Creatinine   Date/Time Value Ref Range Status   09/13/2019 0453 1.14 0.76 - 1.27 mg/dL Final   09/12/2019 0413 1.59 (H) 0.76 - 1.27 mg/dL Final   09/11/2019 0447 1.48 (H) 0.76 - 1.27 mg/dL Final   09/10/2019 1612 1.17 0.76 - 1.27 mg/dL Final   09/10/2019 1236 1.09 0.76 - 1.27 mg/dL Final      Calcium Calcium   Date/Time Value Ref Range Status   09/13/2019 0453 9.0 8.6 - 10.5 mg/dL Final   09/12/2019 0413 8.6 8.6 - 10.5 mg/dL Final   09/11/2019 0447 9.0 8.6 - 10.5 mg/dL Final   09/10/2019 1612 8.6 8.6 - 10.5 mg/dL Final   09/10/2019 1236 9.1 8.6 - 10.5 mg/dL Final      Mag @RESULFAST(MG:3)@        PT/INR:       Lab Results   Component Value Date    PROTIME 15.1 (H) 09/11/2019    PROTIME 16.8 (H) 09/10/2019    PROTIME 13.9 09/06/2019    INR 1.25 (H) 09/11/2019    INR 1.43 (H) 09/10/2019    INR 1.12 09/06/2019      Troponin I:  No results found for: TROPONINI No results found for: CKTOTAL, CKMB, CKMBINDEX, POCRTROPI, TROPONINT      aspirin  mg Oral Daily   atorvastatin 40 mg Oral Nightly   budesonide-formoterol 2 puff Inhalation BID - RT   busPIRone 10 mg Oral BID   cetirizine 5 mg Oral Daily   dicyclomine 20 mg Oral Daily   heparin (porcine) 5,000 Units Subcutaneous Q8H   insulin detemir 25 Units Subcutaneous Q12H   insulin lispro 0-24 Units Subcutaneous 4x Daily With Meals & Nightly   metoprolol tartrate 25 mg Oral  Q12H   pantoprazole 40 mg Oral Nightly   pharmacy consult - MTM  Does not apply Daily   polyethylene glycol 17 g Oral BID   sennosides-docusate sodium 2 tablet Oral BID   tamsulosin 0.4 mg Oral Daily       niCARdipine 5-15 mg/hr    nitroglycerin 5-200 mcg/min Last Rate: Stopped (09/12/19 0510)   norepinephrine 0.02-0.3 mcg/kg/min Last Rate: 0.03 mcg/kg/min (09/10/19 1201)   phenylephrine 0.5-3 mcg/kg/min        Assessment/Plan     Patient Active Problem List   Diagnosis Code   • Unstable angina (CMS/[facility]) I20.0   • Coronary artery disease I25.10   • Type 2 diabetes mellitus with circulatory disorder, without long-term current use of insulin (CMS/[facility]) E11.59   • Benign essential HTN I10     Progressing after 4 vessel CABG and AVR  Normal EF  Creatinine stabilized  Ambulate   Increase metoprolol to 50 twice daily  Transfer to telemetry      Vince Samayoa MD  09/13/19  7:59 AM

## 2019-09-13 NOTE — PROGRESS NOTES
Continued Stay Note  [facility]     Patient Name: Khoa Arnold  MRN: [id number]  Today's Date: 9/13/2019    Admit Date: 9/6/2019    Discharge Plan     Row Name 09/13/19 1037       Plan    Plan Home with spouse    Patient/Family in Agreement with Plan  -- Spouse    Plan Comments Remains in ICU with plan to transfer to floor bed.  Met with spouse/son at bedside, report discharge plan remains to return home when medically stable.  Previously independent at home, used inhaler at times for asthma.  No needs voiced, made aware Case Management will continue to follow.  Carleen Gerber, Ext. [phone]    Final Discharge Disposition Code 01 - home or self-care        Discharge Codes    No documentation.       Expected Discharge Date and Time     Expected Discharge Date Expected Discharge Time    Sep 14, 2019             GLEN Lockhart

## 2019-09-13 NOTE — PLAN OF CARE
Problem: Patient Care Overview  Goal: Plan of Care Review  Outcome: Ongoing (interventions implemented as appropriate)   09/13/19 1030   Coping/Psychosocial   Plan of Care Reviewed With patient;family   OTHER   Outcome Summary Patient follow up for blisters to RLE- area appears much the same-new blister on above right medial ankle-continue with xeroform and foam dressing-patient's RLE edematous-WOC will continue to follow.    Plan of Care Review   Progress improving

## 2019-09-13 NOTE — PLAN OF CARE
Problem: Patient Care Overview  Goal: Plan of Care Review  Outcome: Ongoing (interventions implemented as appropriate)   09/13/19 0505   Coping/Psychosocial   Plan of Care Reviewed With patient   OTHER   Outcome Summary Pt. axious/oriented x 4. Pt. ambulated in rodriguez 160 ft. Pt. on 2L NC sats >96%. will continue to monitor. at 09/13/19 0551   Plan of Care Review   Progress improving

## 2019-09-13 NOTE — PLAN OF CARE
Problem: Cardiac Surgery (Adult)  Goal: Signs and Symptoms of Listed Potential Problems Will be Absent, Minimized or Managed (Cardiac Surgery)  Outcome: Ongoing (interventions implemented as appropriate)   09/13/19 0558   Goal/Outcome Evaluation   Problems Assessed (Cardiac Surgery) all   Problems Present (Cardiac Surgery) situational response;respiratory compromise       Problem: Skin Injury Risk (Adult)  Goal: Identify Related Risk Factors and Signs and Symptoms  Outcome: Ongoing (interventions implemented as appropriate)   09/13/19 0519   Skin Injury Risk (Adult)   Related Risk Factors (Skin Injury Risk) critical care admission;body weight extremes

## 2019-09-13 NOTE — PROGRESS NOTES
Pt. Referred for Phase II Cardiac Rehab. Staff discussed benefits of exercise, program protocol, and educational material provided. Teach back verified.  Permission granted from patient for staff to fax referral information to outlying program at this time.  Staff to fax referral info to [facility].

## 2019-09-13 NOTE — PROGRESS NOTES
INTENSIVIST / PULMONARY FOLLOW UP NOTE     Hospital:  LOS: 7 days   Mr. Khoa Arnold, 64 y.o. male is followed for:     Unstable angina (CMS/HCC)    Coronary artery disease    Type 2 diabetes mellitus with circulatory disorder, without long-term current use of insulin (CMS/HCC)    Benign essential HTN          SUBJECTIVE   Doing well    The patient's relevant past medical, surgical, family, and social history were reviewed    Allergies and medications were reviewed    ROS:  Per subjective, all other systems were reviewed and were negative        OBJECTIVE     Vital Sign Min/Max for last 24 hours:  Temp  Min: 97.9 °F (36.6 °C)  Max: 98.6 °F (37 °C)   BP  Min: 115/54  Max: 162/74   Pulse  Min: 79  Max: 115   Resp  Min: 16  Max: 24   SpO2  Min: 65 %  Max: 100 %   No Data Recorded     Physical Exam:  General Appearance:  Alert, in no acute distress  Eyes:  No scleral icterus or pallor, pupils normal  Ears, Nose, Mouth, Throat:  Atraumatic, oropharynx clear  Neck:  Trachea midline, thyroid normal  Respiratory:  Clear to auscultation bilaterally, normal effort  Cardiovascular:  Regular rate and rhythm, no murmurs, no peripheral edema  Gastrointestinal:  Soft, non-tender, non-distended, no hepatosplenomegaly  Skin:  Normal temperature, blisters near [facility] site RLE  Psychiatric:  Normal mood and affect, normal judgement and insight  Neuro:  No new focal neurologic deficits observed        Telemetry:                  SpO2: 97 % SpO2  Min: 65 %  Max: 100 %   Device:      Flow Rate:   No Data Recorded       Intake/Ouptut 24 hrs (7:00AM - 6:59 AM)  Intake & Output (last 3 days)       09/10 0701 - 09/11 0700 09/11 0701 - 09/12 0700 09/12 0701 - 09/13 0700 09/13 0701 - 09/14 0700    P.O.  240 300     I.V. (mL/kg) 673.3 (5.8) 1799.8 (15.4) 967 (8.3)     Blood 427 360      Other 100       IV Piggyback 817.5 418      Total Intake(mL/kg) 2017.8 (17.2) 2817.8 (24.1) 1267 (10.8)     Urine (mL/kg/hr) 2920 (1) 935 (0.3) 1700 (0.6)      Emesis/NG output 350       Blood 800       Chest Tube 955 360      Total Output 5025 1295 1700     Net -3007.2 +1522.8 -433                   Lines, Drains & Airways    Active LDAs     Name:   Placement date:   Placement time:   Site:   Days:    Pulmonary Artery Catheter - Triple Lumen 09/10/19 Right Internal jugular   09/10/19    0800     less than 1    CVC Single Lumen 09/10/19   09/10/19    0813    --   less than 1    CVC Double Lumen 09/10/19 Right Internal jugular   09/10/19    0812 created via procedure documentation    Internal jugular   less than 1    Peripheral IV 09/06/19 0710 Right;Anterior Forearm   09/06/19    0710    Forearm   4    Peripheral IV 09/09/19 2005 Right Forearm   09/09/19 2005    Forearm   less than 1    Chest Tube 4 Left Pleural   09/10/19    1143    Pleural   less than 1    Chest Tube 3 Anterior Mediastinal   09/10/19    1143    Mediastinal   less than 1    Urethral Catheter Temperature probe;Silicone 16 Fr.   09/10/19    --     less than 1    Y Chest Tube 1 and 2 1 Mediastinal 32 Fr. 2 Mediastinal 32 Fr.   09/10/19    --     less than 1    ETT    09/10/19    0708 created via procedure documentation     less than 1    Arterial Line 09/10/19 Right Femoral   09/10/19    --    Femoral   less than 1    Arterial Line 09/10/19 Right Radial   09/10/19    0810 created via procedure documentation    Radial   less than 1    Pacer Wires   09/10/19    1100    Ventricular   less than 1                Hematology:  Results from last 7 days   Lab Units 09/13/19  0453 09/12/19  0413 09/11/19  0447 09/10/19  1957 09/10/19  1612 09/10/19  1236 09/10/19  1115  09/10/19  0530   WBC 10*3/mm3 7.48 8.48 6.69 5.60 3.88 4.96  --   --  5.92   HEMOGLOBIN g/dL 8.6* 8.4* 7.9* 8.5* 8.0* 8.4*  --   --  13.9   HEMOGLOBIN, POC g/dL  --   --   --   --   --   --  8.2*   < >  --    HEMATOCRIT % 25.8* 25.4* 23.4* 24.8* 23.9* 24.6*  --   --  41.4   HEMATOCRIT POC %  --   --   --   --   --   --  24*   < >  --    PLATELETS  10*3/mm3 143 149 161 186 157 184  --   --  227   MONOCYTES % %  --   --  6.0  --   --   --   --   --   --     < > = values in this interval not displayed.     Electrolytes, Magnesium and Phosphorus:  Results from last 7 days   Lab Units 09/13/19 0453 09/12/19 0413 09/11/19 0447 09/11/19  0143 09/10/19  1612 09/10/19  1236 09/10/19  0530 09/07/19  0535   SODIUM mmol/L 136 139 140  --  144 142 139 140   CHLORIDE mmol/L 98 96* 104  --  105 103 99 101   POTASSIUM mmol/L 4.5 4.9 4.4 4.6 3.5 3.7 4.7 5.1   CO2 mmol/L 26.0 25.0 22.0  --  25.0 25.0 27.0 30.0*   MAGNESIUM mg/dL 2.2  --  2.5*  --  1.9 2.2  --   --    PHOSPHORUS mg/dL 2.6  --  2.7  --  3.4 3.3  --   --      Renal:  Results from last 7 days   Lab Units 09/13/19 0453 09/12/19 0413 09/11/19 0447 09/10/19  1612 09/10/19  1236 09/10/19  0530 09/07/19  0535   CREATININE mg/dL 1.14 1.59* 1.48* 1.17 1.09 1.23 1.00   BUN mg/dL 35* 40* 27* 21 22 25* 22     Estimated Creatinine Clearance: 77.8 mL/min (by C-G formula based on SCr of 1.14 mg/dL).  Hepatic:  Results from last 7 days   Lab Units 09/13/19 0453   ALK PHOS U/L 50   BILIRUBIN mg/dL 0.6   ALT (SGPT) U/L 51*   AST (SGOT) U/L 51*     Arterial Blood Gases:  Results from last 7 days   Lab Units 09/10/19  1722 09/10/19  1224 09/10/19  1115 09/10/19  1018 09/10/19  0940 09/10/19  0907 09/10/19  0838   PH, ARTERIAL pH units 7.374 7.394 7.32* 7.37 7.38 7.36 7.29*   PCO2, ARTERIAL mm Hg 42.7 41.8  --   --   --   --   --    PO2 ART mm Hg 85.5 256.0*  --   --   --   --   --    FIO2 % 50 100  --   --   --   --   --        Results from last 7 days   Lab Units 09/07/19  0535   HEMOGLOBIN A1C % 9.70*       No results found for: LACTATE    Relevant imaging studies and labs from 09/13/19 were reviewed and interpreted by me    Medications (drips):    niCARdipine    nitroglycerin Last Rate: Stopped (09/12/19 0510)   norepinephrine Last Rate: 0.03 mcg/kg/min (09/10/19 1201)   phenylephrine          aspirin  mg Oral Daily    atorvastatin 40 mg Oral Nightly   budesonide-formoterol 2 puff Inhalation BID - RT   busPIRone 10 mg Oral BID   cetirizine 5 mg Oral Daily   dicyclomine 20 mg Oral Daily   heparin (porcine) 5,000 Units Subcutaneous Q8H   insulin detemir 25 Units Subcutaneous Q12H   insulin lispro 0-24 Units Subcutaneous 4x Daily With Meals & Nightly   insulin lispro 8 Units Subcutaneous TID With Meals   metoprolol tartrate 50 mg Oral Q12H   pantoprazole 40 mg Oral Nightly   pharmacy consult - MTM  Does not apply Daily   polyethylene glycol 17 g Oral BID   sennosides-docusate sodium 2 tablet Oral BID   tamsulosin 0.4 mg Oral Daily       Assessment/Plan   IMPRESSION / PLAN     Inpatient Problem List:  64 y.o.male:  Active Hospital Problems    Diagnosis   • **Unstable angina (CMS/[facility])     Added automatically from request for surgery [id number]     • Type 2 diabetes mellitus with circulatory disorder, without long-term current use of insulin (CMS/[facility])   • Benign essential HTN   • Coronary artery disease     Added automatically from request for surgery [id number]          Impression:  64 y.o.male with relevant PMH of Obesity, HTN, HLD, CAD, prior stents, FH of premature CAD, T2DM A1c 9.7, prior CVA 2009 w/ no residual deficit, Asthma (data deficient), non-smoker admitted 9/6/2019 w/ Unstable Angina.  Cath performed 9/6 showed 75% LM in distal bifurcation, 70% ostial/proximal LAD, 70% Diagonal, 70% LCx, 90% Distal RCA.  Echo w/ EF 55% and at least moderate AS.  Patient is now post op 4vCABG and Bioprosthetic AVR by Dr. Goldsmith on 9/10.    Plan:  Post op care - per CTS    CAD / HTN / HLD - per Cardiology    Asthma --> prn albuterol.  Symbicort (on Breo at home).    T2DM A1c 9.7 / Stress Hyperglycemia - Titrate SQ insulin.  On oral hypoglycemics at home which will be held in ICU.  Given A1C will need to go home on Insulin.  DM educator consulted.    SILVINA - monitor, avoid nephrotoxins / NSAIDs, received IV contrast 9/6 for cath, gave 1 liter LR  over 10 hours yesterday, Cr improved.    Acute Blood Loss Anemia - monitor    Stool softeners    Continue appropriate home meds    SQ heparin    Nutrition - Diet Regular; Cardiac, Consistent Carbohydrate    Plan of care and goals reviewed with mulitdisciplinary team at daily rounds           Waldemar Tam MD  Intensive Care Medicine  09/13/19 10:29 AM

## 2019-09-13 NOTE — PLAN OF CARE
Problem: Patient Care Overview  Goal: Plan of Care Review  Outcome: Ongoing (interventions implemented as appropriate)   09/13/19 3964   Coping/Psychosocial   Plan of Care Reviewed With family;spouse;patient   OTHER   Outcome Summary patient able to increase ambulation to 140 ft but he continues to be unsteady with gait with several LOB even with walker.

## 2019-09-14 LAB
ALBUMIN SERPL-MCNC: 3 G/DL (ref 3.5–5.2)
ALBUMIN/GLOB SERPL: 0.9 G/DL
ALP SERPL-CCNC: 53 U/L (ref 39–117)
ALT SERPL W P-5'-P-CCNC: 38 U/L (ref 1–41)
ANION GAP SERPL CALCULATED.3IONS-SCNC: 10 MMOL/L (ref 5–15)
AST SERPL-CCNC: 34 U/L (ref 1–40)
BASOPHILS # BLD AUTO: 0.03 10*3/MM3 (ref 0–0.2)
BASOPHILS NFR BLD AUTO: 0.5 % (ref 0–1.5)
BILIRUB SERPL-MCNC: 0.6 MG/DL (ref 0.2–1.2)
BUN BLD-MCNC: 29 MG/DL (ref 8–23)
BUN/CREAT SERPL: 30.5 (ref 7–25)
CALCIUM SPEC-SCNC: 8.5 MG/DL (ref 8.6–10.5)
CHLORIDE SERPL-SCNC: 101 MMOL/L (ref 98–107)
CO2 SERPL-SCNC: 27 MMOL/L (ref 22–29)
CREAT BLD-MCNC: 0.95 MG/DL (ref 0.76–1.27)
DEPRECATED RDW RBC AUTO: 46.7 FL (ref 37–54)
EOSINOPHIL # BLD AUTO: 0.17 10*3/MM3 (ref 0–0.4)
EOSINOPHIL NFR BLD AUTO: 2.8 % (ref 0.3–6.2)
ERYTHROCYTE [DISTWIDTH] IN BLOOD BY AUTOMATED COUNT: 13.9 % (ref 12.3–15.4)
GFR SERPL CREATININE-BSD FRML MDRD: 80 ML/MIN/1.73
GLOBULIN UR ELPH-MCNC: 3.4 GM/DL
GLUCOSE BLD-MCNC: 181 MG/DL (ref 65–99)
GLUCOSE BLDC GLUCOMTR-MCNC: 160 MG/DL (ref 70–130)
GLUCOSE BLDC GLUCOMTR-MCNC: 203 MG/DL (ref 70–130)
GLUCOSE BLDC GLUCOMTR-MCNC: 222 MG/DL (ref 70–130)
GLUCOSE BLDC GLUCOMTR-MCNC: 356 MG/DL (ref 70–130)
HCT VFR BLD AUTO: 25.5 % (ref 37.5–51)
HGB BLD-MCNC: 8.3 G/DL (ref 13–17.7)
IMM GRANULOCYTES # BLD AUTO: 0.09 10*3/MM3 (ref 0–0.05)
IMM GRANULOCYTES NFR BLD AUTO: 1.5 % (ref 0–0.5)
LYMPHOCYTES # BLD AUTO: 1.27 10*3/MM3 (ref 0.7–3.1)
LYMPHOCYTES NFR BLD AUTO: 21.2 % (ref 19.6–45.3)
MAGNESIUM SERPL-MCNC: 2.4 MG/DL (ref 1.6–2.4)
MCH RBC QN AUTO: 30.5 PG (ref 26.6–33)
MCHC RBC AUTO-ENTMCNC: 32.5 G/DL (ref 31.5–35.7)
MCV RBC AUTO: 93.8 FL (ref 79–97)
MONOCYTES # BLD AUTO: 0.71 10*3/MM3 (ref 0.1–0.9)
MONOCYTES NFR BLD AUTO: 11.8 % (ref 5–12)
NEUTROPHILS # BLD AUTO: 3.73 10*3/MM3 (ref 1.7–7)
NEUTROPHILS NFR BLD AUTO: 62.2 % (ref 42.7–76)
NRBC BLD AUTO-RTO: 0.5 /100 WBC (ref 0–0.2)
PHOSPHATE SERPL-MCNC: 2.3 MG/DL (ref 2.5–4.5)
PLATELET # BLD AUTO: 151 10*3/MM3 (ref 140–450)
PMV BLD AUTO: 9.6 FL (ref 6–12)
POTASSIUM BLD-SCNC: 4 MMOL/L (ref 3.5–5.2)
PROT SERPL-MCNC: 6.4 G/DL (ref 6–8.5)
RBC # BLD AUTO: 2.72 10*6/MM3 (ref 4.14–5.8)
SODIUM BLD-SCNC: 138 MMOL/L (ref 136–145)
WBC NRBC COR # BLD: 6 10*3/MM3 (ref 3.4–10.8)

## 2019-09-14 PROCEDURE — 63710000001 INSULIN LISPRO (HUMAN) PER 5 UNITS: Performed by: FAMILY MEDICINE

## 2019-09-14 PROCEDURE — 85025 COMPLETE CBC W/AUTO DIFF WBC: CPT | Performed by: INTERNAL MEDICINE

## 2019-09-14 PROCEDURE — 94799 UNLISTED PULMONARY SVC/PX: CPT

## 2019-09-14 PROCEDURE — 63710000001 DIPHENHYDRAMINE PER 50 MG: Performed by: INTERNAL MEDICINE

## 2019-09-14 PROCEDURE — 99232 SBSQ HOSP IP/OBS MODERATE 35: CPT | Performed by: FAMILY MEDICINE

## 2019-09-14 PROCEDURE — 80053 COMPREHEN METABOLIC PANEL: CPT | Performed by: INTERNAL MEDICINE

## 2019-09-14 PROCEDURE — 84100 ASSAY OF PHOSPHORUS: CPT | Performed by: INTERNAL MEDICINE

## 2019-09-14 PROCEDURE — 82962 GLUCOSE BLOOD TEST: CPT

## 2019-09-14 PROCEDURE — 25010000002 HEPARIN (PORCINE) PER 1000 UNITS: Performed by: INTERNAL MEDICINE

## 2019-09-14 PROCEDURE — 63710000001 INSULIN DETEMIR PER 5 UNITS: Performed by: INTERNAL MEDICINE

## 2019-09-14 PROCEDURE — 83735 ASSAY OF MAGNESIUM: CPT | Performed by: INTERNAL MEDICINE

## 2019-09-14 RX ORDER — TORSEMIDE 10 MG/1
10 TABLET ORAL DAILY
Status: DISCONTINUED | OUTPATIENT
Start: 2019-09-14 | End: 2019-09-17 | Stop reason: HOSPADM

## 2019-09-14 RX ORDER — POTASSIUM CHLORIDE 750 MG/1
10 CAPSULE, EXTENDED RELEASE ORAL DAILY
Status: DISCONTINUED | OUTPATIENT
Start: 2019-09-14 | End: 2019-09-17 | Stop reason: HOSPADM

## 2019-09-14 RX ADMIN — HYDROCODONE BITARTRATE AND ACETAMINOPHEN 1 TABLET: 7.5; 325 TABLET ORAL at 08:26

## 2019-09-14 RX ADMIN — INSULIN DETEMIR 25 UNITS: 100 INJECTION, SOLUTION SUBCUTANEOUS at 08:28

## 2019-09-14 RX ADMIN — HYDROCODONE BITARTRATE AND ACETAMINOPHEN 1 TABLET: 7.5; 325 TABLET ORAL at 13:27

## 2019-09-14 RX ADMIN — IPRATROPIUM BROMIDE AND ALBUTEROL SULFATE 3 ML: 2.5; .5 SOLUTION RESPIRATORY (INHALATION) at 20:59

## 2019-09-14 RX ADMIN — POTASSIUM CHLORIDE 10 MEQ: 750 CAPSULE, EXTENDED RELEASE ORAL at 11:44

## 2019-09-14 RX ADMIN — PANTOPRAZOLE SODIUM 40 MG: 40 TABLET, DELAYED RELEASE ORAL at 20:47

## 2019-09-14 RX ADMIN — INSULIN LISPRO 8 UNITS: 100 INJECTION, SOLUTION INTRAVENOUS; SUBCUTANEOUS at 11:43

## 2019-09-14 RX ADMIN — ACETAMINOPHEN 650 MG: 325 TABLET ORAL at 00:08

## 2019-09-14 RX ADMIN — INSULIN LISPRO 8 UNITS: 100 INJECTION, SOLUTION INTRAVENOUS; SUBCUTANEOUS at 08:27

## 2019-09-14 RX ADMIN — SODIUM CHLORIDE, PRESERVATIVE FREE 10 ML: 5 INJECTION INTRAVENOUS at 08:28

## 2019-09-14 RX ADMIN — ASPIRIN 325 MG: 325 TABLET, DELAYED RELEASE ORAL at 08:27

## 2019-09-14 RX ADMIN — HEPARIN SODIUM 5000 UNITS: 5000 INJECTION, SOLUTION INTRAVENOUS; SUBCUTANEOUS at 21:00

## 2019-09-14 RX ADMIN — INSULIN LISPRO 8 UNITS: 100 INJECTION, SOLUTION INTRAVENOUS; SUBCUTANEOUS at 20:57

## 2019-09-14 RX ADMIN — DIPHENHYDRAMINE HYDROCHLORIDE 25 MG: 25 CAPSULE ORAL at 21:23

## 2019-09-14 RX ADMIN — DICYCLOMINE HYDROCHLORIDE 20 MG: 20 TABLET ORAL at 08:27

## 2019-09-14 RX ADMIN — HEPARIN SODIUM 5000 UNITS: 5000 INJECTION, SOLUTION INTRAVENOUS; SUBCUTANEOUS at 05:56

## 2019-09-14 RX ADMIN — INSULIN LISPRO 20 UNITS: 100 INJECTION, SOLUTION INTRAVENOUS; SUBCUTANEOUS at 11:44

## 2019-09-14 RX ADMIN — IPRATROPIUM BROMIDE AND ALBUTEROL SULFATE 3 ML: 2.5; .5 SOLUTION RESPIRATORY (INHALATION) at 13:26

## 2019-09-14 RX ADMIN — METOPROLOL TARTRATE 50 MG: 50 TABLET ORAL at 20:47

## 2019-09-14 RX ADMIN — SODIUM CHLORIDE, PRESERVATIVE FREE 10 ML: 5 INJECTION INTRAVENOUS at 20:48

## 2019-09-14 RX ADMIN — HYDROCODONE BITARTRATE AND ACETAMINOPHEN 1 TABLET: 7.5; 325 TABLET ORAL at 17:25

## 2019-09-14 RX ADMIN — BUSPIRONE HYDROCHLORIDE 10 MG: 10 TABLET ORAL at 20:49

## 2019-09-14 RX ADMIN — INSULIN DETEMIR 25 UNITS: 100 INJECTION, SOLUTION SUBCUTANEOUS at 20:59

## 2019-09-14 RX ADMIN — HEPARIN SODIUM 5000 UNITS: 5000 INJECTION, SOLUTION INTRAVENOUS; SUBCUTANEOUS at 13:08

## 2019-09-14 RX ADMIN — ATORVASTATIN CALCIUM 40 MG: 40 TABLET, FILM COATED ORAL at 20:46

## 2019-09-14 RX ADMIN — CETIRIZINE HYDROCHLORIDE 5 MG: 10 TABLET, FILM COATED ORAL at 08:27

## 2019-09-14 RX ADMIN — INSULIN LISPRO 8 UNITS: 100 INJECTION, SOLUTION INTRAVENOUS; SUBCUTANEOUS at 08:28

## 2019-09-14 RX ADMIN — POTASSIUM & SODIUM PHOSPHATES POWDER PACK 280-160-250 MG 1 PACKET: 280-160-250 PACK at 22:52

## 2019-09-14 RX ADMIN — INSULIN LISPRO 4 UNITS: 100 INJECTION, SOLUTION INTRAVENOUS; SUBCUTANEOUS at 17:26

## 2019-09-14 RX ADMIN — INSULIN LISPRO 10 UNITS: 100 INJECTION, SOLUTION INTRAVENOUS; SUBCUTANEOUS at 17:25

## 2019-09-14 RX ADMIN — BUDESONIDE AND FORMOTEROL FUMARATE DIHYDRATE 2 PUFF: 160; 4.5 AEROSOL RESPIRATORY (INHALATION) at 07:47

## 2019-09-14 RX ADMIN — METOPROLOL TARTRATE 50 MG: 50 TABLET ORAL at 08:27

## 2019-09-14 RX ADMIN — TORSEMIDE 10 MG: 10 TABLET ORAL at 11:44

## 2019-09-14 RX ADMIN — TAMSULOSIN HYDROCHLORIDE 0.4 MG: 0.4 CAPSULE ORAL at 08:26

## 2019-09-14 RX ADMIN — BUSPIRONE HYDROCHLORIDE 10 MG: 10 TABLET ORAL at 08:26

## 2019-09-14 RX ADMIN — POTASSIUM & SODIUM PHOSPHATES POWDER PACK 280-160-250 MG 1 PACKET: 280-160-250 PACK at 15:43

## 2019-09-14 NOTE — PROGRESS NOTES
Khoa Arnold  [id number]  1955   LOS: 8 days   Patient Care Team:  Shirlene Sharpe MD as PCP - General (Internal Medicine)    Chief Complaint: Follow-up on chest pain and shortness of breath    Subjective Patient denies any incisional chest pain, palpitations, dizziness, or presyncope.  He is having some mild drainage from his right lower extremity as well as from a place in the middle of his sternal incision.  He has had a little sputum production but is using his pillow when he coughs.  He has complaints of shortness of breath.  He has been compliant with his incentive spirometer.  He has not yet ambulated the hallways but is supposed to do so today.  He has good appetite.  He is resting comfortably in his chair without oxygen therapy this morning.    Objective     Vital Sign Min/Max for last 24 hours  Temp  Min: 98.8 °F (37.1 °C)  Max: 100.2 °F (37.9 °C)   BP  Min: 115/74  Max: 164/85   Pulse  Min: 76  Max: 114   Resp  Min: 16  Max: 26   SpO2  Min: 91 %  Max: 99 %   No Data Recorded   Weight  Min: 117 kg (258 lb)  Max: 123 kg (270 lb 9.6 oz)         09/13/19  1723 09/14/19  0553   Weight: 123 kg (270 lb 9.6 oz) 117 kg (258 lb)         Intake/Output Summary (Last 24 hours) at 9/14/2019 0917  Last data filed at 9/13/2019 1600  Gross per 24 hour   Intake 1300 ml   Output 400 ml   Net 900 ml       Physical Exam:     General Appearance:    Alert, cooperative, in no acute distress   Lungs:    Decreased breath sounds to auscultation,respirations regular, even and                   unlabored    Heart:    Regular and normal rate, normal S1 and S2, grade 2/6            murmur, no gallop, no rub, no click   Abdomen:  Extremities:   Soft, non-tender, bowel sounds audible x4    1+ edema, normal range of motion, right lower extremity drainage/bandaging   Pulses:   Pulses palpable and equal bilaterally    Sternal incision CDI with bandage over medial portion from drainage site  Results Review:   Results from  last 7 days   Lab Units 09/14/19  0541 09/13/19  0453 09/12/19  0413   SODIUM mmol/L 138 136 139   POTASSIUM mmol/L 4.0 4.5 4.9   CHLORIDE mmol/L 101 98 96*   CO2 mmol/L 27.0 26.0 25.0   BUN mg/dL 29* 35* 40*   CREATININE mg/dL 0.95 1.14 1.59*   GLUCOSE mg/dL 181* 248* 311*   CALCIUM mg/dL 8.5* 9.0 8.6     Results from last 7 days   Lab Units 09/14/19  0541 09/13/19  0453 09/12/19  0413   WBC 10*3/mm3 6.00 7.48 8.48   HEMOGLOBIN g/dL 8.3* 8.6* 8.4*   HEMATOCRIT % 25.5* 25.8* 25.4*   PLATELETS 10*3/mm3 151 143 149         ·   Chest x-ray 9/13/2019:  Interval removal of life support apparatus with  redemonstration of cardiomegaly, mild pulmonary vascular congestion,  small right pleural effusion. Additional hazy bibasilar atelectasis is  Identified  · No new ECG to review        Medication Review: Reviewed    Assessment/Plan   Patient is status post CABG x4 vessels/AVR, POD #4 in the setting of persistent anemia.  CTS to assess his wound drainage.  I will add 10 mg of torsemide daily with 10 mEq of K-Dur for his pulmonary congestion.      Unstable angina (CMS/HCC)    Coronary artery disease    Type 2 diabetes mellitus with circulatory disorder, without long-term current use of insulin (CMS/HCC)    Benign essential HTN    Electronically signed by ELENO Carrillo, 09/14/19, 9:34 AM.    09/14/19  9:17 AM

## 2019-09-14 NOTE — PROGRESS NOTES
CTS Progress Note  Postop day #4 CABG x4 with AVR     LOS: 8 days   Patient Care Team:  Shirlene Sharpe MD as PCP - General (Internal Medicine)    Chief Complaint: Unstable angina (CMS/HCC)    Subjective   Diarrhea the last 3 days, Had 3-4 episodes during the night.  Not ambulating well per the patient.  He feels weak and has dyspnea on exertion    Vital Signs:  Temp:  [98.8 °F (37.1 °C)-100.2 °F (37.9 °C)] 98.8 °F (37.1 °C)  Heart Rate:  [] 86  Resp:  [16-20] 16  BP: (137-164)/(71-90) 142/71    Physical Exam:   Gen: Patient on the side of bed.  He is using his arms to push up from the bed despite nursing staff repeatedly telling him not to use arms  Cardiovascular regular rate and rhythm  Lungs:clear  Incisions: Clean, dry and intact.  His right leg incision has areas of serous drainage where the skin is not apposed at the same level.  No erythema      Results:     Results from last 7 days   Lab Units 09/14/19  0541   WBC 10*3/mm3 6.00   HEMOGLOBIN g/dL 8.3*   HEMATOCRIT % 25.5*   PLATELETS 10*3/mm3 151     Results from last 7 days   Lab Units 09/14/19  0541   SODIUM mmol/L 138   POTASSIUM mmol/L 4.0   CHLORIDE mmol/L 101   CO2 mmol/L 27.0   BUN mg/dL 29*   CREATININE mg/dL 0.95   GLUCOSE mg/dL 181*   CALCIUM mg/dL 8.5*           Imaging Results (last 24 hours)     Procedure Component Value Units Date/Time    XR Chest 1 View [[id number]] Collected:  09/10/19 1256     Updated:  09/13/19 1910    Narrative:       EXAMINATION: XR CHEST 1 VW- 09/10/2019     INDICATION: Post-Op Check Line & Tube Placement;  I25.118-Atherosclerotic heart disease of native coronary artery with  other forms of angina pectoris; I20.0-Unstable angina      COMPARISON: Chest x-ray 09/06/2019     FINDINGS: Postsurgical appearance of the chest status post median  sternotomy with support hardware including endotracheal tube placed  above the level of the maeve. Right internal jugular approach pulmonary  arterial catheter terminates  within the proximal right main pulmonary  artery. Mediastinal and pleural drains in place. Decreased lung volumes  with hypoventilatory findings as well as increased central pulmonary  vascularity. No pneumothorax or large effusion.           Impression:       Postsurgical appearance status post median sternotomy with  support hardware in satisfactory position as detailed above. Low lung  volumes with hypoventilatory findings as well as increased pulmonary  vascularity however no overt edema or effusion.     D:  09/10/2019  E:  09/10/2019     This report was finalized on 9/13/2019 7:07 PM by Dr. Timoteo Lee.       XR Chest 1 View [[id number]] Collected:  09/13/19 0839     Updated:  09/13/19 1735    Narrative:       EXAMINATION: XR CHEST 1 VW-09/13/2019:      INDICATION: F/U; I25.118-Atherosclerotic heart disease of native  coronary artery with other forms of angina pectoris; I20.0-Unstable  angina.      COMPARISON: Chest radiograph 09/12/2019.     FINDINGS: Single portable chest radiograph was submitted for review.  There has been interval removal of the life support apparatus from  yesterday's exam. Similar cardiomegaly with mild pulmonary vascular  congestion. Similar small right pleural effusion with right lower lobe  and left lower lobe likely atelectasis. No convincing evidence of  pneumothorax. The visualized upper abdomen is unrevealing. No acute  osseous abnormality.           Impression:       Interval removal of life support apparatus with  redemonstration of cardiomegaly, mild pulmonary vascular congestion,  small right pleural effusion. Additional hazy bibasilar atelectasis is  identified.     D:  09/13/2019  E:  09/13/2019     This report was finalized on 9/13/2019 5:32 PM by Dr. Alejandro Mark MD.             Assessment  Postop day #4 CABG x4 with AVR    Unstable angina (CMS/HCC)    Coronary artery disease    Type 2 diabetes mellitus with circulatory disorder, without long-term current use of  insulin (CMS/HCC)    Benign essential HTN  Diarrhea    Plan   Stop bowel regimen  Keep right lower extremity dry with gauze dressings BID and change PRN saturation  Continue beta-blockers, aspirin and statins  Increase activity and ambulation  Possible SNF at discharge given his marginal mobility    09/14/19  1:15 PM

## 2019-09-14 NOTE — PROGRESS NOTES
[facility] Medicine Services  PROGRESS NOTE    Patient Name: Khoa Arnold  : 1955  MRN: [id number]    Date of Admission: 2019  Primary Care Physician: Shirlene Sharpe MD    Subjective   Subjective     CC:  F/U Medical Management    HPI:  Patient seen and examined.  Nursing notes reviewed, patient to have restless and impulsive behavior after given a PRN dose of Xanax.  Patient is postop day 4, 4 vessel CABG and AVR.  He denies any chest pain.  Patient states that he does have shortness of breath with activity.    Review of Systems  Gen- No fevers, chills  CV- No chest pain, palpitations  Resp- No cough, +dyspnea  GI- No N/V/D, abd pain    All other systems reviewed and negative except as mentioned per HPI.     Objective   Objective     Vital Signs:   Temp:  [98.8 °F (37.1 °C)-100.2 °F (37.9 °C)] 98.8 °F (37.1 °C)  Heart Rate:  [] 86  Resp:  [16-20] 16  BP: (137-164)/(71-90) 142/71        Physical Exam:  Constitutional: No acute distress, awake, alert  HENT: NCAT, mucous membranes moist  Respiratory: Clear to auscultation bilaterally, respiratory effort normal   Cardiovascular: RRR, + murmur, No rubs, or gallops, palpable pedal pulses bilaterally  Gastrointestinal: Positive bowel sounds, soft, nontender, nondistended  Musculoskeletal: +bilateral ankle edema  Psychiatric: Appropriate affect, cooperative  Neurologic: Oriented x 3, strength symmetric in all extremities, Cranial Nerves grossly intact to confrontation, speech clear  Skin: No rashes, midline incision c/d/i  Results Reviewed:    Results from last 7 days   Lab Units 19  0541 19  0453 19  0413 19  0447  09/10/19  1236   WBC 10*3/mm3 6.00 7.48 8.48 6.69   < > 4.96   HEMOGLOBIN g/dL 8.3* 8.6* 8.4* 7.9*   < > 8.4*   HEMATOCRIT % 25.5* 25.8* 25.4* 23.4*   < > 24.6*   PLATELETS 10*3/mm3 151 143 149 161   < > 184   INR   --   --   --  1.25*  --  1.43*    < > = values in this interval  not displayed.     Results from last 7 days   Lab Units 09/14/19  0541 09/13/19  0453 09/12/19  0413   SODIUM mmol/L 138 136 139   POTASSIUM mmol/L 4.0 4.5 4.9   CHLORIDE mmol/L 101 98 96*   CO2 mmol/L 27.0 26.0 25.0   BUN mg/dL 29* 35* 40*   CREATININE mg/dL 0.95 1.14 1.59*   GLUCOSE mg/dL 181* 248* 311*   CALCIUM mg/dL 8.5* 9.0 8.6   ALT (SGPT) U/L 38 51*  --    AST (SGOT) U/L 34 51*  --      Estimated Creatinine Clearance: 93.3 mL/min (by C-G formula based on SCr of 0.95 mg/dL).    Microbiology Results Abnormal     None          Imaging Results (last 24 hours)     Procedure Component Value Units Date/Time    XR Chest 1 View [[id number]] Collected:  09/10/19 1256     Updated:  09/13/19 1910    Narrative:       EXAMINATION: XR CHEST 1 VW- 09/10/2019     INDICATION: Post-Op Check Line & Tube Placement;  I25.118-Atherosclerotic heart disease of native coronary artery with  other forms of angina pectoris; I20.0-Unstable angina      COMPARISON: Chest x-ray 09/06/2019     FINDINGS: Postsurgical appearance of the chest status post median  sternotomy with support hardware including endotracheal tube placed  above the level of the maeve. Right internal jugular approach pulmonary  arterial catheter terminates within the proximal right main pulmonary  artery. Mediastinal and pleural drains in place. Decreased lung volumes  with hypoventilatory findings as well as increased central pulmonary  vascularity. No pneumothorax or large effusion.           Impression:       Postsurgical appearance status post median sternotomy with  support hardware in satisfactory position as detailed above. Low lung  volumes with hypoventilatory findings as well as increased pulmonary  vascularity however no overt edema or effusion.     D:  09/10/2019  E:  09/10/2019     This report was finalized on 9/13/2019 7:07 PM by Dr. Timoteo Lee.       XR Chest 1 View [[id number]] Collected:  09/13/19 0839     Updated:  09/13/19 1735    Narrative:        EXAMINATION: XR CHEST 1 VW-09/13/2019:      INDICATION: F/U; I25.118-Atherosclerotic heart disease of native  coronary artery with other forms of angina pectoris; I20.0-Unstable  angina.      COMPARISON: Chest radiograph 09/12/2019.     FINDINGS: Single portable chest radiograph was submitted for review.  There has been interval removal of the life support apparatus from  yesterday's exam. Similar cardiomegaly with mild pulmonary vascular  congestion. Similar small right pleural effusion with right lower lobe  and left lower lobe likely atelectasis. No convincing evidence of  pneumothorax. The visualized upper abdomen is unrevealing. No acute  osseous abnormality.           Impression:       Interval removal of life support apparatus with  redemonstration of cardiomegaly, mild pulmonary vascular congestion,  small right pleural effusion. Additional hazy bibasilar atelectasis is  identified.     D:  09/13/2019  E:  09/13/2019     This report was finalized on 9/13/2019 5:32 PM by Dr. Alejandro Mark MD.             Results for orders placed during the hospital encounter of 09/06/19   Adult Transthoracic Echo Complete W/ Cont if Necessary Per Protocol    Narrative · Moderate aortic valve stenosis is present.  · Peak velocity of the flow distal to the aortic valve is 372.0 cm/s.  · Aortic valve maximum pressure gradient is 55.0 mmHg.  · Aortic valve mean pressure gradient is 32.5 mmHg.  · Aortic valve area is 0.68 cm2.  · Mild mitral valve regurgitation is present  · Calculated right ventricular systolic pressure from tricuspid   regurgitation is 23 mmHg.  · Estimated EF = 55%.  · Left ventricular systolic function is normal.          I have reviewed the medications:  Scheduled Meds:  aspirin  mg Oral Daily   atorvastatin 40 mg Oral Nightly   budesonide-formoterol 2 puff Inhalation BID - RT   busPIRone 10 mg Oral BID   cetirizine 5 mg Oral Daily   dicyclomine 20 mg Oral Daily   heparin (porcine) 5,000 Units  Subcutaneous Q8H   insulin detemir 25 Units Subcutaneous Q12H   insulin lispro 0-24 Units Subcutaneous 4x Daily With Meals & Nightly   insulin lispro 8 Units Subcutaneous TID With Meals   metoprolol tartrate 50 mg Oral Q12H   pantoprazole 40 mg Oral Nightly   pharmacy consult - MTM  Does not apply Daily   polyethylene glycol 17 g Oral BID   potassium chloride 10 mEq Oral Daily   sennosides-docusate sodium 2 tablet Oral BID   sodium chloride 10 mL Intravenous Q12H   sodium chloride 10 mL Intravenous Q8H   tamsulosin 0.4 mg Oral Daily   torsemide 10 mg Oral Daily     Continuous Infusions:   PRN Meds:.•  acetaminophen  •  albumin human  •  bisacodyl  •  diphenhydrAMINE  •  docusate sodium  •  HYDROcodone-acetaminophen  •  ipratropium-albuterol  •  magnesium hydroxide  •  Morphine  •  nitroglycerin  •  ondansetron  •  oxyCODONE-acetaminophen      Assessment/Plan   Assessment / Plan     Active Hospital Problems    Diagnosis  POA   • **Unstable angina (CMS/[facility]) [I20.0]  Unknown   • Type 2 diabetes mellitus with circulatory disorder, without long-term current use of insulin (CMS/[facility]) [E11.59]  Unknown   • Benign essential HTN [I10]  Yes   • Coronary artery disease [I25.10]  Unknown      Resolved Hospital Problems   No resolved problems to display.        Brief Hospital Course to date:  Khoa Arnold is a 64 y.o. male s/p 4-vessel CABG and AVR with uncontrolled DM2    Multi-Vessel CAD with moderate Aortic Valve Stenosis  -POD #4 4-Vessel CABG/AVR  -Continue PT/OT  -CTS/Cards following    Mild Pulmonary Vascular Congestion, small R Pleural Effusion  -Torsemide + K+ ordered per Cards    Uncontrolled DM2  -Diabetes Educator following  -Hga1c 9.7  -AM FBS improved  -Will increase mealtime insulin    Anemia  -H&H stable    Hypophosphatemia   -replace per protocol     DVT Prophylaxis:  heparin    Disposition: I expect the patient to be discharged per CTS    CODE STATUS:   Code Status and Medical Interventions:   Ordered at:  09/06/19 0953     Level Of Support Discussed With:    Patient     Code Status:    CPR     Medical Interventions (Level of Support Prior to Arrest):    Full         Electronically signed by Starr Houston DO, 09/14/19, 12:45 PM.

## 2019-09-15 ENCOUNTER — APPOINTMENT (OUTPATIENT)
Dept: CARDIOLOGY | Facility: HOSPITAL | Age: 64
End: 2019-09-15

## 2019-09-15 LAB
ANION GAP SERPL CALCULATED.3IONS-SCNC: 11 MMOL/L (ref 5–15)
BUN BLD-MCNC: 25 MG/DL (ref 8–23)
BUN/CREAT SERPL: 22.5 (ref 7–25)
CALCIUM SPEC-SCNC: 8.6 MG/DL (ref 8.6–10.5)
CHLORIDE SERPL-SCNC: 100 MMOL/L (ref 98–107)
CO2 SERPL-SCNC: 29 MMOL/L (ref 22–29)
CREAT BLD-MCNC: 1.11 MG/DL (ref 0.76–1.27)
DEPRECATED RDW RBC AUTO: 46.9 FL (ref 37–54)
ERYTHROCYTE [DISTWIDTH] IN BLOOD BY AUTOMATED COUNT: 14.1 % (ref 12.3–15.4)
GFR SERPL CREATININE-BSD FRML MDRD: 66 ML/MIN/1.73
GLUCOSE BLD-MCNC: 139 MG/DL (ref 65–99)
GLUCOSE BLDC GLUCOMTR-MCNC: 157 MG/DL (ref 70–130)
GLUCOSE BLDC GLUCOMTR-MCNC: 164 MG/DL (ref 70–130)
GLUCOSE BLDC GLUCOMTR-MCNC: 210 MG/DL (ref 70–130)
GLUCOSE BLDC GLUCOMTR-MCNC: 218 MG/DL (ref 70–130)
HCT VFR BLD AUTO: 26.3 % (ref 37.5–51)
HGB BLD-MCNC: 8.4 G/DL (ref 13–17.7)
MCH RBC QN AUTO: 30.2 PG (ref 26.6–33)
MCHC RBC AUTO-ENTMCNC: 31.9 G/DL (ref 31.5–35.7)
MCV RBC AUTO: 94.6 FL (ref 79–97)
PHOSPHATE SERPL-MCNC: 4 MG/DL (ref 2.5–4.5)
PLATELET # BLD AUTO: 184 10*3/MM3 (ref 140–450)
PMV BLD AUTO: 9.3 FL (ref 6–12)
POTASSIUM BLD-SCNC: 4.3 MMOL/L (ref 3.5–5.2)
RBC # BLD AUTO: 2.78 10*6/MM3 (ref 4.14–5.8)
SODIUM BLD-SCNC: 140 MMOL/L (ref 136–145)
WBC NRBC COR # BLD: 7.29 10*3/MM3 (ref 3.4–10.8)

## 2019-09-15 PROCEDURE — 99232 SBSQ HOSP IP/OBS MODERATE 35: CPT | Performed by: NURSE PRACTITIONER

## 2019-09-15 PROCEDURE — 25010000002 FUROSEMIDE PER 20 MG: Performed by: INTERNAL MEDICINE

## 2019-09-15 PROCEDURE — 25010000002 HEPARIN (PORCINE) PER 1000 UNITS: Performed by: INTERNAL MEDICINE

## 2019-09-15 PROCEDURE — 85027 COMPLETE CBC AUTOMATED: CPT | Performed by: FAMILY MEDICINE

## 2019-09-15 PROCEDURE — 93971 EXTREMITY STUDY: CPT

## 2019-09-15 PROCEDURE — 94799 UNLISTED PULMONARY SVC/PX: CPT

## 2019-09-15 PROCEDURE — 63710000001 INSULIN DETEMIR PER 5 UNITS: Performed by: INTERNAL MEDICINE

## 2019-09-15 PROCEDURE — 84100 ASSAY OF PHOSPHORUS: CPT | Performed by: FAMILY MEDICINE

## 2019-09-15 PROCEDURE — 80048 BASIC METABOLIC PNL TOTAL CA: CPT | Performed by: PHYSICIAN ASSISTANT

## 2019-09-15 PROCEDURE — 82962 GLUCOSE BLOOD TEST: CPT

## 2019-09-15 PROCEDURE — 63710000001 DIPHENHYDRAMINE PER 50 MG: Performed by: INTERNAL MEDICINE

## 2019-09-15 PROCEDURE — 99232 SBSQ HOSP IP/OBS MODERATE 35: CPT | Performed by: INTERNAL MEDICINE

## 2019-09-15 RX ORDER — FUROSEMIDE 10 MG/ML
40 INJECTION INTRAMUSCULAR; INTRAVENOUS ONCE
Status: COMPLETED | OUTPATIENT
Start: 2019-09-15 | End: 2019-09-15

## 2019-09-15 RX ADMIN — INSULIN LISPRO 8 UNITS: 100 INJECTION, SOLUTION INTRAVENOUS; SUBCUTANEOUS at 16:52

## 2019-09-15 RX ADMIN — CETIRIZINE HYDROCHLORIDE 5 MG: 10 TABLET, FILM COATED ORAL at 08:02

## 2019-09-15 RX ADMIN — HYDROCODONE BITARTRATE AND ACETAMINOPHEN 1 TABLET: 7.5; 325 TABLET ORAL at 16:52

## 2019-09-15 RX ADMIN — SODIUM CHLORIDE, PRESERVATIVE FREE 10 ML: 5 INJECTION INTRAVENOUS at 06:50

## 2019-09-15 RX ADMIN — METOPROLOL TARTRATE 50 MG: 50 TABLET ORAL at 08:02

## 2019-09-15 RX ADMIN — SODIUM CHLORIDE, PRESERVATIVE FREE 10 ML: 5 INJECTION INTRAVENOUS at 16:53

## 2019-09-15 RX ADMIN — BUDESONIDE AND FORMOTEROL FUMARATE DIHYDRATE 2 PUFF: 160; 4.5 AEROSOL RESPIRATORY (INHALATION) at 10:10

## 2019-09-15 RX ADMIN — DICYCLOMINE HYDROCHLORIDE 20 MG: 20 TABLET ORAL at 08:02

## 2019-09-15 RX ADMIN — INSULIN LISPRO 10 UNITS: 100 INJECTION, SOLUTION INTRAVENOUS; SUBCUTANEOUS at 08:00

## 2019-09-15 RX ADMIN — TAMSULOSIN HYDROCHLORIDE 0.4 MG: 0.4 CAPSULE ORAL at 08:01

## 2019-09-15 RX ADMIN — HEPARIN SODIUM 5000 UNITS: 5000 INJECTION, SOLUTION INTRAVENOUS; SUBCUTANEOUS at 06:51

## 2019-09-15 RX ADMIN — PANTOPRAZOLE SODIUM 40 MG: 40 TABLET, DELAYED RELEASE ORAL at 20:27

## 2019-09-15 RX ADMIN — DIPHENHYDRAMINE HYDROCHLORIDE 25 MG: 25 CAPSULE ORAL at 22:51

## 2019-09-15 RX ADMIN — METOPROLOL TARTRATE 50 MG: 50 TABLET ORAL at 20:26

## 2019-09-15 RX ADMIN — INSULIN LISPRO 4 UNITS: 100 INJECTION, SOLUTION INTRAVENOUS; SUBCUTANEOUS at 12:22

## 2019-09-15 RX ADMIN — INSULIN DETEMIR 25 UNITS: 100 INJECTION, SOLUTION SUBCUTANEOUS at 20:27

## 2019-09-15 RX ADMIN — INSULIN DETEMIR 25 UNITS: 100 INJECTION, SOLUTION SUBCUTANEOUS at 08:00

## 2019-09-15 RX ADMIN — HYDROCODONE BITARTRATE AND ACETAMINOPHEN 1 TABLET: 7.5; 325 TABLET ORAL at 02:36

## 2019-09-15 RX ADMIN — ATORVASTATIN CALCIUM 40 MG: 40 TABLET, FILM COATED ORAL at 20:27

## 2019-09-15 RX ADMIN — INSULIN LISPRO 10 UNITS: 100 INJECTION, SOLUTION INTRAVENOUS; SUBCUTANEOUS at 12:22

## 2019-09-15 RX ADMIN — INSULIN LISPRO 8 UNITS: 100 INJECTION, SOLUTION INTRAVENOUS; SUBCUTANEOUS at 20:36

## 2019-09-15 RX ADMIN — POTASSIUM CHLORIDE 10 MEQ: 750 CAPSULE, EXTENDED RELEASE ORAL at 08:02

## 2019-09-15 RX ADMIN — BUDESONIDE AND FORMOTEROL FUMARATE DIHYDRATE 2 PUFF: 160; 4.5 AEROSOL RESPIRATORY (INHALATION) at 19:33

## 2019-09-15 RX ADMIN — INSULIN LISPRO 4 UNITS: 100 INJECTION, SOLUTION INTRAVENOUS; SUBCUTANEOUS at 08:03

## 2019-09-15 RX ADMIN — HYDROCODONE BITARTRATE AND ACETAMINOPHEN 1 TABLET: 7.5; 325 TABLET ORAL at 20:27

## 2019-09-15 RX ADMIN — HYDROCODONE BITARTRATE AND ACETAMINOPHEN 1 TABLET: 7.5; 325 TABLET ORAL at 08:02

## 2019-09-15 RX ADMIN — HYDROCODONE BITARTRATE AND ACETAMINOPHEN 1 TABLET: 7.5; 325 TABLET ORAL at 12:22

## 2019-09-15 RX ADMIN — HEPARIN SODIUM 5000 UNITS: 5000 INJECTION, SOLUTION INTRAVENOUS; SUBCUTANEOUS at 21:38

## 2019-09-15 RX ADMIN — SODIUM CHLORIDE, PRESERVATIVE FREE 10 ML: 5 INJECTION INTRAVENOUS at 10:26

## 2019-09-15 RX ADMIN — BUSPIRONE HYDROCHLORIDE 10 MG: 10 TABLET ORAL at 08:02

## 2019-09-15 RX ADMIN — IPRATROPIUM BROMIDE AND ALBUTEROL SULFATE 3 ML: 2.5; .5 SOLUTION RESPIRATORY (INHALATION) at 10:09

## 2019-09-15 RX ADMIN — FUROSEMIDE 40 MG: 10 INJECTION, SOLUTION INTRAMUSCULAR; INTRAVENOUS at 10:22

## 2019-09-15 RX ADMIN — HEPARIN SODIUM 5000 UNITS: 5000 INJECTION, SOLUTION INTRAVENOUS; SUBCUTANEOUS at 13:13

## 2019-09-15 RX ADMIN — ASPIRIN 325 MG: 325 TABLET, DELAYED RELEASE ORAL at 08:01

## 2019-09-15 RX ADMIN — INSULIN LISPRO 10 UNITS: 100 INJECTION, SOLUTION INTRAVENOUS; SUBCUTANEOUS at 16:51

## 2019-09-15 RX ADMIN — SODIUM CHLORIDE, PRESERVATIVE FREE 10 ML: 5 INJECTION INTRAVENOUS at 20:36

## 2019-09-15 RX ADMIN — BUSPIRONE HYDROCHLORIDE 10 MG: 10 TABLET ORAL at 20:27

## 2019-09-15 RX ADMIN — SODIUM CHLORIDE, PRESERVATIVE FREE 10 ML: 5 INJECTION INTRAVENOUS at 08:00

## 2019-09-15 RX ADMIN — TORSEMIDE 10 MG: 10 TABLET ORAL at 08:02

## 2019-09-15 NOTE — SIGNIFICANT NOTE
RN called w/ concerns that pt's right RLE w/ more edema and an increase in size and amount of blisters; change noted since beginning of shift exam at 7pm last night. Leg examined this morning; multiple intact blisters and large hematoma noted; no visible streaking or obvious erythema noted. VS stable, pt without complaints. WOC continues to follow patient. Dr. Rosario aware. Possibly consider venous duplex of extremity pending progression.

## 2019-09-15 NOTE — PROGRESS NOTES
[facility] Medicine Services  PROGRESS NOTE    Patient Name: Khoa Arnold  : 1955  MRN: [id number]    Date of Admission: 2019  Primary Care Physician: Shirlene Sharpe MD    Subjective   Subjective     CC:  F/U Medical Management    HPI:  Still having SOA with ambulation. Has been trying to get up and walk more. Family present at bedside. Nursing at bedside states she has noticed multiple blisters, some with serosanguineous drainage, and some with serous. Patient states non-painful      Review of Systems  Gen- No fevers, chills  CV- No chest pain, palpitations,   Resp- No cough, +dyspnea  GI- No N/V/D    All other systems reviewed and negative except as mentioned per HPI.     Objective   Objective     Vital Signs:   Temp:  [98.3 °F (36.8 °C)-99.8 °F (37.7 °C)] 98.3 °F (36.8 °C)  Heart Rate:  [] 75  Resp:  [17-20] 20  BP: (121-151)/(64-84) 151/84        Physical Exam:  Constitutional: No acute distress, awake, alert, resting in chair.   HENT: NCAT, mucous membranes moist  Respiratory: Clear to auscultation bilaterally, respiratory effort normal   Cardiovascular: RRR, + murmur, No rubs, or gallops, palpable pedal pulses bilaterally, RLE with increased swelling.   Gastrointestinal: Positive bowel sounds, soft, nontender, nondistended  Musculoskeletal: +bilateral ankle edema, Right > left  Psychiatric: Flat affect.   Neurologic: Oriented x 3, strength symmetric in all extremities, Cranial Nerves grossly intact to confrontation, speech clear  Skin: No rashes, midline incision c/d/i; Right lower extremity with C/D/I kerlix dressing, with soft, non-tender medial inner thigh hematoma.    Results Reviewed:    Results from last 7 days   Lab Units 09/15/19  0509 19  0541 19  0453  19  0447  09/10/19  1236   WBC 10*3/mm3 7.29 6.00 7.48   < > 6.69   < > 4.96   HEMOGLOBIN g/dL 8.4* 8.3* 8.6*   < > 7.9*   < > 8.4*   HEMATOCRIT % 26.3* 25.5* 25.8*   < >  23.4*   < > 24.6*   PLATELETS 10*3/mm3 184 151 143   < > 161   < > 184   INR   --   --   --   --  1.25*  --  1.43*    < > = values in this interval not displayed.     Results from last 7 days   Lab Units 09/15/19  0509 09/14/19  0541 09/13/19  0453   SODIUM mmol/L 140 138 136   POTASSIUM mmol/L 4.3 4.0 4.5   CHLORIDE mmol/L 100 101 98   CO2 mmol/L 29.0 27.0 26.0   BUN mg/dL 25* 29* 35*   CREATININE mg/dL 1.11 0.95 1.14   GLUCOSE mg/dL 139* 181* 248*   CALCIUM mg/dL 8.6 8.5* 9.0   ALT (SGPT) U/L  --  38 51*   AST (SGOT) U/L  --  34 51*     Estimated Creatinine Clearance: 82.1 mL/min (by C-G formula based on SCr of 1.11 mg/dL).    Microbiology Results Abnormal     None          Imaging Results (last 24 hours)     ** No results found for the last 24 hours. **          Results for orders placed during the hospital encounter of 09/06/19   Adult Transthoracic Echo Complete W/ Cont if Necessary Per Protocol    Narrative · Moderate aortic valve stenosis is present.  · Peak velocity of the flow distal to the aortic valve is 372.0 cm/s.  · Aortic valve maximum pressure gradient is 55.0 mmHg.  · Aortic valve mean pressure gradient is 32.5 mmHg.  · Aortic valve area is 0.68 cm2.  · Mild mitral valve regurgitation is present  · Calculated right ventricular systolic pressure from tricuspid   regurgitation is 23 mmHg.  · Estimated EF = 55%.  · Left ventricular systolic function is normal.          I have reviewed the medications:  Scheduled Meds:    aspirin  mg Oral Daily   atorvastatin 40 mg Oral Nightly   budesonide-formoterol 2 puff Inhalation BID - RT   busPIRone 10 mg Oral BID   cetirizine 5 mg Oral Daily   dicyclomine 20 mg Oral Daily   heparin (porcine) 5,000 Units Subcutaneous Q8H   insulin detemir 25 Units Subcutaneous Q12H   insulin lispro 0-24 Units Subcutaneous 4x Daily With Meals & Nightly   insulin lispro 10 Units Subcutaneous TID With Meals   metoprolol tartrate 50 mg Oral Q12H   pantoprazole 40 mg Oral  Nightly   pharmacy consult - MTM  Does not apply Daily   polyethylene glycol 17 g Oral BID   potassium chloride 10 mEq Oral Daily   sennosides-docusate sodium 2 tablet Oral BID   sodium chloride 10 mL Intravenous Q12H   sodium chloride 10 mL Intravenous Q8H   tamsulosin 0.4 mg Oral Daily   torsemide 10 mg Oral Daily     Continuous Infusions:   PRN Meds:.•  acetaminophen  •  albumin human  •  bisacodyl  •  diphenhydrAMINE  •  docusate sodium  •  HYDROcodone-acetaminophen  •  ipratropium-albuterol  •  magnesium hydroxide  •  Morphine  •  nitroglycerin  •  ondansetron  •  oxyCODONE-acetaminophen  •  potassium & sodium phosphates      Assessment/Plan   Assessment / Plan     Active Hospital Problems    Diagnosis  POA   • **Unstable angina (CMS/HCC) [I20.0]  Unknown   • Type 2 diabetes mellitus with circulatory disorder, without long-term current use of insulin (CMS/[facility]) [E11.59]  Unknown   • Benign essential HTN [I10]  Yes   • Coronary artery disease [I25.10]  Unknown      Resolved Hospital Problems   No resolved problems to display.        Brief Hospital Course to date:  Khoa Arnold is a 64 y.o. male s/p 4-vessel CABG and AVR with uncontrolled DM2    Multi-Vessel CAD with moderate Aortic Valve Stenosis  -POD #4 4-Vessel CABG/AVR  -Continue PT/OT  -CTS/Cards following    Mild Pulmonary Vascular Congestion, small R Pleural Effusion  -Torsemide + K+ ordered per Cards    Right Lower Extremity Swelling  -Likely multifactorial due to vein graft sites, immobility, and edema  -Now with increased number of blisters and hematoma.  -Lower extremity doppler ordered today   - given one extra dose of Lasix per cardiology    Uncontrolled DM2  -Diabetes Educator following  -Hga1c 9.7  -AM FBS improved  - increased mealtime insulin yesterday and has had improvement, will cont to monitor again overnight and adjust as needed     Anemia  -H&H stable    Hypophosphatemia   -replace per protocol     DVT Prophylaxis:   heparin    Disposition: I expect the patient to be discharged per CTS    CODE STATUS:   Code Status and Medical Interventions:   Ordered at: 09/06/19 0953     Level Of Support Discussed With:    Patient     Code Status:    CPR     Medical Interventions (Level of Support Prior to Arrest):    Full         Electronically signed by ELENO Lowery Student, 09/15/19, 2:11 PM.  ATTESTATION      I have seen and examined the patient, performing an independent face-to-face diagnostic evaluation with plan of care reviewed and developed with the ELENO student. I have reviewed and/or edited the above documentation and agree.      Electronically signed by ELENO Sanchez, 09/15/19, 2:45 PM.

## 2019-09-15 NOTE — PROGRESS NOTES
Khoa Arnold  [id number]  1955   LOS: 9 days   Patient Care Team:  Shirlene Sharpe MD as PCP - General (Internal Medicine)    Chief Complaint: Follow-up on chest pain and shortness of breath    Subjective    Complaining of worsening right lower extremity discomfort, edema and blistering.  Denies shortness of breath or palpitations.  No fevers or chills.    Objective     Vital Sign Min/Max for last 24 hours  Temp  Min: 98.3 °F (36.8 °C)  Max: 99.8 °F (37.7 °C)   BP  Min: 121/73  Max: 151/84   Pulse  Min: 86  Max: 103   Resp  Min: 17  Max: 20   SpO2  Min: 91 %  Max: 96 %   No Data Recorded   Weight  Min: 123 kg (270 lb 4.8 oz)  Max: 123 kg (270 lb 4.8 oz)         09/14/19  0553 09/15/19  0557   Weight: 117 kg (258 lb) 123 kg (270 lb 4.8 oz)         Intake/Output Summary (Last 24 hours) at 9/15/2019 0949  Last data filed at 9/15/2019 0703  Gross per 24 hour   Intake 980 ml   Output 1450 ml   Net -470 ml       Physical Exam:     General Appearance:    Alert, cooperative, in no acute distress   Lungs:    Decreased breath sounds to auscultation,respirations regular, even and                   unlabored    Heart:    Regular and normal rate, normal S1 and S2, grade 2/6            murmur, no gallop, no rub, no click   Abdomen:  Extremities:   Soft, non-tender, bowel sounds audible x4    1+ edema on left, right lower extremity wrapped below the knee, normal range of motion, right lower extremity drainage/bandaging   Pulses:   Pulses palpable and equal bilaterally    Sternal incision CDI with bandage over medial portion from drainage site  Results Review:   Results from last 7 days   Lab Units 09/15/19  0509 09/14/19  0541 09/13/19  0453   SODIUM mmol/L 140 138 136   POTASSIUM mmol/L 4.3 4.0 4.5   CHLORIDE mmol/L 100 101 98   CO2 mmol/L 29.0 27.0 26.0   BUN mg/dL 25* 29* 35*   CREATININE mg/dL 1.11 0.95 1.14   GLUCOSE mg/dL 139* 181* 248*   CALCIUM mg/dL 8.6 8.5* 9.0     Results from last 7 days   Lab Units  09/15/19  0509 09/14/19  0541 09/13/19  0453   WBC 10*3/mm3 7.29 6.00 7.48   HEMOGLOBIN g/dL 8.4* 8.3* 8.6*   HEMATOCRIT % 26.3* 25.5* 25.8*   PLATELETS 10*3/mm3 184 151 143         ·   Chest x-ray 9/13/2019:  Interval removal of life support apparatus with  redemonstration of cardiomegaly, mild pulmonary vascular congestion,  small right pleural effusion. Additional hazy bibasilar atelectasis is  Identified  · No new ECG to review        Medication Review: Reviewed    Assessment/Plan   Patient is status post CABG x4 vessels/AVR, POD #4 in the setting of persistent anemia.  CTS and hospitalist to assess right lower extremity.  Continue oral Demadex, additional dose of IV Lasix x1.      Unstable angina (CMS/HCC)    Coronary artery disease    Type 2 diabetes mellitus with circulatory disorder, without long-term current use of insulin (CMS/HCC)    Benign essential HTN      09/15/19  9:49 AM

## 2019-09-15 NOTE — PLAN OF CARE
Problem: Patient Care Overview  Goal: Plan of Care Review  Outcome: Ongoing (interventions implemented as appropriate)   09/15/19 8951   Coping/Psychosocial   Plan of Care Reviewed With patient   OTHER   Outcome Summary VSS, low grade fever. Pt on RA. Pt ambulating well, SOA with ambulation, saturation remains wnl. RLE this morning appears more edematous. Dressing to RLE changed, large blisters extend from ankle to knee.  RLE elevated on pillows.  Pain managed with prn medication.  Continuing to monitor.

## 2019-09-15 NOTE — PLAN OF CARE
Problem: Patient Care Overview  Goal: Plan of Care Review   09/15/19 7981   Coping/Psychosocial   Plan of Care Reviewed With patient   OTHER   Outcome Summary pt. has had a good day. He has walked 3 times today. He has sat in the chair for most of the day. His blisters on his legs are popping and he is feeling better. 3 dressing changes today. Leg has been elevated on pillows. He does his IS to 2000 mls. He has pain but it is controlled by his Norco.    Plan of Care Review   Progress improving       Problem: Fall Risk (Adult)  Goal: Identify Related Risk Factors and Signs and Symptoms  Outcome: Outcome(s) achieved Date Met: 09/15/19    Goal: Absence of Fall  Outcome: Ongoing (interventions implemented as appropriate)

## 2019-09-15 NOTE — PROGRESS NOTES
CTS Progress Note  Postop day # 5 CABG x4 with AVR     LOS: 9 days   Patient Care Team:  Shirlene Sharpe MD as PCP - General (Internal Medicine)    Chief Complaint: Unstable angina (CMS/HCC)    Subjective   Diarrhea has improved.  Not ambulating well per the patient.  Some weakness and dyspnea on exertion    Vital Signs:  Temp:  [98.3 °F (36.8 °C)-99.8 °F (37.7 °C)] 98.3 °F (36.8 °C)  Heart Rate:  [] 75  Resp:  [17-20] 20  BP: (121-151)/(64-84) 151/84    Physical Exam:   Gen: Patient on the side of bed.  He is using his arms to push up from the bed despite nursing staff repeatedly telling him not to use arms  Cardiovascular regular rate and rhythm  Lungs:clear  Incisions: His right leg incision has areas of serous drainage where the skin is not apposed at the same level.  No erythema  Multiple blisters with serous fluid on right  Positive edema in the right lower extremity with discoloration and ecchymotic areas    Results:     Results from last 7 days   Lab Units 09/15/19  0509   WBC 10*3/mm3 7.29   HEMOGLOBIN g/dL 8.4*   HEMATOCRIT % 26.3*   PLATELETS 10*3/mm3 184     Results from last 7 days   Lab Units 09/15/19  0509   SODIUM mmol/L 140   POTASSIUM mmol/L 4.3   CHLORIDE mmol/L 100   CO2 mmol/L 29.0   BUN mg/dL 25*   CREATININE mg/dL 1.11   GLUCOSE mg/dL 139*   CALCIUM mg/dL 8.6           Imaging Results (last 24 hours)     ** No results found for the last 24 hours. **          Assessment  Postop day # 5 CABG x4 with AVR    Unstable angina (CMS/HCC)    Coronary artery disease    Type 2 diabetes mellitus with circulatory disorder, without long-term current use of insulin (CMS/HCC)    Benign essential HTN    Plan   Keep right lower extremity dry with gauze dressings BID and change PRN saturation  Continue beta-blockers, aspirin and statins  Lasix added by cardiology  Increase activity and ambulation  Possible SNF at discharge given his marginal mobility    09/15/19  1:01 PM

## 2019-09-16 LAB
BH CV ECHO MEAS - BSA(HAYCOCK): 2.5 M^2
BH CV ECHO MEAS - BSA: 2.3 M^2
BH CV ECHO MEAS - BZI_BMI: 43.6 KILOGRAMS/M^2
BH CV ECHO MEAS - BZI_METRIC_HEIGHT: 167.6 CM
BH CV ECHO MEAS - BZI_METRIC_WEIGHT: 122.5 KG
BH CV LOWER VASCULAR LEFT COMMON FEMORAL AUGMENT: NORMAL
BH CV LOWER VASCULAR LEFT COMMON FEMORAL COMPRESS: NORMAL
BH CV LOWER VASCULAR LEFT COMMON FEMORAL PHASIC: NORMAL
BH CV LOWER VASCULAR LEFT COMMON FEMORAL SPONT: NORMAL
BH CV LOWER VASCULAR RIGHT COMMON FEMORAL AUGMENT: NORMAL
BH CV LOWER VASCULAR RIGHT COMMON FEMORAL COMPRESS: NORMAL
BH CV LOWER VASCULAR RIGHT COMMON FEMORAL PHASIC: NORMAL
BH CV LOWER VASCULAR RIGHT COMMON FEMORAL SPONT: NORMAL
BH CV LOWER VASCULAR RIGHT DISTAL FEMORAL COMPRESS: NORMAL
BH CV LOWER VASCULAR RIGHT GASTRONEMIUS COMPRESS: NORMAL
BH CV LOWER VASCULAR RIGHT MID FEMORAL AUGMENT: NORMAL
BH CV LOWER VASCULAR RIGHT MID FEMORAL COMPRESS: NORMAL
BH CV LOWER VASCULAR RIGHT MID FEMORAL PHASIC: NORMAL
BH CV LOWER VASCULAR RIGHT MID FEMORAL SPONT: NORMAL
BH CV LOWER VASCULAR RIGHT PERONEAL COMPRESS: NORMAL
BH CV LOWER VASCULAR RIGHT POPLITEAL AUGMENT: NORMAL
BH CV LOWER VASCULAR RIGHT POPLITEAL COMPRESS: NORMAL
BH CV LOWER VASCULAR RIGHT POPLITEAL PHASIC: NORMAL
BH CV LOWER VASCULAR RIGHT POPLITEAL SPONT: NORMAL
BH CV LOWER VASCULAR RIGHT POSTERIOR TIBIAL COMPRESS: NORMAL
BH CV LOWER VASCULAR RIGHT PROFUNDA FEMORAL COMPRESS: NORMAL
BH CV LOWER VASCULAR RIGHT PROXIMAL FEMORAL COMPRESS: NORMAL
BH CV LOWER VASCULAR RIGHT SAPHENOFEMORAL JUNCTION AUGMENT: NORMAL
BH CV LOWER VASCULAR RIGHT SAPHENOFEMORAL JUNCTION COMPRESS: NORMAL
BH CV LOWER VASCULAR RIGHT SAPHENOFEMORAL JUNCTION PHASIC: NORMAL
BH CV LOWER VASCULAR RIGHT SAPHENOFEMORAL JUNCTION SPONT: NORMAL
GLUCOSE BLDC GLUCOMTR-MCNC: 176 MG/DL (ref 70–130)
GLUCOSE BLDC GLUCOMTR-MCNC: 177 MG/DL (ref 70–130)
GLUCOSE BLDC GLUCOMTR-MCNC: 193 MG/DL (ref 70–130)
GLUCOSE BLDC GLUCOMTR-MCNC: 197 MG/DL (ref 70–130)

## 2019-09-16 PROCEDURE — 63710000001 INSULIN DETEMIR PER 5 UNITS: Performed by: NURSE PRACTITIONER

## 2019-09-16 PROCEDURE — 99233 SBSQ HOSP IP/OBS HIGH 50: CPT | Performed by: NURSE PRACTITIONER

## 2019-09-16 PROCEDURE — 93005 ELECTROCARDIOGRAM TRACING: CPT | Performed by: INTERNAL MEDICINE

## 2019-09-16 PROCEDURE — 82962 GLUCOSE BLOOD TEST: CPT

## 2019-09-16 PROCEDURE — 25010000002 HEPARIN (PORCINE) PER 1000 UNITS: Performed by: INTERNAL MEDICINE

## 2019-09-16 PROCEDURE — 25010000002 AMIODARONE IN DEXTROSE 5% 150-4.21 MG/100ML-% SOLUTION: Performed by: PHYSICIAN ASSISTANT

## 2019-09-16 PROCEDURE — 94799 UNLISTED PULMONARY SVC/PX: CPT

## 2019-09-16 PROCEDURE — 99024 POSTOP FOLLOW-UP VISIT: CPT | Performed by: THORACIC SURGERY (CARDIOTHORACIC VASCULAR SURGERY)

## 2019-09-16 PROCEDURE — 25010000002 AMIODARONE IN DEXTROSE 5% 360-4.14 MG/200ML-% SOLUTION: Performed by: PHYSICIAN ASSISTANT

## 2019-09-16 PROCEDURE — 63710000001 INSULIN DETEMIR PER 5 UNITS: Performed by: INTERNAL MEDICINE

## 2019-09-16 RX ORDER — METOPROLOL TARTRATE 50 MG/1
100 TABLET, FILM COATED ORAL EVERY 12 HOURS SCHEDULED
Status: DISCONTINUED | OUTPATIENT
Start: 2019-09-16 | End: 2019-09-17 | Stop reason: HOSPADM

## 2019-09-16 RX ORDER — METOPROLOL TARTRATE 50 MG/1
50 TABLET, FILM COATED ORAL ONCE
Status: COMPLETED | OUTPATIENT
Start: 2019-09-16 | End: 2019-09-16

## 2019-09-16 RX ORDER — AMIODARONE HYDROCHLORIDE 200 MG/1
200 TABLET ORAL DAILY
Status: DISCONTINUED | OUTPATIENT
Start: 2019-10-08 | End: 2019-09-17 | Stop reason: HOSPADM

## 2019-09-16 RX ORDER — AMIODARONE HYDROCHLORIDE 200 MG/1
200 TABLET ORAL EVERY 12 HOURS
Status: DISCONTINUED | OUTPATIENT
Start: 2019-09-24 | End: 2019-09-17 | Stop reason: HOSPADM

## 2019-09-16 RX ORDER — AMIODARONE HYDROCHLORIDE 200 MG/1
200 TABLET ORAL ONCE
Status: DISCONTINUED | OUTPATIENT
Start: 2019-09-17 | End: 2019-09-17 | Stop reason: HOSPADM

## 2019-09-16 RX ORDER — ASPIRIN 81 MG/1
81 TABLET ORAL DAILY
Status: DISCONTINUED | OUTPATIENT
Start: 2019-09-17 | End: 2019-09-17 | Stop reason: HOSPADM

## 2019-09-16 RX ORDER — AMIODARONE HYDROCHLORIDE 200 MG/1
200 TABLET ORAL EVERY 8 HOURS
Status: DISCONTINUED | OUTPATIENT
Start: 2019-09-18 | End: 2019-09-17 | Stop reason: HOSPADM

## 2019-09-16 RX ORDER — HYDROXYZINE HYDROCHLORIDE 25 MG/1
25 TABLET, FILM COATED ORAL EVERY 12 HOURS PRN
Status: DISCONTINUED | OUTPATIENT
Start: 2019-09-16 | End: 2019-09-17 | Stop reason: HOSPADM

## 2019-09-16 RX ORDER — HYDROXYZINE HYDROCHLORIDE 25 MG/1
25 TABLET, FILM COATED ORAL ONCE
Status: COMPLETED | OUTPATIENT
Start: 2019-09-16 | End: 2019-09-16

## 2019-09-16 RX ORDER — HYDROCODONE BITARTRATE AND ACETAMINOPHEN 7.5; 325 MG/1; MG/1
1 TABLET ORAL EVERY 6 HOURS PRN
Qty: 30 TABLET | Refills: 0
Start: 2019-09-16 | End: 2019-09-24

## 2019-09-16 RX ORDER — CHOLECALCIFEROL (VITAMIN D3) 125 MCG
5 CAPSULE ORAL NIGHTLY PRN
Status: DISCONTINUED | OUTPATIENT
Start: 2019-09-16 | End: 2019-09-17 | Stop reason: HOSPADM

## 2019-09-16 RX ADMIN — AMIODARONE HYDROCHLORIDE 150 MG: 1.5 INJECTION, SOLUTION INTRAVENOUS at 08:29

## 2019-09-16 RX ADMIN — PANTOPRAZOLE SODIUM 40 MG: 40 TABLET, DELAYED RELEASE ORAL at 21:38

## 2019-09-16 RX ADMIN — POTASSIUM CHLORIDE 10 MEQ: 750 CAPSULE, EXTENDED RELEASE ORAL at 08:19

## 2019-09-16 RX ADMIN — HYDROCODONE BITARTRATE AND ACETAMINOPHEN 1 TABLET: 7.5; 325 TABLET ORAL at 07:30

## 2019-09-16 RX ADMIN — RIVAROXABAN 20 MG: 20 TABLET, FILM COATED ORAL at 16:59

## 2019-09-16 RX ADMIN — INSULIN LISPRO 4 UNITS: 100 INJECTION, SOLUTION INTRAVENOUS; SUBCUTANEOUS at 08:26

## 2019-09-16 RX ADMIN — CETIRIZINE HYDROCHLORIDE 5 MG: 10 TABLET, FILM COATED ORAL at 08:19

## 2019-09-16 RX ADMIN — INSULIN LISPRO 10 UNITS: 100 INJECTION, SOLUTION INTRAVENOUS; SUBCUTANEOUS at 08:22

## 2019-09-16 RX ADMIN — INSULIN DETEMIR 25 UNITS: 100 INJECTION, SOLUTION SUBCUTANEOUS at 08:21

## 2019-09-16 RX ADMIN — TORSEMIDE 10 MG: 10 TABLET ORAL at 08:19

## 2019-09-16 RX ADMIN — BISACODYL 10 MG: 5 TABLET, COATED ORAL at 12:59

## 2019-09-16 RX ADMIN — METOPROLOL TARTRATE 50 MG: 50 TABLET ORAL at 07:59

## 2019-09-16 RX ADMIN — BUSPIRONE HYDROCHLORIDE 10 MG: 10 TABLET ORAL at 21:38

## 2019-09-16 RX ADMIN — HYDROCODONE BITARTRATE AND ACETAMINOPHEN 1 TABLET: 7.5; 325 TABLET ORAL at 00:18

## 2019-09-16 RX ADMIN — AMIODARONE HYDROCHLORIDE 1 MG/MIN: 1.8 INJECTION, SOLUTION INTRAVENOUS at 13:43

## 2019-09-16 RX ADMIN — ASPIRIN 325 MG: 325 TABLET, DELAYED RELEASE ORAL at 08:29

## 2019-09-16 RX ADMIN — MAGNESIUM HYDROXIDE 10 ML: 2400 SUSPENSION ORAL at 17:15

## 2019-09-16 RX ADMIN — ATORVASTATIN CALCIUM 40 MG: 40 TABLET, FILM COATED ORAL at 21:38

## 2019-09-16 RX ADMIN — SENNOSIDES, DOCUSATE SODIUM 2 TABLET: 50; 8.6 TABLET, FILM COATED ORAL at 07:29

## 2019-09-16 RX ADMIN — METOPROLOL TARTRATE 100 MG: 50 TABLET ORAL at 21:38

## 2019-09-16 RX ADMIN — SODIUM CHLORIDE, PRESERVATIVE FREE 10 ML: 5 INJECTION INTRAVENOUS at 08:21

## 2019-09-16 RX ADMIN — METOPROLOL TARTRATE 50 MG: 50 TABLET ORAL at 08:19

## 2019-09-16 RX ADMIN — BUDESONIDE AND FORMOTEROL FUMARATE DIHYDRATE 2 PUFF: 160; 4.5 AEROSOL RESPIRATORY (INHALATION) at 09:29

## 2019-09-16 RX ADMIN — INSULIN DETEMIR 26 UNITS: 100 INJECTION, SOLUTION SUBCUTANEOUS at 21:40

## 2019-09-16 RX ADMIN — HEPARIN SODIUM 5000 UNITS: 5000 INJECTION, SOLUTION INTRAVENOUS; SUBCUTANEOUS at 13:43

## 2019-09-16 RX ADMIN — HEPARIN SODIUM 5000 UNITS: 5000 INJECTION, SOLUTION INTRAVENOUS; SUBCUTANEOUS at 06:30

## 2019-09-16 RX ADMIN — BUDESONIDE AND FORMOTEROL FUMARATE DIHYDRATE 2 PUFF: 160; 4.5 AEROSOL RESPIRATORY (INHALATION) at 22:10

## 2019-09-16 RX ADMIN — INSULIN LISPRO 10 UNITS: 100 INJECTION, SOLUTION INTRAVENOUS; SUBCUTANEOUS at 12:49

## 2019-09-16 RX ADMIN — BUSPIRONE HYDROCHLORIDE 10 MG: 10 TABLET ORAL at 08:19

## 2019-09-16 RX ADMIN — HYDROXYZINE HYDROCHLORIDE 25 MG: 25 TABLET, FILM COATED ORAL at 10:56

## 2019-09-16 RX ADMIN — INSULIN LISPRO 4 UNITS: 100 INJECTION, SOLUTION INTRAVENOUS; SUBCUTANEOUS at 12:49

## 2019-09-16 RX ADMIN — AMIODARONE HYDROCHLORIDE 1 MG/MIN: 1.8 INJECTION, SOLUTION INTRAVENOUS at 09:08

## 2019-09-16 RX ADMIN — HYDROXYZINE HYDROCHLORIDE 25 MG: 25 TABLET, FILM COATED ORAL at 21:39

## 2019-09-16 RX ADMIN — TAMSULOSIN HYDROCHLORIDE 0.4 MG: 0.4 CAPSULE ORAL at 08:18

## 2019-09-16 RX ADMIN — SODIUM CHLORIDE, PRESERVATIVE FREE 10 ML: 5 INJECTION INTRAVENOUS at 21:40

## 2019-09-16 RX ADMIN — HYDROCODONE BITARTRATE AND ACETAMINOPHEN 1 TABLET: 7.5; 325 TABLET ORAL at 21:38

## 2019-09-16 RX ADMIN — DICYCLOMINE HYDROCHLORIDE 20 MG: 20 TABLET ORAL at 08:19

## 2019-09-16 NOTE — PROGRESS NOTES
CTS Progress Note       LOS: 10 days   Patient Care Team:  Shirlene Sharpe MD as PCP - General (Internal Medicine)    Chief Complaint: Unstable angina (CMS/HCC)    Vital Signs:  Temp:  [98.1 °F (36.7 °C)-99.7 °F (37.6 °C)] 99.7 °F (37.6 °C)  Heart Rate:  [] 102  Resp:  [18-20] 18  BP: (136-151)/(77-84) 136/77    Physical Exam:  Breathing unlabored on room oxygen.  Wants to go home.  No neurologic deficits.  Sternum is stable and incision is satisfactory     Results:   Results from last 7 days   Lab Units 09/15/19  0509   WBC 10*3/mm3 7.29   HEMOGLOBIN g/dL 8.4*   HEMATOCRIT % 26.3*   PLATELETS 10*3/mm3 184     Results from last 7 days   Lab Units 09/15/19  0509   SODIUM mmol/L 140   POTASSIUM mmol/L 4.3   CHLORIDE mmol/L 100   CO2 mmol/L 29.0   BUN mg/dL 25*   CREATININE mg/dL 1.11   GLUCOSE mg/dL 139*   CALCIUM mg/dL 8.6           Imaging Results (last 24 hours)     ** No results found for the last 24 hours. **          Assessment      Unstable angina (CMS/HCC)    Coronary artery disease    Type 2 diabetes mellitus with circulatory disorder, without long-term current use of insulin (CMS/HCC)    Benign essential HTN      Patient walked 300 feet on 3 separate occasions yesterday.  Plan   Satisfactory for discharge home today from my standpoint if okay with cardiology    Please note that portions of this note were completed with a voice recognition program. Efforts were made to edit the dictations, but occasionally words are mistranscribed.    Denys Goldsmith MD  09/16/19  6:51 AM

## 2019-09-16 NOTE — PLAN OF CARE
Problem: Patient Care Overview  Goal: Plan of Care Review  Outcome: Ongoing (interventions implemented as appropriate)   09/16/19 2495   Coping/Psychosocial   Plan of Care Reviewed With patient;family   OTHER   Outcome Summary During beginning of shift at 0730 this AM, pt went into AFIB RVR with rates in 140s-170s. Amio bolused and gtt was initiated. PT converted to NSR around 1200. Xarelto started today. Pt has complained of intermittent incisional pain. PRNS given. Bowel regimen given. Pt ambulated twice this shift. Urine dark. IS and ambulation encouraged. Poss DC tomorrow.    Plan of Care Review   Progress improving

## 2019-09-16 NOTE — PLAN OF CARE
Problem: Patient Care Overview  Goal: Plan of Care Review  Outcome: Ongoing (interventions implemented as appropriate)   09/16/19 1919   Coping/Psychosocial   Plan of Care Reviewed With patient;spouse   OTHER   Outcome Summary VSS. Ambulating well. No acute issues noted overnight. Will continue to monitor.

## 2019-09-16 NOTE — PROGRESS NOTES
[facility]       LOS: 10 days   Patient Care Team:  Shirlene Sharpe MD as PCP - General (Internal Medicine)        Subjective       Patient Denies:  Sob, palpitations.  Wants to go home.  Up in chair      Vital Signs  Temp:  [98.1 °F (36.7 °C)-99.7 °F (37.6 °C)] 99.2 °F (37.3 °C)  Heart Rate:  [] 107  Resp:  [18] 18  BP: ()/() 106/72    Intake/Output Summary (Last 24 hours) at 9/16/2019 1059  Last data filed at 9/16/2019 0900  Gross per 24 hour   Intake 1199 ml   Output 2850 ml   Net -1651 ml     I/O this shift:  In: 119 [P.O.:119]  Out: 700 [Urine:700]    Physical Exam:     General Appearance:    A&O, in no acute distress       Neck:   No adenopathy, supple, trachea midline, no thyromegaly, no JVD       Lungs:     Clear to auscultation,respirations regular, even and                  unlabored    Heart:    Irregular rhythm and tachy rate, normal S1 and S2, 2/6            murmur, no gallop, no rub, no click   Chest Wall:    No abnormalities observed   Abdomen:     Normal bowel sounds, no masses, no organomegaly, soft               Extremities:   1-2+ edema, no cyanosis, no redness/ blood blisters noted on right medial thigh/upper calf     Pulses:   Pulses palpable and equal bilaterally     Results Review:     I reviewed the patient's new clinical results.      WBC WBC   Date/Time Value Ref Range Status   09/15/2019 0509 7.29 3.40 - 10.80 10*3/mm3 Final   09/14/2019 0541 6.00 3.40 - 10.80 10*3/mm3 Final            HGB Hemoglobin   Date/Time Value Ref Range Status   09/15/2019 0509 8.4 (L) 13.0 - 17.7 g/dL Final   09/14/2019 0541 8.3 (L) 13.0 - 17.7 g/dL Final           HCT Hematocrit   Date/Time Value Ref Range Status   09/15/2019 0509 26.3 (L) 37.5 - 51.0 % Final   09/14/2019 0541 25.5 (L) 37.5 - 51.0 % Final            Platlets Platelets   Date/Time Value Ref Range Status   09/15/2019 0509 184 140 - 450 10*3/mm3 Final    09/14/2019 0541 151 140 - 450 10*3/mm3 Final     Sodium  Sodium   Date/Time Value Ref Range Status   09/15/2019 0509 140 136 - 145 mmol/L Final   09/14/2019 0541 138 136 - 145 mmol/L Final     Potassium  Potassium   Date/Time Value Ref Range Status   09/15/2019 0509 4.3 3.5 - 5.2 mmol/L Final   09/14/2019 0541 4.0 3.5 - 5.2 mmol/L Final     Chloride  Chloride   Date/Time Value Ref Range Status   09/15/2019 0509 100 98 - 107 mmol/L Final   09/14/2019 0541 101 98 - 107 mmol/L Final     BicarbonateNo results found for: PLASMABICARB    BUN BUN   Date/Time Value Ref Range Status   09/15/2019 0509 25 (H) 8 - 23 mg/dL Final   09/14/2019 0541 29 (H) 8 - 23 mg/dL Final      Creatinine Creatinine   Date/Time Value Ref Range Status   09/15/2019 0509 1.11 0.76 - 1.27 mg/dL Final   09/14/2019 0541 0.95 0.76 - 1.27 mg/dL Final      Calcium Calcium   Date/Time Value Ref Range Status   09/15/2019 0509 8.6 8.6 - 10.5 mg/dL Final   09/14/2019 0541 8.5 (L) 8.6 - 10.5 mg/dL Final      Mag @RESULFAST(MG:3)@        PT/INR:       Lab Results   Component Value Date    PROTIME 15.1 (H) 09/11/2019    PROTIME 16.8 (H) 09/10/2019    PROTIME 13.9 09/06/2019    INR 1.25 (H) 09/11/2019    INR 1.43 (H) 09/10/2019    INR 1.12 09/06/2019      Troponin I:  No results found for: TROPONINI No results found for: CKTOTAL, CKMB, CKMBINDEX, POCRTROPI, TROPONINT      [START ON 9/17/2019] amiodarone 200 mg Oral Once   Followed by      [START ON 9/17/2019] amiodarone 200 mg Oral Q8H   Followed by      [START ON 9/24/2019] amiodarone 200 mg Oral Q12H   Followed by      [START ON 10/8/2019] amiodarone 200 mg Oral Daily   aspirin  mg Oral Daily   atorvastatin 40 mg Oral Nightly   budesonide-formoterol 2 puff Inhalation BID - RT   busPIRone 10 mg Oral BID   cetirizine 5 mg Oral Daily   dicyclomine 20 mg Oral Daily   heparin (porcine) 5,000 Units Subcutaneous Q8H   insulin detemir 25 Units Subcutaneous Q12H   insulin lispro 0-24 Units Subcutaneous 4x Daily  With Meals & Nightly   insulin lispro 10 Units Subcutaneous TID With Meals   metoprolol tartrate 100 mg Oral Q12H   pantoprazole 40 mg Oral Nightly   pharmacy consult - MTM  Does not apply Daily   polyethylene glycol 17 g Oral BID   potassium chloride 10 mEq Oral Daily   sennosides-docusate sodium 2 tablet Oral BID   sodium chloride 10 mL Intravenous Q12H   sodium chloride 10 mL Intravenous Q8H   tamsulosin 0.4 mg Oral Daily   torsemide 10 mg Oral Daily       amiodarone 1 mg/min Last Rate: 1 mg/min (09/16/19 0908)   Followed by     amiodarone 0.5 mg/min        Assessment/Plan     Patient Active Problem List   Diagnosis Code   • Unstable angina (CMS/[facility]) I20.0   • Coronary artery disease I25.10   • Type 2 diabetes mellitus with circulatory disorder, without long-term current use of insulin (CMS/[facility]) E11.59   • Benign essential HTN I10     Progressing after 4 vessel CABG and AVR  Normal EF  Anticoagulate due to A. fib with RVR  Increase metoprolol   Home in a.m. if no further A. fib today.    JANAK Sanches  09/16/19  10:59 AM

## 2019-09-16 NOTE — PROGRESS NOTES
Continued Stay Note  [facility]     Patient Name: Khoa Arnold  MRN: [id number]  Today's Date: 9/16/2019    Admit Date: 9/6/2019    Discharge Plan     Row Name 09/16/19 1135       Plan    Plan  HOME    Patient/Family in Agreement with Plan  yes    Plan Comments  Met with pt and family at bedside to f/u DCP.  Plan remains to return home when medically ready.  No immediate CM needs identified/voiced.  CM will cont to follow.    Final Discharge Disposition Code  01 - home or self-care        Discharge Codes    No documentation.       Expected Discharge Date and Time     Expected Discharge Date Expected Discharge Time    Sep 16, 2019             Feli Wylie RN

## 2019-09-16 NOTE — PROGRESS NOTES
"    [facility] Medicine Services  PROGRESS NOTE    Patient Name: Khoa Arnold  : 1955  MRN: [id number]    Date of Admission: 2019  Primary Care Physician: Shirlene Sharpe MD    Subjective   Subjective     CC:  F/U Medical Management    HPI:  Pt is seen resting up in the chair in NAD. Wife at bs.  Pt states he feels very anxious today.  Worried about his HR now in AFIB rvr.  On amio gtt.  Pt/wife report he has \"bad anxiety\" at baseline.  Takes buspar.  Really wants something to help him calm down.  Otherwise, tolerating diet.  No n/v.  NO c/p or soa currently, but occ mild soa with increased HR.  Still with BLE swelling but worse in RLE (stable).      Review of Systems  Gen- No fevers, chills  CV- No chest pain, occ palpitations  Resp- No cough, +mild intermittent dyspnea  GI- No N/V/D    All other systems reviewed and negative except as mentioned per HPI.     Objective   Objective     Vital Signs:   Temp:  [99.2 °F (37.3 °C)-99.7 °F (37.6 °C)] 99.6 °F (37.6 °C)  Heart Rate:  []168  Resp:  [18] 20  BP: ()/() 124/76        Physical Exam:  Constitutional: No acute distress, awake, alert, resting in chair. Wife at bs.   HENT: NCAT, mucous membranes moist  Respiratory: Clear to auscultation bilaterally but decreased at bases, respiratory effort normal on RA currently.    Cardiovascular: afib with rvr, + murmur, No rubs, or gallops, palpable pedal pulses bilaterally, BLE edema but RLE with increased swelling c/t LLE.   Gastrointestinal: Positive bowel sounds, soft, nontender, nondistended.  Obese  Musculoskeletal: +bilateral ankle edema, Right > left (stable)  Psychiatric: Flat affect. Mildly anxious.  Cooperative   Neurologic: Oriented x 3, strength symmetric in all extremities, Cranial Nerves grossly intact to confrontation, speech clear and appropriate.  Follows commands   Skin: No rashes, midline vertical sternal incision c/d/i; Right lower extremity " with drsg C/D/I, with soft, non-tender medial inner thigh hematoma.    Results Reviewed:    Results from last 7 days   Lab Units 09/15/19  0509 09/14/19  0541 09/13/19  0453  09/11/19  0447  09/10/19  1236   WBC 10*3/mm3 7.29 6.00 7.48   < > 6.69   < > 4.96   HEMOGLOBIN g/dL 8.4* 8.3* 8.6*   < > 7.9*   < > 8.4*   HEMATOCRIT % 26.3* 25.5* 25.8*   < > 23.4*   < > 24.6*   PLATELETS 10*3/mm3 184 151 143   < > 161   < > 184   INR   --   --   --   --  1.25*  --  1.43*    < > = values in this interval not displayed.     Results from last 7 days   Lab Units 09/15/19  0509 09/14/19  0541 09/13/19  0453   SODIUM mmol/L 140 138 136   POTASSIUM mmol/L 4.3 4.0 4.5   CHLORIDE mmol/L 100 101 98   CO2 mmol/L 29.0 27.0 26.0   BUN mg/dL 25* 29* 35*   CREATININE mg/dL 1.11 0.95 1.14   GLUCOSE mg/dL 139* 181* 248*   CALCIUM mg/dL 8.6 8.5* 9.0   ALT (SGPT) U/L  --  38 51*   AST (SGOT) U/L  --  34 51*     Estimated Creatinine Clearance: 81.7 mL/min (by C-G formula based on SCr of 1.11 mg/dL).    Microbiology Results Abnormal     None          Imaging Results (last 24 hours)     ** No results found for the last 24 hours. **          Results for orders placed during the hospital encounter of 09/06/19   Adult Transthoracic Echo Complete W/ Cont if Necessary Per Protocol    Narrative · Moderate aortic valve stenosis is present.  · Peak velocity of the flow distal to the aortic valve is 372.0 cm/s.  · Aortic valve maximum pressure gradient is 55.0 mmHg.  · Aortic valve mean pressure gradient is 32.5 mmHg.  · Aortic valve area is 0.68 cm2.  · Mild mitral valve regurgitation is present  · Calculated right ventricular systolic pressure from tricuspid   regurgitation is 23 mmHg.  · Estimated EF = 55%.  · Left ventricular systolic function is normal.          I have reviewed the medications:  Scheduled Meds:    [START ON 9/17/2019] amiodarone 200 mg Oral Once   Followed by      [START ON 9/17/2019] amiodarone 200 mg Oral Q8H   Followed by       [START ON 9/24/2019] amiodarone 200 mg Oral Q12H   Followed by      [START ON 10/8/2019] amiodarone 200 mg Oral Daily   aspirin  mg Oral Daily   atorvastatin 40 mg Oral Nightly   budesonide-formoterol 2 puff Inhalation BID - RT   busPIRone 10 mg Oral BID   cetirizine 5 mg Oral Daily   dicyclomine 20 mg Oral Daily   heparin (porcine) 5,000 Units Subcutaneous Q8H   insulin detemir 25 Units Subcutaneous Q12H   insulin lispro 0-24 Units Subcutaneous 4x Daily With Meals & Nightly   insulin lispro 10 Units Subcutaneous TID With Meals   metoprolol tartrate 100 mg Oral Q12H   pantoprazole 40 mg Oral Nightly   pharmacy consult - MTM  Does not apply Daily   polyethylene glycol 17 g Oral BID   potassium chloride 10 mEq Oral Daily   sennosides-docusate sodium 2 tablet Oral BID   sodium chloride 10 mL Intravenous Q12H   sodium chloride 10 mL Intravenous Q8H   tamsulosin 0.4 mg Oral Daily   torsemide 10 mg Oral Daily     Continuous Infusions:    amiodarone 1 mg/min Last Rate: 1 mg/min (09/16/19 0908)   Followed by     amiodarone 0.5 mg/min      PRN Meds:.•  acetaminophen  •  albumin human  •  bisacodyl  •  diphenhydrAMINE  •  docusate sodium  •  HYDROcodone-acetaminophen  •  ipratropium-albuterol  •  magnesium hydroxide  •  Morphine  •  nitroglycerin  •  ondansetron  •  oxyCODONE-acetaminophen  •  potassium & sodium phosphates      Assessment/Plan   Assessment / Plan     Active Hospital Problems    Diagnosis  POA   • **Unstable angina (CMS/[facility]) [I20.0]  Unknown   • Type 2 diabetes mellitus with circulatory disorder, without long-term current use of insulin (CMS/HCC) [E11.59]  Unknown   • Benign essential HTN [I10]  Yes   • Coronary artery disease [I25.10]  Unknown      Resolved Hospital Problems   No resolved problems to display.        Brief Hospital Course to date:  Khoa Arnold is a 64 y.o. male s/p 4-vessel CABG and AVR with uncontrolled DM2. Hospitalist service consult for medical mgmt.      Assessment/Plan:    Multi-Vessel CAD with moderate Aortic Valve Stenosis  -POD #5 4-Vessel CABG/AVR  -Continue PT/OT  -CTS/Cards following    Afib RVR  --cards following   --onset today 9/16/19 this am  --on amio gtt, HR still in 140-160's currently     Mild Pulmonary Vascular Congestion, small R Pleural Effusion  -Torsemide + K+ ordered per Cards  --stable     Right Lower Extremity Swelling  -Likely multifactorial due to vein graft sites, immobility, and edema  -Now with increased number of blisters and hematoma reported but kerlix drsg in place and c/d/i.  -WOC following   -Lower extremity doppler ordered yesterday 9/15--TDS but all veins compressible with good flow noted.   --cards following     Uncontrolled DM2  -Diabetes Educator following  -Hga1c 9.7  -Running 197-218  - increased mealtime insulin 9/14 and has had some improvement  --will slightly increase basal/bolus regimen today.  Also decrease from HDSSI achs to HDSSI tid ac and MDSSI qhs.    --continue to monitor    Anemia  -H&H stable    Severe anxiety  --takes buspar at home, was continued here  --asking for something more now to help anxiety and sleep  --trial atarax PRN    Hypophosphatemia   -replace per protocol     DVT Prophylaxis:  heparin    Disposition: I expect the patient to be discharged per CTS    CODE STATUS:   Code Status and Medical Interventions:   Ordered at: 09/06/19 0953     Level Of Support Discussed With:    Patient     Code Status:    CPR     Medical Interventions (Level of Support Prior to Arrest):    Full         Electronically signed by ELENO Neal, 09/16/19, 1:38 PM.  ATTESTATION      I have seen and examined the patient, performing an independent face-to-face diagnostic evaluation with plan of care reviewed and developed with the ELENO student. I have reviewed and/or edited the above documentation and agree.      Electronically signed by ELENO Sanchez, 09/15/19, 2:45 PM.

## 2019-09-16 NOTE — PROGRESS NOTES
"Adult Nutrition  Assessment/PES    Patient Name:  Khoa Arnold  YOB: 1955  MRN: [id number]  Admit Date:  9/6/2019    Assessment Date:  9/16/2019    Comments:      Reason for Assessment     Row Name 09/16/19 0746          Reason for Assessment    Reason For Assessment  follow-up protocol 15\"           Anthropometrics     Row Name 09/16/19 0600          Anthropometrics    Weight  122 kg (268 lb 3.2 oz)               Nutrition Prescription Ordered     Row Name 09/16/19 0746          Nutrition Prescription PO    Current PO Diet  Regular     Supplement  Boost Glucose Control (Glucerna Shake)     Supplement Frequency  2 times a day     Common Modifiers  Cardiac;Consistent Carbohydrate                 Problem/Interventions:  Problem 1     Row Name 09/16/19 0747          Nutrition Diagnoses Problem 1    Problem 1  Knowledge Deficit     Resolved?  Yes         Problem 2     Row Name 09/16/19 0747          Nutrition Diagnoses Problem 2    Problem 2  Nutrition Appropriate for Condition at this Time     Signs/Symptoms (evidenced by)  PO Intake     Percent (%) intake recorded  69 %     Over number of meals  4                         Electronically signed by:  Teagan Andre RD  09/16/19 7:48 AM  "

## 2019-09-17 VITALS
OXYGEN SATURATION: 100 % | SYSTOLIC BLOOD PRESSURE: 132 MMHG | DIASTOLIC BLOOD PRESSURE: 60 MMHG | RESPIRATION RATE: 18 BRPM | HEIGHT: 66 IN | HEART RATE: 76 BPM | TEMPERATURE: 99.1 F | BODY MASS INDEX: 43.06 KG/M2 | WEIGHT: 267.9 LBS

## 2019-09-17 LAB
ANION GAP SERPL CALCULATED.3IONS-SCNC: 10 MMOL/L (ref 5–15)
BUN BLD-MCNC: 21 MG/DL (ref 8–23)
BUN/CREAT SERPL: 20.6 (ref 7–25)
CALCIUM SPEC-SCNC: 8.5 MG/DL (ref 8.6–10.5)
CHLORIDE SERPL-SCNC: 100 MMOL/L (ref 98–107)
CO2 SERPL-SCNC: 27 MMOL/L (ref 22–29)
CREAT BLD-MCNC: 1.02 MG/DL (ref 0.76–1.27)
DEPRECATED RDW RBC AUTO: 49.4 FL (ref 37–54)
ERYTHROCYTE [DISTWIDTH] IN BLOOD BY AUTOMATED COUNT: 14.5 % (ref 12.3–15.4)
GFR SERPL CREATININE-BSD FRML MDRD: 73 ML/MIN/1.73
GLUCOSE BLD-MCNC: 161 MG/DL (ref 65–99)
GLUCOSE BLDC GLUCOMTR-MCNC: 144 MG/DL (ref 70–130)
GLUCOSE BLDC GLUCOMTR-MCNC: 200 MG/DL (ref 70–130)
HCT VFR BLD AUTO: 25.2 % (ref 37.5–51)
HGB BLD-MCNC: 7.8 G/DL (ref 13–17.7)
MCH RBC QN AUTO: 30.2 PG (ref 26.6–33)
MCHC RBC AUTO-ENTMCNC: 31 G/DL (ref 31.5–35.7)
MCV RBC AUTO: 97.7 FL (ref 79–97)
PLATELET # BLD AUTO: 198 10*3/MM3 (ref 140–450)
PMV BLD AUTO: 9.7 FL (ref 6–12)
POTASSIUM BLD-SCNC: 5.1 MMOL/L (ref 3.5–5.2)
RBC # BLD AUTO: 2.58 10*6/MM3 (ref 4.14–5.8)
SODIUM BLD-SCNC: 137 MMOL/L (ref 136–145)
WBC NRBC COR # BLD: 6.98 10*3/MM3 (ref 3.4–10.8)

## 2019-09-17 PROCEDURE — 85027 COMPLETE CBC AUTOMATED: CPT | Performed by: NURSE PRACTITIONER

## 2019-09-17 PROCEDURE — 25010000002 AMIODARONE IN DEXTROSE 5% 360-4.14 MG/200ML-% SOLUTION: Performed by: PHYSICIAN ASSISTANT

## 2019-09-17 PROCEDURE — 80048 BASIC METABOLIC PNL TOTAL CA: CPT | Performed by: NURSE PRACTITIONER

## 2019-09-17 PROCEDURE — 94799 UNLISTED PULMONARY SVC/PX: CPT

## 2019-09-17 PROCEDURE — 63710000001 INSULIN DETEMIR PER 5 UNITS: Performed by: NURSE PRACTITIONER

## 2019-09-17 PROCEDURE — 99233 SBSQ HOSP IP/OBS HIGH 50: CPT | Performed by: NURSE PRACTITIONER

## 2019-09-17 PROCEDURE — 82962 GLUCOSE BLOOD TEST: CPT

## 2019-09-17 PROCEDURE — 99024 POSTOP FOLLOW-UP VISIT: CPT | Performed by: THORACIC SURGERY (CARDIOTHORACIC VASCULAR SURGERY)

## 2019-09-17 RX ORDER — GLIPIZIDE 5 MG/1
10 TABLET ORAL
Status: DISCONTINUED | OUTPATIENT
Start: 2019-09-17 | End: 2019-09-17 | Stop reason: HOSPADM

## 2019-09-17 RX ORDER — AMIODARONE HYDROCHLORIDE 200 MG/1
200 TABLET ORAL EVERY 12 HOURS
Qty: 28 TABLET | Refills: 0 | Status: SHIPPED | OUTPATIENT
Start: 2019-09-24 | End: 2019-10-08

## 2019-09-17 RX ORDER — AMIODARONE HYDROCHLORIDE 200 MG/1
200 TABLET ORAL EVERY 8 HOURS
Qty: 48 TABLET | Refills: 0 | Status: SHIPPED | OUTPATIENT
Start: 2019-09-18 | End: 2019-10-08

## 2019-09-17 RX ORDER — HYDROXYZINE HYDROCHLORIDE 25 MG/1
25 TABLET, FILM COATED ORAL EVERY 12 HOURS PRN
Qty: 30 TABLET | Refills: 0 | Status: ON HOLD | OUTPATIENT
Start: 2019-09-17 | End: 2021-01-19

## 2019-09-17 RX ORDER — AMIODARONE HYDROCHLORIDE 200 MG/1
200 TABLET ORAL DAILY
Qty: 30 TABLET | Refills: 5 | Status: SHIPPED | OUTPATIENT
Start: 2019-10-08 | End: 2019-09-18

## 2019-09-17 RX ORDER — METOPROLOL TARTRATE 100 MG/1
100 TABLET ORAL EVERY 12 HOURS SCHEDULED
Qty: 60 TABLET | Refills: 11 | Status: SHIPPED | OUTPATIENT
Start: 2019-09-17 | End: 2019-09-18

## 2019-09-17 RX ADMIN — AMIODARONE HYDROCHLORIDE 0.5 MG/MIN: 1.8 INJECTION, SOLUTION INTRAVENOUS at 00:02

## 2019-09-17 RX ADMIN — SENNOSIDES, DOCUSATE SODIUM 2 TABLET: 50; 8.6 TABLET, FILM COATED ORAL at 08:21

## 2019-09-17 RX ADMIN — METOPROLOL TARTRATE 100 MG: 50 TABLET ORAL at 08:21

## 2019-09-17 RX ADMIN — POTASSIUM CHLORIDE 10 MEQ: 750 CAPSULE, EXTENDED RELEASE ORAL at 08:21

## 2019-09-17 RX ADMIN — CETIRIZINE HYDROCHLORIDE 5 MG: 10 TABLET, FILM COATED ORAL at 08:20

## 2019-09-17 RX ADMIN — OXYCODONE HYDROCHLORIDE AND ACETAMINOPHEN 2 TABLET: 5; 325 TABLET ORAL at 03:22

## 2019-09-17 RX ADMIN — METFORMIN HYDROCHLORIDE 1000 MG: 1000 TABLET, FILM COATED ORAL at 11:18

## 2019-09-17 RX ADMIN — GLIPIZIDE 10 MG: 5 TABLET ORAL at 11:18

## 2019-09-17 RX ADMIN — ASPIRIN 81 MG: 81 TABLET, COATED ORAL at 08:21

## 2019-09-17 RX ADMIN — HYDROXYZINE HYDROCHLORIDE 25 MG: 25 TABLET, FILM COATED ORAL at 08:20

## 2019-09-17 RX ADMIN — INSULIN DETEMIR 26 UNITS: 100 INJECTION, SOLUTION SUBCUTANEOUS at 08:23

## 2019-09-17 RX ADMIN — MELATONIN TAB 5 MG 5 MG: 5 TAB at 00:02

## 2019-09-17 RX ADMIN — DICYCLOMINE HYDROCHLORIDE 20 MG: 20 TABLET ORAL at 08:21

## 2019-09-17 RX ADMIN — POLYETHYLENE GLYCOL 3350 17 G: 17 POWDER, FOR SOLUTION ORAL at 08:23

## 2019-09-17 RX ADMIN — BUSPIRONE HYDROCHLORIDE 10 MG: 10 TABLET ORAL at 08:20

## 2019-09-17 RX ADMIN — BUDESONIDE AND FORMOTEROL FUMARATE DIHYDRATE 2 PUFF: 160; 4.5 AEROSOL RESPIRATORY (INHALATION) at 11:39

## 2019-09-17 RX ADMIN — TORSEMIDE 10 MG: 10 TABLET ORAL at 08:21

## 2019-09-17 RX ADMIN — TAMSULOSIN HYDROCHLORIDE 0.4 MG: 0.4 CAPSULE ORAL at 08:21

## 2019-09-17 NOTE — DISCHARGE SUMMARY
Date of Discharge:  9/17/2019              [facility]  Date of Admit: 9/6/2019    Shirlene Sharpe MD      Discharge Diagnosis:  Unstable angina (CMS/Formerly [facility])    Coronary artery disease    Type 2 diabetes mellitus with circulatory disorder, without long-term current use of insulin (CMS/HCC)    Benign essential HTN      Hospital Course: Mr. Arnold is a pleasant 64-year-old male who was admitted to [facility] on 9/6/2019 due to unstable angina.  He underwent a heart catheterization which revealed severe left main and three-vessel CAD.  An echocardiogram revealed severe aortic stenosis.  He was referred to the service of Dr. Goldsmith and ultimately underwent a four-vessel CABG and aortic valve replacement.  He tolerated the procedure well, there were no significant complications.  He did have some postop atrial fibrillation which converted to normal sinus rhythm with increased beta-blockade and amiodarone.  Today he is denying any complaints and is therefore felt ready for discharge.    Procedures Performed  Procedure(s):  CARLYN PER ANESTHESIA, MEDIAN STERNOTOMY, CORONARY ARTERY BYPASS GRAFT X 4, UTILIZING THE LEFT INTERNAL MAMMARY ARTERY, AND EVH OF THE RIGHT GREATER SAPHENOUS VEIN,   AORTIC VALVE REPLACEMENT       Consults     Date and Time Order Name Status Description    9/13/2019 1735 Inpatient Consult to Hospitalist [facility] for Medical Management      9/10/2019 1226 Inpatient Consult to Cardiology      9/6/2019 0953 Inpatient Hospitalist Consult            Pertinent Test Results: angiography: Left main and three-vessel CAD  .      Discharge Physical Exam:    General Appearance No acute distress   Neck No adenopathy, supple, trachea midline, no JVD   Lungs Clear to auscultation,respirations regular, even and unlabored   Heart Regular rhythm and normal rate, normal S1 and S2, no murmur, no gallop, no rub, no click   Chest wall No abnormalities observed   Abdomen Normal bowel sounds,  no masses, no hepatomegaly, soft   Extremities Moves all extremities well, 1+ edema, no cyanosis, no redness   Neurological Alert and oriented x 3     Discharge Medications     Discharge Medications      New Medications      Instructions Start Date   amiodarone 200 MG tablet  Commonly known as:  PACERONE   200 mg, Oral, Every 8 Hours   Start Date:  9/18/2019     amiodarone 200 MG tablet  Commonly known as:  PACERONE   200 mg, Oral, Every 12 Hours   Start Date:  9/24/2019     amiodarone 200 MG tablet  Commonly known as:  PACERONE   200 mg, Oral, Daily   Start Date:  10/8/2019     HYDROcodone-acetaminophen 7.5-325 MG per tablet  Commonly known as:  NORCO   1 tablet, Oral, Every 6 Hours PRN      hydrOXYzine 25 MG tablet  Commonly known as:  ATARAX   25 mg, Oral, Every 12 Hours PRN      Insulin Lispro 100 UNIT/ML solution pen-injector  Commonly known as:  HUMALOG   Chk sugar 4x/d (ACHS):B 150-199 mg/dL-2u, B 200-249 mg/dL-4u B 250-299 mg/d -6u, B 300-349 mg/dL-7u, B 350-400 mg/dL-8u, B >400 mg/dL-9u      Insulin Pen Needle 29G X 12MM misc   1 applicator, Does not apply, 4 Times Daily Before Meals & Nightly      metoprolol tartrate 100 MG tablet  Commonly known as:  LOPRESSOR   100 mg, Oral, Every 12 Hours Scheduled         Continue These Medications      Instructions Start Date   albuterol sulfate  (90 Base) MCG/ACT inhaler  Commonly known as:  PROVENTIL HFA;VENTOLIN HFA;PROAIR HFA   2 puffs, Inhalation, Every 4 Hours PRN      aspirin 81 MG EC tablet   81 mg, Oral, Daily      BREO ELLIPTA 100-25 MCG/INH inhaler  Generic drug:  Fluticasone Furoate-Vilanterol   1 puff, Inhalation, Daily - RT      busPIRone 10 MG tablet  Commonly known as:  BUSPAR   10 mg, Oral, 2 Times Daily      cetirizine 10 MG tablet  Commonly known as:  zyrTEC   10 mg, Oral, Daily      dicyclomine 20 MG tablet  Commonly known as:  BENTYL   20 mg, Oral, Daily      glimepiride 4 MG tablet  Commonly known as:  AMARYL   4 mg, Oral, Every Morning  Before Breakfast      hydrochlorothiazide 25 MG tablet  Commonly known as:  HYDRODIURIL   25 mg, Oral, Daily      metFORMIN 1000 MG tablet  Commonly known as:  GLUCOPHAGE   1,000 mg, Oral, 2 Times Daily With Meals      nitroglycerin 0.4 MG SL tablet  Commonly known as:  NITROSTAT   0.4 mg, Sublingual, Every 5 Minutes PRN, Take no more than 3 doses in 15 minutes.       omeprazole 40 MG capsule  Commonly known as:  priLOSEC   40 mg, Oral, Daily      pravastatin 40 MG tablet  Commonly known as:  PRAVACHOL   40 mg, Oral, Daily      SITagliptin 100 MG tablet  Commonly known as:  JANUVIA   100 mg, Oral, Daily      tamsulosin 0.4 MG capsule 24 hr capsule  Commonly known as:  FLOMAX   1 capsule, Oral, Daily      Vitamin D (Cholecalciferol) 400 units tablet  Commonly known as:  CHOLECALCIFEROL   400 Units, Oral, Daily         Stop These Medications    amLODIPine 10 MG tablet  Commonly known as:  NORVASC     atenolol 100 MG tablet  Commonly known as:  TENORMIN     linaclotide 290 MCG capsule capsule  Commonly known as:  LINZESS     losartan 100 MG tablet  Commonly known as:  COZAAR            Discharge Diet:   Diet Instructions     Diet: Consistent Carbohydrate, Cardiac      Discharge Diet:   Consistent Carbohydrate  Cardiac             Activity at Discharge:   Activity Instructions     Activity as Tolerated      Other Activity Instructions      Activity Instructions: No lifting greater than 10 pounds for 4 weeks and no driving for 4 weeks or while taking narcotics.          Discharge disposition: home    Condition on Discharge: stable    Follow-up Appointments  Future Appointments   Date Time Provider Department Center   9/24/2019 10:45 AM Angella Jones APRN [facility]     Additional Instructions for the Follow-ups that You Need to Schedule     Ambulatory Referral to Diabetic Education   As directed      Discharge Follow-up with Specialty: Cardiothoracic Surgery   As directed      Follow Up Details:  Follow Up in  2-4 Weeks    Specialty:  Cardiothoracic Surgery         Discharge Follow-up with Specialty: [facility]; 1 Week   As directed      Specialty:  [facility]    Follow Up:  1 Week    Follow Up Details:  Follow Up With [facility] Within 7 Days of Discharge. If Discharged on a Weekend, Schedule Follow Up For The Following Friday at 0900.                  JANAK Sanches  09/17/19  10:05 AM

## 2019-09-17 NOTE — PLAN OF CARE
Problem: Patient Care Overview  Goal: Plan of Care Review  Outcome: Ongoing (interventions implemented as appropriate)   09/17/19 1312   Coping/Psychosocial   Plan of Care Reviewed With patient;family   OTHER   Outcome Summary spoke with MARYBETH Neal , ordered to dc . amio gtt, discontinued xarelto d/t bleed   09/17/19 1312   Coping/Psychosocial   Plan of Care Reviewed With patient;family   OTHER   Outcome Summary spoke with MARYBETH Neal , ordered to dc . amio gtt, discontinued xarelto d/t bleeding risk    ing risk      Goal: Individualization and Mutuality  Outcome: Ongoing (interventions implemented as appropriate)

## 2019-09-17 NOTE — PROGRESS NOTES
CTS Progress Note       LOS: 11 days   Patient Care Team:  Shirlene Sharpe MD as PCP - General (Internal Medicine)    Chief Complaint: Unstable angina (CMS/HCC)    Vital Signs:  Temp:  [98.5 °F (36.9 °C)-99.6 °F (37.6 °C)] 98.5 °F (36.9 °C)  Heart Rate:  [] 77  Resp:  [18] 18  BP: ()/() 156/80    Physical Exam: Up in chair.  Alert conversant wants to go home       Results:   Results from last 7 days   Lab Units 09/17/19  0512   WBC 10*3/mm3 6.98   HEMOGLOBIN g/dL 7.8*   HEMATOCRIT % 25.2*   PLATELETS 10*3/mm3 198     Results from last 7 days   Lab Units 09/15/19  0509   SODIUM mmol/L 140   POTASSIUM mmol/L 4.3   CHLORIDE mmol/L 100   CO2 mmol/L 29.0   BUN mg/dL 25*   CREATININE mg/dL 1.11   GLUCOSE mg/dL 139*   CALCIUM mg/dL 8.6           Imaging Results (last 24 hours)     ** No results found for the last 24 hours. **          Assessment      Unstable angina (CMS/HCC)    Coronary artery disease    Type 2 diabetes mellitus with circulatory disorder, without long-term current use of insulin (CMS/HCC)    Benign essential HTN    Breathing unlabored on room air.  Currently in sinus rhythm.  Should be able for discharge home today if satisfactory with cardiology    Plan   Discharge home if satisfactory with cardiology    Please note that portions of this note were completed with a voice recognition program. Efforts were made to edit the dictations, but occasionally words are mistranscribed.    Denys Goldsmith MD  09/17/19  6:47 AM

## 2019-09-18 ENCOUNTER — READMISSION MANAGEMENT (OUTPATIENT)
Dept: CALL CENTER | Facility: HOSPITAL | Age: 64
End: 2019-09-18

## 2019-09-18 ENCOUNTER — NURSE TRIAGE (OUTPATIENT)
Dept: CALL CENTER | Facility: HOSPITAL | Age: 64
End: 2019-09-18

## 2019-09-18 RX ORDER — METOPROLOL TARTRATE 100 MG/1
100 TABLET ORAL EVERY 12 HOURS SCHEDULED
Qty: 60 TABLET | Refills: 11 | Status: SHIPPED | OUTPATIENT
Start: 2019-09-18

## 2019-09-18 RX ORDER — AMIODARONE HYDROCHLORIDE 200 MG/1
200 TABLET ORAL DAILY
Qty: 30 TABLET | Refills: 5 | Status: ON HOLD | OUTPATIENT
Start: 2019-10-08 | End: 2021-01-19

## 2019-09-18 NOTE — TELEPHONE ENCOUNTER
"Caller  was released from [facility] yesterday after CABG.  has started with dizziness with blurry vision since last night.  BP is 124/60 now. Denies any weakness/numbness or headache. Denies any chest pain.  has placed call to Dr Goldsmith's office and spoke with nurse and waiting for return call. Caller  believes is due to his medication. Instructed per Care Advice-voiced understanding.    Reason for Disposition  • [1] Blurred vision or visual changes AND [2] present now AND [3] sudden onset or new (e.g., minutes, hours, days)  (Exception: seeing floaters / black specks OR previously diagnosed migraine headaches with same symptoms)    Additional Information  • Negative: Weakness of the face, arm or leg on one side of the body  • Negative: Followed getting substance in the eye  • Negative: Foreign body or object is or was lodged in the eye  • Negative: Followed an eye injury  • Negative: Followed sun lamp or sun exposure (UV keratitis)  • Negative: Yellow or green discharge (pus) in the eye  • Negative: Pregnant  • Negative: Postpartum  • Negative: Complete loss of vision in 1 or both eyes  • Negative: Severe eye pain  • Negative: Severe headache  • Negative: Double vision    Answer Assessment - Initial Assessment Questions  1. DESCRIPTION: \"What is the vision loss like? Describe it for me.\" (e.g., complete vision loss, blurred vision, double vision, floaters, etc.)      Blurred vision since yesterday.  2. LOCATION: \"One or both eyes?\" If one, ask: \"Which eye?\"      Both eyes.  3. SEVERITY: \"Can you see anything?\" If so, ask: \"What can you see?\" (e.g., fine print)      Things just look blurry.  4. ONSET: \"When did this begin?\" \"Did it start suddenly or has this been gradual?\"      Last night  5. PATTERN: \"Does this come and go, or has it been constant since it started?\"      Dizziness with blurry vision now.  6. PAIN: \"Is there any pain in your eye(s)?\"  (Scale 1-10; or mild, " "moderate, severe)      no  7. CONTACTS-GLASSES: \"Do you wear contacts or glasses?\"      Glasses to read.  8. CAUSE: \"What do you think is causing this visual problem?\"      Thinks vision.  9. OTHER SYMPTOMS: \"Do you have any other symptoms?\" (e.g., confusion, headache, arm or leg weakness, speech problems)      Dizziness.  10. PREGNANCY: \"Is there any chance you are pregnant?\" \"When was your last menstrual period?\"         na    Protocols used: VISION LOSS OR CHANGE-ADULT-AH      "

## 2019-09-18 NOTE — OUTREACH NOTE
Prep Survey      Responses   Facility patient discharged from?  [place]   Is patient eligible?  Yes   Discharge diagnosis  CARLYN PER ANESTHESIA, MEDIAN STERNOTOMY, CORONARY ARTERY BYPASS GRAFT X 4, UTILIZING THE LEFT INTERNAL MAMMARY ARTERY, AND EVH OF THE RIGHT GREATER SAPHENOUS VEIN,   AORTIC VALVE REPLACEMENT   Does the patient have one of the following disease processes/diagnoses(primary or secondary)?  Cardiothoracic surgery   Does the patient have Home health ordered?  No   Is there a DME ordered?  No   Prep survey completed?  Yes          Spring Hardin RN

## 2019-09-20 ENCOUNTER — READMISSION MANAGEMENT (OUTPATIENT)
Dept: CALL CENTER | Facility: HOSPITAL | Age: 64
End: 2019-09-20

## 2019-09-20 NOTE — OUTREACH NOTE
CT Surgery Week 1 Survey      Responses   Facility patient discharged from?  [facility]   Does the patient have one of the following disease processes/diagnoses(primary or secondary)?  Cardiothoracic surgery   Is there a successful TCM telephone encounter documented?  No   Week 1 attempt successful?  Yes   Call start time  1047   Call end time  1110   Discharge diagnosis  CARLYN PER ANESTHESIA, MEDIAN STERNOTOMY, CORONARY ARTERY BYPASS GRAFT X 4, UTILIZING THE LEFT INTERNAL MAMMARY ARTERY, AND EVH OF THE RIGHT GREATER SAPHENOUS VEIN,   AORTIC VALVE REPLACEMENT   Is patient permission given to speak with other caregiver?  Yes   List who call center can speak with  wife   Meds reviewed with patient/caregiver?  Yes   Is the patient having any side effects they believe may be caused by any medication additions or changes?  No   Does the patient have all medications related to this admission filled (includes all antibiotics, pain medications, cardiac medications, etc.)  Yes   Is the patient taking all medications as directed (includes completed medication regime)?  Yes   Medication comments  PCP gave amoxicillan for leg blisters.    What is the Home health agency?   HH is coming into home today. Arranged thru PCP office this week per wife, does not remember agency.   Has home health visited the patient within 72 hours of discharge?  Call prior to 72 hours   Home health comments  Hh will do dressing changes for leg with blister. Wife will have HH check for edema, O2 sat sitting, lying and walking and resp status. Advised to call MD r/t wheezing.    Psychosocial issues?  No   Comments  Pt up walking well. Using IS frequently.Monitoring bp at home, 148/65 HR 87. Denies pain. R. leg has blisters from where ACE wrap was too tight on leg in hospital per wife.PCP aware. Keeping leg elevated as much as possible. Glucose 120-140 at home, which is good for pt per wife. Appetite decreased. No issues voiding. Chest incision is without  s/sx of inf, leg incision is also /s s/sx of inf. Reports having difficulty lying down or reclining even in chair. Diff sleeping r/t SOA &wheezing. Advised to call MD   Did the patient receive a copy of their discharge instructions?  Yes   Nursing interventions  Reviewed instructions with patient   What is the patient's perception of their health status since discharge?  Worsening   Nursing interventions  Nurse provided patient education, Advised patient to call provider   Is the patient/caregiver able to teach back normal signs of recovery?  Nausea and lack of appetite   Nursing interventions  Advised to call cardiologist, Advised to call surgeon   Is the patient /caregiver able to teach back basic post-op care?  Continue use of incentive spirometry at least 1 week post discharge, Practice 'cough and deep breath', Take showers only when approved by MD-sponge bathe until then, No tub bath, swimming, or hot tub until instructed by MD, Do not remove steri-strips   Is the patient/caregiver able to teach back signs and symptoms of incisional infection?  Increased drainage or bleeding, Increased redness, swelling or pain at the incisonal site, Pus or odor from incision   Is the patient/caregiver able to teach back steps to recovery at home?  Weigh daily, Rest and rebuild strength, gradually increase activity, Set small, achievable goals for return to baseline health   Is the patient/caregiver able to teach back the hierarchy of who to call/visit for symptoms/problems? PCP, Specialist, Home health nurse, Urgent Care, ED, 911  Yes   Additional teach back comments  Advised to start morning wts daily&document wts. Reviewed when to call MD. Educated on limiting NA to 2500mg/daily if possible.    Week 1 call completed?  Yes            Gracy Gordillo RN

## 2019-09-30 ENCOUNTER — READMISSION MANAGEMENT (OUTPATIENT)
Dept: CALL CENTER | Facility: HOSPITAL | Age: 64
End: 2019-09-30

## 2019-09-30 NOTE — OUTREACH NOTE
CT Surgery Week 2 Survey      Responses   Facility patient discharged from?  [place]   Does the patient have one of the following disease processes/diagnoses(primary or secondary)?  Cardiothoracic surgery   Week 2 attempt successful?  Yes   Call start time  1031   Call end time  1038   Discharge diagnosis  CARLYN PER ANESTHESIA, MEDIAN STERNOTOMY, CORONARY ARTERY BYPASS GRAFT X 4, UTILIZING THE LEFT INTERNAL MAMMARY ARTERY, AND EVH OF THE RIGHT GREATER SAPHENOUS VEIN,   AORTIC VALVE REPLACEMENT   Meds reviewed with patient/caregiver?  Yes   Is the patient having any side effects they believe may be caused by any medication additions or changes?  No   Does the patient have all medications related to this admission filled (includes all antibiotics, pain medications, cardiac medications, etc.)  Yes   Is the patient taking all medications as directed (includes completed medication regime)?  Yes   Nursing interventions  Reviewed instructions with patient   What is the patient's perception of their health status since discharge?  Same   Nursing interventions  Nurse provided patient education   Is the patient/caregiver able to teach back normal signs of recovery?  Depression or irritability   Is the patient /caregiver able to teach back basic post-op care?  Continue use of incentive spirometry at least 1 week post discharge, Practice 'cough and deep breath', Take showers only when approved by MD-sponge bathe until then, No tub bath, swimming, or hot tub until instructed by MD, Do not remove steri-strips   Is the patient/caregiver able to teach back signs and symptoms of incisional infection?  Increased drainage or bleeding, Increased redness, swelling or pain at the incisonal site, Pus or odor from incision, Fever, Incisional warmth   Is the patient/caregiver able to teach back steps to recovery at home?  Weigh daily, Rest and rebuild strength, gradually increase activity, Set small, achievable goals for return to baseline  health, Eat a well-balance diet, Make a list of questions for surgeon's appointment   Is the patient/caregiver able to teach back the hierarchy of who to call/visit for symptoms/problems? PCP, Specialist, Home health nurse, Urgent Care, ED, 911  Yes   Additional teach back comments  Brody reports he has some numbness in arms and tingling and is mild SOB. No nausea or vomiting.    Week 2 call completed?  Yes          Amado Rose RN

## 2019-10-08 ENCOUNTER — READMISSION MANAGEMENT (OUTPATIENT)
Dept: CALL CENTER | Facility: HOSPITAL | Age: 64
End: 2019-10-08

## 2019-10-08 NOTE — OUTREACH NOTE
CT Surgery Week 3 Survey      Responses   Facility patient discharged from?  [place]   Does the patient have one of the following disease processes/diagnoses(primary or secondary)?  Cardiothoracic surgery   Week 3 attempt successful?  Yes   Call start time  1626   Call end time  1632   Discharge diagnosis  CARLYN PER ANESTHESIA, MEDIAN STERNOTOMY, CORONARY ARTERY BYPASS GRAFT X 4, UTILIZING THE LEFT INTERNAL MAMMARY ARTERY, AND EVH OF THE RIGHT GREATER SAPHENOUS VEIN,   AORTIC VALVE REPLACEMENT   Meds reviewed with patient/caregiver?  Yes   Is the patient having any side effects they believe may be caused by any medication additions or changes?  No   Does the patient have all medications related to this admission filled (includes all antibiotics, pain medications, cardiac medications, etc.)  Yes   Prescription comments  Lasix ordered, will start today   Is the patient taking all medications as directed (includes completed medication regime)?  Yes   Does the patient have a primary care provider?   Yes   Does the patient have an appointment scheduled with their C/T surgeon?  Yes   Has the patient kept scheduled appointments due by today?  N/A   What is the Home health agency?   [facility]    Has home health visited the patient within 72 hours of discharge?  Yes   Psychosocial issues?  No   Did the patient receive a copy of their discharge instructions?  Yes   Nursing interventions  Reviewed instructions with patient   What is the patient's perception of their health status since discharge?  Improving   Nursing interventions  Nurse provided patient education   Is the patient/caregiver able to teach back normal signs of recovery?  Pain or discomfort at incisional site   Is the patient /caregiver able to teach back basic post-op care?  Drive as instructed by MD in discharge instructions, Take showers only when approved by MD-sponge bathe until then, No tub bath, swimming, or hot tub until instructed by MD, Keep  incision areas clean, dry and protected, Do not remove steri-strips, Lifting as instructed by MD in discharge instructions   Is the patient/caregiver able to teach back signs and symptoms of incisional infection?  Increased redness, swelling or pain at the incisonal site, Increased drainage or bleeding, Incisional warmth, Pus or odor from incision, Fever   Is the patient/caregiver able to teach back steps to recovery at home?  Set small, achievable goals for return to baseline health, Rest and rebuild strength, gradually increase activity   Is the patient /caregiver able to teach back the importance of cardiac rehab?  Yes   Nursing interventions  Provided education on importance of cardiac rehab   Is the patient/caregiver able to teach back the hierarchy of who to call/visit for symptoms/problems? PCP, Specialist, Home health nurse, Urgent Care, ED, 911  Yes   Week 3 call completed?  Yes          Anna Pa RN

## 2019-10-14 ENCOUNTER — OFFICE VISIT (OUTPATIENT)
Dept: CARDIAC SURGERY | Facility: CLINIC | Age: 64
End: 2019-10-14

## 2019-10-14 VITALS
WEIGHT: 238 LBS | TEMPERATURE: 97.3 F | OXYGEN SATURATION: 98 % | DIASTOLIC BLOOD PRESSURE: 90 MMHG | HEIGHT: 66 IN | HEART RATE: 69 BPM | SYSTOLIC BLOOD PRESSURE: 170 MMHG | BODY MASS INDEX: 38.25 KG/M2

## 2019-10-14 DIAGNOSIS — I25.119 CORONARY ARTERY DISEASE INVOLVING NATIVE CORONARY ARTERY OF NATIVE HEART WITH ANGINA PECTORIS (HCC): ICD-10-CM

## 2019-10-14 PROCEDURE — 99024 POSTOP FOLLOW-UP VISIT: CPT | Performed by: PHYSICIAN ASSISTANT

## 2019-10-14 PROCEDURE — 71046 X-RAY EXAM CHEST 2 VIEWS: CPT | Performed by: THORACIC SURGERY (CARDIOTHORACIC VASCULAR SURGERY)

## 2019-10-14 RX ORDER — POTASSIUM CHLORIDE 750 MG/1
10 TABLET, EXTENDED RELEASE ORAL DAILY
Refills: 0 | COMMUNITY
Start: 2019-10-09

## 2019-10-14 RX ORDER — LINACLOTIDE 290 UG/1
290 CAPSULE, GELATIN COATED ORAL DAILY
Refills: 5 | Status: ON HOLD | COMMUNITY
Start: 2019-09-25 | End: 2021-01-19

## 2019-10-14 RX ORDER — SITAGLIPTIN 100 MG/1
TABLET, FILM COATED ORAL DAILY
Status: ON HOLD | COMMUNITY
Start: 2013-04-22 | End: 2021-01-19

## 2019-10-14 RX ORDER — FUROSEMIDE 40 MG/1
40 TABLET ORAL DAILY
Refills: 0 | Status: ON HOLD | COMMUNITY
Start: 2019-10-09 | End: 2021-01-19

## 2019-10-14 RX ORDER — FLUTICASONE PROPIONATE 50 MCG
2 SPRAY, SUSPENSION (ML) NASAL DAILY
Refills: 3 | Status: ON HOLD | COMMUNITY
Start: 2019-10-02 | End: 2021-01-19

## 2019-10-14 RX ORDER — ZOLPIDEM TARTRATE 10 MG/1
10 TABLET ORAL NIGHTLY
Refills: 3 | COMMUNITY
Start: 2019-09-23 | End: 2022-10-19

## 2019-10-14 NOTE — PROGRESS NOTES
10/14/2019  Patient Information  Khoa Arnold                                                                                          [address] [facility] [address]   1955  'PCP/Referring Physician'  Shirlene Sharpe MD  [phone]  No ref. provider found    Chief Complaint   Patient presents with   • Post-op Follow-up     Hosp D/C CABG / AVR 9/10/19 for CAD / Aortic Valve Stenosis       History of Present Illness:  Patient is a 64-year-old  male with history of hypertension, hyperlipidemia, aortic stenosis and coronary artery disease status post CABG x4 and aortic valve replacement on 9/10/2019 presents to office today for postoperative follow-up visit.  The patient denies any incisional pain, shortness of breath or difficulty with ambulation.  The patient did describe having several blisters on his right lower extremity from his EVH incision site down to his ankle.  He is followed closely by his cardiologist, Dr. Pineda, with whom he has a follow-up appointment on 10/17/2019.  He is currently not enrolled in cardiac rehab and is seeing a home health nurse once a week.  Otherwise he is doing well.    Patient Active Problem List   Diagnosis   • Unstable angina (CMS/HCC)   • Coronary artery disease   • Type 2 diabetes mellitus with circulatory disorder, without long-term current use of insulin (CMS/HCC)   • Benign essential HTN     Past Medical History:   Diagnosis Date   • Aortic valve stenosis    • Asthma    • Coronary artery disease    • Diabetes mellitus (CMS/HCC)    • Hyperlipidemia    • Hypertension    • Stroke (CMS/HCC)     2009, no residual   • Type 2 diabetes mellitus with circulatory disorder, without long-term current use of insulin (CMS/HCC) 9/6/2019     Past Surgical History:   Procedure Laterality Date   • CARDIAC CATHETERIZATION      3 stents    • CARDIAC CATHETERIZATION N/A 9/6/2019    Procedure: Left Heart Cath;  Surgeon: Jonathan Pineda MD;  Location:   [facility];  Service: Cardiovascular   • CHOLECYSTECTOMY     • COLONOSCOPY     • CORONARY ANGIOPLASTY WITH STENT PLACEMENT      x 3, last one 2014   • CORONARY ARTERY BYPASS GRAFT WITH AORTIC VALVE REPAIR/REPLACEMENT N/A 9/10/2019    Procedure: CARLYN PER ANESTHESIA, MEDIAN STERNOTOMY, CORONARY ARTERY BYPASS GRAFT X 4, UTILIZING THE LEFT INTERNAL MAMMARY ARTERY, AND EVH OF THE RIGHT GREATER SAPHENOUS VEIN,   AORTIC VALVE REPLACEMENT;  Surgeon: Denys Goldsmith MD;  Location: [facility] OR;  Service: Cardiothoracic   • UMBILICAL HERNIA REPAIR         Current Outpatient Medications:   •  amiodarone (PACERONE) 200 MG tablet, Take 1 tablet by mouth Daily., Disp: 30 tablet, Rfl: 5  •  aspirin 81 MG EC tablet, Take 81 mg by mouth Daily., Disp: , Rfl:   •  busPIRone (BUSPAR) 10 MG tablet, Take 10 mg by mouth 2 (Two) Times a Day., Disp: , Rfl:   •  cetirizine (zyrTEC) 10 MG tablet, Take 10 mg by mouth Daily., Disp: , Rfl:   •  dicyclomine (BENTYL) 20 MG tablet, Take 20 mg by mouth Daily., Disp: , Rfl:   •  fluticasone (FLONASE) 50 MCG/ACT nasal spray, 2 sprays Daily., Disp: , Rfl: 3  •  Fluticasone Furoate-Vilanterol (BREO ELLIPTA) 100-25 MCG/INH inhaler, Inhale 1 puff Daily., Disp: , Rfl:   •  furosemide (LASIX) 40 MG tablet, Take 40 mg by mouth Daily., Disp: , Rfl: 0  •  glimepiride (AMARYL) 4 MG tablet, Take 4 mg by mouth Every Morning Before Breakfast., Disp: , Rfl:   •  hydrochlorothiazide (HYDRODIURIL) 25 MG tablet, Take 25 mg by mouth Daily., Disp: , Rfl:   •  hydrOXYzine (ATARAX) 25 MG tablet, Take 1 tablet by mouth Every 12 (Twelve) Hours As Needed for Anxiety., Disp: 30 tablet, Rfl: 0  •  Insulin Lispro (HUMALOG) 100 UNIT/ML solution pen-injector, Chk sugar 4x/d (ACHS):B 150-199 mg/dL-2u, B 200-249 mg/dL-4u B 250-299 mg/d -6u, B 300-349 mg/dL-7u, B 350-400 mg/dL-8u, B >400 mg/dL-9u, Disp: 3 mL, Rfl: 0  •  Insulin Pen Needle 29G X 12MM misc, Use 4 (Four) Times a Day Before Meals & at Bedtime., Disp:  100 each, Rfl: 0  •  LINZESS 290 MCG capsule capsule, Take 290 mcg by mouth Daily., Disp: , Rfl: 5  •  metFORMIN (GLUCOPHAGE) 1000 MG tablet, Take 1,000 mg by mouth 2 (Two) Times a Day With Meals., Disp: , Rfl:   •  metoprolol tartrate (LOPRESSOR) 100 MG tablet, Take 1 tablet by mouth Every 12 (Twelve) Hours., Disp: 60 tablet, Rfl: 11  •  nitroglycerin (NITROSTAT) 0.4 MG SL tablet, Place 0.4 mg under the tongue Every 5 (Five) Minutes As Needed for Chest Pain. Take no more than 3 doses in 15 minutes., Disp: , Rfl:   •  omeprazole (priLOSEC) 40 MG capsule, Take 40 mg by mouth Daily., Disp: , Rfl:   •  potassium chloride (K-DUR,KLOR-CON) 20 MEQ CR tablet, Take 20 mEq by mouth Daily., Disp: , Rfl: 0  •  pravastatin (PRAVACHOL) 40 MG tablet, Take 40 mg by mouth Daily., Disp: , Rfl:   •  SITagliptin (JANUVIA) 100 MG tablet, Take 100 mg by mouth Daily., Disp: , Rfl:   •  tamsulosin (FLOMAX) 0.4 MG capsule 24 hr capsule, Take 1 capsule by mouth Daily., Disp: , Rfl:   •  Vitamin D, Cholecalciferol, (CHOLECALCIFEROL) 400 units tablet, Take 400 Units by mouth Daily., Disp: , Rfl:   •  zolpidem (AMBIEN) 10 MG tablet, Take 10 mg by mouth Every Night., Disp: , Rfl: 3  •  albuterol sulfate  (90 Base) MCG/ACT inhaler, Inhale 2 puffs Every 4 (Four) Hours As Needed for Wheezing., Disp: , Rfl:   •  SITagliptin (JANUVIA) 100 MG tablet, Take  by mouth Daily., Disp: , Rfl:   Allergies   Allergen Reactions   • Ranexa [Ranolazine Er] Nausea Only and Dizziness   • Lisinopril Itching and Rash     On feet     Social History     Socioeconomic History   • Marital status:      Spouse name: Not on file   • Number of children: 1   • Years of education: Not on file   • Highest education level: Not on file   Occupational History   • Occupation:      Comment: Retired   Tobacco Use   • Smoking status: Former Smoker     Years: 1.00     Types: Cigars     Last attempt to quit: 10/14/1979     Years since quittin.0   • Smokeless  "tobacco: Former User     Types: Chew     Quit date: 1/1/2004   Substance and Sexual Activity   • Alcohol use: No     Frequency: Never   • Drug use: No   • Sexual activity: Defer   Social History Narrative    Lives in [place].     Family History   Problem Relation Age of Onset   • Coronary artery disease Mother    • Diabetes Mother    • Coronary artery disease Father    • Diabetes Father      ROS  Vitals:    10/14/19 0819   BP: 170/90   BP Location: Left arm   Patient Position: Sitting   Pulse: 69   Temp: 97.3 °F (36.3 °C)   SpO2: 98%   Weight: 108 kg (238 lb)   Height: 167.6 cm (66\")      Physical Exam  Gen - NAD, pleasant, cooperative  CV -regular rate and rhythm, no murmur gallop or rub  Pulm -lungs clear to auscultation bilateral without wheeze or rhonchi  GI -soft, normoactive bowel sounds, nontender  Ext - without edema.  Areas of previous blistering in the anterior medial aspect of right lower extremity with no erythema or drainage.  Incision -healing well, no evidence of incisional dehiscence or cellulitis.  Sternum stable.  Neuro - CN II - XII grossly intact, tongue midline, voice normal    Labs/Imaging:  CXR 10/14/2019  Impression: Bilateral increase in lung volume when compared to chest x-ray from 9/13/2019.  Resolution of small right pleural effusion.  No signs of pulmonary edema or pneumothorax.  This is a stable postoperative chest x-ray.    Assessment/Plan:  Patient is a 64-year-old  male with history of hypertension, hyperlipidemia, aortic stenosis and coronary artery disease status post CABG x4 and aortic valve replacement on 9/10/2019.  The patient is having a steady postoperative course.  His incisions are healing well, his sternum is stable and his chest x-ray obtained in office today satisfactory.  The patient's wife has been applying dressing changes to the right lower extremity in the area of blistering with Xeroform and ABD pads.  There are no blisters in place at this point and no " signs of erythema, swelling, drainage or any other cellulitic changes or dehiscence.  At this time the patient has been advised to perform daily dry dressing changes for the next week until the areas of previous blistering has completely healed.  He is scheduled to follow-up with his cardiologist, Dr. Pineda on 10/17/2019 and plans to enroll in cardiac rehab in [place], [place] within the next week.  Patient was instructed that he may begin driving at 6 weeks postop, as long as he is not experiencing any dizziness or using any pain medication.  The patient should be sure to use an antibiotic while having dental work performed due to his prosthetic aortic valve.  I reviewed the patient's sternal precautions with him and outlined the physical activity suitable for each benchmark between 6 weeks, 3 months in 1 year.  The patient may follow-up with our office as needed, but should call with any questions or concerns should any arise during the interval.  Should the patient develop any acutely worsening symptoms he should present to the nearest emergency department immediately.    Patient Active Problem List   Diagnosis   • Unstable angina (CMS/HCC)   • Coronary artery disease   • Type 2 diabetes mellitus with circulatory disorder, without long-term current use of insulin (CMS/HCC)   • Benign essential HTN

## 2019-10-15 DIAGNOSIS — Z00.6 EXAMINATION FOR NORMAL COMPARISON FOR CLINICAL RESEARCH: Primary | ICD-10-CM

## 2019-10-17 ENCOUNTER — READMISSION MANAGEMENT (OUTPATIENT)
Dept: CALL CENTER | Facility: HOSPITAL | Age: 64
End: 2019-10-17

## 2019-10-17 ENCOUNTER — HOSPITAL ENCOUNTER (OUTPATIENT)
Dept: DIABETES SERVICES | Facility: HOSPITAL | Age: 64
Setting detail: RECURRING SERIES
Discharge: HOME | End: 2019-10-17

## 2019-10-17 PROCEDURE — G0108 DIAB MANAGE TRN  PER INDIV: HCPCS

## 2019-10-17 NOTE — OUTREACH NOTE
CT Surgery Week 4 Survey      Responses   Facility patient discharged from?  [facility]   Does the patient have one of the following disease processes/diagnoses(primary or secondary)?  Cardiothoracic surgery   Week 4 attempt successful?  No          Anna Pa RN

## 2019-11-12 ENCOUNTER — OFFICE VISIT (OUTPATIENT)
Dept: CARDIAC SURGERY | Facility: CLINIC | Age: 64
End: 2019-11-12

## 2019-11-12 VITALS
SYSTOLIC BLOOD PRESSURE: 145 MMHG | OXYGEN SATURATION: 97 % | DIASTOLIC BLOOD PRESSURE: 77 MMHG | HEIGHT: 66 IN | HEART RATE: 62 BPM | TEMPERATURE: 98.5 F | WEIGHT: 238 LBS | BODY MASS INDEX: 38.25 KG/M2

## 2019-11-12 DIAGNOSIS — Z95.1 S/P CABG X 4: Primary | ICD-10-CM

## 2019-11-12 PROCEDURE — 99024 POSTOP FOLLOW-UP VISIT: CPT | Performed by: NURSE PRACTITIONER

## 2019-11-12 RX ORDER — CEPHALEXIN 500 MG/1
500 CAPSULE ORAL 2 TIMES DAILY
Qty: 14 CAPSULE | Refills: 0 | Status: SHIPPED | OUTPATIENT
Start: 2019-11-12 | End: 2019-11-19

## 2019-11-12 NOTE — PROGRESS NOTES
[facility] Cardiothoracic Surgery Follow-Up Note    Name:  Khoa Arnold  MRN Number:  [id number]  Date of Encounter:  11/12/2019    Referred By:  No ref. provider found  PCP:  Shirlene Sharpe MD    Chief Complaint:    Chief Complaint   Patient presents with   • Post-op     Follow up for wound check of right leg vein harvest site s/p CABG and AVR 9/10/19.   • Coronary Artery Disease   • Aortic Stenosis       History of Present Illness:    Mr. Khoa Arnold is a pleasant 64 y.o. male with a history of hypertension, hyperlipidemia, aortic stenosis and coronary artery disease status post CABG x4 and aortic valve replacement on 9/10/2019 previously seen in the office on 10/14/2019.  Patient returns to the office today for wound check of his right EVH site.  Since the time of his last visit, superficial opening of the right EVH site has not changed.  He denies drainage from the site, erythema, or chills.  Blistering the patient previously had on his right lower extremity has resolved.  Patient is no longer being followed by home health.  He denies any postoperative problems including incisional pain, shortness of breath or difficulty with ambulation.    Review of Systems:  Review of Systems   Constitution: Negative for chills, fever, malaise/fatigue, night sweats and weight loss.   HENT: Negative for hearing loss, odynophagia and sore throat.    Cardiovascular: Negative for chest pain, dyspnea on exertion, leg swelling, orthopnea and palpitations.   Respiratory: Negative for cough and hemoptysis.    Endocrine: Negative for cold intolerance, heat intolerance, polydipsia, polyphagia and polyuria.   Hematologic/Lymphatic: Does not bruise/bleed easily.   Skin: Positive for poor wound healing. Negative for itching and rash.   Musculoskeletal: Negative for joint pain, joint swelling and myalgias.   Gastrointestinal: Negative for abdominal pain, constipation, diarrhea, hematemesis, hematochezia, melena,  nausea and vomiting.   Genitourinary: Negative for dysuria, frequency and hematuria.   Neurological: Negative for focal weakness, headaches, numbness and seizures.   Psychiatric/Behavioral: Negative for suicidal ideas.   All other systems reviewed and are negative.      Past Medical History:    Past Medical History:   Diagnosis Date   • Aortic valve stenosis    • Asthma    • Coronary artery disease    • Diabetes mellitus (CMS/[facility])    • Hyperlipidemia    • Hypertension    • Stroke (CMS/[facility])     , no residual   • Type 2 diabetes mellitus with circulatory disorder, without long-term current use of insulin (CMS/[facility]) 2019       Past Surgical History:    Past Surgical History:   Procedure Laterality Date   • CARDIAC CATHETERIZATION      3 stents    • CARDIAC CATHETERIZATION N/A 2019    Procedure: Left Heart Cath;  Surgeon: Jonathan Pineda MD;  Location:  [facility];  Service: Cardiovascular   • CHOLECYSTECTOMY     • COLONOSCOPY     • CORONARY ANGIOPLASTY WITH STENT PLACEMENT      x 3, last one    • CORONARY ARTERY BYPASS GRAFT WITH AORTIC VALVE REPAIR/REPLACEMENT N/A 9/10/2019    Procedure: CARLYN PER ANESTHESIA, MEDIAN STERNOTOMY, CORONARY ARTERY BYPASS GRAFT X 4, UTILIZING THE LEFT INTERNAL MAMMARY ARTERY, AND EVH OF THE RIGHT GREATER SAPHENOUS VEIN,   AORTIC VALVE REPLACEMENT;  Surgeon: Denys Goldsmith MD;  Location:  [facility] OR;  Service: Cardiothoracic   • UMBILICAL HERNIA REPAIR         Patient Active Problem List   Diagnosis   • Unstable angina (CMS/[facility])   • Coronary artery disease   • Type 2 diabetes mellitus with circulatory disorder, without long-term current use of insulin (CMS/[facility])   • Benign essential HTN     Social History     Tobacco Use   • Smoking status: Former Smoker     Years: 1.00     Types: Cigars     Last attempt to quit: 10/14/1979     Years since quittin.1   • Smokeless tobacco: Former User     Types: Chew     Quit date: 2004   Substance Use Topics    • Alcohol use: No     Frequency: Never   • Drug use: No     Family History   Problem Relation Age of Onset   • Coronary artery disease Mother    • Diabetes Mother    • Coronary artery disease Father    • Diabetes Father        Medications:      Current Outpatient Medications:   •  albuterol sulfate  (90 Base) MCG/ACT inhaler, Inhale 2 puffs Every 4 (Four) Hours As Needed for Wheezing., Disp: , Rfl:   •  amiodarone (PACERONE) 200 MG tablet, Take 1 tablet by mouth Daily., Disp: 30 tablet, Rfl: 5  •  aspirin 81 MG EC tablet, Take 81 mg by mouth Daily., Disp: , Rfl:   •  busPIRone (BUSPAR) 10 MG tablet, Take 10 mg by mouth 2 (Two) Times a Day., Disp: , Rfl:   •  cetirizine (zyrTEC) 10 MG tablet, Take 10 mg by mouth Daily., Disp: , Rfl:   •  dicyclomine (BENTYL) 20 MG tablet, Take 20 mg by mouth Daily., Disp: , Rfl:   •  fluticasone (FLONASE) 50 MCG/ACT nasal spray, 2 sprays Daily., Disp: , Rfl: 3  •  Fluticasone Furoate-Vilanterol (BREO ELLIPTA) 100-25 MCG/INH inhaler, Inhale 1 puff Daily., Disp: , Rfl:   •  furosemide (LASIX) 40 MG tablet, Take 40 mg by mouth Daily., Disp: , Rfl: 0  •  glimepiride (AMARYL) 4 MG tablet, Take 4 mg by mouth Every Morning Before Breakfast., Disp: , Rfl:   •  hydrochlorothiazide (HYDRODIURIL) 25 MG tablet, Take 25 mg by mouth Daily., Disp: , Rfl:   •  hydrOXYzine (ATARAX) 25 MG tablet, Take 1 tablet by mouth Every 12 (Twelve) Hours As Needed for Anxiety., Disp: 30 tablet, Rfl: 0  •  Insulin Lispro (HUMALOG) 100 UNIT/ML solution pen-injector, Chk sugar 4x/d (ACHS):B 150-199 mg/dL-2u, B 200-249 mg/dL-4u B 250-299 mg/d -6u, B 300-349 mg/dL-7u, B 350-400 mg/dL-8u, B >400 mg/dL-9u, Disp: 3 mL, Rfl: 0  •  Insulin Pen Needle 29G X 12MM misc, Use 4 (Four) Times a Day Before Meals & at Bedtime., Disp: 100 each, Rfl: 0  •  LINZESS 290 MCG capsule capsule, Take 290 mcg by mouth Daily., Disp: , Rfl: 5  •  metFORMIN (GLUCOPHAGE) 1000 MG tablet, Take 1,000 mg by mouth 2 (Two) Times a Day With  "Meals., Disp: , Rfl:   •  metoprolol tartrate (LOPRESSOR) 100 MG tablet, Take 1 tablet by mouth Every 12 (Twelve) Hours., Disp: 60 tablet, Rfl: 11  •  nitroglycerin (NITROSTAT) 0.4 MG SL tablet, Place 0.4 mg under the tongue Every 5 (Five) Minutes As Needed for Chest Pain. Take no more than 3 doses in 15 minutes., Disp: , Rfl:   •  omeprazole (priLOSEC) 40 MG capsule, Take 40 mg by mouth Daily., Disp: , Rfl:   •  potassium chloride (K-DUR,KLOR-CON) 20 MEQ CR tablet, Take 20 mEq by mouth Daily., Disp: , Rfl: 0  •  pravastatin (PRAVACHOL) 40 MG tablet, Take 40 mg by mouth Daily., Disp: , Rfl:   •  SITagliptin (JANUVIA) 100 MG tablet, Take 100 mg by mouth Daily., Disp: , Rfl:   •  SITagliptin (JANUVIA) 100 MG tablet, Take  by mouth Daily., Disp: , Rfl:   •  tamsulosin (FLOMAX) 0.4 MG capsule 24 hr capsule, Take 1 capsule by mouth Daily., Disp: , Rfl:   •  Vitamin D, Cholecalciferol, (CHOLECALCIFEROL) 400 units tablet, Take 400 Units by mouth Daily., Disp: , Rfl:   •  zolpidem (AMBIEN) 10 MG tablet, Take 10 mg by mouth Every Night., Disp: , Rfl: 3    Allergies:  Allergies   Allergen Reactions   • Ranexa [Ranolazine Er] Nausea Only and Dizziness   • Lisinopril Itching and Rash     On feet       Physical Exam:  Vital Signs:    Vitals:    11/12/19 1345   BP: 145/77   BP Location: Right arm   Patient Position: Sitting   Pulse: 62   Temp: 98.5 °F (36.9 °C)   SpO2: 97%   Weight: 108 kg (238 lb)   Height: 167.6 cm (66\")       Physical Exam   Gen- NAD, pleasant, cooperative  CV- Regular rate and rhythm, no murmur, gallop or rub  Pulm- Clear to auscultation bilateral without wheeze or rhonchi  GI- Soft, normoactive bowel sounds, non-tender  Ext- Without edema,   Incision- Well approximated midsternal incision.  Right EVH site is superficially open with no erythema, edema or drainage noted.  There is some yellow eschar noted on the wound base.  Neuro- CN II- XII grossly intact, tongue midline, voice normal.    Labs/Imaging:  No " labs or imaging performed in the office today.    Assessment / Plan:  Mr. Khoa Arnold is a pleasant 64 y.o. male with a history of hypertension, hyperlipidemia, aortic stenosis and coronary artery disease status post CABG x4 and aortic valve replacement on 9/10/2019 previously seen in the office on 10/14/2019.  Patient returns to the office today as he is concerned about his right EVH site.  While the site has not gotten any worse since the time of his last visit, it also has not gotten any better.  The patient is diabetic and is concerned over this wound not healing.  The wound itself has no drainage, erythema, or edema.  The opening is superficial with yellow eschar noted on the wound base.  The patient had been putting Xeroform on this prior to coming in the office today.  While I have no concern for infection of the wound, we will give the patient a short-term course of Keflex 500 mg twice daily x7 days to see if we can facilitate healing.  Patient has been instructed in the office today on how to appropriately do wet-to-dry dressings and will do these twice daily.  He will be coming through [place] around [facility] we will follow-up with him at that time.  Has been instructed regarding signs and symptoms of infection, however, and will contact the office immediately if anything changes or worsens.    Patient Education:  Post-Op Education:  N/A  Wound Care:  As above.     Follow Up:  2 weeks    Please note, this document was produced using voice recognition software.    ELENO Quesada  [facility] Cardiothoracic Surgery

## 2020-01-03 ENCOUNTER — TELEPHONE (OUTPATIENT)
Dept: CARDIAC SURGERY | Facility: CLINIC | Age: 65
End: 2020-01-03

## 2020-01-08 ENCOUNTER — TELEPHONE (OUTPATIENT)
Dept: CARDIAC SURGERY | Facility: CLINIC | Age: 65
End: 2020-01-08

## 2020-01-08 NOTE — TELEPHONE ENCOUNTER
PT RECEIVED NO SHOW LETTER FROM MISSED APPT ON 12/5/19. PT STATES THAT HIS LEG IS ALMOST HEALED AND IS DOING WELL. HE FEELS HE DOES NOT NEED TO COME BACK RIGHT NOW BUT IF ANYTHING CHANGES HE WILL CALL OUR OFFICE.

## 2020-06-01 ENCOUNTER — OFFICE VISIT (OUTPATIENT)
Dept: CARDIAC SURGERY | Facility: CLINIC | Age: 65
End: 2020-06-01

## 2020-06-01 VITALS
HEART RATE: 56 BPM | BODY MASS INDEX: 39.86 KG/M2 | WEIGHT: 248 LBS | DIASTOLIC BLOOD PRESSURE: 77 MMHG | SYSTOLIC BLOOD PRESSURE: 162 MMHG | TEMPERATURE: 98.4 F | HEIGHT: 66 IN | OXYGEN SATURATION: 99 %

## 2020-06-01 DIAGNOSIS — I25.10 CORONARY ARTERY DISEASE INVOLVING NATIVE CORONARY ARTERY OF NATIVE HEART WITHOUT ANGINA PECTORIS: ICD-10-CM

## 2020-06-01 DIAGNOSIS — T84.218A FRACTURED STERNAL WIRES, INITIAL ENCOUNTER (HCC): Primary | ICD-10-CM

## 2020-06-01 PROCEDURE — 99213 OFFICE O/P EST LOW 20 MIN: CPT | Performed by: THORACIC SURGERY (CARDIOTHORACIC VASCULAR SURGERY)

## 2020-06-01 PROCEDURE — 71046 X-RAY EXAM CHEST 2 VIEWS: CPT | Performed by: THORACIC SURGERY (CARDIOTHORACIC VASCULAR SURGERY)

## 2020-06-01 RX ORDER — BLOOD SUGAR DIAGNOSTIC
STRIP MISCELLANEOUS
COMMUNITY
Start: 2020-04-19 | End: 2022-10-19

## 2020-06-01 NOTE — PROGRESS NOTES
06/01/2020  Patient Information  Khoa Arnold                                                                                          [address] [facility] [address]   1955  'PCP/Referring Physician'  Shirlene Sharpe MD  [phone]  No ref. provider found    Chief Complaint   Patient presents with   • Follow-up     F/u for popping and cracking in the chest S/P CABG, AVR 9/10/19. Pt states that he is slightly SOA and the his chest feels like is popping and grinding.        History of Present Illness:   This patient is status post aortic valve replacement with coronary bypass grafting surgery in September, 2019.  He states that for the last month or so he has noted a popping sensation in his sternum, especially during Valsalva maneuvers.  He has no angina and no shortness of breath.      Patient Active Problem List   Diagnosis   • Unstable angina (CMS/HCC)   • Coronary artery disease   • Type 2 diabetes mellitus with circulatory disorder, without long-term current use of insulin (CMS/HCC)   • Benign essential HTN   • Fractured sternal wires (CMS/HCC)     Past Medical History:   Diagnosis Date   • Aortic valve stenosis    • Asthma    • Coronary artery disease    • Diabetes mellitus (CMS/HCC)    • Hyperlipidemia    • Hypertension    • Stroke (CMS/HCC)     2009, no residual   • Type 2 diabetes mellitus with circulatory disorder, without long-term current use of insulin (CMS/HCC) 9/6/2019     Past Surgical History:   Procedure Laterality Date   • CARDIAC CATHETERIZATION      3 stents    • CARDIAC CATHETERIZATION N/A 9/6/2019    Procedure: Left Heart Cath;  Surgeon: Jonathan Pineda MD;  Location: [facility] INVASIVE LOCATION;  Service: Cardiovascular   • CHOLECYSTECTOMY     • COLONOSCOPY     • CORONARY ANGIOPLASTY WITH STENT PLACEMENT      x 3, last one 2014   • CORONARY ARTERY BYPASS GRAFT WITH AORTIC VALVE REPAIR/REPLACEMENT N/A 9/10/2019    Procedure: CARLYN PER ANESTHESIA, MEDIAN  STERNOTOMY, CORONARY ARTERY BYPASS GRAFT X 4, UTILIZING THE LEFT INTERNAL MAMMARY ARTERY, AND EVH OF THE RIGHT GREATER SAPHENOUS VEIN,   AORTIC VALVE REPLACEMENT;  Surgeon: Denys Goldsmith MD;  Location: [facility];  Service: Cardiothoracic   • UMBILICAL HERNIA REPAIR         Current Outpatient Medications:   •  albuterol sulfate  (90 Base) MCG/ACT inhaler, Inhale 2 puffs Every 4 (Four) Hours As Needed for Wheezing., Disp: , Rfl:   •  amiodarone (PACERONE) 200 MG tablet, Take 1 tablet by mouth Daily., Disp: 30 tablet, Rfl: 5  •  aspirin 81 MG EC tablet, Take 81 mg by mouth Daily., Disp: , Rfl:   •  busPIRone (BUSPAR) 10 MG tablet, Take 10 mg by mouth 2 (Two) Times a Day., Disp: , Rfl:   •  cetirizine (zyrTEC) 10 MG tablet, Take 10 mg by mouth Daily., Disp: , Rfl:   •  dicyclomine (BENTYL) 20 MG tablet, Take 20 mg by mouth Daily., Disp: , Rfl:   •  fluticasone (FLONASE) 50 MCG/ACT nasal spray, 2 sprays Daily., Disp: , Rfl: 3  •  Fluticasone Furoate-Vilanterol (BREO ELLIPTA) 100-25 MCG/INH inhaler, Inhale 1 puff Daily., Disp: , Rfl:   •  furosemide (LASIX) 40 MG tablet, Take 40 mg by mouth Daily., Disp: , Rfl: 0  •  glimepiride (AMARYL) 4 MG tablet, Take 4 mg by mouth Every Morning Before Breakfast., Disp: , Rfl:   •  hydrochlorothiazide (HYDRODIURIL) 25 MG tablet, Take 25 mg by mouth Daily., Disp: , Rfl:   •  hydrOXYzine (ATARAX) 25 MG tablet, Take 1 tablet by mouth Every 12 (Twelve) Hours As Needed for Anxiety., Disp: 30 tablet, Rfl: 0  •  Insulin Lispro (HUMALOG) 100 UNIT/ML solution pen-injector, Chk sugar 4x/d (ACHS):B 150-199 mg/dL-2u, B 200-249 mg/dL-4u B 250-299 mg/d -6u, B 300-349 mg/dL-7u, B 350-400 mg/dL-8u, B >400 mg/dL-9u, Disp: 3 mL, Rfl: 0  •  Insulin Pen Needle 29G X 12MM misc, Use 4 (Four) Times a Day Before Meals & at Bedtime., Disp: 100 each, Rfl: 0  •  LINZESS 290 MCG capsule capsule, Take 290 mcg by mouth Daily., Disp: , Rfl: 5  •  metFORMIN (GLUCOPHAGE) 1000 MG tablet, Take 1,000 mg by  mouth 2 (Two) Times a Day With Meals., Disp: , Rfl:   •  metoprolol tartrate (LOPRESSOR) 100 MG tablet, Take 1 tablet by mouth Every 12 (Twelve) Hours., Disp: 60 tablet, Rfl: 11  •  nitroglycerin (NITROSTAT) 0.4 MG SL tablet, Place 0.4 mg under the tongue Every 5 (Five) Minutes As Needed for Chest Pain. Take no more than 3 doses in 15 minutes., Disp: , Rfl:   •  omeprazole (priLOSEC) 40 MG capsule, Take 40 mg by mouth Daily., Disp: , Rfl:   •  potassium chloride (K-DUR,KLOR-CON) 20 MEQ CR tablet, Take 20 mEq by mouth Daily., Disp: , Rfl: 0  •  pravastatin (PRAVACHOL) 40 MG tablet, Take 40 mg by mouth Daily., Disp: , Rfl:   •  SITagliptin (JANUVIA) 100 MG tablet, Take 100 mg by mouth Daily., Disp: , Rfl:   •  SITagliptin (JANUVIA) 100 MG tablet, Take  by mouth Daily., Disp: , Rfl:   •  tamsulosin (FLOMAX) 0.4 MG capsule 24 hr capsule, Take 1 capsule by mouth Daily., Disp: , Rfl:   •  Vitamin D, Cholecalciferol, (CHOLECALCIFEROL) 400 units tablet, Take 400 Units by mouth Daily., Disp: , Rfl:   •  zolpidem (AMBIEN) 10 MG tablet, Take 10 mg by mouth Every Night., Disp: , Rfl: 3  •  ONETOUCH VERIO test strip, USE STRIP TO CHECK GLUCOSE THREE TIMES DAILY AS NEEDED, Disp: , Rfl:   Allergies   Allergen Reactions   • Ranexa [Ranolazine Er] Nausea Only and Dizziness   • Lisinopril Itching and Rash     On feet     Social History     Socioeconomic History   • Marital status:      Spouse name: Not on file   • Number of children: 1   • Years of education: Not on file   • Highest education level: Not on file   Occupational History   • Occupation:      Comment: Retired   Tobacco Use   • Smoking status: Former Smoker     Years: 1.00     Types: Cigars     Last attempt to quit: 10/14/1979     Years since quittin.6   • Smokeless tobacco: Former User     Types: Chew     Quit date: 2004   Substance and Sexual Activity   • Alcohol use: No     Frequency: Never   • Drug use: No   • Sexual activity: Defer   Social  "History Narrative    Lives in [place].     Family History   Problem Relation Age of Onset   • Coronary artery disease Mother    • Diabetes Mother    • Coronary artery disease Father    • Diabetes Father      Review of Systems   Constitution: Positive for night sweats. Negative for chills, fever, malaise/fatigue and weight loss.   HENT: Negative for congestion, hearing loss, nosebleeds and odynophagia.    Eyes: Positive for double vision (both eyes that comes and goes).   Cardiovascular: Positive for dyspnea on exertion and leg swelling (right leg calf area). Negative for chest pain, claudication, orthopnea, palpitations and syncope.   Respiratory: Positive for wheezing. Negative for cough, hemoptysis and shortness of breath.    Endocrine: Negative for cold intolerance, heat intolerance, polydipsia, polyphagia and polyuria.   Hematologic/Lymphatic: Does not bruise/bleed easily.   Skin: Negative for itching, poor wound healing and rash.   Musculoskeletal: Positive for back pain and joint pain (right hip). Negative for arthritis, joint swelling and myalgias.   Gastrointestinal: Positive for constipation and diarrhea. Negative for abdominal pain, hematemesis, melena, nausea and vomiting.   Genitourinary: Positive for frequency and nocturia. Negative for dysuria, hematuria and urgency.   Neurological: Negative for dizziness, light-headedness, loss of balance and numbness.   Psychiatric/Behavioral: Negative for depression and suicidal ideas. The patient has insomnia. The patient is not nervous/anxious.    Allergic/Immunologic: Positive for environmental allergies. Negative for HIV exposure.     Vitals:    06/01/20 0845   BP: 162/77   Pulse: 56   Temp: 98.4 °F (36.9 °C)   TempSrc: Temporal   SpO2: 99%   Weight: 112 kg (248 lb)   Height: 167.6 cm (66\")      Physical Exam   CONSTITUTIONAL: Alert and conversant, Well dressed, Well nourished, No acute distress  EYES: Sclera clean, Anicteric, Pupils equal  ENT: No nasal " deviation, Trachea midline  NECK: No neck masses, Supple  LUNGS: No wheezing, Cough, non-congested  HEART: No rubs, No murmurs there is some mild instability of the sternum in the mid section but the movement is minimal  GASTROINTESTINAL: Soft, non-distended, No masses, Non tender  to palpation, normal bowel sounds  NEURO: No motor deficits, No sensory deficits, Cranial Nerves 2 through 12 grossly intact  PSYCHIATRIC: Oriented to person, place and time, No memory deficits, Mood appropriate  VASCULAR: No carotid bruits, Femoral pulses palpable and symmetric  MUSKULOSKELETAL: No extremity trauma or extremity asymmetry    Labs/Imaging:   I obtained and reviewed chest x-ray demonstrating what appears to be 2 broken sternal wires.    Assessment/Plan:   This patient had coronary surgery and aortic valve replacement September, 2019.  For the last month, he has noted some movement in the midsection of the sternum.  There is indeed some very mild movement and popping sensation, but the movement is minimal.  In my opinion, I do not believe this requires sternal rewiring at this time and may never require rewiring.  The patient himself is reluctant to undergo rewiring and states that he thinks this is a tolerable amount of movement.  He states that he will call me if he thinks this worsens in any way and I have encouraged him to call if he feels his symptomatology worsens.    Patient Active Problem List   Diagnosis   • Unstable angina (CMS/HCC)   • Coronary artery disease   • Type 2 diabetes mellitus with circulatory disorder, without long-term current use of insulin (CMS/HCC)   • Benign essential HTN   • Fractured sternal wires (CMS/HCC)     CC: MD Daxa Peoples, , editing for Denys Goldsmith M.D.    I, Dneys Goldsmith MD, have read and agree with the editing done by Daxa Ernst, .

## 2021-01-13 ENCOUNTER — TRANSCRIBE ORDERS (OUTPATIENT)
Dept: ADMINISTRATIVE | Facility: HOSPITAL | Age: 66
End: 2021-01-13

## 2021-01-13 DIAGNOSIS — I49.5 SICK SINUS SYNDROME (HCC): Primary | ICD-10-CM

## 2021-01-19 ENCOUNTER — HOSPITAL ENCOUNTER (OUTPATIENT)
Facility: HOSPITAL | Age: 66
Discharge: HOME | End: 2021-01-20
Attending: INTERNAL MEDICINE | Admitting: INTERNAL MEDICINE

## 2021-01-19 ENCOUNTER — APPOINTMENT (OUTPATIENT)
Dept: GENERAL RADIOLOGY | Facility: HOSPITAL | Age: 66
End: 2021-01-19

## 2021-01-19 DIAGNOSIS — I49.5 SICK SINUS SYNDROME (HCC): ICD-10-CM

## 2021-01-19 LAB
ANION GAP SERPL CALCULATED.3IONS-SCNC: 11 MMOL/L (ref 5–15)
BUN SERPL-MCNC: 23 MG/DL (ref 8–23)
BUN/CREAT SERPL: 16.3 (ref 7–25)
CALCIUM SPEC-SCNC: 8.9 MG/DL (ref 8.6–10.5)
CHLORIDE SERPL-SCNC: 100 MMOL/L (ref 98–107)
CO2 SERPL-SCNC: 26 MMOL/L (ref 22–29)
CREAT SERPL-MCNC: 1.41 MG/DL (ref 0.76–1.27)
DEPRECATED RDW RBC AUTO: 40.7 FL (ref 37–54)
ERYTHROCYTE [DISTWIDTH] IN BLOOD BY AUTOMATED COUNT: 12.6 % (ref 12.3–15.4)
GFR SERPL CREATININE-BSD FRML MDRD: 50 ML/MIN/1.73
GLUCOSE BLDC GLUCOMTR-MCNC: 177 MG/DL (ref 70–130)
GLUCOSE SERPL-MCNC: 189 MG/DL (ref 65–99)
HCT VFR BLD AUTO: 41.2 % (ref 37.5–51)
HGB BLD-MCNC: 13.9 G/DL (ref 13–17.7)
MCH RBC QN AUTO: 30.2 PG (ref 26.6–33)
MCHC RBC AUTO-ENTMCNC: 33.7 G/DL (ref 31.5–35.7)
MCV RBC AUTO: 89.4 FL (ref 79–97)
PLATELET # BLD AUTO: 188 10*3/MM3 (ref 140–450)
PMV BLD AUTO: 9.1 FL (ref 6–12)
POTASSIUM SERPL-SCNC: 4.5 MMOL/L (ref 3.5–5.2)
RBC # BLD AUTO: 4.61 10*6/MM3 (ref 4.14–5.8)
SODIUM SERPL-SCNC: 137 MMOL/L (ref 136–145)
WBC # BLD AUTO: 6.6 10*3/MM3 (ref 3.4–10.8)

## 2021-01-19 PROCEDURE — 25010000003 CEFAZOLIN IN DEXTROSE 2-4 GM/100ML-% SOLUTION: Performed by: PHYSICIAN ASSISTANT

## 2021-01-19 PROCEDURE — C1898 LEAD, PMKR, OTHER THAN TRANS: HCPCS | Performed by: INTERNAL MEDICINE

## 2021-01-19 PROCEDURE — 99153 MOD SED SAME PHYS/QHP EA: CPT | Performed by: INTERNAL MEDICINE

## 2021-01-19 PROCEDURE — 25010000002 FENTANYL CITRATE (PF) 100 MCG/2ML SOLUTION: Performed by: INTERNAL MEDICINE

## 2021-01-19 PROCEDURE — 82962 GLUCOSE BLOOD TEST: CPT

## 2021-01-19 PROCEDURE — 80048 BASIC METABOLIC PNL TOTAL CA: CPT | Performed by: INTERNAL MEDICINE

## 2021-01-19 PROCEDURE — 85027 COMPLETE CBC AUTOMATED: CPT | Performed by: INTERNAL MEDICINE

## 2021-01-19 PROCEDURE — A9270 NON-COVERED ITEM OR SERVICE: HCPCS | Performed by: INTERNAL MEDICINE

## 2021-01-19 PROCEDURE — 63710000001 HYDROCODONE-ACETAMINOPHEN 5-325 MG TABLET: Performed by: INTERNAL MEDICINE

## 2021-01-19 PROCEDURE — C1892 INTRO/SHEATH,FIXED,PEEL-AWAY: HCPCS | Performed by: INTERNAL MEDICINE

## 2021-01-19 PROCEDURE — 25010000002 MIDAZOLAM PER 1 MG: Performed by: INTERNAL MEDICINE

## 2021-01-19 PROCEDURE — 63710000001 ROSUVASTATIN 20 MG TABLET: Performed by: INTERNAL MEDICINE

## 2021-01-19 PROCEDURE — C1785 PMKR, DUAL, RATE-RESP: HCPCS | Performed by: INTERNAL MEDICINE

## 2021-01-19 PROCEDURE — 33208 INSRT HEART PM ATRIAL & VENT: CPT | Performed by: INTERNAL MEDICINE

## 2021-01-19 PROCEDURE — 93005 ELECTROCARDIOGRAM TRACING: CPT | Performed by: INTERNAL MEDICINE

## 2021-01-19 PROCEDURE — 63710000001 BUSPIRONE 10 MG TABLET: Performed by: INTERNAL MEDICINE

## 2021-01-19 PROCEDURE — 71045 X-RAY EXAM CHEST 1 VIEW: CPT

## 2021-01-19 PROCEDURE — 99152 MOD SED SAME PHYS/QHP 5/>YRS: CPT | Performed by: INTERNAL MEDICINE

## 2021-01-19 DEVICE — GEN PM ASSURITY MRI DR RF PM2272: Type: IMPLANTABLE DEVICE | Status: FUNCTIONAL

## 2021-01-19 DEVICE — LD PM TENDRIL STS 6F58CM 2088TC58: Type: IMPLANTABLE DEVICE | Status: FUNCTIONAL

## 2021-01-19 DEVICE — LD PM TENDRIL STS 6F52CM 2088TC52: Type: IMPLANTABLE DEVICE | Status: FUNCTIONAL

## 2021-01-19 RX ORDER — MIDAZOLAM HYDROCHLORIDE 1 MG/ML
INJECTION INTRAMUSCULAR; INTRAVENOUS AS NEEDED
Status: DISCONTINUED | OUTPATIENT
Start: 2021-01-19 | End: 2021-01-19 | Stop reason: HOSPADM

## 2021-01-19 RX ORDER — SODIUM CHLORIDE 9 MG/ML
250 INJECTION, SOLUTION INTRAVENOUS CONTINUOUS
Status: ACTIVE | OUTPATIENT
Start: 2021-01-19 | End: 2021-01-19

## 2021-01-19 RX ORDER — LOSARTAN POTASSIUM 100 MG/1
100 TABLET ORAL DAILY
COMMUNITY

## 2021-01-19 RX ORDER — ROSUVASTATIN CALCIUM 40 MG/1
40 TABLET, COATED ORAL DAILY
COMMUNITY

## 2021-01-19 RX ORDER — UBIDECARENONE 100 MG
100 CAPSULE ORAL DAILY
COMMUNITY

## 2021-01-19 RX ORDER — BUSPIRONE HYDROCHLORIDE 10 MG/1
10 TABLET ORAL 2 TIMES DAILY
Status: DISCONTINUED | OUTPATIENT
Start: 2021-01-19 | End: 2021-01-20 | Stop reason: HOSPADM

## 2021-01-19 RX ORDER — HYDROCODONE BITARTRATE AND ACETAMINOPHEN 5; 325 MG/1; MG/1
1 TABLET ORAL EVERY 4 HOURS PRN
Status: DISCONTINUED | OUTPATIENT
Start: 2021-01-19 | End: 2021-01-20 | Stop reason: HOSPADM

## 2021-01-19 RX ORDER — HYDROCHLOROTHIAZIDE 25 MG/1
50 TABLET ORAL DAILY
Status: DISCONTINUED | OUTPATIENT
Start: 2021-01-20 | End: 2021-01-20 | Stop reason: HOSPADM

## 2021-01-19 RX ORDER — ZOLPIDEM TARTRATE 5 MG/1
10 TABLET ORAL NIGHTLY
Status: DISCONTINUED | OUTPATIENT
Start: 2021-01-19 | End: 2021-01-20 | Stop reason: HOSPADM

## 2021-01-19 RX ORDER — AMLODIPINE BESYLATE 5 MG/1
5 TABLET ORAL DAILY
Status: DISCONTINUED | OUTPATIENT
Start: 2021-01-20 | End: 2021-01-20 | Stop reason: HOSPADM

## 2021-01-19 RX ORDER — LIDOCAINE HYDROCHLORIDE 10 MG/ML
INJECTION, SOLUTION EPIDURAL; INFILTRATION; INTRACAUDAL; PERINEURAL AS NEEDED
Status: DISCONTINUED | OUTPATIENT
Start: 2021-01-19 | End: 2021-01-19 | Stop reason: HOSPADM

## 2021-01-19 RX ORDER — AMLODIPINE BESYLATE 5 MG/1
5 TABLET ORAL DAILY
COMMUNITY
End: 2022-10-19

## 2021-01-19 RX ORDER — ROSUVASTATIN CALCIUM 20 MG/1
40 TABLET, COATED ORAL DAILY
Status: DISCONTINUED | OUTPATIENT
Start: 2021-01-19 | End: 2021-01-20 | Stop reason: HOSPADM

## 2021-01-19 RX ORDER — TAMSULOSIN HYDROCHLORIDE 0.4 MG/1
0.4 CAPSULE ORAL DAILY
Status: DISCONTINUED | OUTPATIENT
Start: 2021-01-20 | End: 2021-01-20 | Stop reason: HOSPADM

## 2021-01-19 RX ORDER — LOSARTAN POTASSIUM 50 MG/1
100 TABLET ORAL DAILY
Status: DISCONTINUED | OUTPATIENT
Start: 2021-01-20 | End: 2021-01-20 | Stop reason: HOSPADM

## 2021-01-19 RX ORDER — FENTANYL CITRATE 50 UG/ML
INJECTION, SOLUTION INTRAMUSCULAR; INTRAVENOUS AS NEEDED
Status: DISCONTINUED | OUTPATIENT
Start: 2021-01-19 | End: 2021-01-19 | Stop reason: HOSPADM

## 2021-01-19 RX ORDER — CEFAZOLIN SODIUM 2 G/100ML
2 INJECTION, SOLUTION INTRAVENOUS ONCE
Status: COMPLETED | OUTPATIENT
Start: 2021-01-19 | End: 2021-01-19

## 2021-01-19 RX ADMIN — ROSUVASTATIN CALCIUM 40 MG: 20 TABLET, COATED ORAL at 20:43

## 2021-01-19 RX ADMIN — HYDROCODONE BITARTRATE AND ACETAMINOPHEN 1 TABLET: 5; 325 TABLET ORAL at 20:43

## 2021-01-19 RX ADMIN — CEFAZOLIN SODIUM 2 G: 2 INJECTION, SOLUTION INTRAVENOUS at 13:06

## 2021-01-19 RX ADMIN — BUSPIRONE HYDROCHLORIDE 10 MG: 10 TABLET ORAL at 20:43

## 2021-01-19 NOTE — H&P
Pre-Cardiac Catheterization Report  Cardiovascular Laboratory  [facility]      Patient:  Khoa Arnold  :  1955  PCP:  Shirlene Sharpe MD  PHONE:  [phone]    DATE: 2021    BRIEF HPI:  Khoa Arnold is a 65 y.o. male with hypertension, hypercholesterolemia, diabetes mellitus, and known coronary artery disease and valvular heart disease.  He is post CABG and AVR from 2019.  He has been complaining of increasing shortness of breath is been occurring daily for months.  He states it is moderate severity lasting minutes with associated dyspnea on exertion and fatigue.  His symptoms increased with stress and are decreased with rest.  He recently wore a 24-hour Holter monitor that revealed heart rates down to 40.  He now presents for permanent pacemaker placement.    Cardiac Risk Factors:  advanced age (older than 55 for men, 65 for women), diabetes mellitus, dyslipidemia, family history of premature cardiovascular disease, hypertension, male gender, obesity (BMI >= 30 kg/m2)    Anginal class in last 2 weeks:  No anginal symptoms    CHF Class in last 2 weeks:  NYHA Class II    Cardiogenic shock:  no    Cardiac arrest <24 hours:  no    Stress test within last 6 months:   yes   Details:    Previous cardiac catheterization:  yes  Details:     Previous CABG:  yes  Details:      Allergies:     IV contrast allergy:  no  Allergies   Allergen Reactions   • Ranexa [Ranolazine Er] Nausea Only and Dizziness   • Lisinopril Itching and Rash     On feet       MEDICATIONS:  Prior to Admission medications    Medication Sig Start Date End Date Taking? Authorizing Provider   albuterol sulfate  (90 Base) MCG/ACT inhaler Inhale 2 puffs Every 4 (Four) Hours As Needed for Wheezing.    Provider, MD Koko   amiodarone (PACERONE) 200 MG tablet Take 1 tablet by mouth Daily. 10/8/19   Alejandro Banks PA   aspirin 81 MG EC tablet Take 81 mg by mouth Daily.    ProviderKoko  MD   busPIRone (BUSPAR) 10 MG tablet Take 10 mg by mouth 2 (Two) Times a Day.    Koko Stephens MD   cetirizine (zyrTEC) 10 MG tablet Take 10 mg by mouth Daily.    Koko Stephens MD   dicyclomine (BENTYL) 20 MG tablet Take 20 mg by mouth Daily.    Kook Stephens MD   fluticasone (FLONASE) 50 MCG/ACT nasal spray 2 sprays Daily. 10/2/19   Koko Stephens MD   Fluticasone Furoate-Vilanterol (BREO ELLIPTA) 100-25 MCG/INH inhaler Inhale 1 puff Daily.    Koko Stephens MD   furosemide (LASIX) 40 MG tablet Take 40 mg by mouth Daily. 10/9/19   Koko Stephens MD   glimepiride (AMARYL) 4 MG tablet Take 4 mg by mouth Every Morning Before Breakfast.    Koko Stephens MD   hydrochlorothiazide (HYDRODIURIL) 25 MG tablet Take 25 mg by mouth Daily.    Koko Stephens MD   hydrOXYzine (ATARAX) 25 MG tablet Take 1 tablet by mouth Every 12 (Twelve) Hours As Needed for Anxiety. 9/17/19   Rosaura Guan APRN   Insulin Lispro (HUMALOG) 100 UNIT/ML solution pen-injector Chk sugar 4x/d (ACHS):B 150-199 mg/dL-2u, B 200-249 mg/dL-4u  B 250-299 mg/d -6u, B 300-349 mg/dL-7u, B 350-400 mg/dL-8u, B >400 mg/dL-9u 9/17/19   Rosaura Guan APRN   Insulin Pen Needle 29G X 12MM misc Use 4 (Four) Times a Day Before Meals & at Bedtime. 9/17/19   Rosaura Guan APRN   LINZESS 290 MCG capsule capsule Take 290 mcg by mouth Daily. 9/25/19   Koko Stephens MD   metFORMIN (GLUCOPHAGE) 1000 MG tablet Take 1,000 mg by mouth 2 (Two) Times a Day With Meals.    Koko Stephens MD   metoprolol tartrate (LOPRESSOR) 100 MG tablet Take 1 tablet by mouth Every 12 (Twelve) Hours. 9/18/19   Alejandro Banks, PA   nitroglycerin (NITROSTAT) 0.4 MG SL tablet Place 0.4 mg under the tongue Every 5 (Five) Minutes As Needed for Chest Pain. Take no more than 3 doses in 15 minutes.    Provider, MD Koko   omeprazole (priLOSEC) 40 MG capsule Take 40 mg by mouth Daily.    Provider, MD Koko   ONETOUCH  VERIO test strip USE STRIP TO CHECK GLUCOSE THREE TIMES DAILY AS NEEDED 4/19/20   Koko Stephens MD   potassium chloride (K-DUR,KLOR-CON) 20 MEQ CR tablet Take 20 mEq by mouth Daily. 10/9/19   Koko Stephens MD   pravastatin (PRAVACHOL) 40 MG tablet Take 40 mg by mouth Daily.    Koko Stephens MD   SITagliptin (JANUVIA) 100 MG tablet Take 100 mg by mouth Daily.    Koko Stephens MD   SITagliptin (JANUVIA) 100 MG tablet Take  by mouth Daily. 4/22/13   Koko Stephens MD   tamsulosin (FLOMAX) 0.4 MG capsule 24 hr capsule Take 1 capsule by mouth Daily.    Koko Stephens MD   Vitamin D, Cholecalciferol, (CHOLECALCIFEROL) 400 units tablet Take 400 Units by mouth Daily.    Koko Stephens MD   zolpidem (AMBIEN) 10 MG tablet Take 10 mg by mouth Every Night. 9/23/19   Koko Stephens MD       Past medical & surgical history, social and family history reviewed in the electronic medical record.    ROS:  Cardiovascular ROS: positive for - dyspnea on exertion, murmur and shortness of breath    Physical Exam:    Vitals: There were no vitals filed for this visit. There were no vitals filed for this visit.    General Appearance:    Alert, cooperative, in no acute distress   Head:    Normocephalic, without obvious abnormality, atraumatic   Eyes:            Lids and lashes normal, conjunctivae and sclerae normal, no   icterus, no pallor, corneas clear, PERRLA   Ears:    Ears appear intact with no abnormalities noted   Neck:   No adenopathy, supple, trachea midline, no thyromegaly, no   carotid bruit, no JVD   Back:     No kyphosis present, no scoliosis present, range of motion normal   Lungs:     Clear to auscultation,respirations regular, even and                unlabored    Heart:    Regular rhythm and tong rate, normal S1 and S2, 2/6 SE         murmur, no gallop, no rub, no click   Chest Wall:    No abnormalities observed   Abdomen:     Normal bowel sounds, no masses, no  organomegaly, soft     nontender, nondistended, no guarding, no rebound                tenderness   Rectal:     Deferred   Extremities:   Moves all extremities well, no edema, no cyanosis, no           redness   Pulses:   Pulses palpable and equal bilaterally   Skin:   No bleeding, bruising or rash   Neurologic:   Cranial nerves 2 - 12 grossly intact, sensation intact     Barbaeu Test:  Left: Not Assessed  (oxymetric Allens) Right: Not Assessed             Lab Results   Component Value Date    AST 34 09/14/2019    ALT 38 09/14/2019           Impression      · Sick sinus syndrome    Plan     · Procedure to perform: PPM implant  · Planned access: Left infraclavicular              JANAK Sanches  01/19/21  11:21 EST

## 2021-01-20 VITALS
RESPIRATION RATE: 20 BRPM | DIASTOLIC BLOOD PRESSURE: 97 MMHG | WEIGHT: 247.8 LBS | HEART RATE: 63 BPM | TEMPERATURE: 98.3 F | SYSTOLIC BLOOD PRESSURE: 159 MMHG | HEIGHT: 66 IN | BODY MASS INDEX: 39.82 KG/M2 | OXYGEN SATURATION: 94 %

## 2021-01-20 LAB
QT INTERVAL: 450 MS
QTC INTERVAL: 453 MS

## 2021-01-20 PROCEDURE — A9270 NON-COVERED ITEM OR SERVICE: HCPCS | Performed by: INTERNAL MEDICINE

## 2021-01-20 PROCEDURE — 63710000001 TAMSULOSIN 0.4 MG CAPSULE: Performed by: INTERNAL MEDICINE

## 2021-01-20 PROCEDURE — 63710000001 ZOLPIDEM 5 MG TABLET: Performed by: INTERNAL MEDICINE

## 2021-01-20 PROCEDURE — 63710000001 HYDROCODONE-ACETAMINOPHEN 5-325 MG TABLET: Performed by: INTERNAL MEDICINE

## 2021-01-20 PROCEDURE — 63710000001 LOSARTAN 50 MG TABLET: Performed by: INTERNAL MEDICINE

## 2021-01-20 PROCEDURE — 63710000001 BUSPIRONE 10 MG TABLET: Performed by: INTERNAL MEDICINE

## 2021-01-20 PROCEDURE — 63710000001 AMLODIPINE 5 MG TABLET: Performed by: INTERNAL MEDICINE

## 2021-01-20 PROCEDURE — 63710000001 METFORMIN 1000 MG TABLET: Performed by: INTERNAL MEDICINE

## 2021-01-20 PROCEDURE — 63710000001 HYDROCHLOROTHIAZIDE 25 MG TABLET: Performed by: INTERNAL MEDICINE

## 2021-01-20 RX ADMIN — ZOLPIDEM TARTRATE 10 MG: 5 TABLET ORAL at 00:08

## 2021-01-20 RX ADMIN — LOSARTAN POTASSIUM 100 MG: 50 TABLET, FILM COATED ORAL at 08:05

## 2021-01-20 RX ADMIN — HYDROCHLOROTHIAZIDE 50 MG: 25 TABLET ORAL at 08:05

## 2021-01-20 RX ADMIN — METFORMIN HYDROCHLORIDE 1000 MG: 1000 TABLET ORAL at 08:06

## 2021-01-20 RX ADMIN — AMLODIPINE BESYLATE 5 MG: 5 TABLET ORAL at 08:06

## 2021-01-20 RX ADMIN — HYDROCODONE BITARTRATE AND ACETAMINOPHEN 1 TABLET: 5; 325 TABLET ORAL at 02:51

## 2021-01-20 RX ADMIN — TAMSULOSIN HYDROCHLORIDE 0.4 MG: 0.4 CAPSULE ORAL at 08:06

## 2021-01-20 RX ADMIN — BUSPIRONE HYDROCHLORIDE 10 MG: 10 TABLET ORAL at 08:06

## 2021-01-20 NOTE — DISCHARGE SUMMARY
Date of Discharge:  1/20/2021              [facility]  Date of Admit: 1/19/2021    Shirlene Sharpe MD      Discharge Diagnosis:  Sick sinus syndrome (CMS/HCC)      Hospital Course: Mr. Arnold is a pleasant 65-year-old male who was admitted to [facility] on 1/19/2021 due to sick sinus syndrome.  He underwent placement of a [employer] dual-chamber permanent pacemaker.  He tolerated the procedure well, there were no complications.  Chest x-ray reveals excellent lead placement with no pneumothorax.  Today he is denying any complaints and is therefore felt ready for discharge.    Procedures Performed  Procedure(s):  DEVICE IMPLANT       Consults     No orders found for last 30 day(s).          Pertinent Test Results: SJM DDD-PPM implant  .      Discharge Physical Exam:    General Appearance No acute distress   Neck No adenopathy, supple, trachea midline, no JVD   Lungs Clear to auscultation,respirations regular, even and unlabored   Heart Regular rhythm and normal rate, normal S1 and S2, no murmur, no gallop, no rub, no click   Chest wall No abnormalities observed, ppm site c/d/i   Abdomen Normal bowel sounds, no masses, no hepatomegaly, soft   Extremities Moves all extremities well, no edema, no cyanosis, no redness   Neurological Alert and oriented x 3     Discharge Medications     Discharge Medications      Continue These Medications      Instructions Start Date   albuterol sulfate  (90 Base) MCG/ACT inhaler  Commonly known as: PROVENTIL HFA;VENTOLIN HFA;PROAIR HFA   2 puffs, Inhalation, Every 4 Hours PRN      amLODIPine 5 MG tablet  Commonly known as: NORVASC   5 mg, Oral, Daily      ANORO ELLIPTA IN   1 puff, Inhalation, Daily      aspirin 81 MG EC tablet   81 mg, Oral, Daily      busPIRone 10 MG tablet  Commonly known as: BUSPAR   10 mg, Oral, 2 Times Daily      cetirizine 10 MG tablet  Commonly known as: zyrTEC   10 mg, Oral, Daily      coenzyme Q10 100 MG  capsule   100 mg, Oral, Daily      dicyclomine 20 MG tablet  Commonly known as: BENTYL   20 mg, Oral, Daily      glimepiride 4 MG tablet  Commonly known as: AMARYL   4 mg, Oral, Every Morning Before Breakfast      hydroCHLOROthiazide 50 MG tablet  Commonly known as: HYDRODIURIL   50 mg, Oral, Daily      losartan 100 MG tablet  Commonly known as: COZAAR   100 mg, Oral, Daily      metFORMIN 500 MG tablet  Commonly known as: GLUCOPHAGE   1,000 mg, Oral, 2 Times Daily With Meals      metoprolol tartrate 100 MG tablet  Commonly known as: LOPRESSOR   100 mg, Oral, Every 12 Hours Scheduled      nitroglycerin 0.4 MG SL tablet  Commonly known as: NITROSTAT   0.4 mg, Sublingual, Every 5 Minutes PRN, Take no more than 3 doses in 15 minutes.       omeprazole 40 MG capsule  Commonly known as: priLOSEC   40 mg, Oral, Daily      OneTouch Verio test strip  Generic drug: glucose blood   USE STRIP TO CHECK GLUCOSE THREE TIMES DAILY AS NEEDED      potassium chloride 10 MEQ CR tablet  Commonly known as: K-DUR,KLOR-CON   10 mEq, Oral, Daily      rosuvastatin 40 MG tablet  Commonly known as: CRESTOR   40 mg, Oral, Daily      SITagliptin 100 MG tablet  Commonly known as: JANUVIA   100 mg, Oral, Daily      tamsulosin 0.4 MG capsule 24 hr capsule  Commonly known as: FLOMAX   1 capsule, Oral, Daily      Vitamin D (Cholecalciferol) 10 MCG (400 UNIT) tablet  Commonly known as: CHOLECALCIFEROL   400 Units, Oral, Daily      zolpidem 10 MG tablet  Commonly known as: AMBIEN   10 mg, Oral, Nightly             Discharge Diet: cardiac/diabetic    Activity at Discharge: as tolerated    Discharge disposition: home    Condition on Discharge: stable    Follow-up Appointments  No future appointments.  Additional Instructions for the Follow-ups that You Need to Schedule     Discharge Follow-up with Specified Provider: Dr. Pineda; 1 Week   As directed      To: Dr. Pineda    Follow Up: 1 Week    Follow Up Details: Wound check                  Alejandro JACOBS  JANAK Banks  01/20/21  09:36 EST

## 2021-01-20 NOTE — PLAN OF CARE
Goal Outcome Evaluation:  Plan of Care Reviewed With: patient     Outcome Summary: Pt. A & O x 4. RA. C/o pain at pacemaker insertion site. Q4H prn Norco ordered. Sling worn throughout night.

## 2021-09-30 ENCOUNTER — OFFICE VISIT (OUTPATIENT)
Dept: ENDOCRINOLOGY | Facility: CLINIC | Age: 66
End: 2021-09-30

## 2021-09-30 ENCOUNTER — LAB (OUTPATIENT)
Dept: LAB | Facility: HOSPITAL | Age: 66
End: 2021-09-30

## 2021-09-30 VITALS
DIASTOLIC BLOOD PRESSURE: 72 MMHG | HEIGHT: 66 IN | SYSTOLIC BLOOD PRESSURE: 140 MMHG | OXYGEN SATURATION: 96 % | BODY MASS INDEX: 40.02 KG/M2 | HEART RATE: 60 BPM | WEIGHT: 249 LBS

## 2021-09-30 DIAGNOSIS — R42 EPISODIC LIGHTHEADEDNESS: ICD-10-CM

## 2021-09-30 DIAGNOSIS — E11.65 UNCONTROLLED TYPE 2 DIABETES MELLITUS WITH HYPERGLYCEMIA (HCC): Primary | Chronic | ICD-10-CM

## 2021-09-30 DIAGNOSIS — E78.2 MIXED HYPERLIPIDEMIA: Chronic | ICD-10-CM

## 2021-09-30 PROBLEM — E11.59 TYPE 2 DIABETES MELLITUS WITH CIRCULATORY DISORDER, WITHOUT LONG-TERM CURRENT USE OF INSULIN: Chronic | Status: ACTIVE | Noted: 2019-09-06

## 2021-09-30 LAB
GLUCOSE BLDC GLUCOMTR-MCNC: 193 MG/DL (ref 70–130)
HBA1C MFR BLD: 7.6 %

## 2021-09-30 PROCEDURE — 82947 ASSAY GLUCOSE BLOOD QUANT: CPT | Performed by: PHYSICIAN ASSISTANT

## 2021-09-30 PROCEDURE — 83036 HEMOGLOBIN GLYCOSYLATED A1C: CPT | Performed by: PHYSICIAN ASSISTANT

## 2021-09-30 PROCEDURE — 3051F HG A1C>EQUAL 7.0%<8.0%: CPT | Performed by: PHYSICIAN ASSISTANT

## 2021-09-30 PROCEDURE — 99204 OFFICE O/P NEW MOD 45 MIN: CPT | Performed by: PHYSICIAN ASSISTANT

## 2021-09-30 PROCEDURE — 80061 LIPID PANEL: CPT | Performed by: PHYSICIAN ASSISTANT

## 2021-09-30 PROCEDURE — 80053 COMPREHEN METABOLIC PANEL: CPT | Performed by: PHYSICIAN ASSISTANT

## 2021-09-30 PROCEDURE — 84443 ASSAY THYROID STIM HORMONE: CPT | Performed by: PHYSICIAN ASSISTANT

## 2021-09-30 RX ORDER — GLIMEPIRIDE 2 MG/1
2 TABLET ORAL
Qty: 90 TABLET | Refills: 1 | Status: SHIPPED | OUTPATIENT
Start: 2021-09-30 | End: 2022-02-23

## 2021-09-30 RX ORDER — SEMAGLUTIDE 1.34 MG/ML
INJECTION, SOLUTION SUBCUTANEOUS
Qty: 4.5 ML | Refills: 1 | Status: SHIPPED | OUTPATIENT
Start: 2021-09-30 | End: 2022-09-21

## 2021-09-30 NOTE — PROGRESS NOTES
"Chief Complaint  Establish care for Diabetes Mellitus.    HPI   Khoa Arnold is a 66 y.o. male with htn, hyperlipidemia, sick sinus syndrome s/p PPM, CAD s/p CABG with AVR, vit d deficiency who is here today for evaluation of Diabetes Mellitus type 2. The initial diagnosis of diabetes was made in his early 60s.     He reports feeling lightheaded when he looks up. No spinning sensation. Cannot climb ladders because of this.   No significant plaque in carotid arteries by u/s 2019.       Diabetic complications: cardiovascular disease and cerebrovascular disease. CAD s/p cabg, hx of 2 CVAs, mild peripheral neuropathy  Eye exam current (within one year): elvis boland and kya in [place], 1/2021, he reports no retinopathy  Foot care and dental care: discussed    Current diabetic medications include:  Metformin 500 mg 2 tabs twice daily  Januvia 100 mg daily  Glimepiride 4 mg daily     Statin: Crestor 40 mg    Past medications: no    Diabetic Monitoring  - checks glucose 1-3x/day  Glucose is averaging- majority BG readings in the 200s  Hypoglycemia- no  Home blood sugar records: glucometer downloaded, data reviewed and scanned to chart    Nutrition:     Current diet: in general, an \"unhealthy\" diet, no sugared drinks    The following portions of the patient's history were reviewed and updated by me as appropriate: allergies, current medications, past family history, past social history, past surgical history and problem list.    Past Medical History:   Diagnosis Date   • Aortic valve stenosis    • Asthma    • Coronary artery disease    • Diabetes mellitus (CMS/[facility])    • Hyperlipidemia    • Hypertension    • Stroke (CMS/[facility])     2009, no residual   • Type 2 diabetes mellitus with circulatory disorder, without long-term current use of insulin (CMS/[facility]) 9/6/2019       Medications    Current Outpatient Medications:   •  albuterol sulfate  (90 Base) MCG/ACT inhaler, Inhale 2 puffs Every 4 (Four) Hours As Needed " for Wheezing., Disp: , Rfl:   •  amLODIPine (NORVASC) 5 MG tablet, Take 5 mg by mouth Daily., Disp: , Rfl:   •  aspirin 81 MG EC tablet, Take 81 mg by mouth Daily., Disp: , Rfl:   •  busPIRone (BUSPAR) 10 MG tablet, Take 10 mg by mouth 2 (Two) Times a Day., Disp: , Rfl:   •  cetirizine (zyrTEC) 10 MG tablet, Take 10 mg by mouth Daily., Disp: , Rfl:   •  coenzyme Q10 100 MG capsule, Take 100 mg by mouth Daily., Disp: , Rfl:   •  dicyclomine (BENTYL) 20 MG tablet, Take 20 mg by mouth Daily., Disp: , Rfl:   •  glimepiride (AMARYL) 2 MG tablet, Take 1 tablet by mouth Every Morning Before Breakfast., Disp: 90 tablet, Rfl: 1  •  hydroCHLOROthiazide (HYDRODIURIL) 50 MG tablet, Take 50 mg by mouth Daily., Disp: , Rfl:   •  losartan (COZAAR) 100 MG tablet, Take 100 mg by mouth Daily., Disp: , Rfl:   •  metFORMIN (GLUCOPHAGE) 500 MG tablet, Take 1,000 mg by mouth 2 (Two) Times a Day With Meals., Disp: , Rfl:   •  metoprolol tartrate (LOPRESSOR) 100 MG tablet, Take 1 tablet by mouth Every 12 (Twelve) Hours., Disp: 60 tablet, Rfl: 11  •  nitroglycerin (NITROSTAT) 0.4 MG SL tablet, Place 0.4 mg under the tongue Every 5 (Five) Minutes As Needed for Chest Pain. Take no more than 3 doses in 15 minutes., Disp: , Rfl:   •  omeprazole (priLOSEC) 40 MG capsule, Take 40 mg by mouth Daily., Disp: , Rfl:   •  ONETOUCH VERIO test strip, USE STRIP TO CHECK GLUCOSE THREE TIMES DAILY AS NEEDED, Disp: , Rfl:   •  potassium chloride (K-DUR,KLOR-CON) 10 MEQ CR tablet, Take 10 mEq by mouth Daily., Disp: , Rfl: 0  •  rosuvastatin (CRESTOR) 40 MG tablet, Take 40 mg by mouth Daily., Disp: , Rfl:   •  tamsulosin (FLOMAX) 0.4 MG capsule 24 hr capsule, Take 1 capsule by mouth Daily., Disp: , Rfl:   •  Vitamin D, Cholecalciferol, (CHOLECALCIFEROL) 400 units tablet, Take 400 Units by mouth Daily., Disp: , Rfl:   •  zolpidem (AMBIEN) 10 MG tablet, Take 10 mg by mouth Every Night., Disp: , Rfl: 3  •  empagliflozin (Jardiance) 10 MG tablet tablet, Take 1  "tablet by mouth Daily., Disp: 90 tablet, Rfl: 1  •  Semaglutide,0.25 or 0.5MG/DOS, (Ozempic, 0.25 or 0.5 MG/DOSE,) 2 MG/1.5ML solution pen-injector, Start with 0.25 mg sc weekly x 4 weeks then increase to 0.5 mg weekly, Disp: 4.5 mL, Rfl: 1    Review of Systems  Review of Systems   Neurological:        Lightheaded with looking up, tingling in toes        Physical Exam    /72   Pulse 60   Ht 167.6 cm (66\")   Wt 113 kg (249 lb)   SpO2 96%   BMI 40.19 kg/m² Body mass index is 40.19 kg/m².  Physical Exam  Constitutional:       General: He is not in acute distress.     Appearance: He is well-developed. He is not diaphoretic.   HENT:      Head: Normocephalic.      Right Ear: External ear normal.      Left Ear: External ear normal.      Nose: Nose normal.   Eyes:      General: No scleral icterus.        Right eye: No discharge.         Left eye: No discharge.      Conjunctiva/sclera: Conjunctivae normal.   Neck:      Thyroid: No thyromegaly.      Vascular: No JVD.      Trachea: No tracheal deviation.   Cardiovascular:      Rate and Rhythm: Normal rate and regular rhythm.      Pulses:           Dorsalis pedis pulses are 2+ on the right side and 2+ on the left side.        Posterior tibial pulses are 2+ on the right side and 2+ on the left side.      Heart sounds: Normal heart sounds. No murmur heard.     Pulmonary:      Effort: Pulmonary effort is normal. No respiratory distress.      Breath sounds: Normal breath sounds. No wheezing.   Abdominal:      General: Bowel sounds are normal.      Palpations: Abdomen is soft.      Tenderness: There is no abdominal tenderness.   Musculoskeletal:         General: No tenderness.      Cervical back: Neck supple.      Right foot: No deformity or Charcot foot.      Left foot: No deformity or Charcot foot.   Feet:      Right foot:      Protective Sensation: 5 sites tested. 3 sites sensed.      Skin integrity: No ulcer, blister, skin breakdown, erythema, warmth or callus.      " Left foot:      Protective Sensation: 5 sites tested. 3 sites sensed.      Skin integrity: No ulcer, blister, skin breakdown, erythema, warmth or callus.      Comments: Diabetic Foot Exam Performed and Monofilament Test Performed      Skin:     General: Skin is warm and dry.      Findings: No erythema or rash.   Neurological:      Mental Status: He is alert and oriented to person, place, and time.   Psychiatric:         Behavior: Behavior normal.         Thought Content: Thought content normal.         Judgment: Judgment normal.         Labs and Imaging   Lab Results   Component Value Date    HGBA1C 7.6 09/30/2021    HGBA1C 9.70 (H) 09/07/2019     External labs reviewed:  8/26/2021 A1c 7.9  1/2021 GFR 50, creatinine 1.41    Duplex Carotid Ultrasound CAR (09/09/2019 16:38)      Assessment / Plan   Diagnoses and all orders for this visit:    1. Uncontrolled type 2 diabetes mellitus with hyperglycemia (CMS/[facility]) (Primary)  -     POC Glucose, Blood  -     POC Glycosylated Hemoglobin (Hb A1C)  -     Microalbumin / Creatinine Urine Ratio - Urine, Clean Catch  -     Comprehensive Metabolic Panel  -     Lipid Panel  -     TSH  -     Semaglutide,0.25 or 0.5MG/DOS, (Ozempic, 0.25 or 0.5 MG/DOSE,) 2 MG/1.5ML solution pen-injector; Start with 0.25 mg sc weekly x 4 weeks then increase to 0.5 mg weekly  Dispense: 4.5 mL; Refill: 1  -     empagliflozin (Jardiance) 10 MG tablet tablet; Take 1 tablet by mouth Daily.  Dispense: 90 tablet; Refill: 1  -     glimepiride (AMARYL) 2 MG tablet; Take 1 tablet by mouth Every Morning Before Breakfast.  Dispense: 90 tablet; Refill: 1    2. Mixed hyperlipidemia  -     Comprehensive Metabolic Panel  -     Lipid Panel    3. Episodic lightheadedness  -     Duplex Carotid Ultrasound CAR; Future        Diabetes Mellitus 2 is under poor control.  -with peripheral neuropathy, history of CAD and CVA  -A1c 7.6 though BG readings suggest a higher BG average (mostly 200s per his meter review)  -We  discussed importance of diet modification  -Given his history of CAD he would benefit from SGLT2 inhibitor and GLP-1 agonist  -Add Jardiance 10 mg daily, samples provided  -Add Ozempic, titration reviewed, risks reviewed. No hx of pancreatitis. No personal or fam hx of thyroid ca or MEN2.  Samples provided  -DC Januvia with addition of GLP-1 agonist  -Decrease glimepiride from 4 mg to 2 mg daily AC to avoid hypoglycemia  -Labs and foot exam updated today, patient reports eye exam was done at the beginning of 2021    1.  Diet: 3-4 carb servings per meal for females, 4-5 carb servings per meal for males  Spread carb intake throughout the day  Increase lean protein and vegetable intake  Avoid sugary drinks and processed carbs including crackers, cookies, cakes  2.  Exercise: Recommend at least 30 minutes of exercise daily, at least 5 days per week. Increase exercise gradually.   3.  Blood Glucose Goal: Blood glucose goal <120 fasting, <180 2 hr postprandial  4.  Microalbumin due  5.  Education performed during this visit: long term diabetic complications discussed. , annual eye examinations at Ophthalmology discussed, dental hygiene discussed  and foot care reviewed., home glucose monitoring emphasized, all medications, side effects and compliance discussed carefully and Hypoglycemia management and prevention reviewed. Reviewed ‘ABCs’ of diabetes management (respective goals in parentheses):  A1C (<7), blood pressure (<130/80), and cholesterol (LDL <100, if CVD <70).    Carotid artery diease  -pt c/o lightheadedness with looking up  -repeat carotid artery u/s    Patient Instructions   Stop Januvia  Decrease glimepiride to 2 mg in the morning before food  Start Ozempic 0.25 mg weekly for 4 weeks then increase to 0.5 mg weekly  Start Januvia 10 mg daily      Follow up: Return in about 3 months (around 12/30/2021).    Discussed the nature of the disease including, risks, complications, implications, management, safe and  proper use of medications. Encouraged therapeutic lifestyle changes including low calorie diet with plenty of fruits and vegetables, daily exercise, medication compliance, and keeping scheduled follow up appointments with me and any other providers. Encouraged patient to have appointment for complete physical, fasting labs, appropriate screenings, and immunizations on an annual basis.      Signed by: Sage Bonner PA-C  Endocrinology

## 2021-09-30 NOTE — PATIENT INSTRUCTIONS
Stop Januvia  Decrease glimepiride to 2 mg in the morning before food  Start Ozempic 0.25 mg weekly for 4 weeks then increase to 0.5 mg weekly  Start Januvia 10 mg daily

## 2021-10-01 ENCOUNTER — TELEPHONE (OUTPATIENT)
Dept: ENDOCRINOLOGY | Facility: CLINIC | Age: 66
End: 2021-10-01

## 2021-10-01 LAB
ALBUMIN SERPL-MCNC: 4.6 G/DL (ref 3.5–5.2)
ALBUMIN/CREAT UR: <8 MG/G CREAT (ref 0–29)
ALBUMIN/GLOB SERPL: 2 G/DL
ALP SERPL-CCNC: 50 U/L (ref 39–117)
ALT SERPL-CCNC: 22 U/L (ref 1–41)
AST SERPL-CCNC: 18 U/L (ref 1–40)
BILIRUB SERPL-MCNC: 0.4 MG/DL (ref 0–1.2)
BUN SERPL-MCNC: 25 MG/DL (ref 8–23)
BUN/CREAT SERPL: 22.3 (ref 7–25)
CALCIUM SERPL-MCNC: 10.1 MG/DL (ref 8.6–10.5)
CHLORIDE SERPL-SCNC: 99 MMOL/L (ref 98–107)
CHOLEST SERPL-MCNC: 107 MG/DL (ref 0–200)
CO2 SERPL-SCNC: 27.1 MMOL/L (ref 22–29)
CREAT SERPL-MCNC: 1.12 MG/DL (ref 0.76–1.27)
CREAT UR-MCNC: 38.7 MG/DL
GLOBULIN SER CALC-MCNC: 2.3 GM/DL
GLUCOSE SERPL-MCNC: 181 MG/DL (ref 65–99)
HDLC SERPL-MCNC: 33 MG/DL (ref 40–60)
LDLC SERPL CALC-MCNC: 50 MG/DL (ref 0–100)
MICROALBUMIN UR-MCNC: <3 UG/ML
POTASSIUM SERPL-SCNC: 4.9 MMOL/L (ref 3.5–5.2)
PROT SERPL-MCNC: 6.9 G/DL (ref 6–8.5)
SODIUM SERPL-SCNC: 140 MMOL/L (ref 136–145)
TRIGL SERPL-MCNC: 133 MG/DL (ref 0–150)
TSH SERPL DL<=0.005 MIU/L-ACNC: 1.91 UIU/ML (ref 0.27–4.2)
VLDLC SERPL CALC-MCNC: 24 MG/DL (ref 5–40)

## 2021-10-01 NOTE — TELEPHONE ENCOUNTER
Patients wife called and wanted us to know that the Ozempic and Jardiance that was prescribed is over $300 and there is no way they can pay for that. Is there something that can be done to help them with this? Or anything else he can take? Please advise.

## 2021-10-04 NOTE — TELEPHONE ENCOUNTER
Left message for pt's wife telling her that there are a couple of things we need to know to determine the best way to help. I asked her to give me a call so we could discuss these things.

## 2021-10-05 ENCOUNTER — TELEPHONE (OUTPATIENT)
Dept: ENDOCRINOLOGY | Facility: CLINIC | Age: 66
End: 2021-10-05

## 2021-10-05 NOTE — TELEPHONE ENCOUNTER
Patient wife called and wanted to relay that Mr Arnold is on the Donut hole, would like a return call when Gale gets a chance

## 2021-10-05 NOTE — TELEPHONE ENCOUNTER
Spoke with pt's wife and she is going to find out if the medication requires a PA or if they are in a donut hole, she will call em back.

## 2021-10-06 NOTE — TELEPHONE ENCOUNTER
spoke with pt's wife and she has been in contact with some sort of pt assistance and they should be sending me papers to fill out. When I complete them the wife would like a call so that she can get them to complete their part.

## 2021-10-11 ENCOUNTER — TELEPHONE (OUTPATIENT)
Dept: ENDOCRINOLOGY | Facility: CLINIC | Age: 66
End: 2021-10-11

## 2021-10-21 ENCOUNTER — HOSPITAL ENCOUNTER (OUTPATIENT)
Dept: CARDIOLOGY | Facility: HOSPITAL | Age: 66
Discharge: HOME | End: 2021-10-21
Admitting: PHYSICIAN ASSISTANT

## 2021-10-21 VITALS — HEIGHT: 66 IN | BODY MASS INDEX: 40.04 KG/M2 | WEIGHT: 249.12 LBS

## 2021-10-21 DIAGNOSIS — R42 EPISODIC LIGHTHEADEDNESS: ICD-10-CM

## 2021-10-21 LAB
BH CV XLRA MEAS LEFT DIST CCA EDV: 19.9 CM/SEC
BH CV XLRA MEAS LEFT DIST CCA PSV: 81.5 CM/SEC
BH CV XLRA MEAS LEFT DIST ICA EDV: 27.6 CM/SEC
BH CV XLRA MEAS LEFT DIST ICA PSV: 72.1 CM/SEC
BH CV XLRA MEAS LEFT ICA/CCA RATIO: 1.61
BH CV XLRA MEAS LEFT MID CCA EDV: 20.5 CM/SEC
BH CV XLRA MEAS LEFT MID CCA PSV: 86.8 CM/SEC
BH CV XLRA MEAS LEFT MID ICA EDV: 24 CM/SEC
BH CV XLRA MEAS LEFT MID ICA PSV: 76.2 CM/SEC
BH CV XLRA MEAS LEFT PROX CCA EDV: 19.3 CM/SEC
BH CV XLRA MEAS LEFT PROX CCA PSV: 85 CM/SEC
BH CV XLRA MEAS LEFT PROX ECA EDV: 13.5 CM/SEC
BH CV XLRA MEAS LEFT PROX ECA PSV: 92.6 CM/SEC
BH CV XLRA MEAS LEFT PROX ICA EDV: 44 CM/SEC
BH CV XLRA MEAS LEFT PROX ICA PSV: 140 CM/SEC
BH CV XLRA MEAS LEFT PROX SCLA PSV: 72.7 CM/SEC
BH CV XLRA MEAS LEFT VERTEBRAL A EDV: 11.8 CM/SEC
BH CV XLRA MEAS LEFT VERTEBRAL A PSV: 44 CM/SEC
BH CV XLRA MEAS RIGHT DIST CCA EDV: 18.9 CM/SEC
BH CV XLRA MEAS RIGHT DIST CCA PSV: 85.6 CM/SEC
BH CV XLRA MEAS RIGHT DIST ICA EDV: 35.4 CM/SEC
BH CV XLRA MEAS RIGHT DIST ICA PSV: 139 CM/SEC
BH CV XLRA MEAS RIGHT ICA/CCA RATIO: 1.53
BH CV XLRA MEAS RIGHT MID CCA EDV: 21.2 CM/SEC
BH CV XLRA MEAS RIGHT MID CCA PSV: 99.8 CM/SEC
BH CV XLRA MEAS RIGHT MID ICA EDV: 18.6 CM/SEC
BH CV XLRA MEAS RIGHT MID ICA PSV: 77.6 CM/SEC
BH CV XLRA MEAS RIGHT PROX CCA EDV: 17.3 CM/SEC
BH CV XLRA MEAS RIGHT PROX CCA PSV: 112 CM/SEC
BH CV XLRA MEAS RIGHT PROX ECA PSV: 119 CM/SEC
BH CV XLRA MEAS RIGHT PROX ICA EDV: 37.3 CM/SEC
BH CV XLRA MEAS RIGHT PROX ICA PSV: 153 CM/SEC
BH CV XLRA MEAS RIGHT PROX SCLA PSV: 119 CM/SEC
BH CV XLRA MEAS RIGHT VERTEBRAL A EDV: 10.6 CM/SEC
BH CV XLRA MEAS RIGHT VERTEBRAL A PSV: 40.5 CM/SEC
LEFT ARM BP: NORMAL MMHG
RIGHT ARM BP: NORMAL MMHG

## 2021-10-21 PROCEDURE — 93880 EXTRACRANIAL BILAT STUDY: CPT

## 2021-10-28 ENCOUNTER — TELEPHONE (OUTPATIENT)
Dept: ENDOCRINOLOGY | Facility: CLINIC | Age: 66
End: 2021-10-28

## 2021-10-28 NOTE — TELEPHONE ENCOUNTER
Admitted:     07/21/2018      PRIMARY CARE PHYSICIAN:  Dr. Neisha Esparza      CHIEF COMPLAINT:  Generalized weakness and sweats.      HISTORY OF PRESENT ILLNESS:  History is obtained from patient who is a good historian.  The patient is an 80-year-old female with past medical history of atrial fibrillation, status post ablation and pacemaker placement, type 2 diabetes, COPD, chronic kidney disease who is presenting for evaluation of generalized weakness.  The patient reports she has not been feeling well over the last 5-6 days.  She reports feeling weak, having sweats and decreased oral intake.  She also reports her stomach growls as well.  The patient was seen in the emergency room over the last 1 week on 07/18/2018 and on 07/16/2018.  During her ED visit, she had workup including a chest x-ray, CT abdomen and pelvis, and abdominal x-ray, which did not reveal acute abnormality.  As a result, patient was discharged home with followup with her primary care physician.  The patient now represents herself to the emergency room because she continues to not feel well.      The patient otherwise denies any nausea or vomiting.  The patient reports she has diarrhea which further goes and describes as a chronic thing for her.  She has a loose/watery bowel movement once a day every day and is chronic without any recent changes.  She states she may have noticed some stool color changes like her stool becoming alyse today, otherwise no bright red blood or dark stool she noted today.  She denies any cough but feels like she has some phlegm in her throat.  She reports she feels short of breath just at rest but no other complaints.  She denies any chest pain.  She denies fever but as mentioned above reports sweats.  She denies any recent travel or sick contacts.      In the emergency room, the patient was evaluated by Dr. Givens.  Her vital signs were notable for slight hypertension with blood pressure systolic 178 upon arrival,  I left a message for his wife letting her know that I have information form [employer]. I asked her to call to discuss the information.    saturation in the 90s on room air and afebrile with temperature of 98 degree Fahrenheit.  Laboratory is notable for a creatinine of 1.19, which is around her baseline.  Liver function panel is within normal range.  Troponin is negative.  Fecal occult blood is positive.  Hemoglobin 13.3, WBC 7.2.  Urinalysis has 1 WBC and 2 RBCs and a few bacteria noted.  Chest x-ray shows new left lower lobe infiltrate compared to recent chest x-ray obtained on 07/16/2018.  Given her generalized weakness and this new finding of left lower lobe infiltrate, admission for observation was requested for further management.      PAST MEDICAL HISTORY:   1.  History of atrial fibrillation, status post ablation and pacemaker placement.   2.  Type 2 diabetes with associated neuropathy.   3.  COPD.   4.  Chronic kidney disease with baseline creatinine ranging around 1.2 to 1.4.   5.  History of tobacco use.   6.  Lymphedema.   7.  Heart failure with preserved ejection fraction.      PAST SURGICAL HISTORY:  History of ablation and pacemaker placement in 2012, bilateral knee replacement, cholecystectomy 2015      SOCIAL HISTORY:  The patient currently moved into a senior living apartment.  She had quit smoking about 1 month ago.  She denies alcohol use.      FAMILY HISTORY:  Reviewed and noncontributory to current presentation.      REVIEW OF SYSTEMS:  A 10-point review of systems was completed and is negative except as noted in history of present illness.      ALLERGIES:  Include AMBIEN, PENICILLIN gives her hives, SULFA gives her itching, CYMBALTA rash, FLUCONAZOLE rash.      PRIOR TO ADMISSION MEDICATIONS:   1.  Albuterol inhaler as needed.   2.  Eliquis 2.5 mg 3 times daily.   3.  Aspirin 81 mg daily.   4.  Furosemide 20 mg daily.   5.  Glipizide 5 mg daily.   6.  Hydroxyzine 25 mg 2 times daily as needed for itching.   7.  Lisinopril 10 mg 2 times daily.   8.  Milk of Magnesia daily.   9.  Reglan as needed.   10.  Metoprolol 25 mg 2 times  daily.   11.  Zofran as needed.   12.  Zantac 150 mg twice daily.   13.  Simvastatin 10 mg at bedtime.   14.  Carafate 4 times daily.   15.  Spiriva daily.   16.  Tramadol 50 mg every 6 hours as needed.      PHYSICAL EXAMINATION:   VITAL SIGNS:  Reviewed, temperature of 98, pulse 69, respirations 16, blood pressure 168/93, saturation 96% on room air.   GENERAL:  She is alert, awake, no apparent distress.   HEENT:  Normocephalic, atraumatic.  Oral mucosa is dry.  No pharyngeal erythema or exudates.   NECK:  Supple.  No cervical lymphadenopathy.   CARDIOVASCULAR:  Regular rate and rhythm.   LUNGS:  Diminished breath sounds in the left lung.  Other lung fields are clear to auscultation.  No wheezing.   ABDOMEN:  Soft, mildly distended.  Active bowel sounds and nontender.   EXTREMITIES:  Trace lower extremity edema notable for varicose veins.  No joint effusions noted.   NEUROLOGIC:  Strength appears symmetrical in bilateral upper and lower extremities.   PSYCHIATRIC:  Mood and affect appear normal but frustrated with the fact she had to come back to the ED multiple times over the last 1 week.      LABORATORY:  Basic metabolic panel:  Sodium 133, potassium 3.6, BUN 13, creatinine 1.19.  LFTs within normal range.  Fecal occult positive.  Troponin less than 0.015.  WBC 7.2, hemoglobin 13.3, platelets 223.  Urinalysis shows protein of 14, albumin urine of 100, WBC 1, nitrites negative, leukoesterase negative, bacteria and urine few noted.      Chest x-ray shows a new left lower lobe infiltrate.      ASSESSMENT AND PLAN:  The patient is an 80-year-old female with past medical history of atrial fibrillation, status post ablation, COPD, CKD stage III, type 2 diabetes, diastolic heart failure with preserved ejection fraction who is presenting to the emergency room today for the third time this week due to generalized weakness and sweats.  She is noted to have left lower lobe infiltrate on imaging and admission under  observation requested due to her ongoing generalized weakness.      1.  Left lower lobe infiltrate, likely community-acquired pneumonia.  Surprisingly other than sweats and feeling generalized weakness, the patient does not clearly have significant respiratory symptoms.  She does feel some subjective short of breath but she is not in any respiratory distress or not hypoxic at this time.  She is afebrile and no leukocytosis.  At this time, we will admit under observation and place her on Levaquin 500 mg daily and reassess tomorrow.  Anticipate she will likely be able to discharge home tomorrow if she feels improved overall.  We will also order her some incentive spirometry.   2.  Generalized weakness.  This could be probably due to above.  In addition, she reports not eating well and poor oral intake over the last week which likely could contribute to this as well.  At this point, she had a recent CT scan of the abdomen without any acute abnormality on 07/18/2018.  As a result, we will not repeat any imaging.  She is passing gas and had a bowel movement this morning.  Her abdomen feels slightly distended but soft and nontender.  At this point, we will encourage her to eat as she tolerates.  We will hydrate her with normal saline 100 mL an hour over the next 10 hours for a total of 1 liter.  Will also have physical therapy evaluation tomorrow to see for safe discharge planning.   3.  History of atrial fibrillation, status post ablation.  Will continue her prior to admission metoprolol b.i.d. and Eliquis.     4.  History of hypertension.  I will continue her prior to admission lisinopril.  Hold off on Lasix while she is being hydrated.  Continue prior to admission aspirin as well.   5.  History of chronic obstructive pulmonary disease.  Continue prior to admission Spiriva and albuterol inhaler p.r.n.  She is currently moving air well and no signs of bronchospasm.  No signs of chronic obstructive pulmonary disease  exacerbation.   6.  Type 2 diabetes.  Will hold off on her oral glipizide while she is here.  Watch for her oral intake.  In the meantime, we will place her on sliding scale insulin while she is here under observation unit.  Her last hemoglobin A1c was 6.8% in 2018.      7. Positive fecal occult on testing.  Per discussion with Dr. Givens on rectal examination, stool appeared brown and she does have some hemorrhoids.  However, could follow up as an outpatient.  At this point, her hemoglobin is stable at 13.  Will monitor for any signs of active bleeding at this time.  CBC is ordered for tomorrow morning.      DVT PROPHYLAXIS:  She is on Eliquis.     CODE: DNR/DNI     DISPOSITION:  Likely over the next 24 hours if she continues to improve.  PT evaluation completed and safe for discharge.         JOSLYN MONGE MD             D: 2018   T: 2018   MT: DARRELL      Name:     PATRICIA BOBO   MRN:      [id number]        Account:      [id number]   :      1938        Admitted:     2018                   Document: [id number]

## 2021-10-28 NOTE — TELEPHONE ENCOUNTER
PT's wife returned my call. I read the letter from [facility] to her, gave her the number to call and told her if they were denied that we needed to send the denial letter to them. She voiced understanding.

## 2021-11-02 DIAGNOSIS — T84.218D FRACTURED STERNAL WIRES, SUBSEQUENT ENCOUNTER: Primary | ICD-10-CM

## 2021-11-08 ENCOUNTER — TELEPHONE (OUTPATIENT)
Dept: ENDOCRINOLOGY | Facility: CLINIC | Age: 66
End: 2021-11-08

## 2021-12-02 ENCOUNTER — HOSPITAL ENCOUNTER (OUTPATIENT)
Dept: CT IMAGING | Facility: HOSPITAL | Age: 66
Discharge: HOME | End: 2021-12-02
Admitting: NURSE PRACTITIONER

## 2021-12-02 DIAGNOSIS — T84.218D FRACTURED STERNAL WIRES, SUBSEQUENT ENCOUNTER: ICD-10-CM

## 2021-12-02 LAB — CREAT BLDA-MCNC: 1.2 MG/DL (ref 0.6–1.3)

## 2021-12-02 PROCEDURE — 82565 ASSAY OF CREATININE: CPT

## 2021-12-02 PROCEDURE — 71270 CT THORAX DX C-/C+: CPT

## 2021-12-02 PROCEDURE — 25010000002 IOPAMIDOL 61 % SOLUTION: Performed by: NURSE PRACTITIONER

## 2021-12-02 RX ADMIN — IOPAMIDOL 75 ML: 612 INJECTION, SOLUTION INTRAVENOUS at 14:44

## 2022-01-03 ENCOUNTER — OFFICE VISIT (OUTPATIENT)
Dept: CARDIAC SURGERY | Facility: CLINIC | Age: 67
End: 2022-01-03

## 2022-01-03 VITALS
BODY MASS INDEX: 36.93 KG/M2 | SYSTOLIC BLOOD PRESSURE: 142 MMHG | OXYGEN SATURATION: 98 % | TEMPERATURE: 97.5 F | WEIGHT: 229.8 LBS | HEIGHT: 66 IN | HEART RATE: 70 BPM | DIASTOLIC BLOOD PRESSURE: 84 MMHG

## 2022-01-03 DIAGNOSIS — T84.218D FRACTURED STERNAL WIRES, SUBSEQUENT ENCOUNTER: Primary | ICD-10-CM

## 2022-01-03 PROCEDURE — 99214 OFFICE O/P EST MOD 30 MIN: CPT | Performed by: NURSE PRACTITIONER

## 2022-01-03 NOTE — PROGRESS NOTES
"     [facility] Cardiothoracic Surgery Office Follow Up Note     Date of Encounter: 2022     Name: Khoa Arnold  : 1955     Referred By: No ref. provider found  PCP: Shirlene Sharpe MD    Chief Complaint:    Chief Complaint   Patient presents with   • Hospital Follow Up Visit     Follow up after CT chest to discuss results. Pt states that he feels and hears a clicking in his chest and that he is very sore in his chest from the center to the left side of the chest.        Subjective      History of Present Illness:    Khoa Arnold is a 66 y.o. male former smoker with history of HTN, HLD on statin therapy, non-insulin-dependent DM, CAD and aortic disease s/p CABG x4 and aortic valve replacement in 2019 by Dr. Goldsmith.  Patient was last seen in clinic 2020 by Dr. Goldsmith with complaints of popping and cracking in his chest specifically with Valsalva maneuvers.  CXR in clinic demonstrated 2 broken sternal wires and conservative measures were pursued.  Patient was instructed to contact our office for worsening discomfort. He has noticed worsening of his symptoms and contacted our office 2021 and was instructed to obtain CT chest. He presents today to discuss results. He states the popping and cracking is now present with minimal exertion and with talking. He is sore around the left lateral sternal border. He does not specifically call this \"pain.\" Since our last visit he has had PPM placed by Dr. Pineda 2021 for SSS.    Review of Systems:  Review of Systems   Constitutional: Positive for malaise/fatigue. Negative for chills, decreased appetite, diaphoresis, fever, night sweats, weight gain and weight loss.   HENT: Negative for hoarse voice.    Eyes: Negative for blurred vision, double vision and visual disturbance.   Cardiovascular: Positive for chest pain (chest pain from the sternum to the left pect chest muscle) and dyspnea on exertion. Negative for claudication, " irregular heartbeat, leg swelling, near-syncope, orthopnea, palpitations, paroxysmal nocturnal dyspnea and syncope.   Respiratory: Negative for cough, hemoptysis, shortness of breath, sputum production and wheezing.    Hematologic/Lymphatic: Negative for adenopathy and bleeding problem. Does not bruise/bleed easily.   Skin: Negative for color change, nail changes, poor wound healing and rash.   Musculoskeletal: Positive for back pain. Negative for falls and muscle cramps.   Gastrointestinal: Positive for constipation. Negative for abdominal pain, dysphagia and heartburn.   Genitourinary: Positive for frequency and nocturia. Negative for flank pain.   Neurological: Positive for dizziness, light-headedness and loss of balance. Negative for brief paralysis, disturbances in coordination, focal weakness, headaches, numbness, paresthesias, sensory change, vertigo and weakness.   Psychiatric/Behavioral: Negative for depression and suicidal ideas. The patient has insomnia.    Allergic/Immunologic: Negative for persistent infections.       I have reviewed the following portions of the patient's history: allergies, current medications, past family history, past medical history, past social history, past surgical history and problem list and confirm it's accurate.    Allergies:  Allergies   Allergen Reactions   • Ranexa [Ranolazine Er] Nausea Only and Dizziness   • Lisinopril Itching and Rash     On feet       Medications:      Current Outpatient Medications:   •  albuterol sulfate  (90 Base) MCG/ACT inhaler, Inhale 2 puffs Every 4 (Four) Hours As Needed for Wheezing., Disp: , Rfl:   •  amLODIPine (NORVASC) 5 MG tablet, Take 5 mg by mouth Daily., Disp: , Rfl:   •  aspirin 81 MG EC tablet, Take 81 mg by mouth Daily., Disp: , Rfl:   •  busPIRone (BUSPAR) 10 MG tablet, Take 10 mg by mouth 2 (Two) Times a Day., Disp: , Rfl:   •  cetirizine (zyrTEC) 10 MG tablet, Take 10 mg by mouth Daily., Disp: , Rfl:   •  coenzyme Q10 100  MG capsule, Take 100 mg by mouth Daily., Disp: , Rfl:   •  dicyclomine (BENTYL) 20 MG tablet, Take 20 mg by mouth Daily., Disp: , Rfl:   •  empagliflozin (Jardiance) 10 MG tablet tablet, Take 1 tablet by mouth Daily., Disp: 90 tablet, Rfl: 1  •  glimepiride (AMARYL) 2 MG tablet, Take 1 tablet by mouth Every Morning Before Breakfast., Disp: 90 tablet, Rfl: 1  •  hydroCHLOROthiazide (HYDRODIURIL) 50 MG tablet, Take 50 mg by mouth Daily., Disp: , Rfl:   •  losartan (COZAAR) 100 MG tablet, Take 100 mg by mouth Daily., Disp: , Rfl:   •  metFORMIN (GLUCOPHAGE) 500 MG tablet, Take 1,000 mg by mouth 2 (Two) Times a Day With Meals., Disp: , Rfl:   •  metoprolol tartrate (LOPRESSOR) 100 MG tablet, Take 1 tablet by mouth Every 12 (Twelve) Hours., Disp: 60 tablet, Rfl: 11  •  nitroglycerin (NITROSTAT) 0.4 MG SL tablet, Place 0.4 mg under the tongue Every 5 (Five) Minutes As Needed for Chest Pain. Take no more than 3 doses in 15 minutes., Disp: , Rfl:   •  omeprazole (priLOSEC) 40 MG capsule, Take 40 mg by mouth Daily., Disp: , Rfl:   •  ONETOUCH VERIO test strip, USE STRIP TO CHECK GLUCOSE THREE TIMES DAILY AS NEEDED, Disp: , Rfl:   •  potassium chloride (K-DUR,KLOR-CON) 10 MEQ CR tablet, Take 10 mEq by mouth Daily., Disp: , Rfl: 0  •  rosuvastatin (CRESTOR) 40 MG tablet, Take 40 mg by mouth Daily., Disp: , Rfl:   •  Semaglutide,0.25 or 0.5MG/DOS, (Ozempic, 0.25 or 0.5 MG/DOSE,) 2 MG/1.5ML solution pen-injector, Start with 0.25 mg sc weekly x 4 weeks then increase to 0.5 mg weekly, Disp: 4.5 mL, Rfl: 1  •  tamsulosin (FLOMAX) 0.4 MG capsule 24 hr capsule, Take 1 capsule by mouth Daily., Disp: , Rfl:   •  Vitamin D, Cholecalciferol, (CHOLECALCIFEROL) 400 units tablet, Take 400 Units by mouth Daily., Disp: , Rfl:   •  zolpidem (AMBIEN) 10 MG tablet, Take 10 mg by mouth Every Night., Disp: , Rfl: 3    History:   Past Medical History:   Diagnosis Date   • Aortic valve stenosis    • Asthma    • Coronary artery disease    • Diabetes  mellitus (HCC)    • Hyperlipidemia    • Hypertension    • Stroke (HCC)     , no residual   • Type 2 diabetes mellitus with circulatory disorder, without long-term current use of insulin ([facility]) 2019       Past Surgical History:   Procedure Laterality Date   • CARDIAC CATHETERIZATION      3 stents    • CARDIAC CATHETERIZATION N/A 2019    Procedure: Left Heart Cath;  Surgeon: Jonathan Pineda MD;  Location:  [facility];  Service: Cardiovascular   • CARDIAC ELECTROPHYSIOLOGY PROCEDURE N/A 2021    Procedure: DEVICE IMPLANT;  Surgeon: Jonathan Pineda MD;  Location:  [facility];  Service: Cardiovascular;  Laterality: N/A;   • CHOLECYSTECTOMY     • COLONOSCOPY     • CORONARY ANGIOPLASTY WITH STENT PLACEMENT      x 3, last one    • CORONARY ARTERY BYPASS GRAFT WITH AORTIC VALVE REPAIR/REPLACEMENT N/A 9/10/2019    Procedure: CARLYN PER ANESTHESIA, MEDIAN STERNOTOMY, CORONARY ARTERY BYPASS GRAFT X 4, UTILIZING THE LEFT INTERNAL MAMMARY ARTERY, AND EVH OF THE RIGHT GREATER SAPHENOUS VEIN,   AORTIC VALVE REPLACEMENT;  Surgeon: Denys Goldsmith MD;  Location:  [facility] OR;  Service: Cardiothoracic   • UMBILICAL HERNIA REPAIR         Social History     Socioeconomic History   • Marital status:    • Number of children: 1   Tobacco Use   • Smoking status: Former Smoker     Years: 1.00     Types: Cigars     Quit date: 10/14/1979     Years since quittin.2   • Smokeless tobacco: Former User     Types: Chew     Quit date: 2004   Vaping Use   • Vaping Use: Never used   Substance and Sexual Activity   • Alcohol use: No   • Drug use: No   • Sexual activity: Defer        Family History   Problem Relation Age of Onset   • Coronary artery disease Mother    • Diabetes Mother    • Coronary artery disease Father    • Diabetes Father        Objective   Physical Exam:  Vitals:    22 1045   BP: 142/84   Pulse: 70   Temp: 97.5 °F (36.4 °C)   SpO2: 98%   Weight: 104  "kg (229 lb 12.8 oz)   Height: 167.6 cm (66\")      Body mass index is 37.09 kg/m².    Physical Exam  Vitals and nursing note reviewed.   Constitutional:       Appearance: Normal appearance. He is well-developed. He is obese.   HENT:      Head: Normocephalic and atraumatic.   Eyes:      Pupils: Pupils are equal, round, and reactive to light.   Neck:      Vascular: No carotid bruit.   Cardiovascular:      Rate and Rhythm: Normal rate and regular rhythm.      Pulses: Normal pulses.      Heart sounds: Normal heart sounds, S1 normal and S2 normal. No murmur heard.      Pulmonary:      Effort: Pulmonary effort is normal.      Breath sounds: Normal breath sounds.   Chest:      Comments: Sternal mobility noted, no tenderness on palpation    Abdominal:      Palpations: Abdomen is soft.   Musculoskeletal:         General: No swelling.      Cervical back: Neck supple.      Right lower leg: No edema.      Left lower leg: No edema.   Skin:     General: Skin is warm and dry.      Capillary Refill: Capillary refill takes less than 2 seconds.      Findings: No bruising.   Neurological:      General: No focal deficit present.      Mental Status: He is alert and oriented to person, place, and time. Mental status is at baseline.      GCS: GCS eye subscore is 4. GCS verbal subscore is 5. GCS motor subscore is 6.      Motor: Motor function is intact.      Coordination: Coordination is intact.      Gait: Gait is intact.   Psychiatric:         Mood and Affect: Mood normal.         Speech: Speech normal.         Behavior: Behavior normal. Behavior is cooperative.         Cognition and Memory: Cognition normal.         Imaging/Labs:  CT Chest With & Without Contrast-12/2/2021  There are broken sternotomy wires with diastases identified of the sternum of approximately 2 cm. There is diastasis as well seen of the manubrium of approximately 1 cm. There is no mediastinal mass. Cardiac chambers are within normal limits. No chest wall mass. No " "abnormal fluid collection seen in the soft tissues. No bulky hilar or axillary adenopathy. Pacer leads in satisfactory position. Lung parenchyma is clear. Degenerative changes seen within the spine. The visualized upper abdomen is unremarkable in appearance.  IMPRESSION: Broken sternotomy wires with diastasis seen of both the manubrium and sternum. There is no mediastinal mass or abnormality in the overlying soft tissues. No acute intrathoracic abnormality present.    Assessment / Plan      Assessment / Plan:  Diagnoses and all orders for this visit:    1. Fractured sternal wires, subsequent encounter (Primary)       · CAD and aortic disease s/p CABG x4 and aortic valve replacement in 2019 by Dr. Goldsmith.    · Last seen in clinic 6/2020 by Dr. Goldsmith with complaints of popping and cracking in his chest specifically with Valsalva maneuvers.  CXR in clinic demonstrated 2 broken sternal wires and conservative measures were pursued.  Patient was instructed to contact our office for worsening discomfort.   · Noticed worsening of his symptoms and contacted our office 11/1/2021 and was instructed to obtain CT chest. He states the popping and cracking is now present with minimal exertion and with talking. He is sore around the left lateral sternum. He does not specifically call this \"pain.\"   · CT chest demonstrates ~2cm diastases of sternum  · Case discussed with Dr Goldsmith who has offered repair. However, risk of reoperative surgery is not insignificant  Risks of surgery were discussed with the patient including: bleeding, infection, blood clots, kidney damage, stroke, heart attack, or death.  Patient and wife would like to go home and think/consider prior to moving forward  · Since our last visit he has had PPM placed by Dr. Pineda 1/19/2021 for SSS. Pt reports repeat ECHO revealed some mild valvular disease. Will obtain ECHO for review. Should future valve repair come into consideration this reoperative sternal " wiring would increase surgical risk.    Follow Up:   Return on as needed basis.   Or sooner for any further concerns or worsening sign and symptoms. If unable to reach us in the office please dial 911 or go to the nearest emergency department.      Shana JOHANSEN  [facility] Cardiothoracic Surgery    Time Spent: I spent 36 minutes caring for Khoa on this date of service. This time includes time spent by me in the following activities: preparing for the visit, reviewing tests, obtaining and/or reviewing a separately obtained history, performing a medically appropriate examination and/or evaluation, counseling and educating the patient/family/caregiver, referring and communicating with other health care professionals, documenting information in the medical record, independently interpreting results and communicating that information with the patient/family/caregiver and care coordination.

## 2022-01-28 ENCOUNTER — OFFICE VISIT (OUTPATIENT)
Dept: ENDOCRINOLOGY | Facility: CLINIC | Age: 67
End: 2022-01-28

## 2022-01-28 VITALS
HEIGHT: 66 IN | WEIGHT: 230.8 LBS | DIASTOLIC BLOOD PRESSURE: 68 MMHG | BODY MASS INDEX: 37.09 KG/M2 | OXYGEN SATURATION: 95 % | SYSTOLIC BLOOD PRESSURE: 128 MMHG | HEART RATE: 61 BPM

## 2022-01-28 DIAGNOSIS — E78.2 MIXED HYPERLIPIDEMIA: Chronic | ICD-10-CM

## 2022-01-28 DIAGNOSIS — E11.65 TYPE 2 DIABETES MELLITUS WITH HYPERGLYCEMIA, WITHOUT LONG-TERM CURRENT USE OF INSULIN: Primary | Chronic | ICD-10-CM

## 2022-01-28 LAB
EXPIRATION DATE: ABNORMAL
EXPIRATION DATE: NORMAL
GLUCOSE BLDC GLUCOMTR-MCNC: 194 MG/DL (ref 70–130)
HBA1C MFR BLD: 6.3 %
Lab: ABNORMAL
Lab: NORMAL

## 2022-01-28 PROCEDURE — 99214 OFFICE O/P EST MOD 30 MIN: CPT | Performed by: PHYSICIAN ASSISTANT

## 2022-01-28 PROCEDURE — 3044F HG A1C LEVEL LT 7.0%: CPT | Performed by: PHYSICIAN ASSISTANT

## 2022-01-28 PROCEDURE — 82947 ASSAY GLUCOSE BLOOD QUANT: CPT | Performed by: PHYSICIAN ASSISTANT

## 2022-01-28 PROCEDURE — 83036 HEMOGLOBIN GLYCOSYLATED A1C: CPT | Performed by: PHYSICIAN ASSISTANT

## 2022-01-28 NOTE — PROGRESS NOTES
Chief Complaint  F/u for Diabetes Mellitus.    HPI   Khoa Arnold is a 66 y.o. male with htn, hyperlipidemia, sick sinus syndrome s/p PPM, CAD s/p CABG with AVR, vit d deficiency who is here today for f/u of Diabetes Mellitus type 2. The initial diagnosis of diabetes was made in his early 60s.     BG is improving.   Has lost about 20 pounds on Ozempic.     Diabetic complications: cardiovascular disease and cerebrovascular disease. CAD s/p cabg, hx of 2 CVAs, mild peripheral neuropathy  Eye exam current (within one year): elvis boland and kya due 5/2022  Foot care and dental care: discussed    Current diabetic medications include:  Metformin 500 mg 2 tabs twice daily  Glimepiride 2 mg daily   Jardiance 10 mg daily  ozempic 0.5 mg weekly - mild nausea, improving    Statin: Crestor 40 mg - hyperlipidemia    Past medications: Januvia    Diabetic Monitoring  - checks glucose 3-4x/day  Glucose is averaging- majority BG readings <200  Hypoglycemia- rare  Home blood sugar records: glucometer downloaded, data reviewed and scanned to chart    The following portions of the patient's history were reviewed and updated by me as appropriate: allergies, current medications, past family history, past social history, past surgical history and problem list.      Past Medical History:   Diagnosis Date   • Aortic valve stenosis    • Asthma    • Coronary artery disease    • Diabetes mellitus (HCC)    • Hyperlipidemia    • Hypertension    • Stroke (HCC)     2009, no residual   • Type 2 diabetes mellitus with circulatory disorder, without long-term current use of insulin ([facility]) 9/6/2019       Medications    Current Outpatient Medications:   •  albuterol sulfate  (90 Base) MCG/ACT inhaler, Inhale 2 puffs Every 4 (Four) Hours As Needed for Wheezing., Disp: , Rfl:   •  amLODIPine (NORVASC) 5 MG tablet, Take 5 mg by mouth Daily., Disp: , Rfl:   •  aspirin 81 MG EC tablet, Take 81 mg by mouth Daily., Disp: , Rfl:   •  busPIRone  "(BUSPAR) 10 MG tablet, Take 10 mg by mouth 2 (Two) Times a Day., Disp: , Rfl:   •  cetirizine (zyrTEC) 10 MG tablet, Take 10 mg by mouth Daily., Disp: , Rfl:   •  coenzyme Q10 100 MG capsule, Take 100 mg by mouth Daily., Disp: , Rfl:   •  empagliflozin (Jardiance) 10 MG tablet tablet, Take 1 tablet by mouth Daily., Disp: 90 tablet, Rfl: 1  •  glimepiride (AMARYL) 2 MG tablet, Take 1 tablet by mouth Every Morning Before Breakfast., Disp: 90 tablet, Rfl: 1  •  losartan (COZAAR) 100 MG tablet, Take 100 mg by mouth Daily., Disp: , Rfl:   •  metFORMIN (GLUCOPHAGE) 500 MG tablet, Take 1,000 mg by mouth 2 (Two) Times a Day With Meals., Disp: , Rfl:   •  metoprolol tartrate (LOPRESSOR) 100 MG tablet, Take 1 tablet by mouth Every 12 (Twelve) Hours., Disp: 60 tablet, Rfl: 11  •  nitroglycerin (NITROSTAT) 0.4 MG SL tablet, Place 0.4 mg under the tongue Every 5 (Five) Minutes As Needed for Chest Pain. Take no more than 3 doses in 15 minutes., Disp: , Rfl:   •  omeprazole (priLOSEC) 40 MG capsule, Take 40 mg by mouth Daily., Disp: , Rfl:   •  ONETOUCH VERIO test strip, USE STRIP TO CHECK GLUCOSE THREE TIMES DAILY AS NEEDED, Disp: , Rfl:   •  potassium chloride (K-DUR,KLOR-CON) 10 MEQ CR tablet, Take 10 mEq by mouth Daily., Disp: , Rfl: 0  •  rosuvastatin (CRESTOR) 40 MG tablet, Take 40 mg by mouth Daily., Disp: , Rfl:   •  Semaglutide,0.25 or 0.5MG/DOS, (Ozempic, 0.25 or 0.5 MG/DOSE,) 2 MG/1.5ML solution pen-injector, Start with 0.25 mg sc weekly x 4 weeks then increase to 0.5 mg weekly, Disp: 4.5 mL, Rfl: 1  •  tamsulosin (FLOMAX) 0.4 MG capsule 24 hr capsule, Take 1 capsule by mouth Daily., Disp: , Rfl:   •  Vitamin D, Cholecalciferol, (CHOLECALCIFEROL) 400 units tablet, Take 400 Units by mouth Daily., Disp: , Rfl:   •  zolpidem (AMBIEN) 10 MG tablet, Take 10 mg by mouth Every Night., Disp: , Rfl: 3    Review of Systems  Review of Systems        Physical Exam    /68   Pulse 61   Ht 167.6 cm (66\")   Wt 105 kg (230 lb " 12.8 oz)   SpO2 95%   BMI 37.25 kg/m² Body mass index is 37.25 kg/m².  Physical Exam  Constitutional:       General: He is not in acute distress.     Appearance: He is well-developed. He is not diaphoretic.   HENT:      Head: Normocephalic.      Right Ear: External ear normal.      Left Ear: External ear normal.      Nose: Nose normal.   Eyes:      General: No scleral icterus.     Conjunctiva/sclera: Conjunctivae normal.   Neck:      Thyroid: No thyromegaly.      Vascular: No JVD.      Trachea: No tracheal deviation.   Cardiovascular:      Rate and Rhythm: Normal rate and regular rhythm.      Heart sounds: Normal heart sounds. No murmur heard.      Pulmonary:      Effort: Pulmonary effort is normal. No respiratory distress.      Breath sounds: Normal breath sounds. No wheezing.   Musculoskeletal:         General: No tenderness.      Cervical back: Neck supple.   Skin:     General: Skin is warm and dry.      Findings: No erythema or rash.   Neurological:      Mental Status: He is alert and oriented to person, place, and time.   Psychiatric:         Behavior: Behavior normal.         Thought Content: Thought content normal.         Judgment: Judgment normal.         Labs and Imaging   Lab Results   Component Value Date    HGBA1C 6.3 01/28/2022    HGBA1C 7.6 09/30/2021    HGBA1C 9.70 (H) 09/07/2019     Comprehensive Metabolic Panel (09/30/2021 11:33)  Lipid Panel (09/30/2021 11:33)  TSH (09/30/2021 11:33)  Microalbumin / Creatinine Urine Ratio - Urine, Clean Catch (09/30/2021 11:33)    Duplex Carotid Ultrasound CAR (10/21/2021 13:30)        Assessment / Plan   Diagnoses and all orders for this visit:    1. Type 2 diabetes mellitus with hyperglycemia, without long-term current use of insulin (HCC) (Primary)  -     POC Glycosylated Hemoglobin (Hb A1C)  -     POC Glucose, Blood    2. Mixed hyperlipidemia        Diabetes Mellitus 2 is under improved control.  -with peripheral neuropathy, history of CAD and CVA  -A1c 6.3,  down from 7.6 last time  -BG readings mostly <200, last visit were mostly >200  -continue Jardiance 10 mg daily  -continue Ozempic 0.5 mg weekly - no dose increase due to mild nausea, consider next visit if nausea resolves   -continue glimepiride 2 mg daily AC   -Labs and foot exam updated 9/2021, eye exam updated 5/2021    1.  Diet: 3-4 carb servings per meal for females, 4-5 carb servings per meal for males  Spread carb intake throughout the day  Increase lean protein and vegetable intake  Avoid sugary drinks and processed carbs including crackers, cookies, cakes  2.  Exercise: Recommend at least 30 minutes of exercise daily, at least 5 days per week. Increase exercise gradually.   3.  Blood Glucose Goal: Blood glucose goal <120 fasting, <180 2 hr postprandial  4.  Microalbumin due 9/2022  5.  Education performed during this visit: long term diabetic complications discussed. , annual eye examinations at Ophthalmology discussed, dental hygiene discussed  and foot care reviewed., home glucose monitoring emphasized, all medications, side effects and compliance discussed carefully and Hypoglycemia management and prevention reviewed. Reviewed ‘ABCs’ of diabetes management (respective goals in parentheses):  A1C (<7), blood pressure (<130/80), and cholesterol (LDL <100, if CVD <70).    Hyperlipidemia  -LDL within guidelines 9/2021  -continue crestor 40 mg qhs    There are no Patient Instructions on file for this visit.    Follow up: Return in about 3 months (around 4/28/2022).    Discussed the nature of the disease including, risks, complications, implications, management, safe and proper use of medications. Encouraged therapeutic lifestyle changes including low calorie diet with plenty of fruits and vegetables, daily exercise, medication compliance, and keeping scheduled follow up appointments with me and any other providers. Encouraged patient to have appointment for complete physical, fasting labs, appropriate  screenings, and immunizations on an annual basis.      Signed by: Sage Bonner PA-C  Endocrinology

## 2022-02-23 DIAGNOSIS — E11.65 UNCONTROLLED TYPE 2 DIABETES MELLITUS WITH HYPERGLYCEMIA: Chronic | ICD-10-CM

## 2022-02-23 RX ORDER — GLIMEPIRIDE 2 MG/1
TABLET ORAL
Qty: 90 TABLET | Refills: 1 | Status: SHIPPED | OUTPATIENT
Start: 2022-02-23 | End: 2022-09-21

## 2022-03-31 ENCOUNTER — TELEPHONE (OUTPATIENT)
Dept: NEUROLOGY | Facility: OTHER | Age: 67
End: 2022-03-31

## 2022-03-31 NOTE — TELEPHONE ENCOUNTER
"Caller: Clayton Sarah    Relationship: Emergency Contact; SPOUSE    Best call back number: [phone]    What was the call regarding: PT'S WIFE CALLED TO ASK IF PT WOULD NEED REFERRAL TO BECOME ESTABLISHED WITH  NEUROLOGY. I ADVISED WE ACCEPT SELF REFERRALS BUT DO REQUIRE RECORDS PRIOR TO SCHEDULING. PT'S WIFE STATES THAT SHE BELIEVES PT IS HAVING TIAS/MINI STROKES AS HE IS HAVING \"SPELLS\". PT'S WIFE DESCRIBES SPELLS AS LOSS OF AWARENESS, CONFUSION, LOSS OF CONCENTRATION. I ADVISED THAT PT'S WIFE NEEDS TO TAKE PT TO THE NEAREST ED FOR FURTHER EVAL WHERE CT HEAD SCAN CAN BE COMPLETED FOR STROKE WORK-UP. PT'S WIFE ASKS IF SHE SHOULD WAIT UNTIL PT HAS ANOTHER SPELL BEFORE TAKING HIM I STATED NO, SHE SHOULD TAKE PT AS SOON AS POSSIBLE, DO NOT WAIT UNTIL AN ACTIVE SPELL. ADVISED THAT IF PT IS HAVING SMALLER STROKES/TIAS, THESE COULD BE PRECURSORS TO A MORE SERIOUS MATTER AND IT IS IMPORTANT TO BE PROACTIVE. ADVISED THAT WE COULD SEE PT AT  NEURO AS OUTPATIENT & FOR STROKE PREVENTION AFTER ED VISIT. PT'S WIFE VERBALIZED UNDERSTANDING AND WILL TAKE PT TO THE NEAREST ED FOR FURTHER EVAL.    Patient directed to call 911 or go to their nearest emergency room.     Patient verbalized understanding: [x] Yes  [] No    DOCUMENTING PER HUB PROTOCOL.    "

## 2022-08-22 ENCOUNTER — TELEPHONE (OUTPATIENT)
Dept: CARDIAC SURGERY | Facility: CLINIC | Age: 67
End: 2022-08-22

## 2022-08-22 NOTE — TELEPHONE ENCOUNTER
Provider: DR KIRKLAND    Caller: RAFAEL SOUTH    Relationship to Patient: SPOUSE    Phone Number: [phone]    Reason for Call:     PT SPOUSE CALLED IN STATING THAT PT RECENTLY CATARACTS TAKEN OFF BY EYE DOC-    ON THE 12TH HAD US DONE IN [place].    US SHOWED CAROTID ARTERY IN HIS NECK-     LOOKS VERY BAD- POSSIBLITY OF STROKE.     PT SPOUSE CALLING TO INFORM THAT SHE IS HAVING EYE DOCTOR FAX OVER US & WOULD LIKE DR KIRKLAND TO TAKE A LOOK & ADVISE ON NEXT STEPS.

## 2022-09-12 ENCOUNTER — OFFICE VISIT (OUTPATIENT)
Dept: CARDIAC SURGERY | Facility: CLINIC | Age: 67
End: 2022-09-12

## 2022-09-12 VITALS
HEART RATE: 72 BPM | SYSTOLIC BLOOD PRESSURE: 147 MMHG | BODY MASS INDEX: 35.36 KG/M2 | DIASTOLIC BLOOD PRESSURE: 82 MMHG | WEIGHT: 220 LBS | TEMPERATURE: 97.8 F | HEIGHT: 66 IN | OXYGEN SATURATION: 98 %

## 2022-09-12 DIAGNOSIS — R42 DIZZINESS: Primary | ICD-10-CM

## 2022-09-12 DIAGNOSIS — I65.23 BILATERAL CAROTID ARTERY STENOSIS: Primary | ICD-10-CM

## 2022-09-12 DIAGNOSIS — I65.29 STENOSIS OF CAROTID ARTERY, UNSPECIFIED LATERALITY: ICD-10-CM

## 2022-09-12 PROCEDURE — 99215 OFFICE O/P EST HI 40 MIN: CPT | Performed by: THORACIC SURGERY (CARDIOTHORACIC VASCULAR SURGERY)

## 2022-09-12 RX ORDER — DIPHENOXYLATE HYDROCHLORIDE AND ATROPINE SULFATE 2.5; .025 MG/1; MG/1
1 TABLET ORAL DAILY
COMMUNITY

## 2022-09-12 RX ORDER — CLOPIDOGREL BISULFATE 75 MG/1
75 TABLET ORAL DAILY
COMMUNITY

## 2022-09-12 NOTE — PROGRESS NOTES
09/12/2022  Patient Information  Khoa Arnold                                                                                          [address] [facility] [address]   1955  'PCP/Referring Physician'  Shirlene Sharpe MD  [phone]  No ref. provider found    Chief Complaint   Patient presents with   • Consult     Referred for carotid stenosis,complains of dizziness and loss of balance.       History of Present Illness:   The patient is a 67-year-old male who was referred to me to evaluate carotid disease.  This is a patient who was seen by me in the past for coronary bypass grafting surgery but now he is seeing me for a new and completely different diagnoses.  Interestingly, he had a carotid duplex scan at our facility 1 year ago prior to his heart surgery which demonstrated no significant carotid artery disease.  However, the patient has now had symptoms of dizziness when he stands up and when he turns his head from one side to the other he says he experiences vision loss with the blackout of his vision which is very temporary lasting only 1 or 2 seconds.  He has now had a carotid duplex scan recently at an outside facility and the radiologist comments that by velocity criteria they see no significant disease but on grayscale imaging they see severe bilateral carotid stenosis. The patient has been referred to me to evaluate and he has not had a CT angiogram.  He denies stroke, seizures, TIA's or classic amaurosis fugax.  His primary symptom is dizziness sometimes by turning his head from side to side and occasional blackout of vision turning side to side.  Loss of vision tends to occur in both eyes simultaneously.      Patient Active Problem List   Diagnosis   • Unstable angina (HCC)   • Coronary artery disease   • Type 2 diabetes mellitus with circulatory disorder, without long-term current use of insulin (HCC)   • Benign essential HTN   • Fractured sternal wires (HCC)   • Sick sinus  syndrome (HCC)   • Mixed hyperlipidemia   • Bilateral carotid artery stenosis     Past Medical History:   Diagnosis Date   • Aortic valve stenosis    • Asthma    • Coronary artery disease    • Diabetes mellitus (HCC)    • Hyperlipidemia    • Hypertension    • Stroke ([facility])     2009, no residual   • Type 2 diabetes mellitus with circulatory disorder, without long-term current use of insulin ([facility]) 9/6/2019     Past Surgical History:   Procedure Laterality Date   • CARDIAC CATHETERIZATION      3 stents    • CARDIAC CATHETERIZATION N/A 09/06/2019    Procedure: Left Heart Cath;  Surgeon: Jonathan Pineda MD;  Location:  [facility];  Service: Cardiovascular   • CARDIAC ELECTROPHYSIOLOGY PROCEDURE N/A 01/19/2021    Procedure: DEVICE IMPLANT;  Surgeon: Jonathan Pineda MD;  Location:  [facility];  Service: Cardiovascular;  Laterality: N/A;   • CHOLECYSTECTOMY     • COLONOSCOPY     • CORONARY ANGIOPLASTY WITH STENT PLACEMENT      x 3, last one 2014   • CORONARY ARTERY BYPASS GRAFT WITH AORTIC VALVE REPAIR/REPLACEMENT N/A 09/10/2019    Procedure: CARLYN PER ANESTHESIA, MEDIAN STERNOTOMY, CORONARY ARTERY BYPASS GRAFT X 4, UTILIZING THE LEFT INTERNAL MAMMARY ARTERY, AND EVH OF THE RIGHT GREATER SAPHENOUS VEIN,   AORTIC VALVE REPLACEMENT;  Surgeon: Denys Goldsmith MD;  Location:  [facility];  Service: Cardiothoracic   • INSERT / REPLACE / REMOVE PACEMAKER     • UMBILICAL HERNIA REPAIR         Current Outpatient Medications:   •  albuterol sulfate  (90 Base) MCG/ACT inhaler, Inhale 2 puffs Every 4 (Four) Hours As Needed for Wheezing., Disp: , Rfl:   •  aspirin 81 MG EC tablet, Take 81 mg by mouth Daily., Disp: , Rfl:   •  busPIRone (BUSPAR) 10 MG tablet, Take 10 mg by mouth 2 (Two) Times a Day., Disp: , Rfl:   •  clopidogrel (PLAVIX) 75 MG tablet, Take 75 mg by mouth Daily., Disp: , Rfl:   •  coenzyme Q10 100 MG capsule, Take 100 mg by mouth Daily., Disp: , Rfl:   •   empagliflozin (Jardiance) 10 MG tablet tablet, Take 1 tablet by mouth Daily., Disp: 90 tablet, Rfl: 1  •  glimepiride (AMARYL) 2 MG tablet, TAKE 1 TABLET EVERY MORNING BEFORE BREAKFAST (Patient taking differently: 4 mg.), Disp: 90 tablet, Rfl: 1  •  losartan (COZAAR) 100 MG tablet, Take 100 mg by mouth Daily., Disp: , Rfl:   •  metFORMIN (GLUCOPHAGE) 500 MG tablet, Take 1,000 mg by mouth 2 (Two) Times a Day With Meals., Disp: , Rfl:   •  metoprolol tartrate (LOPRESSOR) 100 MG tablet, Take 1 tablet by mouth Every 12 (Twelve) Hours., Disp: 60 tablet, Rfl: 11  •  multivitamin (MULTI-VITAMIN PO), Take  by mouth Daily., Disp: , Rfl:   •  nitroglycerin (NITROSTAT) 0.4 MG SL tablet, Place 0.4 mg under the tongue Every 5 (Five) Minutes As Needed for Chest Pain. Take no more than 3 doses in 15 minutes., Disp: , Rfl:   •  omeprazole (priLOSEC) 40 MG capsule, Take 40 mg by mouth Daily., Disp: , Rfl:   •  ONETOUCH VERIO test strip, USE STRIP TO CHECK GLUCOSE THREE TIMES DAILY AS NEEDED, Disp: , Rfl:   •  potassium chloride (K-DUR,KLOR-CON) 10 MEQ CR tablet, Take 10 mEq by mouth Daily., Disp: , Rfl: 0  •  rosuvastatin (CRESTOR) 40 MG tablet, Take 40 mg by mouth Daily., Disp: , Rfl:   •  Semaglutide,0.25 or 0.5MG/DOS, (Ozempic, 0.25 or 0.5 MG/DOSE,) 2 MG/1.5ML solution pen-injector, Start with 0.25 mg sc weekly x 4 weeks then increase to 0.5 mg weekly, Disp: 4.5 mL, Rfl: 1  •  tamsulosin (FLOMAX) 0.4 MG capsule 24 hr capsule, Take 1 capsule by mouth Daily., Disp: , Rfl:   •  amLODIPine (NORVASC) 5 MG tablet, Take 5 mg by mouth Daily. (Patient not taking: Reported on 9/12/2022), Disp: , Rfl:   •  cetirizine (zyrTEC) 10 MG tablet, Take 10 mg by mouth Daily. (Patient not taking: Reported on 9/12/2022), Disp: , Rfl:   •  Vitamin D, Cholecalciferol, (CHOLECALCIFEROL) 400 units tablet, Take 400 Units by mouth Daily. (Patient not taking: Reported on 9/12/2022), Disp: , Rfl:   •  zolpidem (AMBIEN) 10 MG tablet, Take 10 mg by mouth Every  Night. (Patient not taking: Reported on 2022), Disp: , Rfl: 3  Allergies   Allergen Reactions   • Ranexa [Ranolazine Er] Nausea Only and Dizziness   • Lisinopril Itching and Rash     On feet     Social History     Socioeconomic History   • Marital status:    • Number of children: 1   Tobacco Use   • Smoking status: Former Smoker     Years: 1.00     Types: Cigars     Quit date: 10/14/1979     Years since quittin.9   • Smokeless tobacco: Former User     Types: Chew     Quit date: 2004   Vaping Use   • Vaping Use: Never used   Substance and Sexual Activity   • Alcohol use: No   • Drug use: No   • Sexual activity: Defer     Family History   Problem Relation Age of Onset   • Coronary artery disease Mother    • Diabetes Mother    • Coronary artery disease Father    • Diabetes Father      Review of Systems   Constitutional: Positive for malaise/fatigue. Negative for chills, fever, night sweats and weight loss.   HENT: Negative.  Negative for hearing loss, odynophagia and sore throat.    Cardiovascular: Positive for dyspnea on exertion. Negative for chest pain, leg swelling, orthopnea and palpitations.   Respiratory: Negative.  Negative for cough and hemoptysis.    Endocrine: Negative for cold intolerance, heat intolerance, polydipsia, polyphagia and polyuria.   Hematologic/Lymphatic: Negative.  Does not bruise/bleed easily.   Skin: Negative.  Negative for itching and rash.   Musculoskeletal: Positive for joint pain. Negative for joint swelling and myalgias.   Gastrointestinal: Negative.  Negative for abdominal pain, constipation, diarrhea, hematemesis, hematochezia, melena, nausea and vomiting.   Genitourinary: Negative.  Negative for dysuria, frequency and hematuria.   Neurological: Positive for dizziness, light-headedness and loss of balance. Negative for focal weakness, headaches, numbness and seizures.   Psychiatric/Behavioral: Positive for depression. Negative for suicidal ideas. The patient is  "nervous/anxious.    Allergic/Immunologic: Negative.    All other systems reviewed and are negative.    Vitals:    09/12/22 0745   BP: 147/82   BP Location: Right arm   Patient Position: Sitting   Pulse: 72   Temp: 97.8 °F (36.6 °C)   SpO2: 98%   Weight: 99.8 kg (220 lb)   Height: 167.6 cm (66\")      Physical Exam CONSTITUTIONAL: Alert and conversant, Well dressed, Well nourished, No acute distress  EYES: Sclera clean, Anicteric, Pupils equal  ENT: No nasal deviation, Trachea midline  NECK: No neck masses, Supple  LUNGS: No wheezing, Cough, non-congested  HEART: No rubs, No murmurs  GASTROINTESTINAL: Soft, non-distended, No masses, Non tender  to palpation, normal bowel sounds  NEURO: No motor deficits, No sensory deficits, Cranial Nerves 2 through 12 grossly intact  PSYCHIATRIC: Oriented to person, place and time, No memory deficits, Mood appropriate  VASCULAR: No carotid bruits, Femoral pulses palpable and symmetric  MUSKULOSKELETAL: No extremity trauma or extremity asymmetry    The ROS, past medical history, surgical history, family history, social history and vitals were reviewed by myself and corrected as needed.      Labs/Imaging:  I reviewed the carotid duplex raw data from both facilities on 2 different scans.    Assessment/Plan:   As described above, the patient is a 67-year-old male that I am now seeing for a new and completely different diagnoses.  He had coronary bypass grafting surgery, approximately, 11 months ago in our facility and his carotid duplex scan at that time demonstrated no significant carotid disease.  Now, he has been complaining of dizziness when he turns his head from side to side and transient bilateral vision loss when he turns his head from side to side.  A duplex scan at an outside facility recently demonstrates no carotid disease by velocity criteria. However, on grayscale imaging they say there is severe bilateral internal carotid disease.  At this time I have made arrangements for " a CT angiogram of the carotids with 3D recon and have indicated the patient to continue his Plavix until we have those results. The patient says he lives a long way from the hospital and if his carotid disease is significant he just wants us to call him and schedule him for surgery and not make another return trip to the office.  I have indicated that if surgery is needed, the hospital stay is typically 1 night and we do not perform bilateral carotid endarterectomies simultaneously; in most cases.  I have also indicated that the surgery has a 1% risk of stroke, bleeding, infection and death and he understands.  We will try to obtain that CT angiogram promptly.    Patient Active Problem List   Diagnosis   • Unstable angina (HCC)   • Coronary artery disease   • Type 2 diabetes mellitus with circulatory disorder, without long-term current use of insulin (HCC)   • Benign essential HTN   • Fractured sternal wires (HCC)   • Sick sinus syndrome (HCC)   • Mixed hyperlipidemia   • Bilateral carotid artery stenosis       CC: MD Anastasiya Peoples editing for Denys Goldsmith MD      I, Denys Goldsmith MD, have read and agree with the editing done by Anastasiya Gu, .

## 2022-09-20 ENCOUNTER — APPOINTMENT (OUTPATIENT)
Dept: CT IMAGING | Facility: HOSPITAL | Age: 67
End: 2022-09-20

## 2022-09-21 ENCOUNTER — OFFICE VISIT (OUTPATIENT)
Dept: ENDOCRINOLOGY | Facility: CLINIC | Age: 67
End: 2022-09-21

## 2022-09-21 ENCOUNTER — HOSPITAL ENCOUNTER (OUTPATIENT)
Dept: CT IMAGING | Facility: HOSPITAL | Age: 67
Discharge: HOME | End: 2022-09-21

## 2022-09-21 ENCOUNTER — LAB (OUTPATIENT)
Dept: LAB | Facility: HOSPITAL | Age: 67
End: 2022-09-21

## 2022-09-21 VITALS
SYSTOLIC BLOOD PRESSURE: 114 MMHG | HEART RATE: 71 BPM | OXYGEN SATURATION: 95 % | HEIGHT: 66 IN | BODY MASS INDEX: 36.32 KG/M2 | DIASTOLIC BLOOD PRESSURE: 68 MMHG | WEIGHT: 226 LBS

## 2022-09-21 DIAGNOSIS — E78.2 MIXED HYPERLIPIDEMIA: Chronic | ICD-10-CM

## 2022-09-21 DIAGNOSIS — I65.29 STENOSIS OF CAROTID ARTERY, UNSPECIFIED LATERALITY: ICD-10-CM

## 2022-09-21 DIAGNOSIS — E11.649 CONTROLLED TYPE 2 DIABETES MELLITUS WITH HYPOGLYCEMIA, WITHOUT LONG-TERM CURRENT USE OF INSULIN: Primary | Chronic | ICD-10-CM

## 2022-09-21 DIAGNOSIS — R42 DIZZINESS: ICD-10-CM

## 2022-09-21 LAB
ALBUMIN SERPL-MCNC: 4 G/DL (ref 3.5–5.2)
ALBUMIN/GLOB SERPL: 1.5 G/DL
ALP SERPL-CCNC: 54 U/L (ref 39–117)
ALT SERPL W P-5'-P-CCNC: 21 U/L (ref 1–41)
ANION GAP SERPL CALCULATED.3IONS-SCNC: 10.1 MMOL/L (ref 5–15)
AST SERPL-CCNC: 19 U/L (ref 1–40)
BILIRUB SERPL-MCNC: 0.4 MG/DL (ref 0–1.2)
BUN SERPL-MCNC: 20 MG/DL (ref 8–23)
BUN/CREAT SERPL: 16.3 (ref 7–25)
CALCIUM SPEC-SCNC: 9.3 MG/DL (ref 8.6–10.5)
CHLORIDE SERPL-SCNC: 103 MMOL/L (ref 98–107)
CHOLEST SERPL-MCNC: 82 MG/DL (ref 0–200)
CO2 SERPL-SCNC: 26.9 MMOL/L (ref 22–29)
CREAT BLDA-MCNC: 1.3 MG/DL (ref 0.6–1.3)
CREAT SERPL-MCNC: 1.23 MG/DL (ref 0.76–1.27)
EGFRCR SERPLBLD CKD-EPI 2021: 64.3 ML/MIN/1.73
EXPIRATION DATE: ABNORMAL
EXPIRATION DATE: NORMAL
GLOBULIN UR ELPH-MCNC: 2.6 GM/DL
GLUCOSE BLDC GLUCOMTR-MCNC: 221 MG/DL (ref 70–130)
GLUCOSE SERPL-MCNC: 131 MG/DL (ref 65–99)
HBA1C MFR BLD: 6.3 %
HDLC SERPL-MCNC: 34 MG/DL (ref 40–60)
LDLC SERPL CALC-MCNC: 32 MG/DL (ref 0–100)
LDLC/HDLC SERPL: 0.97 {RATIO}
Lab: ABNORMAL
Lab: NORMAL
POTASSIUM SERPL-SCNC: 4.9 MMOL/L (ref 3.5–5.2)
PROT SERPL-MCNC: 6.6 G/DL (ref 6–8.5)
SODIUM SERPL-SCNC: 140 MMOL/L (ref 136–145)
TRIGL SERPL-MCNC: 75 MG/DL (ref 0–150)
TSH SERPL DL<=0.05 MIU/L-ACNC: 1.45 UIU/ML (ref 0.27–4.2)
VLDLC SERPL-MCNC: 16 MG/DL (ref 5–40)

## 2022-09-21 PROCEDURE — 82043 UR ALBUMIN QUANTITATIVE: CPT | Performed by: PHYSICIAN ASSISTANT

## 2022-09-21 PROCEDURE — 82570 ASSAY OF URINE CREATININE: CPT | Performed by: PHYSICIAN ASSISTANT

## 2022-09-21 PROCEDURE — 82565 ASSAY OF CREATININE: CPT

## 2022-09-21 PROCEDURE — 82947 ASSAY GLUCOSE BLOOD QUANT: CPT | Performed by: PHYSICIAN ASSISTANT

## 2022-09-21 PROCEDURE — 83036 HEMOGLOBIN GLYCOSYLATED A1C: CPT | Performed by: PHYSICIAN ASSISTANT

## 2022-09-21 PROCEDURE — 99214 OFFICE O/P EST MOD 30 MIN: CPT | Performed by: PHYSICIAN ASSISTANT

## 2022-09-21 PROCEDURE — 0 IOPAMIDOL PER 1 ML: Performed by: NURSE PRACTITIONER

## 2022-09-21 PROCEDURE — 3044F HG A1C LEVEL LT 7.0%: CPT | Performed by: PHYSICIAN ASSISTANT

## 2022-09-21 PROCEDURE — 70498 CT ANGIOGRAPHY NECK: CPT

## 2022-09-21 RX ORDER — LANCETS
EACH MISCELLANEOUS
COMMUNITY
Start: 2022-07-26

## 2022-09-21 RX ORDER — SEMAGLUTIDE 2.68 MG/ML
2 INJECTION, SOLUTION SUBCUTANEOUS WEEKLY
Qty: 9 ML | Refills: 1 | Status: SHIPPED | OUTPATIENT
Start: 2022-09-21

## 2022-09-21 RX ORDER — GLIMEPIRIDE 4 MG/1
4 TABLET ORAL DAILY
COMMUNITY
Start: 2022-09-08 | End: 2022-10-19

## 2022-09-21 RX ORDER — SEMAGLUTIDE 1.34 MG/ML
1 INJECTION, SOLUTION SUBCUTANEOUS WEEKLY
COMMUNITY
Start: 2022-09-09 | End: 2022-09-21 | Stop reason: DRUGHIGH

## 2022-09-21 RX ADMIN — IOPAMIDOL 75 ML: 755 INJECTION, SOLUTION INTRAVENOUS at 09:19

## 2022-09-21 NOTE — PROGRESS NOTES
Chief Complaint  F/u for Diabetes Mellitus.    HPI   Khoa Arnold is a 67 y.o. male with htn, hyperlipidemia, sick sinus syndrome s/p PPM, CAD s/p CABG with AVR, vit d deficiency who is here today for f/u of Diabetes Mellitus type 2. The initial diagnosis of diabetes was made in his early 60s.     BG has improved.  Would like to lose more weight. Ok with increasing Ozempic further.       Diabetic complications: cardiovascular disease and cerebrovascular disease. CAD s/p cabg, hx of 2 CVAs, mild peripheral neuropathy  Eye exam current (within one year): utd kya and kya due 5/2022    Current diabetic medications include:  Metformin 500 mg 2 tabs twice daily  Glimepiride 2 mg daily   Jardiance 10 mg daily  ozempic 1 mg weekly    Statin: Crestor 40 mg - hyperlipidemia    Past medications: Januvia    Diabetic Monitoring  - checks glucose 2-3x/day  Glucose is averaging- Bg ranging , mostly <190  Hypoglycemia- occasional  Home blood sugar records: glucometer downloaded, data reviewed and scanned to chart    The following portions of the patient's history were reviewed and updated by me as appropriate: allergies, current medications, past family history, past social history, past surgical history and problem list.    Past Medical History:   Diagnosis Date   • Aortic valve stenosis    • Asthma    • Coronary artery disease    • Diabetes mellitus (HCC)    • Hyperlipidemia    • Hypertension    • Stroke (HCC)     2009, no residual   • Type 2 diabetes mellitus with circulatory disorder, without long-term current use of insulin (HCC) 9/6/2019       Medications    Current Outpatient Medications:   •  Accu-Chek Softclix Lancets lancets, Use to check glucose three times daily, Disp: , Rfl:   •  albuterol sulfate  (90 Base) MCG/ACT inhaler, Inhale 2 puffs Every 4 (Four) Hours As Needed for Wheezing., Disp: , Rfl:   •  amLODIPine (NORVASC) 5 MG tablet, Take 5 mg by mouth Daily., Disp: , Rfl:   •  aspirin 81 MG  EC tablet, Take 81 mg by mouth Daily., Disp: , Rfl:   •  busPIRone (BUSPAR) 10 MG tablet, Take 10 mg by mouth 2 (Two) Times a Day., Disp: , Rfl:   •  cetirizine (zyrTEC) 10 MG tablet, Take 10 mg by mouth Daily., Disp: , Rfl:   •  clopidogrel (PLAVIX) 75 MG tablet, Take 75 mg by mouth Daily., Disp: , Rfl:   •  coenzyme Q10 100 MG capsule, Take 100 mg by mouth Daily., Disp: , Rfl:   •  empagliflozin (Jardiance) 10 MG tablet tablet, Take 1 tablet by mouth Daily., Disp: 90 tablet, Rfl: 1  •  glimepiride (AMARYL) 4 MG tablet, Take 4 mg by mouth Daily., Disp: , Rfl:   •  losartan (COZAAR) 100 MG tablet, Take 100 mg by mouth Daily., Disp: , Rfl:   •  metFORMIN (GLUCOPHAGE) 500 MG tablet, Take 1,000 mg by mouth 2 (Two) Times a Day With Meals., Disp: , Rfl:   •  metoprolol tartrate (LOPRESSOR) 100 MG tablet, Take 1 tablet by mouth Every 12 (Twelve) Hours., Disp: 60 tablet, Rfl: 11  •  multivitamin (THERAGRAN) tablet tablet, Take  by mouth Daily., Disp: , Rfl:   •  nitroglycerin (NITROSTAT) 0.4 MG SL tablet, Place 0.4 mg under the tongue Every 5 (Five) Minutes As Needed for Chest Pain. Take no more than 3 doses in 15 minutes., Disp: , Rfl:   •  omeprazole (priLOSEC) 40 MG capsule, Take 40 mg by mouth Daily., Disp: , Rfl:   •  ONETOUCH VERIO test strip, USE STRIP TO CHECK GLUCOSE THREE TIMES DAILY AS NEEDED, Disp: , Rfl:   •  potassium chloride (K-DUR,KLOR-CON) 10 MEQ CR tablet, Take 10 mEq by mouth Daily., Disp: , Rfl: 0  •  rosuvastatin (CRESTOR) 40 MG tablet, Take 40 mg by mouth Daily., Disp: , Rfl:   •  tamsulosin (FLOMAX) 0.4 MG capsule 24 hr capsule, Take 1 capsule by mouth Daily., Disp: , Rfl:   •  Vitamin D, Cholecalciferol, (CHOLECALCIFEROL) 400 units tablet, Take 400 Units by mouth Daily., Disp: , Rfl:   •  zolpidem (AMBIEN) 10 MG tablet, Take 10 mg by mouth Every Night., Disp: , Rfl: 3  •  Semaglutide, 2 MG/DOSE, (Ozempic, 2 MG/DOSE,) 8 MG/3ML solution pen-injector, Inject 2 mg under the skin into the appropriate  "area as directed 1 (One) Time Per Week. Dose increase, Disp: 9 mL, Rfl: 1  No current facility-administered medications for this visit.    Review of Systems  Review of Systems     Physical Exam    /68   Pulse 71   Ht 167.6 cm (66\")   Wt 103 kg (226 lb)   SpO2 95%   BMI 36.48 kg/m² Body mass index is 36.48 kg/m².  Physical Exam  Constitutional:       General: He is not in acute distress.     Appearance: He is well-developed. He is not diaphoretic.   HENT:      Head: Normocephalic.      Right Ear: External ear normal.      Left Ear: External ear normal.      Nose: Nose normal.   Eyes:      General: No scleral icterus.     Conjunctiva/sclera: Conjunctivae normal.   Neck:      Thyroid: No thyromegaly.      Vascular: No JVD.      Trachea: No tracheal deviation.   Cardiovascular:      Rate and Rhythm: Normal rate and regular rhythm.      Pulses:           Dorsalis pedis pulses are 2+ on the right side and 2+ on the left side.        Posterior tibial pulses are 2+ on the right side and 2+ on the left side.      Heart sounds: Normal heart sounds. No murmur heard.  Pulmonary:      Effort: Pulmonary effort is normal. No respiratory distress.      Breath sounds: Normal breath sounds. No wheezing.   Musculoskeletal:         General: No tenderness.      Cervical back: Neck supple.      Right foot: No deformity or Charcot foot.      Left foot: No deformity or Charcot foot.   Feet:      Right foot:      Protective Sensation: 5 sites tested. 5 sites sensed.      Skin integrity: No ulcer, blister, skin breakdown, erythema, warmth or callus.      Left foot:      Protective Sensation: 5 sites tested. 5 sites sensed.      Skin integrity: No ulcer, blister, skin breakdown, erythema, warmth or callus.      Comments: Diabetic Foot Exam Performed  Skin:     General: Skin is warm and dry.      Findings: No erythema or rash.   Neurological:      Mental Status: He is alert and oriented to person, place, and time.   Psychiatric:    "      Behavior: Behavior normal.         Thought Content: Thought content normal.         Judgment: Judgment normal.         Labs and Imaging   Lab Results   Component Value Date    HGBA1C 6.3 09/21/2022    HGBA1C 6.3 01/28/2022    HGBA1C 7.6 09/30/2021       Duplex Carotid Ultrasound CAR (10/21/2021 13:30)        Assessment / Plan   Diagnoses and all orders for this visit:    1. Controlled type 2 diabetes mellitus with hypoglycemia, without long-term current use of insulin (HCC) (Primary)  -     POC Glycosylated Hemoglobin (Hb A1C)  -     POC Glucose, Blood  -     Semaglutide, 2 MG/DOSE, (Ozempic, 2 MG/DOSE,) 8 MG/3ML solution pen-injector; Inject 2 mg under the skin into the appropriate area as directed 1 (One) Time Per Week. Dose increase  Dispense: 9 mL; Refill: 1  -     Microalbumin / Creatinine Urine Ratio - Urine, Clean Catch  -     Cancel: Comprehensive Metabolic Panel  -     Cancel: Lipid Panel  -     Cancel: TSH    2. Mixed hyperlipidemia  -     Cancel: Comprehensive Metabolic Panel  -     Cancel: Lipid Panel        Diabetes Mellitus 2 is under improved control.  -with peripheral neuropathy, history of CAD and CVA  -A1c 6.3  -with some hypoglycemia  -dc glimepiride  -increase Ozempic to 2 mg weekly  -continue Jardiance 10 mg daily  -Labs and foot exam updated today, eye exam updated 5/2022    Hyperlipidemia  -Lipid panel  -continue crestor 40 mg qhs    Patient Instructions   Increase Ozempic to 2 mg weekly  Stop glimepiride  Continue jardiance and metformin      Follow up: Return in about 3 months (around 12/21/2022).    Signed by: Sage Bonner PA-C  Endocrinology

## 2022-09-22 LAB
ALBUMIN UR-MCNC: <1.2 MG/DL
CREAT UR-MCNC: 33.2 MG/DL
MICROALBUMIN/CREAT UR: NORMAL MG/G{CREAT}

## 2022-09-23 ENCOUNTER — TELEPHONE (OUTPATIENT)
Dept: CARDIAC SURGERY | Facility: CLINIC | Age: 67
End: 2022-09-23

## 2022-09-23 NOTE — TELEPHONE ENCOUNTER
Caller: RAAFEL SOUTH     Relationship: WIFE    Best call back number: [phone]    What is the best time to reach you: ANY  Who are you requesting to speak with (clinical staff, provider,  specific staff member): ANY    Do you know the name of the person who called: PT WIFE     What was the call regarding:   PT WIFE CALLED WANTING TO KNOW THE CT RESULTS OF PT THAT WAS DONE ON 9.12.22    Do you require a callback: YES

## 2022-09-27 ENCOUNTER — TELEPHONE (OUTPATIENT)
Dept: CARDIAC SURGERY | Facility: CLINIC | Age: 67
End: 2022-09-27

## 2022-09-27 NOTE — TELEPHONE ENCOUNTER
Provider: DR KIRKLAND  Caller: RAFAEL SOUTH  Relationship to Patient: SPOUSE  Reason for Call: [phone]    PT SPOUSE CALLED IN REQUESTING SOMEONE GIVE THEM A CALL TO GO OVER THE CT SCAN THAT WAS PERFORMED LAST Wednesday 9/21/22.    PLEASE CONTACT PT WHEN YOU'VE HAD A CHANCE TO REVIEW WITH UPDATE    THANK YOU!

## 2022-09-27 NOTE — TELEPHONE ENCOUNTER
I s/w the pt and explained his CTA results are on Dr. Agus godfrey for review and that I would contact him once I have received a response back from Dr. Goldsmith

## 2022-10-04 ENCOUNTER — TELEPHONE (OUTPATIENT)
Dept: CARDIAC SURGERY | Facility: CLINIC | Age: 67
End: 2022-10-04

## 2022-10-04 NOTE — TELEPHONE ENCOUNTER
Caller: Sarah Arnold    Relationship: Emergency Contact    Best call back number: [phone]    What is the best time to reach you: ANY    Who are you requesting to speak with (clinical staff, provider,  specific staff member): CLINICAL    What was the call regarding:    PT CALLED IN WITH QUESTIONS REGARDING A RECENT CT SCAN.     PT WAS CURIOUS IF RESULTS HAVE BEEN READ & WOULD LIKE TO BE CONTACTED WITH AN UPDATE ON THE RESULTS.     Do you require a callback: YES, PLEASE CALL BACK PT WHEN CT HAS BEEN READ- THANK YOU!

## 2022-10-11 ENCOUNTER — TELEPHONE (OUTPATIENT)
Dept: ENDOCRINOLOGY | Facility: CLINIC | Age: 67
End: 2022-10-11

## 2022-10-18 DIAGNOSIS — I65.23 BILATERAL CAROTID ARTERY STENOSIS: Primary | ICD-10-CM

## 2022-10-19 ENCOUNTER — PRE-ADMISSION TESTING (OUTPATIENT)
Dept: PREADMISSION TESTING | Facility: HOSPITAL | Age: 67
End: 2022-10-19

## 2022-10-19 ENCOUNTER — HOSPITAL ENCOUNTER (OUTPATIENT)
Dept: GENERAL RADIOLOGY | Facility: HOSPITAL | Age: 67
Discharge: HOME | End: 2022-10-19

## 2022-10-19 ENCOUNTER — PREP FOR SURGERY (OUTPATIENT)
Dept: OTHER | Facility: HOSPITAL | Age: 67
End: 2022-10-19

## 2022-10-19 VITALS — HEIGHT: 66 IN | BODY MASS INDEX: 35.34 KG/M2 | WEIGHT: 219.91 LBS

## 2022-10-19 DIAGNOSIS — I65.23 BILATERAL CAROTID ARTERY STENOSIS: Primary | ICD-10-CM

## 2022-10-19 DIAGNOSIS — I65.23 BILATERAL CAROTID ARTERY STENOSIS: ICD-10-CM

## 2022-10-19 LAB
ABO GROUP BLD: NORMAL
AMPHET+METHAMPHET UR QL: NEGATIVE
AMPHETAMINES UR QL: NEGATIVE
ANION GAP SERPL CALCULATED.3IONS-SCNC: 9 MMOL/L (ref 5–15)
BARBITURATES UR QL SCN: NEGATIVE
BENZODIAZ UR QL SCN: POSITIVE
BLD GP AB SCN SERPL QL: NEGATIVE
BUN SERPL-MCNC: 22 MG/DL (ref 8–23)
BUN/CREAT SERPL: 18 (ref 7–25)
BUPRENORPHINE SERPL-MCNC: NEGATIVE NG/ML
CALCIUM SPEC-SCNC: 9.7 MG/DL (ref 8.6–10.5)
CANNABINOIDS SERPL QL: NEGATIVE
CHLORIDE SERPL-SCNC: 108 MMOL/L (ref 98–107)
CO2 SERPL-SCNC: 27 MMOL/L (ref 22–29)
COCAINE UR QL: NEGATIVE
CREAT SERPL-MCNC: 1.22 MG/DL (ref 0.76–1.27)
DEPRECATED RDW RBC AUTO: 45.5 FL (ref 37–54)
EGFRCR SERPLBLD CKD-EPI 2021: 64.6 ML/MIN/1.73
ERYTHROCYTE [DISTWIDTH] IN BLOOD BY AUTOMATED COUNT: 13.5 % (ref 12.3–15.4)
GLUCOSE SERPL-MCNC: 79 MG/DL (ref 65–99)
HBA1C MFR BLD: 6.6 % (ref 4.8–5.6)
HCT VFR BLD AUTO: 41 % (ref 37.5–51)
HGB BLD-MCNC: 13.3 G/DL (ref 13–17.7)
INR PPP: 1 (ref 0.84–1.13)
MCH RBC QN AUTO: 30 PG (ref 26.6–33)
MCHC RBC AUTO-ENTMCNC: 32.4 G/DL (ref 31.5–35.7)
MCV RBC AUTO: 92.6 FL (ref 79–97)
METHADONE UR QL SCN: NEGATIVE
OPIATES UR QL: NEGATIVE
OXYCODONE UR QL SCN: NEGATIVE
PCP UR QL SCN: NEGATIVE
PLATELET # BLD AUTO: 161 10*3/MM3 (ref 140–450)
PMV BLD AUTO: 8.8 FL (ref 6–12)
POTASSIUM SERPL-SCNC: 4.7 MMOL/L (ref 3.5–5.2)
PROPOXYPH UR QL: NEGATIVE
PROTHROMBIN TIME: 13.1 SECONDS (ref 11.4–14.4)
QT INTERVAL: 446 MS
QTC INTERVAL: 452 MS
RBC # BLD AUTO: 4.43 10*6/MM3 (ref 4.14–5.8)
RH BLD: NEGATIVE
SODIUM SERPL-SCNC: 144 MMOL/L (ref 136–145)
T&S EXPIRATION DATE: NORMAL
TRICYCLICS UR QL SCN: NEGATIVE
WBC NRBC COR # BLD: 5.53 10*3/MM3 (ref 3.4–10.8)

## 2022-10-19 PROCEDURE — 86900 BLOOD TYPING SEROLOGIC ABO: CPT

## 2022-10-19 PROCEDURE — 80048 BASIC METABOLIC PNL TOTAL CA: CPT

## 2022-10-19 PROCEDURE — 36415 COLL VENOUS BLD VENIPUNCTURE: CPT

## 2022-10-19 PROCEDURE — 80305 DRUG TEST PRSMV DIR OPT OBS: CPT | Performed by: PHYSICIAN ASSISTANT

## 2022-10-19 PROCEDURE — 85027 COMPLETE CBC AUTOMATED: CPT

## 2022-10-19 PROCEDURE — 85610 PROTHROMBIN TIME: CPT

## 2022-10-19 PROCEDURE — 86901 BLOOD TYPING SEROLOGIC RH(D): CPT

## 2022-10-19 PROCEDURE — 93005 ELECTROCARDIOGRAM TRACING: CPT

## 2022-10-19 PROCEDURE — 86850 RBC ANTIBODY SCREEN: CPT

## 2022-10-19 PROCEDURE — 71046 X-RAY EXAM CHEST 2 VIEWS: CPT

## 2022-10-19 PROCEDURE — 93010 ELECTROCARDIOGRAM REPORT: CPT | Performed by: INTERNAL MEDICINE

## 2022-10-19 PROCEDURE — 80306 DRUG TEST PRSMV INSTRMNT: CPT | Performed by: PHYSICIAN ASSISTANT

## 2022-10-19 PROCEDURE — 83036 HEMOGLOBIN GLYCOSYLATED A1C: CPT

## 2022-10-19 RX ORDER — CHLORHEXIDINE GLUCONATE 500 MG/1
1 CLOTH TOPICAL EVERY 12 HOURS PRN
Status: CANCELLED | OUTPATIENT
Start: 2022-10-19

## 2022-10-19 RX ORDER — TEMAZEPAM 30 MG/1
30 CAPSULE ORAL NIGHTLY PRN
COMMUNITY

## 2022-10-19 RX ORDER — CEFAZOLIN SODIUM 2 G/100ML
2 INJECTION, SOLUTION INTRAVENOUS ONCE
Status: CANCELLED | OUTPATIENT
Start: 2022-10-20 | End: 2022-10-20

## 2022-10-19 NOTE — PAT
PACEMAKER WAS INTERROGATED AT DR. LEMUS OFFICE, RN REQUESTED REPORT WHILE PATIENT WAS IN PAT BUT DID NOT RECEIVE

## 2022-10-20 ENCOUNTER — ANESTHESIA EVENT (OUTPATIENT)
Dept: PERIOP | Facility: HOSPITAL | Age: 67
End: 2022-10-20

## 2022-10-20 ENCOUNTER — APPOINTMENT (OUTPATIENT)
Dept: NEUROLOGY | Facility: HOSPITAL | Age: 67
End: 2022-10-20

## 2022-10-20 ENCOUNTER — HOSPITAL ENCOUNTER (INPATIENT)
Facility: HOSPITAL | Age: 67
LOS: 1 days | Discharge: HOME | End: 2022-10-21
Attending: THORACIC SURGERY (CARDIOTHORACIC VASCULAR SURGERY) | Admitting: THORACIC SURGERY (CARDIOTHORACIC VASCULAR SURGERY)

## 2022-10-20 ENCOUNTER — ANESTHESIA (OUTPATIENT)
Dept: PERIOP | Facility: HOSPITAL | Age: 67
End: 2022-10-20

## 2022-10-20 DIAGNOSIS — I65.23 BILATERAL CAROTID ARTERY STENOSIS: ICD-10-CM

## 2022-10-20 LAB
GLUCOSE BLDC GLUCOMTR-MCNC: 126 MG/DL (ref 70–130)
GLUCOSE BLDC GLUCOMTR-MCNC: 129 MG/DL (ref 70–130)
GLUCOSE BLDC GLUCOMTR-MCNC: 135 MG/DL (ref 70–130)
GLUCOSE BLDC GLUCOMTR-MCNC: 139 MG/DL (ref 70–130)

## 2022-10-20 PROCEDURE — 03CN0ZZ EXTIRPATION OF MATTER FROM LEFT EXTERNAL CAROTID ARTERY, OPEN APPROACH: ICD-10-PCS | Performed by: THORACIC SURGERY (CARDIOTHORACIC VASCULAR SURGERY)

## 2022-10-20 PROCEDURE — S0260 H&P FOR SURGERY: HCPCS | Performed by: PHYSICIAN ASSISTANT

## 2022-10-20 PROCEDURE — 88304 TISSUE EXAM BY PATHOLOGIST: CPT | Performed by: THORACIC SURGERY (CARDIOTHORACIC VASCULAR SURGERY)

## 2022-10-20 PROCEDURE — 25010000002 ONDANSETRON PER 1 MG

## 2022-10-20 PROCEDURE — 25010000002 CEFAZOLIN PER 500 MG: Performed by: PHYSICIAN ASSISTANT

## 2022-10-20 PROCEDURE — 25010000002 DROPERIDOL PER 5 MG

## 2022-10-20 PROCEDURE — S0260 H&P FOR SURGERY: HCPCS | Performed by: THORACIC SURGERY (CARDIOTHORACIC VASCULAR SURGERY)

## 2022-10-20 PROCEDURE — C1768 GRAFT, VASCULAR: HCPCS | Performed by: THORACIC SURGERY (CARDIOTHORACIC VASCULAR SURGERY)

## 2022-10-20 PROCEDURE — 4A10X4Z MONITORING OF CENTRAL NERVOUS ELECTRICAL ACTIVITY, EXTERNAL APPROACH: ICD-10-PCS | Performed by: THORACIC SURGERY (CARDIOTHORACIC VASCULAR SURGERY)

## 2022-10-20 PROCEDURE — 95816 EEG AWAKE AND DROWSY: CPT

## 2022-10-20 PROCEDURE — 03CL0ZZ EXTIRPATION OF MATTER FROM LEFT INTERNAL CAROTID ARTERY, OPEN APPROACH: ICD-10-PCS | Performed by: THORACIC SURGERY (CARDIOTHORACIC VASCULAR SURGERY)

## 2022-10-20 PROCEDURE — 03UN0KZ SUPPLEMENT LEFT EXTERNAL CAROTID ARTERY WITH NONAUTOLOGOUS TISSUE SUBSTITUTE, OPEN APPROACH: ICD-10-PCS | Performed by: THORACIC SURGERY (CARDIOTHORACIC VASCULAR SURGERY)

## 2022-10-20 PROCEDURE — 25010000002 FENTANYL CITRATE (PF) 50 MCG/ML SOLUTION: Performed by: NURSE ANESTHETIST, CERTIFIED REGISTERED

## 2022-10-20 PROCEDURE — 25010000002 CEFOXITIN PER 1 G: Performed by: THORACIC SURGERY (CARDIOTHORACIC VASCULAR SURGERY)

## 2022-10-20 PROCEDURE — 25010000002 PHENYLEPHRINE 10 MG/ML SOLUTION 1 ML VIAL: Performed by: NURSE ANESTHETIST, CERTIFIED REGISTERED

## 2022-10-20 PROCEDURE — 25010000002 ONDANSETRON PER 1 MG: Performed by: NURSE ANESTHETIST, CERTIFIED REGISTERED

## 2022-10-20 PROCEDURE — 25010000002 HEPARIN (PORCINE) PER 1000 UNITS: Performed by: NURSE ANESTHETIST, CERTIFIED REGISTERED

## 2022-10-20 PROCEDURE — 25010000002 PROTAMINE SULFATE PER 10 MG: Performed by: NURSE ANESTHETIST, CERTIFIED REGISTERED

## 2022-10-20 PROCEDURE — 35301 RECHANNELING OF ARTERY: CPT | Performed by: THORACIC SURGERY (CARDIOTHORACIC VASCULAR SURGERY)

## 2022-10-20 PROCEDURE — 25010000002 DEXAMETHASONE PER 1 MG: Performed by: NURSE ANESTHETIST, CERTIFIED REGISTERED

## 2022-10-20 PROCEDURE — 99232 SBSQ HOSP IP/OBS MODERATE 35: CPT | Performed by: INTERNAL MEDICINE

## 2022-10-20 PROCEDURE — 82962 GLUCOSE BLOOD TEST: CPT

## 2022-10-20 PROCEDURE — 25010000002 HEPARIN (PORCINE) PER 1000 UNITS: Performed by: THORACIC SURGERY (CARDIOTHORACIC VASCULAR SURGERY)

## 2022-10-20 PROCEDURE — 25010000002 GENTAMICIN PER 80 MG: Performed by: THORACIC SURGERY (CARDIOTHORACIC VASCULAR SURGERY)

## 2022-10-20 PROCEDURE — 88311 DECALCIFY TISSUE: CPT | Performed by: THORACIC SURGERY (CARDIOTHORACIC VASCULAR SURGERY)

## 2022-10-20 PROCEDURE — 0 LIDOCAINE 1 % SOLUTION: Performed by: THORACIC SURGERY (CARDIOTHORACIC VASCULAR SURGERY)

## 2022-10-20 PROCEDURE — 25010000002 PROPOFOL 10 MG/ML EMULSION: Performed by: NURSE ANESTHETIST, CERTIFIED REGISTERED

## 2022-10-20 PROCEDURE — 95955 EEG DURING SURGERY: CPT

## 2022-10-20 PROCEDURE — 25010000002 HYDROMORPHONE 1 MG/ML SOLUTION

## 2022-10-20 PROCEDURE — 35301 RECHANNELING OF ARTERY: CPT | Performed by: PHYSICIAN ASSISTANT

## 2022-10-20 DEVICE — SEALANT HEMO TACHOSIL FIBRIN PTCH 9.5X4.8CM: Type: IMPLANTABLE DEVICE | Site: CAROTID | Status: FUNCTIONAL

## 2022-10-20 DEVICE — VASCU-GUARD PERIPHERAL VASCULAR PATCH IS PREPARED FROM BOVINE PERICARDIUM WHICH IS CROSS-LINKED WITH GLUTARALDEHYDE. THE PERICARDIUM IS PROCURED FROM CATTLE ORIGINATING IN THE UNITED STATES. VASCU-GUARD PERIPHERAL VASCULAR PATCH IS CHEMICALLY STERILIZED USING ETHANOL AND PROPYLENE OXIDE. VASCU-GUARD PERIPHERAL VASCULAR PATCH HAS BEEN TREATED WITH 1 MOLAR SODIUM HYDROXIDE FOR A MINIMUM OF 60 MINUTES AT 20 - 25 C.  VASCU-GUARD PERIPHERAL VASCULAR PATCH IS PACKAGED IN A CONTAINER FILLED WITH STERILE, NON-PYROGENIC WATER CONTAINING PROPYLENE OXIDE. THE CONTENTS OF THE UNOPENED, UNDAMAGED CONTAINER ARE STERILE.
Type: IMPLANTABLE DEVICE | Site: CAROTID | Status: FUNCTIONAL
Brand: VASCU-GUARD PERIPHERAL VASCULAR PATCH

## 2022-10-20 RX ORDER — DROPERIDOL 2.5 MG/ML
0.62 INJECTION, SOLUTION INTRAMUSCULAR; INTRAVENOUS
Status: DISCONTINUED | OUTPATIENT
Start: 2022-10-20 | End: 2022-10-20 | Stop reason: HOSPADM

## 2022-10-20 RX ORDER — CEFAZOLIN SODIUM 2 G/100ML
2 INJECTION, SOLUTION INTRAVENOUS EVERY 8 HOURS
Status: DISCONTINUED | OUTPATIENT
Start: 2022-10-20 | End: 2022-10-20

## 2022-10-20 RX ORDER — ONDANSETRON 2 MG/ML
INJECTION INTRAMUSCULAR; INTRAVENOUS AS NEEDED
Status: DISCONTINUED | OUTPATIENT
Start: 2022-10-20 | End: 2022-10-20

## 2022-10-20 RX ORDER — FENTANYL CITRATE 50 UG/ML
INJECTION, SOLUTION INTRAMUSCULAR; INTRAVENOUS
Status: DISCONTINUED
Start: 2022-10-20 | End: 2022-10-20

## 2022-10-20 RX ORDER — INSULIN LISPRO 100 [IU]/ML
0-9 INJECTION, SOLUTION INTRAVENOUS; SUBCUTANEOUS
Status: DISCONTINUED | OUTPATIENT
Start: 2022-10-20 | End: 2022-10-21 | Stop reason: HOSPADM

## 2022-10-20 RX ORDER — BUPIVACAINE HCL/0.9 % NACL/PF 0.1 %
2 PLASTIC BAG, INJECTION (ML) EPIDURAL EVERY 8 HOURS
Status: COMPLETED | OUTPATIENT
Start: 2022-10-20 | End: 2022-10-21

## 2022-10-20 RX ORDER — HEPARIN SODIUM 1000 [USP'U]/ML
INJECTION, SOLUTION INTRAVENOUS; SUBCUTANEOUS AS NEEDED
Status: DISCONTINUED | OUTPATIENT
Start: 2022-10-20 | End: 2022-10-20 | Stop reason: SURG

## 2022-10-20 RX ORDER — HYDROMORPHONE HYDROCHLORIDE 1 MG/ML
0.5 INJECTION, SOLUTION INTRAMUSCULAR; INTRAVENOUS; SUBCUTANEOUS
Status: DISCONTINUED | OUTPATIENT
Start: 2022-10-20 | End: 2022-10-20 | Stop reason: HOSPADM

## 2022-10-20 RX ORDER — ALPRAZOLAM 0.25 MG/1
0.25 TABLET ORAL ONCE
Status: COMPLETED | OUTPATIENT
Start: 2022-10-20 | End: 2022-10-20

## 2022-10-20 RX ORDER — SODIUM CHLORIDE 0.9 % (FLUSH) 0.9 %
10 SYRINGE (ML) INJECTION EVERY 12 HOURS SCHEDULED
Status: DISCONTINUED | OUTPATIENT
Start: 2022-10-20 | End: 2022-10-21 | Stop reason: HOSPADM

## 2022-10-20 RX ORDER — THROMBIN HUMAN AND FIBRINOGEN 2; 5.5 [USP'U]/1; MG/1
PATCH TOPICAL AS NEEDED
Status: DISCONTINUED | OUTPATIENT
Start: 2022-10-20 | End: 2022-10-20 | Stop reason: HOSPADM

## 2022-10-20 RX ORDER — ONDANSETRON 4 MG/1
4 TABLET, FILM COATED ORAL EVERY 6 HOURS PRN
Status: DISCONTINUED | OUTPATIENT
Start: 2022-10-20 | End: 2022-10-21 | Stop reason: HOSPADM

## 2022-10-20 RX ORDER — NALOXONE HCL 0.4 MG/ML
0.4 VIAL (ML) INJECTION AS NEEDED
Status: DISCONTINUED | OUTPATIENT
Start: 2022-10-20 | End: 2022-10-20 | Stop reason: HOSPADM

## 2022-10-20 RX ORDER — PROPOFOL 10 MG/ML
VIAL (ML) INTRAVENOUS AS NEEDED
Status: DISCONTINUED | OUTPATIENT
Start: 2022-10-20 | End: 2022-10-20 | Stop reason: SURG

## 2022-10-20 RX ORDER — PROMETHAZINE HYDROCHLORIDE 25 MG/1
25 TABLET ORAL ONCE AS NEEDED
Status: DISCONTINUED | OUTPATIENT
Start: 2022-10-20 | End: 2022-10-20 | Stop reason: HOSPADM

## 2022-10-20 RX ORDER — TAMSULOSIN HYDROCHLORIDE 0.4 MG/1
0.4 CAPSULE ORAL DAILY
Status: DISCONTINUED | OUTPATIENT
Start: 2022-10-20 | End: 2022-10-21 | Stop reason: HOSPADM

## 2022-10-20 RX ORDER — PANTOPRAZOLE SODIUM 40 MG/1
40 TABLET, DELAYED RELEASE ORAL
Status: DISCONTINUED | OUTPATIENT
Start: 2022-10-21 | End: 2022-10-21 | Stop reason: HOSPADM

## 2022-10-20 RX ORDER — CETIRIZINE HYDROCHLORIDE 10 MG/1
10 TABLET ORAL NIGHTLY PRN
Status: DISCONTINUED | OUTPATIENT
Start: 2022-10-20 | End: 2022-10-21 | Stop reason: HOSPADM

## 2022-10-20 RX ORDER — HYDROCODONE BITARTRATE AND ACETAMINOPHEN 7.5; 325 MG/1; MG/1
1 TABLET ORAL EVERY 4 HOURS PRN
Status: DISCONTINUED | OUTPATIENT
Start: 2022-10-20 | End: 2022-10-21 | Stop reason: HOSPADM

## 2022-10-20 RX ORDER — HYDROCODONE BITARTRATE AND ACETAMINOPHEN 5; 325 MG/1; MG/1
1 TABLET ORAL ONCE AS NEEDED
Status: DISCONTINUED | OUTPATIENT
Start: 2022-10-20 | End: 2022-10-20

## 2022-10-20 RX ORDER — ROSUVASTATIN CALCIUM 20 MG/1
40 TABLET, COATED ORAL NIGHTLY
Status: DISCONTINUED | OUTPATIENT
Start: 2022-10-20 | End: 2022-10-21 | Stop reason: HOSPADM

## 2022-10-20 RX ORDER — SODIUM CHLORIDE 0.9 % (FLUSH) 0.9 %
10 SYRINGE (ML) INJECTION AS NEEDED
Status: DISCONTINUED | OUTPATIENT
Start: 2022-10-20 | End: 2022-10-21 | Stop reason: HOSPADM

## 2022-10-20 RX ORDER — SODIUM CHLORIDE 0.9 % (FLUSH) 0.9 %
10 SYRINGE (ML) INJECTION AS NEEDED
Status: DISCONTINUED | OUTPATIENT
Start: 2022-10-20 | End: 2022-10-20 | Stop reason: HOSPADM

## 2022-10-20 RX ORDER — SODIUM CHLORIDE, SODIUM LACTATE, POTASSIUM CHLORIDE, CALCIUM CHLORIDE 600; 310; 30; 20 MG/100ML; MG/100ML; MG/100ML; MG/100ML
9 INJECTION, SOLUTION INTRAVENOUS CONTINUOUS PRN
Status: DISCONTINUED | OUTPATIENT
Start: 2022-10-20 | End: 2022-10-20

## 2022-10-20 RX ORDER — NICARDIPINE HYDROCHLORIDE 2.5 MG/ML
INJECTION INTRAVENOUS AS NEEDED
Status: DISCONTINUED | OUTPATIENT
Start: 2022-10-20 | End: 2022-10-20 | Stop reason: SURG

## 2022-10-20 RX ORDER — HYDRALAZINE HYDROCHLORIDE 20 MG/ML
5 INJECTION INTRAMUSCULAR; INTRAVENOUS
Status: DISCONTINUED | OUTPATIENT
Start: 2022-10-20 | End: 2022-10-20 | Stop reason: HOSPADM

## 2022-10-20 RX ORDER — ONDANSETRON 2 MG/ML
4 INJECTION INTRAMUSCULAR; INTRAVENOUS ONCE AS NEEDED
Status: COMPLETED | OUTPATIENT
Start: 2022-10-20 | End: 2022-10-20

## 2022-10-20 RX ORDER — CLOPIDOGREL BISULFATE 75 MG/1
75 TABLET ORAL DAILY
Status: DISCONTINUED | OUTPATIENT
Start: 2022-10-21 | End: 2022-10-21 | Stop reason: HOSPADM

## 2022-10-20 RX ORDER — SODIUM CHLORIDE, SODIUM LACTATE, POTASSIUM CHLORIDE, CALCIUM CHLORIDE 600; 310; 30; 20 MG/100ML; MG/100ML; MG/100ML; MG/100ML
9 INJECTION, SOLUTION INTRAVENOUS CONTINUOUS
Status: DISCONTINUED | OUTPATIENT
Start: 2022-10-20 | End: 2022-10-20

## 2022-10-20 RX ORDER — MEPERIDINE HYDROCHLORIDE 25 MG/ML
12.5 INJECTION INTRAMUSCULAR; INTRAVENOUS; SUBCUTANEOUS
Status: DISCONTINUED | OUTPATIENT
Start: 2022-10-20 | End: 2022-10-20

## 2022-10-20 RX ORDER — FAMOTIDINE 20 MG/1
20 TABLET, FILM COATED ORAL
Status: COMPLETED | OUTPATIENT
Start: 2022-10-20 | End: 2022-10-20

## 2022-10-20 RX ORDER — MORPHINE SULFATE 2 MG/ML
2 INJECTION, SOLUTION INTRAMUSCULAR; INTRAVENOUS
Status: DISCONTINUED | OUTPATIENT
Start: 2022-10-20 | End: 2022-10-21 | Stop reason: HOSPADM

## 2022-10-20 RX ORDER — FENTANYL CITRATE 50 UG/ML
INJECTION, SOLUTION INTRAMUSCULAR; INTRAVENOUS AS NEEDED
Status: DISCONTINUED | OUTPATIENT
Start: 2022-10-20 | End: 2022-10-20 | Stop reason: SDUPTHER

## 2022-10-20 RX ORDER — SODIUM CHLORIDE 0.9 % (FLUSH) 0.9 %
3-10 SYRINGE (ML) INJECTION AS NEEDED
Status: DISCONTINUED | OUTPATIENT
Start: 2022-10-20 | End: 2022-10-20 | Stop reason: HOSPADM

## 2022-10-20 RX ORDER — LABETALOL HYDROCHLORIDE 5 MG/ML
5 INJECTION, SOLUTION INTRAVENOUS
Status: DISCONTINUED | OUTPATIENT
Start: 2022-10-20 | End: 2022-10-20 | Stop reason: HOSPADM

## 2022-10-20 RX ORDER — ONDANSETRON 2 MG/ML
INJECTION INTRAMUSCULAR; INTRAVENOUS
Status: COMPLETED
Start: 2022-10-20 | End: 2022-10-20

## 2022-10-20 RX ORDER — LIDOCAINE HYDROCHLORIDE 10 MG/ML
0.5 INJECTION, SOLUTION EPIDURAL; INFILTRATION; INTRACAUDAL; PERINEURAL ONCE AS NEEDED
Status: DISCONTINUED | OUTPATIENT
Start: 2022-10-20 | End: 2022-10-20 | Stop reason: HOSPADM

## 2022-10-20 RX ORDER — SODIUM CHLORIDE 9 MG/ML
INJECTION, SOLUTION INTRAVENOUS AS NEEDED
Status: DISCONTINUED | OUTPATIENT
Start: 2022-10-20 | End: 2022-10-20 | Stop reason: HOSPADM

## 2022-10-20 RX ORDER — FAMOTIDINE 10 MG/ML
20 INJECTION, SOLUTION INTRAVENOUS ONCE
Status: DISCONTINUED | OUTPATIENT
Start: 2022-10-20 | End: 2022-10-20 | Stop reason: HOSPADM

## 2022-10-20 RX ORDER — BUPIVACAINE HCL/0.9 % NACL/PF 0.1 %
2 PLASTIC BAG, INJECTION (ML) EPIDURAL ONCE
Status: COMPLETED | OUTPATIENT
Start: 2022-10-20 | End: 2022-10-20

## 2022-10-20 RX ORDER — ASPIRIN 81 MG/1
81 TABLET ORAL DAILY
Status: DISCONTINUED | OUTPATIENT
Start: 2022-10-20 | End: 2022-10-21 | Stop reason: HOSPADM

## 2022-10-20 RX ORDER — METOPROLOL TARTRATE 50 MG/1
100 TABLET, FILM COATED ORAL EVERY 12 HOURS SCHEDULED
Status: DISCONTINUED | OUTPATIENT
Start: 2022-10-20 | End: 2022-10-21 | Stop reason: HOSPADM

## 2022-10-20 RX ORDER — SODIUM CHLORIDE 0.9 % (FLUSH) 0.9 %
3 SYRINGE (ML) INJECTION EVERY 12 HOURS SCHEDULED
Status: DISCONTINUED | OUTPATIENT
Start: 2022-10-20 | End: 2022-10-20 | Stop reason: HOSPADM

## 2022-10-20 RX ORDER — LIDOCAINE HYDROCHLORIDE 10 MG/ML
INJECTION, SOLUTION INFILTRATION; PERINEURAL AS NEEDED
Status: DISCONTINUED | OUTPATIENT
Start: 2022-10-20 | End: 2022-10-20 | Stop reason: HOSPADM

## 2022-10-20 RX ORDER — EPHEDRINE SULFATE 50 MG/ML
INJECTION, SOLUTION INTRAVENOUS AS NEEDED
Status: DISCONTINUED | OUTPATIENT
Start: 2022-10-20 | End: 2022-10-20 | Stop reason: SURG

## 2022-10-20 RX ORDER — ONDANSETRON 2 MG/ML
4 INJECTION INTRAMUSCULAR; INTRAVENOUS EVERY 6 HOURS PRN
Status: DISCONTINUED | OUTPATIENT
Start: 2022-10-20 | End: 2022-10-21 | Stop reason: HOSPADM

## 2022-10-20 RX ORDER — DROPERIDOL 2.5 MG/ML
0.62 INJECTION, SOLUTION INTRAMUSCULAR; INTRAVENOUS ONCE AS NEEDED
Status: DISCONTINUED | OUTPATIENT
Start: 2022-10-20 | End: 2022-10-20 | Stop reason: HOSPADM

## 2022-10-20 RX ORDER — BUPIVACAINE HCL/0.9 % NACL/PF 0.125 %
PLASTIC BAG, INJECTION (ML) EPIDURAL AS NEEDED
Status: DISCONTINUED | OUTPATIENT
Start: 2022-10-20 | End: 2022-10-20 | Stop reason: SURG

## 2022-10-20 RX ORDER — DROPERIDOL 2.5 MG/ML
INJECTION, SOLUTION INTRAMUSCULAR; INTRAVENOUS
Status: COMPLETED
Start: 2022-10-20 | End: 2022-10-20

## 2022-10-20 RX ORDER — LIDOCAINE HYDROCHLORIDE 10 MG/ML
0.5 INJECTION, SOLUTION EPIDURAL; INFILTRATION; INTRACAUDAL; PERINEURAL ONCE AS NEEDED
Status: COMPLETED | OUTPATIENT
Start: 2022-10-20 | End: 2022-10-20

## 2022-10-20 RX ORDER — LIDOCAINE HYDROCHLORIDE 10 MG/ML
INJECTION, SOLUTION EPIDURAL; INFILTRATION; INTRACAUDAL; PERINEURAL AS NEEDED
Status: DISCONTINUED | OUTPATIENT
Start: 2022-10-20 | End: 2022-10-20 | Stop reason: SURG

## 2022-10-20 RX ORDER — BUSPIRONE HYDROCHLORIDE 5 MG/1
10 TABLET ORAL 3 TIMES DAILY
Status: DISCONTINUED | OUTPATIENT
Start: 2022-10-20 | End: 2022-10-21 | Stop reason: HOSPADM

## 2022-10-20 RX ORDER — PROTAMINE SULFATE 10 MG/ML
INJECTION, SOLUTION INTRAVENOUS AS NEEDED
Status: DISCONTINUED | OUTPATIENT
Start: 2022-10-20 | End: 2022-10-20 | Stop reason: SURG

## 2022-10-20 RX ORDER — FENTANYL CITRATE 50 UG/ML
50 INJECTION, SOLUTION INTRAMUSCULAR; INTRAVENOUS
Status: DISCONTINUED | OUTPATIENT
Start: 2022-10-20 | End: 2022-10-20

## 2022-10-20 RX ORDER — NITROGLYCERIN 0.4 MG/1
0.4 TABLET SUBLINGUAL
Status: DISCONTINUED | OUTPATIENT
Start: 2022-10-20 | End: 2022-10-21 | Stop reason: HOSPADM

## 2022-10-20 RX ORDER — ALBUTEROL SULFATE 2.5 MG/3ML
2.5 SOLUTION RESPIRATORY (INHALATION) EVERY 4 HOURS PRN
Status: DISCONTINUED | OUTPATIENT
Start: 2022-10-20 | End: 2022-10-21 | Stop reason: HOSPADM

## 2022-10-20 RX ORDER — NICARDIPINE HYDROCHLORIDE 2.5 MG/ML
INJECTION INTRAVENOUS
Status: DISPENSED
Start: 2022-10-20 | End: 2022-10-21

## 2022-10-20 RX ORDER — ESMOLOL HYDROCHLORIDE 10 MG/ML
INJECTION INTRAVENOUS AS NEEDED
Status: DISCONTINUED | OUTPATIENT
Start: 2022-10-20 | End: 2022-10-20

## 2022-10-20 RX ORDER — CHOLECALCIFEROL (VITAMIN D3) 125 MCG
5 CAPSULE ORAL ONCE
Status: COMPLETED | OUTPATIENT
Start: 2022-10-20 | End: 2022-10-20

## 2022-10-20 RX ORDER — MIDAZOLAM HYDROCHLORIDE 1 MG/ML
0.5 INJECTION INTRAMUSCULAR; INTRAVENOUS
Status: DISCONTINUED | OUTPATIENT
Start: 2022-10-20 | End: 2022-10-20 | Stop reason: HOSPADM

## 2022-10-20 RX ORDER — FAMOTIDINE 20 MG/1
20 TABLET, FILM COATED ORAL ONCE
Status: DISCONTINUED | OUTPATIENT
Start: 2022-10-20 | End: 2022-10-20 | Stop reason: SDUPTHER

## 2022-10-20 RX ORDER — IPRATROPIUM BROMIDE AND ALBUTEROL SULFATE 2.5; .5 MG/3ML; MG/3ML
3 SOLUTION RESPIRATORY (INHALATION) ONCE AS NEEDED
Status: DISCONTINUED | OUTPATIENT
Start: 2022-10-20 | End: 2022-10-20 | Stop reason: HOSPADM

## 2022-10-20 RX ORDER — PROMETHAZINE HYDROCHLORIDE 25 MG/1
25 SUPPOSITORY RECTAL ONCE AS NEEDED
Status: DISCONTINUED | OUTPATIENT
Start: 2022-10-20 | End: 2022-10-20 | Stop reason: HOSPADM

## 2022-10-20 RX ORDER — ROCURONIUM BROMIDE 10 MG/ML
INJECTION, SOLUTION INTRAVENOUS AS NEEDED
Status: DISCONTINUED | OUTPATIENT
Start: 2022-10-20 | End: 2022-10-20 | Stop reason: SURG

## 2022-10-20 RX ORDER — LOSARTAN POTASSIUM 50 MG/1
100 TABLET ORAL DAILY
Status: DISCONTINUED | OUTPATIENT
Start: 2022-10-20 | End: 2022-10-21 | Stop reason: HOSPADM

## 2022-10-20 RX ORDER — DEXAMETHASONE SODIUM PHOSPHATE 10 MG/ML
INJECTION INTRAMUSCULAR; INTRAVENOUS AS NEEDED
Status: DISCONTINUED | OUTPATIENT
Start: 2022-10-20 | End: 2022-10-20 | Stop reason: SURG

## 2022-10-20 RX ORDER — SODIUM CHLORIDE 0.9 % (FLUSH) 0.9 %
10 SYRINGE (ML) INJECTION EVERY 12 HOURS SCHEDULED
Status: DISCONTINUED | OUTPATIENT
Start: 2022-10-20 | End: 2022-10-20 | Stop reason: HOSPADM

## 2022-10-20 RX ADMIN — FENTANYL CITRATE 50 MCG: 50 INJECTION, SOLUTION INTRAMUSCULAR; INTRAVENOUS at 09:14

## 2022-10-20 RX ADMIN — Medication 5 MG: at 22:46

## 2022-10-20 RX ADMIN — HEPARIN SODIUM 5000 UNITS: 1000 INJECTION, SOLUTION INTRAVENOUS; SUBCUTANEOUS at 09:07

## 2022-10-20 RX ADMIN — BUSPIRONE HYDROCHLORIDE 10 MG: 5 TABLET ORAL at 21:14

## 2022-10-20 RX ADMIN — ROSUVASTATIN 40 MG: 20 TABLET, FILM COATED ORAL at 21:14

## 2022-10-20 RX ADMIN — Medication 10 ML: at 21:15

## 2022-10-20 RX ADMIN — SODIUM CHLORIDE, POTASSIUM CHLORIDE, SODIUM LACTATE AND CALCIUM CHLORIDE 9 ML/HR: 600; 310; 30; 20 INJECTION, SOLUTION INTRAVENOUS at 08:13

## 2022-10-20 RX ADMIN — ONDANSETRON 4 MG: 2 INJECTION INTRAMUSCULAR; INTRAVENOUS at 10:55

## 2022-10-20 RX ADMIN — BUSPIRONE HYDROCHLORIDE 10 MG: 5 TABLET ORAL at 13:31

## 2022-10-20 RX ADMIN — DEXAMETHASONE SODIUM PHOSPHATE 4 MG: 10 INJECTION INTRAMUSCULAR; INTRAVENOUS at 09:00

## 2022-10-20 RX ADMIN — HYDROCODONE BITARTRATE AND ACETAMINOPHEN 1 TABLET: 7.5; 325 TABLET ORAL at 13:31

## 2022-10-20 RX ADMIN — TAMSULOSIN HYDROCHLORIDE 0.4 MG: 0.4 CAPSULE ORAL at 13:31

## 2022-10-20 RX ADMIN — EPHEDRINE SULFATE 5 MG: 50 INJECTION INTRAVENOUS at 09:31

## 2022-10-20 RX ADMIN — ALPRAZOLAM 0.25 MG: 0.25 TABLET ORAL at 19:06

## 2022-10-20 RX ADMIN — DROPERIDOL 0.62 MG: 2.5 INJECTION, SOLUTION INTRAMUSCULAR; INTRAVENOUS at 10:46

## 2022-10-20 RX ADMIN — NICARDIPINE HYDROCHLORIDE 5 MG/HR: 0.1 INJECTION, SOLUTION INTRAVENOUS at 09:25

## 2022-10-20 RX ADMIN — PROPOFOL 40 MG: 10 INJECTION, EMULSION INTRAVENOUS at 09:16

## 2022-10-20 RX ADMIN — NICARDIPINE HYDROCHLORIDE 0.3 MG: 25 INJECTION INTRAVENOUS at 09:26

## 2022-10-20 RX ADMIN — Medication 100 MCG: at 09:11

## 2022-10-20 RX ADMIN — PROPOFOL 120 MG: 10 INJECTION, EMULSION INTRAVENOUS at 08:49

## 2022-10-20 RX ADMIN — CEFAZOLIN 2 G: 10 INJECTION, POWDER, FOR SOLUTION INTRAVENOUS at 15:59

## 2022-10-20 RX ADMIN — ROCURONIUM BROMIDE 50 MG: 10 INJECTION, SOLUTION INTRAVENOUS at 08:49

## 2022-10-20 RX ADMIN — LOSARTAN POTASSIUM 100 MG: 50 TABLET, FILM COATED ORAL at 13:31

## 2022-10-20 RX ADMIN — PROTAMINE SULFATE 50 MG: 10 INJECTION, SOLUTION INTRAVENOUS at 09:30

## 2022-10-20 RX ADMIN — SODIUM CHLORIDE, POTASSIUM CHLORIDE, SODIUM LACTATE AND CALCIUM CHLORIDE 9 ML/HR: 600; 310; 30; 20 INJECTION, SOLUTION INTRAVENOUS at 08:15

## 2022-10-20 RX ADMIN — LIDOCAINE HYDROCHLORIDE 0.5 ML: 10 INJECTION, SOLUTION EPIDURAL; INFILTRATION; INTRACAUDAL; PERINEURAL at 08:11

## 2022-10-20 RX ADMIN — Medication 2 G: at 08:51

## 2022-10-20 RX ADMIN — SODIUM CHLORIDE 5 MG/HR: 9 INJECTION, SOLUTION INTRAVENOUS at 13:33

## 2022-10-20 RX ADMIN — Medication 10 ML: at 13:34

## 2022-10-20 RX ADMIN — SUGAMMADEX 200 MG: 100 INJECTION, SOLUTION INTRAVENOUS at 09:42

## 2022-10-20 RX ADMIN — METOPROLOL TARTRATE 100 MG: 50 TABLET, FILM COATED ORAL at 21:14

## 2022-10-20 RX ADMIN — LIDOCAINE HYDROCHLORIDE 50 MG: 10 INJECTION, SOLUTION EPIDURAL; INFILTRATION; INTRACAUDAL; PERINEURAL at 08:49

## 2022-10-20 RX ADMIN — FAMOTIDINE 20 MG: 20 TABLET ORAL at 08:11

## 2022-10-20 RX ADMIN — ESMOLOL HYDROCHLORIDE 40 MG: 10 INJECTION, SOLUTION INTRAVENOUS at 09:01

## 2022-10-20 RX ADMIN — ESMOLOL HYDROCHLORIDE 50 MG: 10 INJECTION, SOLUTION INTRAVENOUS at 09:14

## 2022-10-20 RX ADMIN — FENTANYL CITRATE 50 MCG: 50 INJECTION, SOLUTION INTRAMUSCULAR; INTRAVENOUS at 08:51

## 2022-10-20 RX ADMIN — ASPIRIN 81 MG: 81 TABLET, COATED ORAL at 13:31

## 2022-10-20 RX ADMIN — PHENYLEPHRINE HYDROCHLORIDE 0.5 MCG/KG/MIN: 10 INJECTION INTRAVENOUS at 09:08

## 2022-10-20 RX ADMIN — ONDANSETRON 4 MG: 2 INJECTION INTRAMUSCULAR; INTRAVENOUS at 09:27

## 2022-10-20 RX ADMIN — HYDROMORPHONE HYDROCHLORIDE 0.5 MG: 1 INJECTION, SOLUTION INTRAMUSCULAR; INTRAVENOUS; SUBCUTANEOUS at 10:35

## 2022-10-20 RX ADMIN — ESMOLOL HYDROCHLORIDE 60 MG: 10 INJECTION, SOLUTION INTRAVENOUS at 08:49

## 2022-10-20 RX ADMIN — EPHEDRINE SULFATE 5 MG: 50 INJECTION INTRAVENOUS at 09:10

## 2022-10-20 NOTE — ANESTHESIA PREPROCEDURE EVALUATION
Anesthesia Evaluation     Patient summary reviewed and Nursing notes reviewed   NPO Solid Status: > 8 hours  NPO Liquid Status: > 8 hours           Airway   Mallampati: I  TM distance: >3 FB  Neck ROM: full  No difficulty expected  Dental    (+) upper dentures    Pulmonary     breath sounds clear to auscultation  Cardiovascular   Exercise tolerance: poor (<4 METS)    Rhythm: regular  Rate: normal    (+) murmur,       Neuro/Psych  GI/Hepatic/Renal/Endo      Musculoskeletal     Abdominal    Substance History      OB/GYN          Other            Phys Exam Other: Loud systolic murmur                Anesthesia Plan    ASA 3     general     intravenous induction     Anesthetic plan, risks, benefits, and alternatives have been provided, discussed and informed consent has been obtained with: patient.        CODE STATUS:

## 2022-10-20 NOTE — ANESTHESIA PROCEDURE NOTES
Airway  Urgency: elective    Airway not difficult    General Information and Staff    Patient location during procedure: OR  CRNA/CAA: Tc Ignacio CRNA    Indications and Patient Condition  Indications for airway management: airway protection    Preoxygenated: yes  MILS not maintained throughout  Mask difficulty assessment: 2 - vent by mask + OA or adjuvant +/- NMBA    Final Airway Details  Final airway type: endotracheal airway      Successful airway: ETT  Cuffed: yes   Successful intubation technique: direct laryngoscopy  Endotracheal tube insertion site: oral  Blade: Kelsi  Blade size: 3  ETT size (mm): 7.5  Cormack-Lehane Classification: grade I - full view of glottis  Placement verified by: chest auscultation and capnometry   Measured from: lips  ETT/EBT  to lips (cm): 20  Number of attempts at approach: 1  Assessment: lips, teeth, and gum same as pre-op and atraumatic intubation    Additional Comments  Negative epigastric sounds, Breath sound equal bilaterally with symmetric chest rise and fall

## 2022-10-20 NOTE — ANESTHESIA POSTPROCEDURE EVALUATION
Patient: Khoa Arnold    Procedure Summary     Date: 10/20/22 Room / Location: [facility] OR [address] [place] [address] [facility]    Anesthesia Start: 0844 Anesthesia Stop:     Procedure: LEFT CAROTID ENDARTERECTOMY WITH EEG (Left: Neck) Diagnosis:       Bilateral carotid artery stenosis      (Bilateral carotid artery stenosis [I65.23])    Surgeons: Denys Goldsmith MD Provider: hKoa Anders MD    Anesthesia Type: general ASA Status: 3          Anesthesia Type: general    Vitals  No vitals data found for the desired time range.          Post Anesthesia Care and Evaluation    Patient location during evaluation: PACU  Patient participation: complete - patient participated  Level of consciousness: responsive to verbal stimuli  Pain score: 0  Pain management: adequate    Airway patency: patent  Anesthetic complications: No anesthetic complications  PONV Status: none  Cardiovascular status: hemodynamically stable and acceptable  Respiratory status: nonlabored ventilation, acceptable, nasal cannula and spontaneous ventilation  Hydration status: acceptable    Comments: Started Nicardipine @ 5 mg/hr to maintain systolic BP @ 120 or less

## 2022-10-20 NOTE — PLAN OF CARE
Goal Outcome Evaluation:  Plan of Care Reviewed With: patient        Progress: improving  Outcome Evaluation: Pt neuro intact.  Pt on 2L NC sats>95%.  UOP 400ml.  Pain med given for discomfort.  will continue to monitor.  at 10/20/2022 8573

## 2022-10-20 NOTE — PROGRESS NOTES
Intensive Care Follow-up     Hospital:  LOS: 0 days   Mr. Khoa Arnold, 67 y.o. male is followed for:   Bilateral carotid artery stenosis   Postop hemodynamic,          History of present illness:   Khoa Arnold is a 67 year-old male with PMH of carotid stenosis, CAD s/p stents x 3 (last 2014) and CABG (2019), asthma, CVA, T2DM, HLD, HTN, former tobacco use, and aortic valve stenosis that presented to [facility] on 10/20 for a scheduled left carotid endarterectomy with Dr. Goldsmith.     Mr. Arnold was experiencing dizziness upon standing and temporary bilateral vision loss with blackout. Carotid Duplex done at OSH which showed severe bilateral carotid stenosis. She was referred to CT surgery who ordered a CTA which showed 80% stenosis of the left carotid. Originally, he was scheduled for surgery in a few weeks, however, the patient phoned the office yesterday for furthering visual disturbances and the decision was made to proceed with an urgent left carotid endarterectomy with Dr. Goldsmith today. He presents to the ICU post-operatively for further management.      Time spent: 15 minutes  Electronically signed by ELNEO Lee, 10/20/22, 8:33 AM EDT.        The patient's past medical, surgical and social history were reviewed and updated in Epic as appropriate.       Objective     Infusions:  niCARdipine, 5-15 mg/hr, Last Rate: 5 mg/hr (10/20/22 1333)      Medications:  aspirin, 81 mg, Oral, Daily  busPIRone, 10 mg, Oral, TID  ceFAZolin, 2 g, Intravenous, Q8H  [START ON 10/21/2022] clopidogrel, 75 mg, Oral, Daily  insulin lispro, 0-9 Units, Subcutaneous, 4x Daily With Meals & Nightly  losartan, 100 mg, Oral, Daily  metoprolol tartrate, 100 mg, Oral, Q12H  niCARdipine, , ,   [START ON 10/21/2022] pantoprazole, 40 mg, Oral, Q AM  rosuvastatin, 40 mg, Oral, Nightly  sodium chloride, 10 mL, Intravenous, Q12H  tamsulosin, 0.4 mg, Oral, Daily        Vital Sign Min/Max for last 24 hours  Temp  Min: 97 °F (36.1  °C)  Max: 98 °F (36.7 °C)   BP  Min: 109/55  Max: 163/83   Pulse  Min: 59  Max: 76   Resp  Min: 16  Max: 18   SpO2  Min: 87 %  Max: 98 %   Flow (L/min)  Min: 2  Max: 2       Input/Output for last 24 hour shift  No intake/output data recorded.   S RR:  [7-13] 7  Objective  General Appearance: Awake, alert, in no acite distress  Head:    Atraumatic, Normocephalic, without obvious abnormality, Pupils reactive & symmetrical B/L.  Neck: Dressing intact left side of neck with GUIDO drain in place.   Lungs:   B/L Breath sounds present with decreased breath sounds on bases, no wheezing heard, no crackles.   Heart: S1 and S2 present, 3/6 NEL  Abdomen: Soft, nontender, no guarding or rigidity, bowel sounds positive.  Extremities:  no cyanosis or clubbing,  no edema, warm to touch.  Pulses: Positive and symmetric.  Neurologic:  Moving all four extremities. Good strength bilaterally.   Psychological: Normal affect, Cooperative    Results from last 7 days   Lab Units 10/19/22  1357   WBC 10*3/mm3 5.53   HEMOGLOBIN g/dL 13.3   PLATELETS 10*3/mm3 161     Results from last 7 days   Lab Units 10/19/22  1357   SODIUM mmol/L 144   POTASSIUM mmol/L 4.7   CO2 mmol/L 27.0   BUN mg/dL 22   CREATININE mg/dL 1.22   GLUCOSE mg/dL 79     Estimated Creatinine Clearance: 63.5 mL/min (by C-G formula based on SCr of 1.22 mg/dL).          Images:   NA    I reviewed the patient's results and images.     Assessment & Plan   Impression        Bilateral carotid artery stenosis       Plan        67-year-old male with past medical history of carotid stenosis, coronary disease, s/p coronary bypass graft surgery, asthma, CVA, type 2 diabetes, dyslipidemia and hypertension.  Patient is a former tobacco abuser.  He also has moderate aortic stenosis.  He was having problems with dizziness upon standing and temporary bilateral vision loss.  He underwent further work-up and found to have 80% stenosis of the left carotid.  Patient started to have worsening visual  disturbances so decision was made to proceed with urgent carotid endarterectomy.  Patient was taken to operating room on October 28 and underwent right carotid endarterectomy by Dr. Goldsmith.  Procedure was uneventful.  Post procedure patient is on low-dose of nicardipine.  Operative note is not available at the time of this dictation.  Nursing report obtained.    1.  Admit to intensive care unit for closer hemodynamic and neurologic monitoring.  Postop care per CT surgery.  Continue aspirin, Plavix.  2.  Blood pressure control per protocol to keep systolic blood pressure less than 120.  Nicardipine drip as needed.  Resume home medications for blood pressure control including losartan and metoprolol.  3.  Statins for dyslipidemia management.  4.  GI prophylaxis.  5.  SCDs for DVT prophylaxis.  6.  Sliding scale insulin coverage for hyperglycemia management.  7.  Monitor urine output and electrolytes closely.    Check labs in the morning.    Plan of care and goals reviewed with multidisciplinary/antibiotic stewardship team during rounds.   I discussed the patient's findings and my recommendations with patient, family and nursing staff       Time spent 30 min (exclusive of procedure time)  including high complexity decision making to assess, manipulate, and support vital organ system failure in this individual who has impairment of one or more vital organ systems such that there is a high probability of imminent or life threatening deterioration in the patient’s condition.      Aditya House MD, [facility]  Pulmonary, Critical care and Sleep Medicine

## 2022-10-20 NOTE — H&P
"Pre-Op H&P  Khoa Arnold  [id number]  1955    Chief complaint: \"I have a blockage in my neck.\"    HPI:    Patient is a 67 y.o.male who presents with past history of aortic valve replacement with coronary artery bypass grafting.  Recently has had significant dizziness symptoms especially when rising.  Or when he turns his head from side to side.  He has had some vision loss with black out of his vision only temporary.  He has had a duplex carotid in outside facility.  By velocity was felt the patient had significant disease.  He denies any history of stroke, seizure, TIA or classic amaurosis fugax.    Review of Systems:  General ROS: negative for chills, fever or skin lesions;  No changes since last office visit.  Neg for recent sick exposure  Cardiovascular ROS: no chest pain or dyspnea on exertion  Respiratory ROS: no cough, shortness of breath, or wheezing    Allergies:   Allergies   Allergen Reactions   • Ranexa [Ranolazine Er] Nausea Only and Dizziness   • Lisinopril Itching and Rash     On feet       Home Meds:    No current facility-administered medications on file prior to encounter.     Current Outpatient Medications on File Prior to Encounter   Medication Sig Dispense Refill   • Accu-Chek Softclix Lancets lancets Use to check glucose three times daily     • albuterol sulfate  (90 Base) MCG/ACT inhaler Inhale 2 puffs Every 4 (Four) Hours As Needed for Wheezing.     • aspirin 81 MG EC tablet Take 81 mg by mouth Daily.     • busPIRone (BUSPAR) 10 MG tablet Take 1 tablet by mouth 3 (Three) Times a Day.     • cetirizine (zyrTEC) 10 MG tablet Take 1 tablet by mouth Daily As Needed.     • clopidogrel (PLAVIX) 75 MG tablet Take 75 mg by mouth Daily.     • coenzyme Q10 100 MG capsule Take 100 mg by mouth Daily.     • empagliflozin (Jardiance) 10 MG tablet tablet Take 1 tablet by mouth Daily. 90 tablet 1   • losartan (COZAAR) 100 MG tablet Take 100 mg by mouth Daily.     • metFORMIN (GLUCOPHAGE) 500 " MG tablet Take 1 tablet by mouth 2 (Two) Times a Day With Meals.     • metoprolol tartrate (LOPRESSOR) 100 MG tablet Take 1 tablet by mouth Every 12 (Twelve) Hours. 60 tablet 11   • multivitamin (THERAGRAN) tablet tablet Take 1 tablet by mouth Daily.     • nitroglycerin (NITROSTAT) 0.4 MG SL tablet Place 0.4 mg under the tongue Every 5 (Five) Minutes As Needed for Chest Pain. Take no more than 3 doses in 15 minutes.     • omeprazole (priLOSEC) 40 MG capsule Take 40 mg by mouth Daily.     • potassium chloride (K-DUR,KLOR-CON) 10 MEQ CR tablet Take 10 mEq by mouth Daily.  0   • rosuvastatin (CRESTOR) 40 MG tablet Take 40 mg by mouth Daily.     • Semaglutide, 2 MG/DOSE, (Ozempic, 2 MG/DOSE,) 8 MG/3ML solution pen-injector Inject 2 mg under the skin into the appropriate area as directed 1 (One) Time Per Week. Dose increase 9 mL 1   • tamsulosin (FLOMAX) 0.4 MG capsule 24 hr capsule Take 1 capsule by mouth Daily.     • Vitamin D, Cholecalciferol, (CHOLECALCIFEROL) 400 units tablet Take 400 Units by mouth Daily.         PMH:   Past Medical History:   Diagnosis Date   • Aortic valve stenosis    • Asthma    • Coronary artery disease    • Diabetes mellitus (HCC)    • Hyperlipidemia    • Hypertension    • Stroke (HCC)     2009, no residual   • Type 2 diabetes mellitus with circulatory disorder, without long-term current use of insulin ([facility]) 09/06/2019     PSH:    Past Surgical History:   Procedure Laterality Date   • CARDIAC CATHETERIZATION      3 stents    • CARDIAC CATHETERIZATION N/A 09/06/2019    Procedure: Left Heart Cath;  Surgeon: Jonathan Pineda MD;  Location:  [facility] CATH INVASIVE LOCATION;  Service: Cardiovascular   • CARDIAC ELECTROPHYSIOLOGY PROCEDURE N/A 01/19/2021    Procedure: DEVICE IMPLANT;  Surgeon: Jonathan Pineda MD;  Location:  [facility] CATH INVASIVE LOCATION;  Service: Cardiovascular;  Laterality: N/A;   • CHOLECYSTECTOMY     • COLONOSCOPY     • CORONARY ANGIOPLASTY WITH STENT PLACEMENT      x 3,  last one    • CORONARY ARTERY BYPASS GRAFT WITH AORTIC VALVE REPAIR/REPLACEMENT N/A 09/10/2019    Procedure: CARLYN PER ANESTHESIA, MEDIAN STERNOTOMY, CORONARY ARTERY BYPASS GRAFT X 4, UTILIZING THE LEFT INTERNAL MAMMARY ARTERY, AND EVH OF THE RIGHT GREATER SAPHENOUS VEIN,   AORTIC VALVE REPLACEMENT;  Surgeon: Denys Goldsmith MD;  Location: [facility];  Service: Cardiothoracic   • INSERT / REPLACE / REMOVE PACEMAKER     • UMBILICAL HERNIA REPAIR       Patient denies allergy to contrast dye or latex  Immunization History:  Influenza: No  Pneumococcal: Yes  Tetanus: Up-to-date    Social History:   Tobacco:   Social History     Tobacco Use   Smoking Status Former   • Types: Cigars   • Quit date: 10/14/1979   • Years since quittin.0   Smokeless Tobacco Former   • Types: Chew   • Quit date: 2004      Alcohol:     Social History     Substance and Sexual Activity   Alcohol Use No       Vitals:           There were no vitals taken for this visit.    Physical Exam:  General Appearance:    Alert, cooperative, no distress, appears stated age   Head:    Normocephalic, without obvious abnormality, atraumatic   Lungs:    Increased AP diameter noted.  Clear to auscultation bilaterally to the bases.    Heart:  S1 and S2 without rubs or gallops.  3/6 diastolic rumble throughout the precordium    Abdomen:   Soft, nontender, bowel sounds present throughout.   Breast Exam:    deferred   Genitalia:    deferred   Extremities:   Extremities normal, atraumatic, no cyanosis or edema   Skin:   Skin color, texture, turgor normal, no rashes or lesions   Neurologic:   Grossly intact   Results Review  LABS:  Lab Results   Component Value Date    WBC 5.53 10/19/2022    HGB 13.3 10/19/2022    HCT 41.0 10/19/2022    MCV 92.6 10/19/2022     10/19/2022    NEUTROABS 3.73 2019    GLUCOSE 79 10/19/2022    BUN 22 10/19/2022    CREATININE 1.22 10/19/2022    EGFRIFNONA 65 2021    EGFRIFAFRI 60 2022     10/19/2022     K 4.7 10/19/2022     (H) 10/19/2022    CO2 27.0 10/19/2022    MG 2.4 09/14/2019    PHOS 4.0 09/15/2019    CALCIUM 9.7 10/19/2022    ALBUMIN 4.00 09/30/2021    ALBUMIN 4.60 09/30/2021    AST 19 09/30/2021    AST 18 09/30/2021    ALT 21 09/30/2021    ALT 22 09/30/2021    BILITOT 0.4 09/30/2021    BILITOT 0.4 09/30/2021    PTT 27.7 09/10/2019    INR 1.00 10/19/2022       RADIOLOGY:  Chest X-Ray PA & Lateral    Result Date: 10/19/2022  DATE OF EXAM: 10/19/2022 3:00 PM  PROCEDURE: XR CHEST PA AND LATERAL-  INDICATIONS: Pre-Op Cardiac Surgery; I65.23-Occlusion and stenosis of bilateral carotid arteries  COMPARISON: 1/19/2021  TECHNIQUE: Two radiologic views of the chest, PA and lateral, were obtained.  FINDINGS: Left chest wall ICD projects in place. Mild chronic interstitial changes are present, with some scattered atelectasis. There is no focal airspace consolidation. No effusion or pneumothorax. Unremarkable heart and mediastinal contours. Stable appearance of median sternotomy wires, including some areas of separation of dehiscence.      Chronic and postoperative changes are present as above. No acute cardiopulmonary abnormality.  This report was finalized on 10/19/2022 3:53 PM by Jonathan Mcrae.         I reviewed the patient's new clinical results.    Cancer Staging (if applicable)  Cancer Patient: __ yes __no __unknown; If yes, clinical stage T:__ N:__M:__, stage group or __N/A    Impression: Bilateral carotid artery stenosis left greater than right  Coronary artery disease  Heart murmur  Aortic stenosis s/p replacement  Diabetes mellitus  Hyperlipidemia  Hypertension  Asthma    Plan: Left carotid endarterectomy with EEG monitoring, left  Agree with the above.  This patient has had previous coronary surgery and aortic valve replacement.  However now he has critical left-sided carotid artery stenosis that is symptomatic with vision disturbances in his left eye.  He had been scheduled for surgery in a few  weeks for a tight carotid lesion without symptomatology but patient apparently phoned our office complaining of visual disturbances and we are proceeding as an on an urgent basis.  He does understand that the surgery has a risk of stroke bleeding infection and death and blindness no guarantees are made as to outcome and he agrees to proceed  This patient discontinued smoking in 1979 therefore smoking cessation was not discussed.  He declines an advanced directive.         I am addending this note at 8:02 AM on October 20, 2022.  I had previously seen this patient in the office in September of this year who was referred to me with carotid artery disease by duplex scan.  The patient denied to stroke seizures or amaurosis fugax at that time and we had arranged for a CT angiogram which demonstrated greater than 80% left-sided carotid stenosis.  The patient subsequently phoned our office within the last few days complaining of transient vision loss in the left and we moved his surgery up to today.  However, it was only today that had noticed notes from the [facility].  Apparently they have recently gone lives with her epic electronic medical record and we are now able to see their charts which I was not able to see at the first visit with this patient.  This patient had apparently been seen by them within the last 2 months and was diagnosed with a left-sided stroke by CT scan and MRI.  The patient did not reveal this to me in my initial encounter with him but rather denied a history of stroke.  Furthermore he had had a CT angiogram at that time which he did not reveal to me which demonstrated 45% left-sided lesion.  I have now reviewed the scans with the radiologist I feel confident there is at least 80% left-sided stenosis.  However I am concerned that the patient did not reveal to me his visit to the [facility] and his history of stroke as documented in the radiology reports.    Khoa WILBURN  JANAK To   10/20/22   7:09 AM EDT

## 2022-10-21 VITALS
SYSTOLIC BLOOD PRESSURE: 126 MMHG | HEART RATE: 61 BPM | TEMPERATURE: 97.8 F | RESPIRATION RATE: 20 BRPM | DIASTOLIC BLOOD PRESSURE: 62 MMHG | OXYGEN SATURATION: 92 %

## 2022-10-21 LAB
ANION GAP SERPL CALCULATED.3IONS-SCNC: 10 MMOL/L (ref 5–15)
BASOPHILS # BLD AUTO: 0.02 10*3/MM3 (ref 0–0.2)
BASOPHILS NFR BLD AUTO: 0.3 % (ref 0–1.5)
BUN SERPL-MCNC: 23 MG/DL (ref 8–23)
BUN/CREAT SERPL: 20.9 (ref 7–25)
CALCIUM SPEC-SCNC: 8.5 MG/DL (ref 8.6–10.5)
CHLORIDE SERPL-SCNC: 108 MMOL/L (ref 98–107)
CO2 SERPL-SCNC: 23 MMOL/L (ref 22–29)
COTININE UR QL SCN: NEGATIVE NG/ML
CREAT SERPL-MCNC: 1.1 MG/DL (ref 0.76–1.27)
CYTO UR: NORMAL
DEPRECATED RDW RBC AUTO: 44.4 FL (ref 37–54)
EGFRCR SERPLBLD CKD-EPI 2021: 73.1 ML/MIN/1.73
EOSINOPHIL # BLD AUTO: 0.01 10*3/MM3 (ref 0–0.4)
EOSINOPHIL NFR BLD AUTO: 0.1 % (ref 0.3–6.2)
ERYTHROCYTE [DISTWIDTH] IN BLOOD BY AUTOMATED COUNT: 13.6 % (ref 12.3–15.4)
GLUCOSE BLDC GLUCOMTR-MCNC: 119 MG/DL (ref 70–130)
GLUCOSE SERPL-MCNC: 93 MG/DL (ref 65–99)
HCT VFR BLD AUTO: 34 % (ref 37.5–51)
HGB BLD-MCNC: 11.3 G/DL (ref 13–17.7)
IMM GRANULOCYTES # BLD AUTO: 0.03 10*3/MM3 (ref 0–0.05)
IMM GRANULOCYTES NFR BLD AUTO: 0.4 % (ref 0–0.5)
LAB AP CASE REPORT: NORMAL
LAB AP CLINICAL INFORMATION: NORMAL
LYMPHOCYTES # BLD AUTO: 1.23 10*3/MM3 (ref 0.7–3.1)
LYMPHOCYTES NFR BLD AUTO: 18.2 % (ref 19.6–45.3)
Lab: NORMAL
MCH RBC QN AUTO: 30.1 PG (ref 26.6–33)
MCHC RBC AUTO-ENTMCNC: 33.2 G/DL (ref 31.5–35.7)
MCV RBC AUTO: 90.4 FL (ref 79–97)
MONOCYTES # BLD AUTO: 0.7 10*3/MM3 (ref 0.1–0.9)
MONOCYTES NFR BLD AUTO: 10.4 % (ref 5–12)
NEUTROPHILS NFR BLD AUTO: 4.75 10*3/MM3 (ref 1.7–7)
NEUTROPHILS NFR BLD AUTO: 70.6 % (ref 42.7–76)
NRBC BLD AUTO-RTO: 0 /100 WBC (ref 0–0.2)
PATH REPORT.FINAL DX SPEC: NORMAL
PATH REPORT.GROSS SPEC: NORMAL
PLATELET # BLD AUTO: 141 10*3/MM3 (ref 140–450)
PMV BLD AUTO: 9.5 FL (ref 6–12)
POTASSIUM SERPL-SCNC: 4.3 MMOL/L (ref 3.5–5.2)
RBC # BLD AUTO: 3.76 10*6/MM3 (ref 4.14–5.8)
SODIUM SERPL-SCNC: 141 MMOL/L (ref 136–145)
WBC NRBC COR # BLD: 6.74 10*3/MM3 (ref 3.4–10.8)

## 2022-10-21 PROCEDURE — 99024 POSTOP FOLLOW-UP VISIT: CPT | Performed by: THORACIC SURGERY (CARDIOTHORACIC VASCULAR SURGERY)

## 2022-10-21 PROCEDURE — 80048 BASIC METABOLIC PNL TOTAL CA: CPT | Performed by: PHYSICIAN ASSISTANT

## 2022-10-21 PROCEDURE — 82962 GLUCOSE BLOOD TEST: CPT

## 2022-10-21 PROCEDURE — 25010000002 CEFAZOLIN PER 500 MG: Performed by: PHYSICIAN ASSISTANT

## 2022-10-21 PROCEDURE — 85025 COMPLETE CBC W/AUTO DIFF WBC: CPT | Performed by: PHYSICIAN ASSISTANT

## 2022-10-21 RX ADMIN — ASPIRIN 81 MG: 81 TABLET, COATED ORAL at 09:14

## 2022-10-21 RX ADMIN — SODIUM CHLORIDE 5 MG/HR: 9 INJECTION, SOLUTION INTRAVENOUS at 06:17

## 2022-10-21 RX ADMIN — CEFAZOLIN 2 G: 10 INJECTION, POWDER, FOR SOLUTION INTRAVENOUS at 00:14

## 2022-10-21 RX ADMIN — METOPROLOL TARTRATE 100 MG: 50 TABLET, FILM COATED ORAL at 09:14

## 2022-10-21 RX ADMIN — LOSARTAN POTASSIUM 100 MG: 50 TABLET, FILM COATED ORAL at 09:14

## 2022-10-21 RX ADMIN — BUSPIRONE HYDROCHLORIDE 10 MG: 5 TABLET ORAL at 09:14

## 2022-10-21 RX ADMIN — HYDROCODONE BITARTRATE AND ACETAMINOPHEN 1 TABLET: 7.5; 325 TABLET ORAL at 00:18

## 2022-10-21 RX ADMIN — TAMSULOSIN HYDROCHLORIDE 0.4 MG: 0.4 CAPSULE ORAL at 09:14

## 2022-10-21 RX ADMIN — SODIUM CHLORIDE 5 MG/HR: 9 INJECTION, SOLUTION INTRAVENOUS at 00:43

## 2022-10-21 RX ADMIN — CLOPIDOGREL BISULFATE 75 MG: 75 TABLET ORAL at 09:14

## 2022-10-21 RX ADMIN — PANTOPRAZOLE SODIUM 40 MG: 40 TABLET, DELAYED RELEASE ORAL at 06:08

## 2022-10-21 NOTE — OP NOTE
Operative Report    Preop Diagnosis: Previous stroke.  Left carotid artery stenosis.  Current visual symptoms    Results of STS risk score discussed with patient and family  Smoking cessation was not discussed as patient discontinued smoking in 1979  Patient declines advanced directive    Postoperative Diagnosis: Same      Procedure: Left carotid endarterectomy with pericardial patch closure and EEG monitoring        Surgeons: Denys Goldsmith MD      Assistant: Deepak Ricardo PA-C    The Assistant provided services of suctioning, irrigation and retraction.  Also, assisted in suture closure of parts of the skin incision.      Indication: Patient was referred to my office with a duplex scan demonstrating left-sided carotid artery stenosis.  He had denied stroke seizures TIAs amaurosis fugax.  And we obtained a CT angiogram.  This demonstrated 80 to 85% left-sided carotid stenosis.  It was only after that that I determined that the patient had previously been seen at the [facility] and just August of this year with CT and MRI documenting a left-sided stroke.  He understood that surgery had with the risk of stroke bleed infection death renal failure and agreed to proceed        Description: Supine position sterile prep and drape.  General endotracheal anesthesia and antibiotics given.  Incision was made on the left neck anterior to the sternocleidomastoid and through the platysma.  Care was taken to identify not injure the hypoglossal and vagus nerves.  Heparin was given.  The internal/external and common carotid arteries were sequentially clamped and no deleterious EEG changes were encountered.  The artery was opened there was a heavy calcific plaque it was endarterectomized to a smooth surface and a pericardial patch was sutured in place with 6-0 Prolene.  Brisk backbleeding was noted from the internal carotid.  Care had also been taken to ensure the external carotid was endarterectomized because of the  patient's visual symptoms.  The clamps were released in the proper sequential fashion and no deleterious EEG changes were again encountered.  Good hemostasis was obtained.  A Eddi-Becerra drain was placed the incision closed multiple layers of Vicryl suture and the sponge and needle count reported as correct and blood loss less than 100 mL and most of this was from back flushing the artery.      Please note that portions of this note were completed with a voice recognition program. Efforts were made to edit the dictations, but occasionally words are mistranscribed.

## 2022-10-21 NOTE — PROGRESS NOTES
CTS Progress Note       LOS: 1 day   Patient Care Team:  Shirlene Sharpe MD as PCP - General (Internal Medicine)  Sage Bonner PA-C as Physician Assistant (Endocrinology)    Chief Complaint: Bilateral carotid artery stenosis    Vital Signs:  Temp:  [97 °F (36.1 °C)-98.4 °F (36.9 °C)] 98.4 °F (36.9 °C)  Heart Rate:  [59-76] 60  Resp:  [16-20] 18  BP: ()/(54-83) 106/54  Arterial Line BP: (1-125)/(0-61) 100/37    Physical Exam: Tongue midline.  Alert conversant neck is satisfactory no new focal motor or sensory neurologic deficits       Results:   Results from last 7 days   Lab Units 10/21/22  0220   WBC 10*3/mm3 6.74   HEMOGLOBIN g/dL 11.3*   HEMATOCRIT % 34.0*   PLATELETS 10*3/mm3 141     Results from last 7 days   Lab Units 10/21/22  0220   SODIUM mmol/L 141   POTASSIUM mmol/L 4.3   CHLORIDE mmol/L 108*   CO2 mmol/L 23.0   BUN mg/dL 23   CREATININE mg/dL 1.10   GLUCOSE mg/dL 93   CALCIUM mg/dL 8.5*           Imaging Results (Last 24 Hours)     ** No results found for the last 24 hours. **          Assessment      Bilateral carotid artery stenosis        Plan   Discontinue Eddi-Becerra drain  Discharge home at noon today  No narcotics at discharge    Please note that portions of this note were completed with a voice recognition program. Efforts were made to edit the dictations, but occasionally words are mistranscribed.    Denys Goldsmith MD  10/21/22  06:57 EDT

## 2022-10-21 NOTE — CASE MANAGEMENT/SOCIAL WORK
Case Management Discharge Note      Final Note: Mr. Arnold has discharge orders in place.  No needs identified at this time.  Spouse here to transport home.                   Final Discharge Disposition Code: 01 - home or self-care

## 2022-10-27 ENCOUNTER — TELEPHONE (OUTPATIENT)
Dept: CARDIAC SURGERY | Facility: CLINIC | Age: 67
End: 2022-10-27

## 2022-10-27 NOTE — TELEPHONE ENCOUNTER
Caller: Sarah Arnold    Relationship: Emergency Contact    Best call back number: [phone]    What is the best time to reach you: ANY    Who are you requesting to speak with (clinical staff, provider,  specific staff member):CLINICAL    What was the call regarding: PT SPOUSE CALLING IN WANTING TO KNOW IF IT WAS OKAY FOR PT TO SHAVE HIS FACE POST SURGERY.     Do you require a callback: YES PLEASE CALL BACK PT SPOUSE TO ADVISE.

## 2022-10-27 NOTE — TELEPHONE ENCOUNTER
I spoke with Rosario Clayton, explained that he may shave face but avoid getting close to incision.

## 2022-11-07 ENCOUNTER — TELEPHONE (OUTPATIENT)
Dept: ENDOCRINOLOGY | Facility: CLINIC | Age: 67
End: 2022-11-07

## 2022-11-07 NOTE — TELEPHONE ENCOUNTER
He could see if his PCP has any samples of 2 mg dose (or even 1 mg dose where he could take 2 shots at one time) - he would need to pens to get him to the beginning of the year. The other option is patient assistance. Since the issue is donut hole, switching to any other medication in the class will be similar in cost.

## 2022-11-07 NOTE — TELEPHONE ENCOUNTER
Spoke to pt-advised we do not have a sample.  He states he's in the donut hole and cant afford $186 for ozempic. After the 1st of the year he will be ok - doesn't want to change it because it works so good!

## 2022-11-07 NOTE — TELEPHONE ENCOUNTER
Patients wife Sarah called stated Khoa cannot get his Ozempic because it is to expensive. They wanted to know if we had any samples. Please advise.

## 2022-11-08 NOTE — TELEPHONE ENCOUNTER
Spoke with patient and told him Sage's message. He said he would call his pcp to see if they have samples to give him then call me back. If they do not have samples I advised him that we could do pt assistance until the first of the year when he is out of the donut hole. Patient verbalized understanding.

## 2022-11-10 ENCOUNTER — OFFICE VISIT (OUTPATIENT)
Dept: CARDIAC SURGERY | Facility: CLINIC | Age: 67
End: 2022-11-10

## 2022-11-10 VITALS
OXYGEN SATURATION: 99 % | HEART RATE: 69 BPM | SYSTOLIC BLOOD PRESSURE: 140 MMHG | WEIGHT: 219 LBS | DIASTOLIC BLOOD PRESSURE: 70 MMHG | TEMPERATURE: 98.6 F | HEIGHT: 66 IN | BODY MASS INDEX: 35.2 KG/M2

## 2022-11-10 DIAGNOSIS — E11.59 TYPE 2 DIABETES MELLITUS WITH OTHER CIRCULATORY COMPLICATION, WITHOUT LONG-TERM CURRENT USE OF INSULIN: Chronic | ICD-10-CM

## 2022-11-10 DIAGNOSIS — I10 BENIGN ESSENTIAL HTN: Chronic | ICD-10-CM

## 2022-11-10 DIAGNOSIS — I65.23 BILATERAL CAROTID ARTERY STENOSIS: Primary | ICD-10-CM

## 2022-11-10 DIAGNOSIS — T84.218D FRACTURED STERNAL WIRES, SUBSEQUENT ENCOUNTER: ICD-10-CM

## 2022-11-10 DIAGNOSIS — I25.10 CORONARY ARTERY DISEASE INVOLVING NATIVE CORONARY ARTERY OF NATIVE HEART WITHOUT ANGINA PECTORIS: ICD-10-CM

## 2022-11-10 PROCEDURE — 99024 POSTOP FOLLOW-UP VISIT: CPT | Performed by: NURSE PRACTITIONER

## 2022-11-10 NOTE — PROGRESS NOTES
[facility] Cardiothoracic Surgery Office Follow Up Note     Date of Encounter: 11/10/2022     Name: Khoa Arnold  : 1955     Referred By: No ref. provider found  PCP: Shirlene Sharpe MD    Chief Complaint:    Chief Complaint   Patient presents with   • Hospital Follow Up Visit     Hosp follow up s/p 2 week Left CEA-ROM- Left carotid artery stenosis. Pt states that he is doing really well, some pain in the chest and tenderness from where the heart monitor leads where placed, pt states that he is still sore  Some tenderness remain at incision site and still dizzy from time to time when rising from sitting.        Subjective      History of Present Illness:    Khoa Arnold is a 67 y.o. male with PMH CAD + valvular disease s/p CABGx4 + AVR 9/10/2019, HTN, Hx sternal wire fractures, former smoker, SSS, DM type 2 (HgA1C 6.3%), dyslipidemia, CVA, and bilateral carotid stenosis.  He was seen by Dr. Goldsmith 22 w/ symptoms of dizziness while turning head side to side and transient bilateral vision loss.  CTA carotids 22 demonstrated greater than 80% left sided carotid stenosis and less than 50% right ICA stenosis.  He underwent left CEA w/ EEG per Dr. Goldsmith on 10/20/22.  No intraoperative or postoperative complications.  He DC home POD #1.  No re-admissions.        Patient follows up for post op exam today.  He is accompanied by spouse.  Patient states compliance with meds.  He denies new neurological symptoms or pain with chewing.  Spouse states voice quality is the same as pre-op.  Patient states his vision loss episodes and dizziness with head turning have resolved.  He continues walking daily.  Patient follows with PCP, Endocrinology, and Cardiology (Dr. Pineda).      Regarding sternal wire fractures: patient last seen by Dr. Goldsmith 2020.  Re-wiring was deferred due to mild symptomatology.  CXR at that time identified two broken sternal wires.  Patient states his  symptoms of mild sternal movement and popping have remained stable.  He avoids side lying sleep positions.      Review of Systems:  Review of Systems   Constitutional: Negative for chills, decreased appetite, fever and malaise/fatigue.   HENT: Positive for congestion (allergies and sinus).    Eyes: Positive for visual disturbance (right eye). Negative for vision loss in right eye.   Cardiovascular: Positive for chest pain (tenderness and pain in the left side of the chest where monitor leads where placed ) and dyspnea on exertion (slight SOB with exerion ). Negative for claudication, irregular heartbeat, leg swelling, near-syncope, orthopnea, palpitations and syncope.   Respiratory: Negative for cough, hemoptysis, shortness of breath, sputum production and wheezing.    Hematologic/Lymphatic: Positive for bleeding problem. Bruises/bleeds easily.   Skin: Negative for color change, poor wound healing and rash.   Musculoskeletal: Positive for back pain. Negative for falls and joint pain.   Gastrointestinal: Positive for constipation. Negative for abdominal pain, diarrhea, nausea and vomiting.   Genitourinary: Positive for frequency and nocturia.   Neurological: Positive for dizziness (when rising from sitting ). Negative for focal weakness, numbness and paresthesias.   Psychiatric/Behavioral: Negative for depression. The patient has insomnia and is nervous/anxious.    Allergic/Immunologic: Positive for environmental allergies.       I have reviewed the following portions of the patient's history: allergies, current medications, past medical history, past social history, past surgical history, problem list and ROS and confirm it's accurate.    Allergies:  Allergies   Allergen Reactions   • Ranexa [Ranolazine Er] Nausea Only and Dizziness   • Lisinopril Itching and Rash     On feet       Medications:      Current Outpatient Medications:   •  Accu-Chek Softclix Lancets lancets, Use to check glucose three times daily, Disp:  , Rfl:   •  albuterol sulfate  (90 Base) MCG/ACT inhaler, Inhale 2 puffs Every 4 (Four) Hours As Needed for Wheezing., Disp: , Rfl:   •  aspirin 81 MG EC tablet, Take 81 mg by mouth Daily., Disp: , Rfl:   •  busPIRone (BUSPAR) 10 MG tablet, Take 1 tablet by mouth 3 (Three) Times a Day., Disp: , Rfl:   •  cetirizine (zyrTEC) 10 MG tablet, Take 1 tablet by mouth Daily As Needed., Disp: , Rfl:   •  clopidogrel (PLAVIX) 75 MG tablet, Take 75 mg by mouth Daily., Disp: , Rfl:   •  coenzyme Q10 100 MG capsule, Take 100 mg by mouth Daily., Disp: , Rfl:   •  empagliflozin (Jardiance) 10 MG tablet tablet, Take 1 tablet by mouth Daily., Disp: 90 tablet, Rfl: 1  •  losartan (COZAAR) 100 MG tablet, Take 100 mg by mouth Daily., Disp: , Rfl:   •  metFORMIN (GLUCOPHAGE) 500 MG tablet, Take 1 tablet by mouth 2 (Two) Times a Day With Meals., Disp: , Rfl:   •  metoprolol tartrate (LOPRESSOR) 100 MG tablet, Take 1 tablet by mouth Every 12 (Twelve) Hours., Disp: 60 tablet, Rfl: 11  •  multivitamin (THERAGRAN) tablet tablet, Take 1 tablet by mouth Daily., Disp: , Rfl:   •  nitroglycerin (NITROSTAT) 0.4 MG SL tablet, Place 0.4 mg under the tongue Every 5 (Five) Minutes As Needed for Chest Pain. Take no more than 3 doses in 15 minutes., Disp: , Rfl:   •  omeprazole (priLOSEC) 40 MG capsule, Take 40 mg by mouth Daily., Disp: , Rfl:   •  potassium chloride (K-DUR,KLOR-CON) 10 MEQ CR tablet, Take 10 mEq by mouth Daily., Disp: , Rfl: 0  •  rosuvastatin (CRESTOR) 40 MG tablet, Take 40 mg by mouth Daily., Disp: , Rfl:   •  Semaglutide, 2 MG/DOSE, (Ozempic, 2 MG/DOSE,) 8 MG/3ML solution pen-injector, Inject 2 mg under the skin into the appropriate area as directed 1 (One) Time Per Week. Dose increase, Disp: 9 mL, Rfl: 1  •  tamsulosin (FLOMAX) 0.4 MG capsule 24 hr capsule, Take 1 capsule by mouth Daily., Disp: , Rfl:   •  temazepam (RESTORIL) 30 MG capsule, Take 1 capsule by mouth At Night As Needed for Sleep., Disp: , Rfl:   •  Vitamin D,  Cholecalciferol, (CHOLECALCIFEROL) 400 units tablet, Take 400 Units by mouth Daily., Disp: , Rfl:     History:   Past Medical History:   Diagnosis Date   • Aortic valve stenosis    • Asthma    • Coronary artery disease    • Diabetes mellitus (HCC)    • Hyperlipidemia    • Hypertension    • Stroke (HCC)     , no residual   • Type 2 diabetes mellitus with circulatory disorder, without long-term current use of insulin (HCC) 2019       Past Surgical History:   Procedure Laterality Date   • CARDIAC CATHETERIZATION      3 stents    • CARDIAC CATHETERIZATION N/A 2019    Procedure: Left Heart Cath;  Surgeon: Jonathan Pineda MD;  Location:  [facility];  Service: Cardiovascular   • CARDIAC ELECTROPHYSIOLOGY PROCEDURE N/A 2021    Procedure: DEVICE IMPLANT;  Surgeon: Jonathan Pineda MD;  Location:  [facility];  Service: Cardiovascular;  Laterality: N/A;   • CAROTID ENDARTERECTOMY Left 10/20/2022    Procedure: CAROTID ENDARTERECTOMY WITH EEG LEFT;  Surgeon: Denys Goldsmith MD;  Location:  [facility];  Service: Vascular;  Laterality: Left;   • CHOLECYSTECTOMY     • COLONOSCOPY     • CORONARY ANGIOPLASTY WITH STENT PLACEMENT      x 3, last one    • CORONARY ARTERY BYPASS GRAFT WITH AORTIC VALVE REPAIR/REPLACEMENT N/A 09/10/2019    Procedure: CARLYN PER ANESTHESIA, MEDIAN STERNOTOMY, CORONARY ARTERY BYPASS GRAFT X 4, UTILIZING THE LEFT INTERNAL MAMMARY ARTERY, AND EVH OF THE RIGHT GREATER SAPHENOUS VEIN,   AORTIC VALVE REPLACEMENT;  Surgeon: Denys Goldsmith MD;  Location:  [facility];  Service: Cardiothoracic   • INSERT / REPLACE / REMOVE PACEMAKER     • UMBILICAL HERNIA REPAIR         Social History     Socioeconomic History   • Marital status:    • Number of children: 1   Tobacco Use   • Smoking status: Former     Types: Cigars     Quit date: 10/14/1979     Years since quittin.1   • Smokeless tobacco: Former     Types: Chew     Quit date:  "1/1/2004   Vaping Use   • Vaping Use: Never used   Substance and Sexual Activity   • Alcohol use: No   • Drug use: No   • Sexual activity: Defer        Family History   Problem Relation Age of Onset   • Coronary artery disease Mother    • Diabetes Mother    • Coronary artery disease Father    • Diabetes Father        Objective   Physical Exam:  Vitals:    11/10/22 1031   BP: 140/70   Pulse: 69   Temp: 98.6 °F (37 °C)   SpO2: 99%   Weight: 99.3 kg (219 lb)   Height: 167.6 cm (66\")      Body mass index is 35.35 kg/m².    Physical Exam  Vitals reviewed.   Constitutional:       General: He is not in acute distress.     Appearance: Normal appearance. He is well-developed. He is obese. He is not toxic-appearing.          Comments: Left neck CEA incision is very well approximated, no induration, no exudate, no tenderness.  Two small areas of scabbing remain at distal edge of incision.     HENT:      Head: Normocephalic and atraumatic.      Mouth/Throat:      Mouth: Mucous membranes are moist.      Tongue: Tongue does not deviate from midline.      Comments: Voice quality normal/ no hoarseness.  Facial structures symmetric  Eyes:      General: Lids are normal.      Comments: No ptosis   Neck:      Thyroid: No thyromegaly.      Vascular: No carotid bruit.   Cardiovascular:      Rate and Rhythm: Normal rate and regular rhythm.      Heart sounds: Murmur heard.    Systolic murmur is present with a grade of 2/6.    No friction rub.      Comments: Mild palpable left chest wall tenderness without ecchymosis over PPM device.  Pulmonary:      Effort: Pulmonary effort is normal.      Breath sounds: Normal breath sounds.   Musculoskeletal:      Cervical back: Normal range of motion and neck supple.      Right lower leg: No edema.      Left lower leg: No edema.   Lymphadenopathy:      Cervical: No cervical adenopathy.   Skin:     General: Skin is warm and dry.      Capillary Refill: Capillary refill takes less than 2 seconds. "   Neurological:      General: No focal deficit present.      Mental Status: He is alert and oriented to person, place, and time. Mental status is at baseline.      Cranial Nerves: No cranial nerve deficit.      Gait: Gait normal.   Psychiatric:         Mood and Affect: Mood normal.         Behavior: Behavior normal. Behavior is cooperative.         Imaging/Labs:  EEG Monitoring Nonintracranial Surgery    Result Date: 10/20/2022  Uneventful EEG monitoring during left CEA This report is transcribed using the Dragon dictation system.      EEG    Result Date: 10/20/2022  The study is adequate for intraoperative monitoring This report is transcribed using the Dragon dictation system.      Chest X-Ray PA & Lateral    Result Date: 10/19/2022  Chronic and postoperative changes are present as above. No acute cardiopulmonary abnormality.  This report was finalized on 10/19/2022 3:53 PM by Jonathan Mcrae.         Results for orders placed during the hospital encounter of 10/21/21    Duplex Carotid Ultrasound CAR    Interpretation Summary  · Right internal carotid artery stenosis of 0-49%.  · Left internal carotid artery stenosis of 0-49%.    DATE OF EXAM: 9/21/2022  CT ANGIOGRAM CAROTIDS  COMPARISON: Carotid ultrasound 10/21/2021       FINDINGS:  The lung apices are grossly clear. Evaluation of the neck soft tissues  demonstrates no pathologic cervical adenopathy or unexpected  aerodigestive tract mass. Evaluation of the osseous structures  demonstrates multilevel spondylosis, otherwise without evidence of acute  fracture or aggressive osseous lesion. Limited intracranial evaluation  demonstrates no acute findings. The carotid siphons are incidentally  imaged with prominent calcific atherosclerotic change resulting in mild  narrowing of the supraclinoid segments bilaterally. Patent aortic arch  with three-vessel branching pattern. On the right, there is calcific  atherosclerotic change in addition to eccentric soft plaque  which  results in minimal, less than 50% narrowing of the ICA origin. There is  calcific plaque present involving the left ICA origin, with focal  approximate 80% stenosis by NASCET criteria. Eccentric plaque is also  present along the more distal left ICA just proximal to the skull base,  with less than 50% narrowing present at this location. The vertebral  arteries demonstrate no evidence of flow-limiting stenosis.     IMPRESSION:  Minimal, less than 50% narrowing of the right ICA origin, with  combination of eccentric soft plaque and calcific atherosclerotic  change.     Prominent calcific atherosclerotic change of the left ICA origin, with  focal approximate 80% stenosis.     Assessment / Plan      Assessment / Plan:  Diagnoses and all orders for this visit:    1. Bilateral carotid artery stenosis (Primary)  - left CEA 10/22/22 without complications or readmissions  - pre-op CTA carotid noted 50% FABRICE stenosis and 80% LICA stenosis  - Continue ASA, plavix, statin  - report any new neurological symptoms  - Surveillance follow up per Dr. Pineda (annual carotid duplex)    2. Coronary artery disease s/p CABG  - ASA, Plavix, statin, BB  - denies angina  - continue follow up with Dr. Pineda    3. Benign essential HTN  - goal <130/70 mm Hg  - Monitor daily at home x 2 weeks and follow up BP log with PCP    4. Type 2 diabetes mellitus with other circulatory complication, without long-term current use of insulin ([facility])  - follows with Endocrinology  - pre-op HgA1C 6.3%    5. Fractured sternal wires, subsequent encounter  - No symptom changes since eval per Dr. Goldsmith 6/2020  - patient continues to indicate he does not wish to pursue re-wiring as long as symptoms remain stable  - Report if symptoms change to CT Surgery clinic       Patient Education:  Promptly report and CVA symptoms and seek ER EVAL.    Follow Up:   Return on as needed basis (changes in sternal symptoms).  See Dr. Pineda for annual carotid  surveillance.   Or sooner for any further concerns or worsening sign and symptoms. If unable to reach us in the office please dial 911 or go to the nearest emergency department.      ELENO Gaviria  [facility] Cardiothoracic Surgery    Time Spent: I spent 35 minutes caring for Khoa on this date of service. This time includes time spent by me in the following activities: preparing for the visit, reviewing tests, obtaining and/or reviewing a separately obtained history, performing a medically appropriate examination and/or evaluation, counseling and educating the patient/family/caregiver, documenting information in the medical record and care coordination.

## 2022-11-17 ENCOUNTER — TELEPHONE (OUTPATIENT)
Dept: CARDIAC SURGERY | Facility: CLINIC | Age: 67
End: 2022-11-17

## 2022-11-17 NOTE — TELEPHONE ENCOUNTER
Mr. Shelton called stating he is having tingling under left breast and in left arm when he lays certain ways. He is also having dizziness and shortness of breath when walking across the room. He has to stop and rest to catch his breath.   I spoke with ELENO Haas who recommended patient be evaluated in ED. Patient did not want to go to the ED. Therefore I suggested that he see his PCP asap for evaluation. He verbalized understanding.    Deepak

## 2023-01-06 ENCOUNTER — LAB (OUTPATIENT)
Dept: LAB | Facility: HOSPITAL | Age: 68
End: 2023-01-06
Payer: MEDICARE

## 2023-01-06 ENCOUNTER — OFFICE VISIT (OUTPATIENT)
Dept: ENDOCRINOLOGY | Facility: CLINIC | Age: 68
End: 2023-01-06
Payer: MEDICARE

## 2023-01-06 VITALS
WEIGHT: 221 LBS | HEIGHT: 66 IN | DIASTOLIC BLOOD PRESSURE: 70 MMHG | HEART RATE: 64 BPM | SYSTOLIC BLOOD PRESSURE: 136 MMHG | BODY MASS INDEX: 35.52 KG/M2 | OXYGEN SATURATION: 98 %

## 2023-01-06 DIAGNOSIS — E55.9 VITAMIN D DEFICIENCY: ICD-10-CM

## 2023-01-06 DIAGNOSIS — E11.649 CONTROLLED TYPE 2 DIABETES MELLITUS WITH HYPOGLYCEMIA, WITHOUT LONG-TERM CURRENT USE OF INSULIN: Primary | ICD-10-CM

## 2023-01-06 DIAGNOSIS — E78.2 MIXED HYPERLIPIDEMIA: ICD-10-CM

## 2023-01-06 LAB
EXPIRATION DATE: NORMAL
GLUCOSE BLDC GLUCOMTR-MCNC: 181 MG/DL (ref 70–130)
HBA1C MFR BLD: 6.8 %
Lab: NORMAL

## 2023-01-06 PROCEDURE — 36415 COLL VENOUS BLD VENIPUNCTURE: CPT | Performed by: PHYSICIAN ASSISTANT

## 2023-01-06 PROCEDURE — 3044F HG A1C LEVEL LT 7.0%: CPT | Performed by: PHYSICIAN ASSISTANT

## 2023-01-06 PROCEDURE — 83036 HEMOGLOBIN GLYCOSYLATED A1C: CPT | Performed by: PHYSICIAN ASSISTANT

## 2023-01-06 PROCEDURE — 99214 OFFICE O/P EST MOD 30 MIN: CPT | Performed by: PHYSICIAN ASSISTANT

## 2023-01-06 PROCEDURE — 82947 ASSAY GLUCOSE BLOOD QUANT: CPT | Performed by: PHYSICIAN ASSISTANT

## 2023-01-06 NOTE — PROGRESS NOTES
Chief Complaint  F/u for Diabetes Mellitus.    HPI   Khoa Arnold is a 67 y.o. male with htn, hyperlipidemia, sick sinus syndrome s/p PPM, CAD s/p CABG with AVR, vit d deficiency who is here today for f/u of Diabetes Mellitus type 2. The initial diagnosis of diabetes was made in his early 60s.     No hypoglycemia since glimepiride was stopped last visit.   Still on vitamin D supplement.    Diabetic complications: cardiovascular disease and cerebrovascular disease. CAD s/p cabg, hx of 2 CVAs, mild peripheral neuropathy  Eye exam current (within one year): elvis boland and kya due 5/2022    Current diabetic medications include:  Metformin 500 mg 2 tabs twice daily  Jardiance 10 mg daily  Ozempic 2 mg weekly    Statin: Crestor 40 mg - hyperlipidemia    Past medications: Januvia, glimepiride    Diabetic Monitoring  - checks glucose 2-3x/day  Glucose is averaging- Bg ranging , mostly <160  Hypoglycemia- none  Home blood sugar records: glucometer downloaded, data reviewed and scanned to chart    The following portions of the patient's history were reviewed and updated by me as appropriate: allergies, current medications, past family history, past social history, past surgical history and problem list.    Past Medical History:   Diagnosis Date   • Aortic valve stenosis    • Asthma    • Coronary artery disease    • Diabetes mellitus (HCC)    • Hyperlipidemia    • Hypertension    • Stroke (HCC)     2009, no residual   • Type 2 diabetes mellitus with circulatory disorder, without long-term current use of insulin (HCC) 09/06/2019       Medications    Current Outpatient Medications:   •  Accu-Chek Softclix Lancets lancets, Use to check glucose three times daily, Disp: , Rfl:   •  albuterol sulfate  (90 Base) MCG/ACT inhaler, Inhale 2 puffs Every 4 (Four) Hours As Needed for Wheezing., Disp: , Rfl:   •  aspirin 81 MG EC tablet, Take 81 mg by mouth Daily., Disp: , Rfl:   •  busPIRone (BUSPAR) 10 MG tablet,  Take 1 tablet by mouth 3 (Three) Times a Day., Disp: , Rfl:   •  cetirizine (zyrTEC) 10 MG tablet, Take 1 tablet by mouth Daily As Needed., Disp: , Rfl:   •  clopidogrel (PLAVIX) 75 MG tablet, Take 75 mg by mouth Daily., Disp: , Rfl:   •  coenzyme Q10 100 MG capsule, Take 100 mg by mouth Daily., Disp: , Rfl:   •  empagliflozin (Jardiance) 10 MG tablet tablet, Take 1 tablet by mouth Daily., Disp: 90 tablet, Rfl: 1  •  losartan (COZAAR) 100 MG tablet, Take 100 mg by mouth Daily., Disp: , Rfl:   •  metFORMIN (GLUCOPHAGE) 500 MG tablet, Take 1 tablet by mouth 2 (Two) Times a Day With Meals., Disp: , Rfl:   •  metoprolol tartrate (LOPRESSOR) 100 MG tablet, Take 1 tablet by mouth Every 12 (Twelve) Hours., Disp: 60 tablet, Rfl: 11  •  multivitamin (THERAGRAN) tablet tablet, Take 1 tablet by mouth Daily., Disp: , Rfl:   •  nitroglycerin (NITROSTAT) 0.4 MG SL tablet, Place 0.4 mg under the tongue Every 5 (Five) Minutes As Needed for Chest Pain. Take no more than 3 doses in 15 minutes., Disp: , Rfl:   •  omeprazole (priLOSEC) 40 MG capsule, Take 40 mg by mouth Daily., Disp: , Rfl:   •  potassium chloride (K-DUR,KLOR-CON) 10 MEQ CR tablet, Take 10 mEq by mouth Daily., Disp: , Rfl: 0  •  rosuvastatin (CRESTOR) 40 MG tablet, Take 40 mg by mouth Daily., Disp: , Rfl:   •  Semaglutide, 2 MG/DOSE, (Ozempic, 2 MG/DOSE,) 8 MG/3ML solution pen-injector, Inject 2 mg under the skin into the appropriate area as directed 1 (One) Time Per Week. Dose increase, Disp: 9 mL, Rfl: 1  •  tamsulosin (FLOMAX) 0.4 MG capsule 24 hr capsule, Take 1 capsule by mouth Daily., Disp: , Rfl:   •  temazepam (RESTORIL) 30 MG capsule, Take 1 capsule by mouth At Night As Needed for Sleep., Disp: , Rfl:   •  Vitamin D, Cholecalciferol, (CHOLECALCIFEROL) 400 units tablet, Take 400 Units by mouth Daily., Disp: , Rfl:     Review of Systems  Review of Systems     Physical Exam    /70   Pulse 64   Ht 167.6 cm (66\")   Wt 100 kg (221 lb)   SpO2 98%   BMI  35.67 kg/m² Body mass index is 35.67 kg/m².  Physical Exam  Constitutional:       General: He is not in acute distress.     Appearance: He is well-developed. He is not diaphoretic.   HENT:      Head: Normocephalic.      Right Ear: External ear normal.      Left Ear: External ear normal.      Nose: Nose normal.   Eyes:      General: No scleral icterus.     Conjunctiva/sclera: Conjunctivae normal.   Neck:      Thyroid: No thyromegaly.      Vascular: No JVD.      Trachea: No tracheal deviation.   Cardiovascular:      Rate and Rhythm: Normal rate and regular rhythm.      Heart sounds: Normal heart sounds. No murmur heard.  Pulmonary:      Effort: Pulmonary effort is normal. No respiratory distress.      Breath sounds: Normal breath sounds. No wheezing.   Musculoskeletal:         General: No tenderness.      Cervical back: Neck supple.   Skin:     General: Skin is warm and dry.      Findings: No erythema or rash.   Neurological:      Mental Status: He is alert and oriented to person, place, and time.   Psychiatric:         Behavior: Behavior normal.         Thought Content: Thought content normal.         Judgment: Judgment normal.         Labs and Imaging   Lab Results   Component Value Date    HGBA1C 6.8 01/06/2023    HGBA1C 6.60 (H) 10/19/2022    HGBA1C 6.3 09/21/2022         Assessment / Plan   Diagnoses and all orders for this visit:    1. Controlled type 2 diabetes mellitus with hypoglycemia, without long-term current use of insulin (HCC) (Primary)  -     POC Glycosylated Hemoglobin (Hb A1C)  -     POC Glucose, Blood  -     Comprehensive Metabolic Panel  -     Lipid Panel  -     TSH  -     Vitamin B12    2. Mixed hyperlipidemia  -     Comprehensive Metabolic Panel  -     Lipid Panel    3. Vitamin D deficiency  -     Vitamin D,25-Hydroxy        Diabetes Mellitus 2 is under good control.  -with peripheral neuropathy, history of CAD and CVA  -A1c 6.8, up from 6.3 last visit  -no hypoglycemia  -continue Ozempic 2 mg  weekly  -continue Jardiance 10 mg daily  -continue metformin 500 mg BID  -Foot exam updated 9/2022, labs today, eye exam updated 5/2022    Hyperlipidemia  -Lipid panel today  -continue crestor 40 mg qhs    There are no Patient Instructions on file for this visit.    Follow up: Return in about 4 months (around 5/6/2023).    Signed by: Sage Bonner PA-C  Endocrinology

## 2023-01-07 LAB
25(OH)D3+25(OH)D2 SERPL-MCNC: 60 NG/ML (ref 30–100)
ALBUMIN SERPL-MCNC: 4.4 G/DL (ref 3.5–5.2)
ALBUMIN/GLOB SERPL: 1.9 G/DL
ALP SERPL-CCNC: 53 U/L (ref 39–117)
ALT SERPL-CCNC: 19 U/L (ref 1–41)
AST SERPL-CCNC: 17 U/L (ref 1–40)
BILIRUB SERPL-MCNC: 0.4 MG/DL (ref 0–1.2)
BUN SERPL-MCNC: 24 MG/DL (ref 8–23)
BUN/CREAT SERPL: 20.3 (ref 7–25)
CALCIUM SERPL-MCNC: 9.6 MG/DL (ref 8.6–10.5)
CHLORIDE SERPL-SCNC: 103 MMOL/L (ref 98–107)
CHOLEST SERPL-MCNC: 90 MG/DL (ref 0–200)
CO2 SERPL-SCNC: 27.2 MMOL/L (ref 22–29)
CREAT SERPL-MCNC: 1.18 MG/DL (ref 0.76–1.27)
EGFRCR SERPLBLD CKD-EPI 2021: 67.2 ML/MIN/1.73
GLOBULIN SER CALC-MCNC: 2.3 GM/DL
GLUCOSE SERPL-MCNC: 149 MG/DL (ref 65–99)
HDLC SERPL-MCNC: 35 MG/DL (ref 40–60)
LDLC SERPL CALC-MCNC: 38 MG/DL (ref 0–100)
POTASSIUM SERPL-SCNC: 4.9 MMOL/L (ref 3.5–5.2)
PROT SERPL-MCNC: 6.7 G/DL (ref 6–8.5)
SODIUM SERPL-SCNC: 139 MMOL/L (ref 136–145)
TRIGL SERPL-MCNC: 84 MG/DL (ref 0–150)
TSH SERPL DL<=0.005 MIU/L-ACNC: 1.62 UIU/ML (ref 0.27–4.2)
VIT B12 SERPL-MCNC: 225 PG/ML (ref 211–946)
VLDLC SERPL CALC-MCNC: 17 MG/DL (ref 5–40)

## 2023-03-31 ENCOUNTER — TELEPHONE (OUTPATIENT)
Dept: ENDOCRINOLOGY | Facility: CLINIC | Age: 68
End: 2023-03-31

## 2023-03-31 NOTE — TELEPHONE ENCOUNTER
Spoke with patients wife and let her know Sage is out of the office today but will be back Monday to sign the patient assistance forms then I will send them to them via mail. She voiced understanding.

## 2023-03-31 NOTE — TELEPHONE ENCOUNTER
Provider was supposed to receive papers from [employer] about ozempic when you finsh papers can you mail them to the patient ?

## 2023-05-08 ENCOUNTER — OFFICE VISIT (OUTPATIENT)
Dept: ENDOCRINOLOGY | Facility: CLINIC | Age: 68
End: 2023-05-08
Payer: MEDICARE

## 2023-05-08 VITALS
DIASTOLIC BLOOD PRESSURE: 76 MMHG | HEIGHT: 66 IN | HEART RATE: 71 BPM | SYSTOLIC BLOOD PRESSURE: 130 MMHG | BODY MASS INDEX: 35.03 KG/M2 | OXYGEN SATURATION: 98 % | WEIGHT: 218 LBS

## 2023-05-08 DIAGNOSIS — E11.65 CONTROLLED TYPE 2 DIABETES MELLITUS WITH HYPERGLYCEMIA, WITHOUT LONG-TERM CURRENT USE OF INSULIN: Primary | Chronic | ICD-10-CM

## 2023-05-08 DIAGNOSIS — E78.2 MIXED HYPERLIPIDEMIA: Chronic | ICD-10-CM

## 2023-05-08 LAB
EXPIRATION DATE: ABNORMAL
EXPIRATION DATE: NORMAL
GLUCOSE BLDC GLUCOMTR-MCNC: 149 MG/DL (ref 70–130)
HBA1C MFR BLD: 7.1 %
Lab: ABNORMAL
Lab: NORMAL

## 2023-05-08 PROCEDURE — 1159F MED LIST DOCD IN RCRD: CPT | Performed by: PHYSICIAN ASSISTANT

## 2023-05-08 PROCEDURE — 3078F DIAST BP <80 MM HG: CPT | Performed by: PHYSICIAN ASSISTANT

## 2023-05-08 PROCEDURE — 82947 ASSAY GLUCOSE BLOOD QUANT: CPT | Performed by: PHYSICIAN ASSISTANT

## 2023-05-08 PROCEDURE — 3051F HG A1C>EQUAL 7.0%<8.0%: CPT | Performed by: PHYSICIAN ASSISTANT

## 2023-05-08 PROCEDURE — 99214 OFFICE O/P EST MOD 30 MIN: CPT | Performed by: PHYSICIAN ASSISTANT

## 2023-05-08 PROCEDURE — 1160F RVW MEDS BY RX/DR IN RCRD: CPT | Performed by: PHYSICIAN ASSISTANT

## 2023-05-08 PROCEDURE — 83036 HEMOGLOBIN GLYCOSYLATED A1C: CPT | Performed by: PHYSICIAN ASSISTANT

## 2023-05-08 PROCEDURE — 3075F SYST BP GE 130 - 139MM HG: CPT | Performed by: PHYSICIAN ASSISTANT

## 2023-05-08 RX ORDER — BLOOD SUGAR DIAGNOSTIC
STRIP MISCELLANEOUS
COMMUNITY
Start: 2023-03-20

## 2023-05-08 RX ORDER — AMLODIPINE BESYLATE 5 MG/1
5 TABLET ORAL DAILY
COMMUNITY
Start: 2023-04-02

## 2023-05-08 NOTE — PROGRESS NOTES
Chief Complaint  F/u for Diabetes Mellitus.    HPI   Khoa Arnold is a 67 y.o. male with htn, hyperlipidemia, sick sinus syndrome s/p PPM, CAD s/p CABG with AVR, vit d deficiency who is here today for f/u of Diabetes Mellitus type 2. The initial diagnosis of diabetes was made in his early 60s.     Presents to appointment with wife.    Diabetic complications: cardiovascular disease and cerebrovascular disease. CAD s/p cabg, hx of 2 CVAs, mild peripheral neuropathy  Eye exam current (within one year): utd 4/2023 domo     Current diabetic medications include:  Metformin 500 mg 2 tabs twice daily  Jardiance 10 mg daily  Ozempic 2 mg weekly    Statin: Crestor 40 mg - hyperlipidemia    Past medications: Januvia, glimepiride    Diabetic Monitoring  - checks glucose 2-3x/day  Glucose is averaging- fasting -181 (mostly mist 100s), during the day 138-224, evening 105-220  Hypoglycemia- none  Home blood sugar records: glucometer downloaded, data reviewed and scanned to chart    The following portions of the patient's history were reviewed and updated by me as appropriate: allergies, current medications, past family history, past social history, past surgical history and problem list.    Past Medical History:   Diagnosis Date   • Aortic valve stenosis    • Asthma    • Coronary artery disease    • Diabetes mellitus    • Hyperlipidemia    • Hypertension    • Stroke     2009, no residual   • Type 2 diabetes mellitus with circulatory disorder, without long-term current use of insulin 09/06/2019       Medications    Current Outpatient Medications:   •  Accu-Chek Guide test strip, Use to check glucose TID, Disp: , Rfl:   •  Accu-Chek Softclix Lancets lancets, Use to check glucose three times daily, Disp: , Rfl:   •  albuterol sulfate  (90 Base) MCG/ACT inhaler, Inhale 2 puffs Every 4 (Four) Hours As Needed for Wheezing., Disp: , Rfl:   •  amLODIPine (NORVASC) 5 MG tablet, Take 1 tablet by mouth Daily.,  Disp: , Rfl:   •  aspirin 81 MG EC tablet, Take 1 tablet by mouth Daily., Disp: , Rfl:   •  busPIRone (BUSPAR) 10 MG tablet, Take 1 tablet by mouth 3 (Three) Times a Day., Disp: , Rfl:   •  cetirizine (zyrTEC) 10 MG tablet, Take 1 tablet by mouth Daily As Needed., Disp: , Rfl:   •  clopidogrel (PLAVIX) 75 MG tablet, Take 1 tablet by mouth Daily., Disp: , Rfl:   •  empagliflozin (Jardiance) 10 MG tablet tablet, Take 1 tablet by mouth Daily., Disp: 90 tablet, Rfl: 1  •  losartan (COZAAR) 100 MG tablet, Take 1 tablet by mouth Daily., Disp: , Rfl:   •  metFORMIN (GLUCOPHAGE) 500 MG tablet, Take 1 tablet by mouth 2 (Two) Times a Day With Meals., Disp: , Rfl:   •  metoprolol tartrate (LOPRESSOR) 100 MG tablet, Take 1 tablet by mouth Every 12 (Twelve) Hours., Disp: 60 tablet, Rfl: 11  •  multivitamin (THERAGRAN) tablet tablet, Take 1 tablet by mouth Daily., Disp: , Rfl:   •  nitroglycerin (NITROSTAT) 0.4 MG SL tablet, Place 1 tablet under the tongue Every 5 (Five) Minutes As Needed for Chest Pain. Take no more than 3 doses in 15 minutes., Disp: , Rfl:   •  omeprazole (priLOSEC) 40 MG capsule, Take 1 capsule by mouth Daily., Disp: , Rfl:   •  potassium chloride (K-DUR,KLOR-CON) 10 MEQ CR tablet, Take 1 tablet by mouth Daily., Disp: , Rfl: 0  •  rosuvastatin (CRESTOR) 40 MG tablet, Take 1 tablet by mouth Daily., Disp: , Rfl:   •  Semaglutide, 2 MG/DOSE, (Ozempic, 2 MG/DOSE,) 8 MG/3ML solution pen-injector, Inject 2 mg under the skin into the appropriate area as directed 1 (One) Time Per Week. Dose increase, Disp: 9 mL, Rfl: 1  •  tamsulosin (FLOMAX) 0.4 MG capsule 24 hr capsule, Take 1 capsule by mouth Daily., Disp: , Rfl:   •  temazepam (RESTORIL) 30 MG capsule, Take 1 capsule by mouth At Night As Needed for Sleep., Disp: , Rfl:   •  Vitamin D, Cholecalciferol, (CHOLECALCIFEROL) 400 units tablet, Take 1 tablet by mouth Daily., Disp: , Rfl:     Review of Systems  Review of Systems     Physical Exam    /76   Pulse 71   " Ht 167.6 cm (66\")   Wt 98.9 kg (218 lb)   SpO2 98%   BMI 35.19 kg/m² Body mass index is 35.19 kg/m².  Physical Exam  Constitutional:       General: He is not in acute distress.     Appearance: He is well-developed. He is not diaphoretic.   HENT:      Head: Normocephalic.      Right Ear: External ear normal.      Left Ear: External ear normal.      Nose: Nose normal.   Eyes:      General: No scleral icterus.     Conjunctiva/sclera: Conjunctivae normal.   Neck:      Thyroid: No thyromegaly.      Vascular: No JVD.      Trachea: No tracheal deviation.   Cardiovascular:      Rate and Rhythm: Normal rate and regular rhythm.      Heart sounds: Normal heart sounds. No murmur heard.  Pulmonary:      Effort: Pulmonary effort is normal. No respiratory distress.      Breath sounds: Normal breath sounds. No wheezing.   Musculoskeletal:         General: No tenderness.      Cervical back: Neck supple.   Skin:     General: Skin is warm and dry.      Findings: No erythema or rash.   Neurological:      Mental Status: He is alert and oriented to person, place, and time.   Psychiatric:         Behavior: Behavior normal.         Thought Content: Thought content normal.         Judgment: Judgment normal.         Labs and Imaging   Lab Results   Component Value Date    HGBA1C 7.1 05/08/2023    HGBA1C 6.8 01/06/2023    HGBA1C 6.60 (H) 10/19/2022     CMP        10/19/2022    13:57 10/21/2022    02:20 1/6/2023    12:00   CMP   Glucose 79   93   149     BUN 22   23   24     Creatinine 1.22   1.10   1.18     EGFR 64.6   73.1      Sodium 144   141   139     Potassium 4.7   4.3   4.9     Chloride 108   108   103     Calcium 9.7   8.5   9.6     Total Protein   6.7     Albumin   4.4     Globulin   2.3     Total Bilirubin   0.4     Alkaline Phosphatase   53     AST (SGOT)   17     ALT (SGPT)   19     BUN/Creatinine Ratio 18.0   20.9   20.3     Anion Gap 9.0   10.0        CBC        10/19/2022    13:57 10/21/2022    02:20   CBC   WBC 5.53   " 6.74     RBC 4.43   3.76     Hemoglobin 13.3   11.3     Hematocrit 41.0   34.0     MCV 92.6   90.4     MCH 30.0   30.1     MCHC 32.4   33.2     RDW 13.5   13.6     Platelets 161   141       Lipid Panel        1/6/2023    12:00   Lipid Panel   Total Cholesterol 90     Triglycerides 84     HDL Cholesterol 35     VLDL Cholesterol 17     LDL Cholesterol  38       TSH        1/6/2023    12:00   TSH   TSH 1.620       Most Recent A1C        5/8/2023    11:16   HGBA1C Most Recent   Hemoglobin A1C 7.1       Microalbumin        9/21/2022    11:57   Microalbumin   Microalbumin, Urine <1.2         Assessment / Plan   Diagnoses and all orders for this visit:    1. Controlled type 2 diabetes mellitus with hyperglycemia, without long-term current use of insulin (Primary)  -     POC Glycosylated Hemoglobin (Hb A1C)  -     POC Glucose, Blood    2. Mixed hyperlipidemia        Diabetes Mellitus 2 is under good control.  -with peripheral neuropathy, history of CAD and CVA  -A1c 7.1, up from 6.8 last visit  -no hypoglycemia, occasional daytime hypoglycemia  -continue Ozempic 2 mg weekly  -continue Jardiance 10 mg daily  -continue metformin 500 mg BID  -Foot exam and UACR updated 9/2022, labs updated 1/2023, eye exam updated 5/2022    Hyperlipidemia  -LDL within guidelines  -continue crestor 40 mg qhs    There are no Patient Instructions on file for this visit.    Follow up: Return in about 4 months (around 9/8/2023).    Signed by: Sage Bonner PA-C  Endocrinology

## 2023-05-31 NOTE — PAT
Dr. Santos aware. Unfortunately unable to given ONBs.  Left a message for Betsy to return call to update her and appt can be canceled on 6/6/23.   Patient denies any current skin issues.       An arrival time for procedure was not provided during PAT visit. If patient had any questions or concerns about their arrival time, they were instructed to contact their surgeon/physician.  Additionally, if the patient referred to an arrival time that was acquired from their my chart account, patient was encouraged to verify that time with their surgeon/physician. Arrival times are NOT provided in Pre Admission Testing Department.

## 2023-06-19 ENCOUNTER — TELEPHONE (OUTPATIENT)
Dept: ENDOCRINOLOGY | Facility: CLINIC | Age: 68
End: 2023-06-19
Payer: MEDICARE

## 2023-07-17 PROBLEM — I65.21 CAROTID STENOSIS, SYMPTOMATIC W/O INFARCT, RIGHT: Status: ACTIVE | Noted: 2022-09-12

## 2023-07-17 NOTE — H&P (VIEW-ONLY)
07/17/2023  Patient Information  Khoa Arnold                                                                                          [address] [facility] [address]   1955  'PCP/Referring Physician'  Shirlene Sharpe MD  [phone]  Jose R Boogie MD  [phone]  Chief Complaint   Patient presents with    Follow-up     Current Patient Referred Back by Dr. Boogie for Right Carotid Stenosis-Hx of Left CEA 10/20/22-Complains of Dizziness and Right Eye Blurred Vision       History of Present Illness:  The patient is a 68-year-old male on whom I performed an aortic valve replacement and multivessel coronary bypass grafting in 2019.  I then performed a left carotid endarterectomy, approximately, 1 year ago.  At the time he had only 50% narrowing in the right sided carotid artery.  However, now a duplex scan demonstrates 50 to 69% narrowing but the main reason he was referred to me is that he is having visual disturbances and amaurosis fugax in the right eye.  He has had at least 3 of these events within the last 2 weeks.  He states this is similar to the ocular symptoms he had before surgery on his left eye, which totally resolved following his left carotid endarterectomy.      Patient Active Problem List   Diagnosis    Unstable angina    Coronary artery disease    Type 2 diabetes mellitus with circulatory disorder, without long-term current use of insulin    Benign essential HTN    Fractured sternal wires    Sick sinus syndrome    Mixed hyperlipidemia    Carotid stenosis, symptomatic w/o infarct, right     Past Medical History:   Diagnosis Date    Aortic valve stenosis     Asthma     Carotid stenosis     Coronary artery disease     Diabetes mellitus     Hyperlipidemia     Hypertension     Stroke     2009, no residual    Type 2 diabetes mellitus with circulatory disorder, without long-term current use of insulin 09/06/2019     Past Surgical History:   Procedure Laterality Date    CARDIAC  CATHETERIZATION      3 stents     CARDIAC CATHETERIZATION N/A 09/06/2019    Procedure: Left Heart Cath;  Surgeon: Jonathan Pineda MD;  Location:  [facility];  Service: Cardiovascular    CARDIAC ELECTROPHYSIOLOGY PROCEDURE N/A 01/19/2021    Procedure: DEVICE IMPLANT;  Surgeon: Jonathan Pineda MD;  Location:  [facility];  Service: Cardiovascular;  Laterality: N/A;    CAROTID ENDARTERECTOMY Left 10/20/2022    Procedure: CAROTID ENDARTERECTOMY WITH EEG LEFT;  Surgeon: Denys Goldsmith MD;  Location:  [facility];  Service: Vascular;  Laterality: Left;    CHOLECYSTECTOMY      COLONOSCOPY      CORONARY ANGIOPLASTY WITH STENT PLACEMENT      x 3, last one 2014    CORONARY ARTERY BYPASS GRAFT WITH AORTIC VALVE REPAIR/REPLACEMENT N/A 09/10/2019    Procedure: CARLYN PER ANESTHESIA, MEDIAN STERNOTOMY, CORONARY ARTERY BYPASS GRAFT X 4, UTILIZING THE LEFT INTERNAL MAMMARY ARTERY, AND EVH OF THE RIGHT GREATER SAPHENOUS VEIN,   AORTIC VALVE REPLACEMENT;  Surgeon: Denys Goldsmith MD;  Location:  [facility];  Service: Cardiothoracic    INSERT / REPLACE / REMOVE PACEMAKER      UMBILICAL HERNIA REPAIR         Current Outpatient Medications:     Accu-Chek Guide test strip, Use to check glucose TID, Disp: , Rfl:     Accu-Chek Softclix Lancets lancets, Use to check glucose three times daily, Disp: , Rfl:     albuterol sulfate  (90 Base) MCG/ACT inhaler, Inhale 2 puffs Every 4 (Four) Hours As Needed for Wheezing., Disp: , Rfl:     aspirin 81 MG EC tablet, Take 1 tablet by mouth Daily., Disp: , Rfl:     busPIRone (BUSPAR) 10 MG tablet, Take 1 tablet by mouth 3 (Three) Times a Day., Disp: , Rfl:     cetirizine (zyrTEC) 10 MG tablet, Take 1 tablet by mouth Daily As Needed., Disp: , Rfl:     clopidogrel (PLAVIX) 75 MG tablet, Take 1 tablet by mouth Daily., Disp: , Rfl:     empagliflozin (Jardiance) 10 MG tablet tablet, Take 1 tablet by mouth Daily., Disp: 90 tablet, Rfl: 1    losartan  (COZAAR) 100 MG tablet, Take 1 tablet by mouth Daily., Disp: , Rfl:     metFORMIN (GLUCOPHAGE) 500 MG tablet, Take 1 tablet by mouth 2 (Two) Times a Day With Meals., Disp: , Rfl:     metoprolol tartrate (LOPRESSOR) 100 MG tablet, Take 1 tablet by mouth Every 12 (Twelve) Hours., Disp: 60 tablet, Rfl: 11    multivitamin (THERAGRAN) tablet tablet, Take 1 tablet by mouth Daily., Disp: , Rfl:     nitroglycerin (NITROSTAT) 0.4 MG SL tablet, Place 1 tablet under the tongue Every 5 (Five) Minutes As Needed for Chest Pain. Take no more than 3 doses in 15 minutes., Disp: , Rfl:     omeprazole (priLOSEC) 40 MG capsule, Take 1 capsule by mouth Daily., Disp: , Rfl:     potassium chloride (K-DUR,KLOR-CON) 10 MEQ CR tablet, Take 1 tablet by mouth Daily., Disp: , Rfl: 0    rosuvastatin (CRESTOR) 40 MG tablet, Take 1 tablet by mouth Daily., Disp: , Rfl:     Semaglutide, 2 MG/DOSE, (Ozempic, 2 MG/DOSE,) 8 MG/3ML solution pen-injector, Inject 2 mg under the skin into the appropriate area as directed 1 (One) Time Per Week. Dose increase, Disp: 9 mL, Rfl: 1    tamsulosin (FLOMAX) 0.4 MG capsule 24 hr capsule, Take 1 capsule by mouth Daily., Disp: , Rfl:     temazepam (RESTORIL) 30 MG capsule, Take 1 capsule by mouth At Night As Needed for Sleep., Disp: , Rfl:     Vitamin D, Cholecalciferol, (CHOLECALCIFEROL) 400 units tablet, Take 1 tablet by mouth Daily., Disp: , Rfl:     amLODIPine (NORVASC) 5 MG tablet, Take 1 tablet by mouth Daily. (Patient not taking: Reported on 2023), Disp: , Rfl:   Allergies   Allergen Reactions    Ranexa [Ranolazine Er] Nausea Only and Dizziness    Lisinopril Itching and Rash     On feet     Social History     Socioeconomic History    Marital status:     Number of children: 1   Tobacco Use    Smoking status: Former     Types: Cigars     Quit date: 10/14/1979     Years since quittin.7    Smokeless tobacco: Former     Types: Chew     Quit date: 2004   Vaping Use    Vaping Use: Never used  "  Substance and Sexual Activity    Alcohol use: No    Drug use: No    Sexual activity: Defer     Family History   Problem Relation Age of Onset    Coronary artery disease Mother     Diabetes Mother     Coronary artery disease Father     Diabetes Father      Review of Systems   Constitutional: Positive for weight loss (intentional). Negative for chills, fever, malaise/fatigue and night sweats.   HENT:  Negative for hearing loss, odynophagia and sore throat.    Eyes:  Positive for blurred vision (right eye).   Cardiovascular:  Positive for dyspnea on exertion. Negative for chest pain, leg swelling, orthopnea and palpitations.   Respiratory:  Positive for cough, shortness of breath and wheezing. Negative for hemoptysis.    Endocrine: Negative for cold intolerance, heat intolerance, polydipsia, polyphagia and polyuria.   Hematologic/Lymphatic: Bruises/bleeds easily.   Skin:  Negative for itching and rash.   Musculoskeletal:  Positive for back pain. Negative for joint pain, joint swelling and myalgias.   Gastrointestinal:  Positive for constipation. Negative for abdominal pain, diarrhea, hematemesis, hematochezia, melena, nausea and vomiting.   Genitourinary:  Negative for dysuria, frequency and hematuria.   Neurological:  Positive for dizziness. Negative for focal weakness, headaches, numbness and seizures.   Psychiatric/Behavioral:  Negative for suicidal ideas.    All other systems reviewed and are negative.  Vitals:    07/17/23 0809   BP: 150/70   BP Location: Right arm   Patient Position: Sitting   Pulse: 60   Temp: 97.9 °F (36.6 °C)   SpO2: 98%   Weight: 98 kg (216 lb)   Height: 167.6 cm (66\")      Physical Exam   CONSTITUTIONAL: Alert and conversant, Well dressed, Well nourished, No acute distress  EYES: Sclera clean, Anicteric, Pupils equal  ENT: No nasal deviation, Trachea midline  NECK: No neck masses, Supple  LUNGS: No wheezing, Cough, non-congested  HEART: No rubs, No murmurs  GASTROINTESTINAL: Soft, " non-distended, No masses, Non tender  to palpation, normal bowel sounds  NEURO: No motor deficits, No sensory deficits, Cranial Nerves 2 through 12 grossly intact  PSYCHIATRIC: Oriented to person, place and time, No memory deficits, Mood appropriate  VASCULAR: No carotid bruits, Femoral pulses palpable and symmetric  MUSKULOSKELETAL: No extremity trauma or extremity asymmetry    The ROS, past medical history, surgical history, family history, social history, and vitals were reviewed by myself and corrected as needed.      Labs/Imaging:  I have reviewed the carotid duplex raw data.     Assessment/Plan:   The patient is a 68-year-old status post aortic valve replacement and multivessel coronary bypass grafting and left carotid endarterectomy.  The patient is now having ocular symptoms on the right with amaurosis fugax and a duplex scan demonstrating 50 to 69% narrowing on the right.  This appears to be a symptomatic lesion. The patient states the symptoms are exactly the same as he had before the surgery on his left carotid artery and those symptoms resolved following his surgery.  We discussed the surgery and I indicated it is typically a 1 night stay.  I also indicated that although he had a very satisfactory result with his previous surgery, that in no way guarantees a satisfactory result with the surgery.  And that there is always risks of stroke, bleeding, infection and death. The anticipated hospital stay is 1 day and he is agreeable to proceed. We will try to proceed as soon as possible.    Patient Active Problem List   Diagnosis    Unstable angina    Coronary artery disease    Type 2 diabetes mellitus with circulatory disorder, without long-term current use of insulin    Benign essential HTN    Fractured sternal wires    Sick sinus syndrome    Mixed hyperlipidemia    Carotid stenosis, symptomatic w/o infarct, right       CC: MD Jose R Peoples MD Regina Fugate editing for Denys  MD Agus       I, Denys Goldsmith MD, have read and agree with the editing done by Anastasiya Gu, .

## 2023-07-19 ENCOUNTER — PRE-ADMISSION TESTING (OUTPATIENT)
Dept: PREADMISSION TESTING | Facility: HOSPITAL | Age: 68
DRG: 038 | End: 2023-07-19
Payer: MEDICARE

## 2023-07-19 VITALS — BODY MASS INDEX: 33.88 KG/M2 | WEIGHT: 215.83 LBS | HEIGHT: 67 IN

## 2023-07-19 DIAGNOSIS — I65.21 CAROTID STENOSIS, SYMPTOMATIC W/O INFARCT, RIGHT: ICD-10-CM

## 2023-07-19 LAB
ABO GROUP BLD: NORMAL
ANION GAP SERPL CALCULATED.3IONS-SCNC: 13 MMOL/L (ref 5–15)
BLD GP AB SCN SERPL QL: NEGATIVE
BUN SERPL-MCNC: 23 MG/DL (ref 8–23)
BUN/CREAT SERPL: 17.3 (ref 7–25)
CALCIUM SPEC-SCNC: 9.7 MG/DL (ref 8.6–10.5)
CHLORIDE SERPL-SCNC: 103 MMOL/L (ref 98–107)
CO2 SERPL-SCNC: 22 MMOL/L (ref 22–29)
CREAT SERPL-MCNC: 1.33 MG/DL (ref 0.76–1.27)
DEPRECATED RDW RBC AUTO: 42.3 FL (ref 37–54)
EGFRCR SERPLBLD CKD-EPI 2021: 57.9 ML/MIN/1.73
ERYTHROCYTE [DISTWIDTH] IN BLOOD BY AUTOMATED COUNT: 12.9 % (ref 12.3–15.4)
GLUCOSE SERPL-MCNC: 153 MG/DL (ref 65–99)
HBA1C MFR BLD: 6.8 % (ref 4.8–5.6)
HCT VFR BLD AUTO: 40.3 % (ref 37.5–51)
HGB BLD-MCNC: 13.4 G/DL (ref 13–17.7)
INR PPP: 0.98 (ref 0.89–1.12)
MCH RBC QN AUTO: 30.2 PG (ref 26.6–33)
MCHC RBC AUTO-ENTMCNC: 33.3 G/DL (ref 31.5–35.7)
MCV RBC AUTO: 91 FL (ref 79–97)
PLATELET # BLD AUTO: 183 10*3/MM3 (ref 140–450)
PMV BLD AUTO: 9 FL (ref 6–12)
POTASSIUM SERPL-SCNC: 4.8 MMOL/L (ref 3.5–5.2)
PROTHROMBIN TIME: 13.1 SECONDS (ref 12.2–14.5)
RBC # BLD AUTO: 4.43 10*6/MM3 (ref 4.14–5.8)
RH BLD: NEGATIVE
SODIUM SERPL-SCNC: 138 MMOL/L (ref 136–145)
T&S EXPIRATION DATE: NORMAL
WBC NRBC COR # BLD: 4.97 10*3/MM3 (ref 3.4–10.8)

## 2023-07-19 PROCEDURE — 36415 COLL VENOUS BLD VENIPUNCTURE: CPT

## 2023-07-19 PROCEDURE — 80048 BASIC METABOLIC PNL TOTAL CA: CPT

## 2023-07-19 PROCEDURE — 83036 HEMOGLOBIN GLYCOSYLATED A1C: CPT

## 2023-07-19 PROCEDURE — 93010 ELECTROCARDIOGRAM REPORT: CPT | Performed by: INTERNAL MEDICINE

## 2023-07-19 PROCEDURE — 85610 PROTHROMBIN TIME: CPT

## 2023-07-19 PROCEDURE — 93005 ELECTROCARDIOGRAM TRACING: CPT

## 2023-07-19 PROCEDURE — 86901 BLOOD TYPING SEROLOGIC RH(D): CPT

## 2023-07-19 PROCEDURE — 86900 BLOOD TYPING SEROLOGIC ABO: CPT

## 2023-07-19 PROCEDURE — 86850 RBC ANTIBODY SCREEN: CPT

## 2023-07-19 PROCEDURE — 85027 COMPLETE CBC AUTOMATED: CPT

## 2023-07-19 NOTE — DISCHARGE INSTRUCTIONS
Per Anesthesia Request, patient instructed not to take their ACE/ARB medications on the AM of surgery.     Bactroban to be applied to each nostril during PAT visit if surgery the following day.  If surgery NOT the following day, then Bactroban supplied to patient with instructions both written and verbally to insert into each nares the night before surgery.     Patient to apply Chlorhexadine wipes  to surgical area (as instructed) the night before procedure and the AM of procedure. Wipes provided.     Patient viewed general PAT education video as instructed in their preoperative information received from their surgeon.  Patient stated the general PAT education video was viewed in its entirety and survey completed.  Copies of PAT general education handouts (Incentive Spirometry, Meds to Beds Program, Patient Belongings, Pre-op skin preparation instructions, Blood Glucose testing, Visitor policy, Surgery FAQ, Code H) distributed to patient if not printed. Education related to the PAT pass and skin preparation for surgery (if applicable) completed in PAT as a reinforcement to PAT education video. Patient instructed to return PAT pass provided today as well as completed skin preparation sheet (if applicable) on the day of procedure.     Additionally if patient had not viewed video yet but intended to view it at home or in our waiting area, then referred them to the handout with QR code/link provided during PAT visit.  Instructed patient to complete survey after viewing the video in its entirety.  Encouraged patient/family to read PAT general education handouts thoroughly and notify PAT staff with any questions or concerns. Patient verbalized understanding of all information and priority content.

## 2023-07-19 NOTE — PAT
Patient directed to Radiology Department for CXR after Pre Admission Testing Appointment.      Pacemaker interrogation from Dr Pineda office 1/18/2023. Pt and wife report he has active bedside monitoring for pacemaker as well     An arrival time for procedure was not provided during PAT visit. If patient had any questions or concerns about their arrival time, they were instructed to contact their surgeon/physician.  Additionally, if the patient referred to an arrival time that was acquired from their my chart account, patient was encouraged to verify that time with their surgeon/physician. Arrival times are NOT provided in Pre Admission Testing Department.     Patient denies any current skin issues.        Blood bank bracelet applied to patient during Pre Admission Testing visit.  Patient instructed not to remove from arm until after procedure and they are discharged from the hospital.  Explained to patient that they may shower and get the bracelet wet, but not to immerse under water for longer periods (bathing, swimming, hand dishwashing, etc).  Patient verbalized understanding.     Per Anesthesia Request, patient instructed not to take their ACE/ARB medications on the AM of surgery.     Bactroban to be applied to each nostril during PAT visit if surgery the following day.  If surgery NOT the following day, then Bactroban supplied to patient with instructions both written and verbally to insert into each nares the night before surgery.     Patient to apply Chlorhexadine wipes  to surgical area (as instructed) the night before procedure and the AM of procedure. Wipes provided.

## 2023-07-20 ENCOUNTER — APPOINTMENT (OUTPATIENT)
Dept: NEUROLOGY | Facility: HOSPITAL | Age: 68
DRG: 038 | End: 2023-07-20
Payer: MEDICARE

## 2023-07-20 ENCOUNTER — HOSPITAL ENCOUNTER (INPATIENT)
Facility: HOSPITAL | Age: 68
LOS: 1 days | Discharge: HOME | DRG: 038 | End: 2023-07-21
Attending: THORACIC SURGERY (CARDIOTHORACIC VASCULAR SURGERY) | Admitting: THORACIC SURGERY (CARDIOTHORACIC VASCULAR SURGERY)
Payer: MEDICARE

## 2023-07-20 DIAGNOSIS — I65.21 CAROTID STENOSIS, SYMPTOMATIC W/O INFARCT, RIGHT: ICD-10-CM

## 2023-07-20 DIAGNOSIS — I65.21 CAROTID STENOSIS, ASYMPTOMATIC, RIGHT: ICD-10-CM

## 2023-07-20 PROBLEM — I49.5 SICK SINUS SYNDROME: Chronic | Status: ACTIVE | Noted: 2021-01-13

## 2023-07-20 PROBLEM — I25.10 CAD (CORONARY ARTERY DISEASE): Chronic | Status: ACTIVE | Noted: 2019-09-03

## 2023-07-20 LAB — GLUCOSE BLDC GLUCOMTR-MCNC: 156 MG/DL (ref 70–130)

## 2023-07-20 PROCEDURE — 25010000002 MIDAZOLAM PER 1 MG: Performed by: ANESTHESIOLOGY

## 2023-07-20 PROCEDURE — 95816 EEG AWAKE AND DROWSY: CPT

## 2023-07-20 PROCEDURE — 25010000002 MORPHINE PER 10 MG: Performed by: NURSE PRACTITIONER

## 2023-07-20 PROCEDURE — 25010000002 GENTAMICIN PER 80 MG: Performed by: THORACIC SURGERY (CARDIOTHORACIC VASCULAR SURGERY)

## 2023-07-20 PROCEDURE — C1768 GRAFT, VASCULAR: HCPCS | Performed by: THORACIC SURGERY (CARDIOTHORACIC VASCULAR SURGERY)

## 2023-07-20 PROCEDURE — 95955 EEG DURING SURGERY: CPT | Performed by: PSYCHIATRY & NEUROLOGY

## 2023-07-20 PROCEDURE — 99232 SBSQ HOSP IP/OBS MODERATE 35: CPT | Performed by: NURSE PRACTITIONER

## 2023-07-20 PROCEDURE — 95955 EEG DURING SURGERY: CPT

## 2023-07-20 PROCEDURE — 25010000002 FENTANYL CITRATE (PF) 50 MCG/ML SOLUTION

## 2023-07-20 PROCEDURE — 03UK0KZ SUPPLEMENT RIGHT INTERNAL CAROTID ARTERY WITH NONAUTOLOGOUS TISSUE SUBSTITUTE, OPEN APPROACH: ICD-10-PCS | Performed by: THORACIC SURGERY (CARDIOTHORACIC VASCULAR SURGERY)

## 2023-07-20 PROCEDURE — 25010000002 NITROGLYCERIN 200 MCG/ML SOLUTION: Performed by: PHYSICIAN ASSISTANT

## 2023-07-20 PROCEDURE — 88311 DECALCIFY TISSUE: CPT | Performed by: THORACIC SURGERY (CARDIOTHORACIC VASCULAR SURGERY)

## 2023-07-20 PROCEDURE — 25010000002 HYDRALAZINE PER 20 MG

## 2023-07-20 PROCEDURE — 82948 REAGENT STRIP/BLOOD GLUCOSE: CPT

## 2023-07-20 PROCEDURE — 25010000002 HEPARIN (PORCINE) PER 1000 UNITS: Performed by: THORACIC SURGERY (CARDIOTHORACIC VASCULAR SURGERY)

## 2023-07-20 PROCEDURE — 0 LIDOCAINE 1 % SOLUTION: Performed by: THORACIC SURGERY (CARDIOTHORACIC VASCULAR SURGERY)

## 2023-07-20 PROCEDURE — 25010000002 HYDROMORPHONE 1 MG/ML SOLUTION

## 2023-07-20 PROCEDURE — 03CK0ZZ EXTIRPATION OF MATTER FROM RIGHT INTERNAL CAROTID ARTERY, OPEN APPROACH: ICD-10-PCS | Performed by: THORACIC SURGERY (CARDIOTHORACIC VASCULAR SURGERY)

## 2023-07-20 PROCEDURE — 88304 TISSUE EXAM BY PATHOLOGIST: CPT | Performed by: THORACIC SURGERY (CARDIOTHORACIC VASCULAR SURGERY)

## 2023-07-20 PROCEDURE — 4A10X4Z MONITORING OF CENTRAL NERVOUS ELECTRICAL ACTIVITY, EXTERNAL APPROACH: ICD-10-PCS | Performed by: THORACIC SURGERY (CARDIOTHORACIC VASCULAR SURGERY)

## 2023-07-20 PROCEDURE — 35301 RECHANNELING OF ARTERY: CPT | Performed by: PHYSICIAN ASSISTANT

## 2023-07-20 PROCEDURE — 25010000002 DROPERIDOL PER 5 MG

## 2023-07-20 PROCEDURE — 25010000002 CEFAZOLIN PER 500 MG: Performed by: THORACIC SURGERY (CARDIOTHORACIC VASCULAR SURGERY)

## 2023-07-20 PROCEDURE — 0 NITROGLYCERIN 200 MCG/ML SOLUTION

## 2023-07-20 PROCEDURE — 35301 RECHANNELING OF ARTERY: CPT | Performed by: THORACIC SURGERY (CARDIOTHORACIC VASCULAR SURGERY)

## 2023-07-20 PROCEDURE — 95816 EEG AWAKE AND DROWSY: CPT | Performed by: PSYCHIATRY & NEUROLOGY

## 2023-07-20 DEVICE — SEALANT HEMO TACHOSIL FIBRIN PTCH 9.5X4.8CM: Type: IMPLANTABLE DEVICE | Site: NECK | Status: FUNCTIONAL

## 2023-07-20 DEVICE — VASCU-GUARD IS PREPARED FROM BOVINE PERICARDIUM CROSS-LINKED WITH GLUTARALDEHYDE, AND TREATED WITH 1 MOLAR SODIUM HYDROXIDE FOR A MINIMUM OF 60 MINUTES AT 20-25 DEGREES C. VASCU-GUARD IS TERMINALLY STERILIZED USING GAMMA IRRADIATION. AND PACKAGED WITHIN A DOUBLE-POUCH SYSTEM. THE CONTENTS OF THE UNOPENED, UNDAMAGED OUTER POUCH ARE STERILE.
Type: IMPLANTABLE DEVICE | Site: CAROTID | Status: FUNCTIONAL
Brand: VASCU-GUARD

## 2023-07-20 RX ORDER — ONDANSETRON 4 MG/1
4 TABLET, FILM COATED ORAL EVERY 6 HOURS PRN
Status: DISCONTINUED | OUTPATIENT
Start: 2023-07-20 | End: 2023-07-21 | Stop reason: HOSPADM

## 2023-07-20 RX ORDER — CEFAZOLIN SODIUM 2 G/100ML
2 INJECTION, SOLUTION INTRAVENOUS ONCE
Status: COMPLETED | OUTPATIENT
Start: 2023-07-20 | End: 2023-07-20

## 2023-07-20 RX ORDER — SODIUM CHLORIDE 9 MG/ML
INJECTION, SOLUTION INTRAVENOUS AS NEEDED
Status: DISCONTINUED | OUTPATIENT
Start: 2023-07-20 | End: 2023-07-20 | Stop reason: HOSPADM

## 2023-07-20 RX ORDER — SODIUM CHLORIDE 9 MG/ML
40 INJECTION, SOLUTION INTRAVENOUS AS NEEDED
Status: DISCONTINUED | OUTPATIENT
Start: 2023-07-20 | End: 2023-07-20 | Stop reason: HOSPADM

## 2023-07-20 RX ORDER — NITROGLYCERIN 20 MG/100ML
INJECTION INTRAVENOUS
Status: COMPLETED
Start: 2023-07-20 | End: 2023-07-20

## 2023-07-20 RX ORDER — CLOPIDOGREL BISULFATE 75 MG/1
75 TABLET ORAL DAILY
Status: DISCONTINUED | OUTPATIENT
Start: 2023-07-21 | End: 2023-07-21 | Stop reason: HOSPADM

## 2023-07-20 RX ORDER — FAMOTIDINE 10 MG/ML
20 INJECTION, SOLUTION INTRAVENOUS ONCE
Status: DISCONTINUED | OUTPATIENT
Start: 2023-07-20 | End: 2023-07-20 | Stop reason: HOSPADM

## 2023-07-20 RX ORDER — OXYCODONE HYDROCHLORIDE 5 MG/1
5 TABLET ORAL EVERY 6 HOURS PRN
Status: DISCONTINUED | OUTPATIENT
Start: 2023-07-20 | End: 2023-07-21 | Stop reason: HOSPADM

## 2023-07-20 RX ORDER — DROPERIDOL 2.5 MG/ML
0.62 INJECTION, SOLUTION INTRAMUSCULAR; INTRAVENOUS
Status: DISCONTINUED | OUTPATIENT
Start: 2023-07-20 | End: 2023-07-20 | Stop reason: HOSPADM

## 2023-07-20 RX ORDER — FENTANYL CITRATE 50 UG/ML
INJECTION, SOLUTION INTRAMUSCULAR; INTRAVENOUS
Status: COMPLETED
Start: 2023-07-20 | End: 2023-07-20

## 2023-07-20 RX ORDER — METOPROLOL TARTRATE 100 MG/1
100 TABLET ORAL EVERY 12 HOURS SCHEDULED
Status: DISCONTINUED | OUTPATIENT
Start: 2023-07-20 | End: 2023-07-21 | Stop reason: HOSPADM

## 2023-07-20 RX ORDER — LIDOCAINE HYDROCHLORIDE 10 MG/ML
INJECTION, SOLUTION INFILTRATION; PERINEURAL AS NEEDED
Status: DISCONTINUED | OUTPATIENT
Start: 2023-07-20 | End: 2023-07-20 | Stop reason: HOSPADM

## 2023-07-20 RX ORDER — HYDRALAZINE HYDROCHLORIDE 20 MG/ML
5 INJECTION INTRAMUSCULAR; INTRAVENOUS
Status: DISCONTINUED | OUTPATIENT
Start: 2023-07-20 | End: 2023-07-20 | Stop reason: HOSPADM

## 2023-07-20 RX ORDER — NITROGLYCERIN 0.4 MG/1
0.4 TABLET SUBLINGUAL
Status: DISCONTINUED | OUTPATIENT
Start: 2023-07-20 | End: 2023-07-21 | Stop reason: HOSPADM

## 2023-07-20 RX ORDER — PROMETHAZINE HYDROCHLORIDE 25 MG/1
25 SUPPOSITORY RECTAL ONCE AS NEEDED
Status: DISCONTINUED | OUTPATIENT
Start: 2023-07-20 | End: 2023-07-20 | Stop reason: HOSPADM

## 2023-07-20 RX ORDER — MIDAZOLAM HYDROCHLORIDE 1 MG/ML
0.5 INJECTION INTRAMUSCULAR; INTRAVENOUS
Status: DISCONTINUED | OUTPATIENT
Start: 2023-07-20 | End: 2023-07-20 | Stop reason: SDUPTHER

## 2023-07-20 RX ORDER — TAMSULOSIN HYDROCHLORIDE 0.4 MG/1
0.4 CAPSULE ORAL 2 TIMES DAILY
Status: DISCONTINUED | OUTPATIENT
Start: 2023-07-20 | End: 2023-07-21 | Stop reason: HOSPADM

## 2023-07-20 RX ORDER — SODIUM CHLORIDE 9 MG/ML
9 INJECTION, SOLUTION INTRAVENOUS CONTINUOUS PRN
Status: DISCONTINUED | OUTPATIENT
Start: 2023-07-20 | End: 2023-07-21 | Stop reason: HOSPADM

## 2023-07-20 RX ORDER — HYDRALAZINE HYDROCHLORIDE 20 MG/ML
INJECTION INTRAMUSCULAR; INTRAVENOUS
Status: COMPLETED
Start: 2023-07-20 | End: 2023-07-20

## 2023-07-20 RX ORDER — ONDANSETRON 2 MG/ML
4 INJECTION INTRAMUSCULAR; INTRAVENOUS EVERY 6 HOURS PRN
Status: DISCONTINUED | OUTPATIENT
Start: 2023-07-20 | End: 2023-07-21 | Stop reason: HOSPADM

## 2023-07-20 RX ORDER — CETIRIZINE HYDROCHLORIDE 10 MG/1
10 TABLET ORAL DAILY PRN
Status: DISCONTINUED | OUTPATIENT
Start: 2023-07-20 | End: 2023-07-21 | Stop reason: HOSPADM

## 2023-07-20 RX ORDER — FAMOTIDINE 10 MG/ML
20 INJECTION, SOLUTION INTRAVENOUS
Status: COMPLETED | OUTPATIENT
Start: 2023-07-20 | End: 2023-07-20

## 2023-07-20 RX ORDER — NALOXONE HCL 0.4 MG/ML
0.4 VIAL (ML) INJECTION AS NEEDED
Status: DISCONTINUED | OUTPATIENT
Start: 2023-07-20 | End: 2023-07-20 | Stop reason: HOSPADM

## 2023-07-20 RX ORDER — PROMETHAZINE HYDROCHLORIDE 25 MG/1
25 TABLET ORAL ONCE AS NEEDED
Status: DISCONTINUED | OUTPATIENT
Start: 2023-07-20 | End: 2023-07-20 | Stop reason: HOSPADM

## 2023-07-20 RX ORDER — HYDROCODONE BITARTRATE AND ACETAMINOPHEN 5; 325 MG/1; MG/1
1 TABLET ORAL ONCE AS NEEDED
Status: DISCONTINUED | OUTPATIENT
Start: 2023-07-20 | End: 2023-07-20 | Stop reason: HOSPADM

## 2023-07-20 RX ORDER — PANTOPRAZOLE SODIUM 40 MG/1
40 TABLET, DELAYED RELEASE ORAL
Status: DISCONTINUED | OUTPATIENT
Start: 2023-07-20 | End: 2023-07-21 | Stop reason: HOSPADM

## 2023-07-20 RX ORDER — IPRATROPIUM BROMIDE AND ALBUTEROL SULFATE 2.5; .5 MG/3ML; MG/3ML
3 SOLUTION RESPIRATORY (INHALATION) ONCE AS NEEDED
Status: DISCONTINUED | OUTPATIENT
Start: 2023-07-20 | End: 2023-07-20 | Stop reason: HOSPADM

## 2023-07-20 RX ORDER — BUSPIRONE HYDROCHLORIDE 10 MG/1
10 TABLET ORAL 3 TIMES DAILY
Status: DISCONTINUED | OUTPATIENT
Start: 2023-07-20 | End: 2023-07-21 | Stop reason: HOSPADM

## 2023-07-20 RX ORDER — SODIUM CHLORIDE, SODIUM LACTATE, POTASSIUM CHLORIDE, CALCIUM CHLORIDE 600; 310; 30; 20 MG/100ML; MG/100ML; MG/100ML; MG/100ML
9 INJECTION, SOLUTION INTRAVENOUS CONTINUOUS
Status: DISCONTINUED | OUTPATIENT
Start: 2023-07-20 | End: 2023-07-21 | Stop reason: HOSPADM

## 2023-07-20 RX ORDER — ONDANSETRON 2 MG/ML
4 INJECTION INTRAMUSCULAR; INTRAVENOUS ONCE AS NEEDED
Status: DISCONTINUED | OUTPATIENT
Start: 2023-07-20 | End: 2023-07-20 | Stop reason: HOSPADM

## 2023-07-20 RX ORDER — ROSUVASTATIN CALCIUM 20 MG/1
40 TABLET, COATED ORAL NIGHTLY
Status: DISCONTINUED | OUTPATIENT
Start: 2023-07-20 | End: 2023-07-21 | Stop reason: HOSPADM

## 2023-07-20 RX ORDER — SODIUM CHLORIDE 0.9 % (FLUSH) 0.9 %
10 SYRINGE (ML) INJECTION EVERY 12 HOURS SCHEDULED
Status: DISCONTINUED | OUTPATIENT
Start: 2023-07-20 | End: 2023-07-21 | Stop reason: HOSPADM

## 2023-07-20 RX ORDER — HYDROMORPHONE HYDROCHLORIDE 1 MG/ML
0.5 INJECTION, SOLUTION INTRAMUSCULAR; INTRAVENOUS; SUBCUTANEOUS
Status: DISCONTINUED | OUTPATIENT
Start: 2023-07-20 | End: 2023-07-20 | Stop reason: HOSPADM

## 2023-07-20 RX ORDER — MORPHINE SULFATE 2 MG/ML
1 INJECTION, SOLUTION INTRAMUSCULAR; INTRAVENOUS ONCE
Status: COMPLETED | OUTPATIENT
Start: 2023-07-20 | End: 2023-07-20

## 2023-07-20 RX ORDER — FAMOTIDINE 20 MG/1
20 TABLET, FILM COATED ORAL ONCE
Status: DISCONTINUED | OUTPATIENT
Start: 2023-07-20 | End: 2023-07-20 | Stop reason: HOSPADM

## 2023-07-20 RX ORDER — LABETALOL HYDROCHLORIDE 5 MG/ML
5 INJECTION, SOLUTION INTRAVENOUS
Status: DISCONTINUED | OUTPATIENT
Start: 2023-07-20 | End: 2023-07-20 | Stop reason: HOSPADM

## 2023-07-20 RX ORDER — LIDOCAINE HYDROCHLORIDE 10 MG/ML
0.5 INJECTION, SOLUTION EPIDURAL; INFILTRATION; INTRACAUDAL; PERINEURAL ONCE AS NEEDED
Status: DISCONTINUED | OUTPATIENT
Start: 2023-07-20 | End: 2023-07-20 | Stop reason: HOSPADM

## 2023-07-20 RX ORDER — FAMOTIDINE 20 MG/1
20 TABLET, FILM COATED ORAL
Status: COMPLETED | OUTPATIENT
Start: 2023-07-20 | End: 2023-07-20

## 2023-07-20 RX ORDER — LOSARTAN POTASSIUM 50 MG/1
100 TABLET ORAL DAILY
Status: DISCONTINUED | OUTPATIENT
Start: 2023-07-20 | End: 2023-07-21 | Stop reason: HOSPADM

## 2023-07-20 RX ORDER — MIDAZOLAM HYDROCHLORIDE 1 MG/ML
0.5 INJECTION INTRAMUSCULAR; INTRAVENOUS
Status: DISCONTINUED | OUTPATIENT
Start: 2023-07-20 | End: 2023-07-20 | Stop reason: HOSPADM

## 2023-07-20 RX ORDER — ALBUTEROL SULFATE 90 UG/1
2 AEROSOL, METERED RESPIRATORY (INHALATION) EVERY 4 HOURS PRN
Status: DISCONTINUED | OUTPATIENT
Start: 2023-07-20 | End: 2023-07-20

## 2023-07-20 RX ORDER — POTASSIUM CHLORIDE 750 MG/1
10 CAPSULE, EXTENDED RELEASE ORAL DAILY
Status: DISCONTINUED | OUTPATIENT
Start: 2023-07-21 | End: 2023-07-21 | Stop reason: HOSPADM

## 2023-07-20 RX ORDER — LIDOCAINE HYDROCHLORIDE 10 MG/ML
0.5 INJECTION, SOLUTION EPIDURAL; INFILTRATION; INTRACAUDAL; PERINEURAL ONCE AS NEEDED
Status: COMPLETED | OUTPATIENT
Start: 2023-07-20 | End: 2023-07-20

## 2023-07-20 RX ORDER — THROMBIN HUMAN AND FIBRINOGEN 2; 5.5 [USP'U]/1; MG/1
PATCH TOPICAL AS NEEDED
Status: DISCONTINUED | OUTPATIENT
Start: 2023-07-20 | End: 2023-07-20 | Stop reason: HOSPADM

## 2023-07-20 RX ORDER — SODIUM CHLORIDE 0.9 % (FLUSH) 0.9 %
1-10 SYRINGE (ML) INJECTION AS NEEDED
Status: DISCONTINUED | OUTPATIENT
Start: 2023-07-20 | End: 2023-07-21 | Stop reason: HOSPADM

## 2023-07-20 RX ORDER — SODIUM CHLORIDE 9 MG/ML
40 INJECTION, SOLUTION INTRAVENOUS AS NEEDED
Status: DISCONTINUED | OUTPATIENT
Start: 2023-07-20 | End: 2023-07-21 | Stop reason: HOSPADM

## 2023-07-20 RX ORDER — SODIUM CHLORIDE 0.9 % (FLUSH) 0.9 %
10 SYRINGE (ML) INJECTION AS NEEDED
Status: DISCONTINUED | OUTPATIENT
Start: 2023-07-20 | End: 2023-07-20 | Stop reason: HOSPADM

## 2023-07-20 RX ORDER — NITROGLYCERIN 20 MG/100ML
10-50 INJECTION INTRAVENOUS
Status: DISCONTINUED | OUTPATIENT
Start: 2023-07-20 | End: 2023-07-21 | Stop reason: HOSPADM

## 2023-07-20 RX ORDER — SODIUM CHLORIDE 0.9 % (FLUSH) 0.9 %
10 SYRINGE (ML) INJECTION EVERY 12 HOURS SCHEDULED
Status: DISCONTINUED | OUTPATIENT
Start: 2023-07-20 | End: 2023-07-20 | Stop reason: HOSPADM

## 2023-07-20 RX ORDER — CHOLECALCIFEROL (VITAMIN D3) 125 MCG
5 CAPSULE ORAL NIGHTLY PRN
Status: DISCONTINUED | OUTPATIENT
Start: 2023-07-20 | End: 2023-07-21 | Stop reason: HOSPADM

## 2023-07-20 RX ORDER — SODIUM CHLORIDE 0.9 % (FLUSH) 0.9 %
3-10 SYRINGE (ML) INJECTION AS NEEDED
Status: DISCONTINUED | OUTPATIENT
Start: 2023-07-20 | End: 2023-07-20 | Stop reason: HOSPADM

## 2023-07-20 RX ORDER — ALBUTEROL SULFATE 2.5 MG/3ML
2.5 SOLUTION RESPIRATORY (INHALATION) EVERY 4 HOURS PRN
Status: DISCONTINUED | OUTPATIENT
Start: 2023-07-20 | End: 2023-07-21 | Stop reason: HOSPADM

## 2023-07-20 RX ORDER — CHLORHEXIDINE GLUCONATE 500 MG/1
1 CLOTH TOPICAL EVERY 12 HOURS PRN
Status: DISCONTINUED | OUTPATIENT
Start: 2023-07-20 | End: 2023-07-21 | Stop reason: HOSPADM

## 2023-07-20 RX ORDER — DIPHENOXYLATE HYDROCHLORIDE AND ATROPINE SULFATE 2.5; .025 MG/1; MG/1
1 TABLET ORAL DAILY
Status: DISCONTINUED | OUTPATIENT
Start: 2023-07-21 | End: 2023-07-21 | Stop reason: HOSPADM

## 2023-07-20 RX ORDER — CEFAZOLIN SODIUM 2 G/100ML
2000 INJECTION, SOLUTION INTRAVENOUS EVERY 8 HOURS
Status: COMPLETED | OUTPATIENT
Start: 2023-07-20 | End: 2023-07-21

## 2023-07-20 RX ORDER — DROPERIDOL 2.5 MG/ML
INJECTION, SOLUTION INTRAMUSCULAR; INTRAVENOUS
Status: COMPLETED
Start: 2023-07-20 | End: 2023-07-20

## 2023-07-20 RX ORDER — FENTANYL CITRATE 50 UG/ML
50 INJECTION, SOLUTION INTRAMUSCULAR; INTRAVENOUS
Status: DISCONTINUED | OUTPATIENT
Start: 2023-07-20 | End: 2023-07-20 | Stop reason: HOSPADM

## 2023-07-20 RX ORDER — SODIUM CHLORIDE 0.9 % (FLUSH) 0.9 %
3 SYRINGE (ML) INJECTION EVERY 12 HOURS SCHEDULED
Status: DISCONTINUED | OUTPATIENT
Start: 2023-07-20 | End: 2023-07-20 | Stop reason: HOSPADM

## 2023-07-20 RX ORDER — DROPERIDOL 2.5 MG/ML
0.62 INJECTION, SOLUTION INTRAMUSCULAR; INTRAVENOUS ONCE AS NEEDED
Status: DISCONTINUED | OUTPATIENT
Start: 2023-07-20 | End: 2023-07-20 | Stop reason: HOSPADM

## 2023-07-20 RX ORDER — ASPIRIN 81 MG/1
81 TABLET ORAL DAILY
Status: DISCONTINUED | OUTPATIENT
Start: 2023-07-20 | End: 2023-07-21 | Stop reason: HOSPADM

## 2023-07-20 RX ORDER — UBIDECARENONE 75 MG
250 CAPSULE ORAL DAILY
Status: DISCONTINUED | OUTPATIENT
Start: 2023-07-21 | End: 2023-07-21 | Stop reason: HOSPADM

## 2023-07-20 RX ORDER — HYDROCODONE BITARTRATE AND ACETAMINOPHEN 5; 325 MG/1; MG/1
1 TABLET ORAL EVERY 6 HOURS PRN
Status: DISCONTINUED | OUTPATIENT
Start: 2023-07-20 | End: 2023-07-21 | Stop reason: HOSPADM

## 2023-07-20 RX ORDER — SODIUM CHLORIDE 450 MG/100ML
30 INJECTION, SOLUTION INTRAVENOUS CONTINUOUS
Status: DISCONTINUED | OUTPATIENT
Start: 2023-07-20 | End: 2023-07-21 | Stop reason: HOSPADM

## 2023-07-20 RX ADMIN — HYDROCODONE BITARTRATE AND ACETAMINOPHEN 1 TABLET: 5; 325 TABLET ORAL at 20:29

## 2023-07-20 RX ADMIN — BUSPIRONE HYDROCHLORIDE 10 MG: 10 TABLET ORAL at 15:13

## 2023-07-20 RX ADMIN — HYDROMORPHONE HYDROCHLORIDE 0.5 MG: 1 INJECTION, SOLUTION INTRAMUSCULAR; INTRAVENOUS; SUBCUTANEOUS at 09:05

## 2023-07-20 RX ADMIN — METOPROLOL TARTRATE 100 MG: 100 TABLET, FILM COATED ORAL at 20:29

## 2023-07-20 RX ADMIN — METFORMIN HYDROCHLORIDE 1000 MG: 1000 TABLET, FILM COATED ORAL at 16:53

## 2023-07-20 RX ADMIN — Medication 10 ML: at 12:14

## 2023-07-20 RX ADMIN — FENTANYL CITRATE 50 MCG: 50 INJECTION, SOLUTION INTRAMUSCULAR; INTRAVENOUS at 08:50

## 2023-07-20 RX ADMIN — Medication 10 ML: at 20:31

## 2023-07-20 RX ADMIN — FENTANYL CITRATE 50 MCG: 50 INJECTION, SOLUTION INTRAMUSCULAR; INTRAVENOUS at 09:31

## 2023-07-20 RX ADMIN — MUPIROCIN 1 APPLICATION: 20 OINTMENT TOPICAL at 20:45

## 2023-07-20 RX ADMIN — ASPIRIN 81 MG: 81 TABLET, COATED ORAL at 11:58

## 2023-07-20 RX ADMIN — NITROGLYCERIN 20 MCG/MIN: 20 INJECTION INTRAVENOUS at 08:44

## 2023-07-20 RX ADMIN — NICARDIPINE HYDROCHLORIDE 10 MG/HR: 25 INJECTION, SOLUTION INTRAVENOUS at 16:25

## 2023-07-20 RX ADMIN — DROPERIDOL 0.62 MG: 2.5 INJECTION, SOLUTION INTRAMUSCULAR; INTRAVENOUS at 09:34

## 2023-07-20 RX ADMIN — SODIUM CHLORIDE 9 ML/HR: 9 INJECTION, SOLUTION INTRAVENOUS at 06:45

## 2023-07-20 RX ADMIN — HYDROMORPHONE HYDROCHLORIDE 0.5 MG: 1 INJECTION, SOLUTION INTRAMUSCULAR; INTRAVENOUS; SUBCUTANEOUS at 10:26

## 2023-07-20 RX ADMIN — NICARDIPINE HYDROCHLORIDE 10 MG/HR: 25 INJECTION, SOLUTION INTRAVENOUS at 21:14

## 2023-07-20 RX ADMIN — LIDOCAINE HYDROCHLORIDE 0.5 ML: 10 INJECTION, SOLUTION EPIDURAL; INFILTRATION; INTRACAUDAL; PERINEURAL at 06:46

## 2023-07-20 RX ADMIN — OXYCODONE HYDROCHLORIDE 5 MG: 5 TABLET ORAL at 15:31

## 2023-07-20 RX ADMIN — BUSPIRONE HYDROCHLORIDE 10 MG: 10 TABLET ORAL at 20:29

## 2023-07-20 RX ADMIN — MORPHINE SULFATE 1 MG: 2 INJECTION, SOLUTION INTRAMUSCULAR; INTRAVENOUS at 13:23

## 2023-07-20 RX ADMIN — Medication 2000 MG: at 15:14

## 2023-07-20 RX ADMIN — TAMSULOSIN HYDROCHLORIDE 0.4 MG: 0.4 CAPSULE ORAL at 20:29

## 2023-07-20 RX ADMIN — ROSUVASTATIN 40 MG: 20 TABLET, FILM COATED ORAL at 20:29

## 2023-07-20 RX ADMIN — HYDRALAZINE HYDROCHLORIDE 5 MG: 20 INJECTION INTRAMUSCULAR; INTRAVENOUS at 09:46

## 2023-07-20 RX ADMIN — PANTOPRAZOLE SODIUM 40 MG: 40 TABLET, DELAYED RELEASE ORAL at 11:58

## 2023-07-20 RX ADMIN — PHENOL 1 SPRAY: 1.5 LIQUID ORAL at 17:47

## 2023-07-20 RX ADMIN — OXYCODONE HYDROCHLORIDE 5 MG: 5 TABLET ORAL at 22:11

## 2023-07-20 RX ADMIN — NICARDIPINE HYDROCHLORIDE 10 MG/HR: 25 INJECTION, SOLUTION INTRAVENOUS at 18:47

## 2023-07-20 RX ADMIN — FAMOTIDINE 20 MG: 20 TABLET ORAL at 06:46

## 2023-07-20 RX ADMIN — EMPAGLIFLOZIN 10 MG: 10 TABLET, FILM COATED ORAL at 11:59

## 2023-07-20 RX ADMIN — SODIUM CHLORIDE 9 ML/HR: 9 INJECTION, SOLUTION INTRAVENOUS at 06:47

## 2023-07-20 RX ADMIN — METOPROLOL TARTRATE 100 MG: 100 TABLET, FILM COATED ORAL at 12:14

## 2023-07-20 RX ADMIN — SODIUM CHLORIDE 30 ML/HR: 4.5 INJECTION, SOLUTION INTRAVENOUS at 11:11

## 2023-07-20 RX ADMIN — NITROGLYCERIN 50 MCG/MIN: 20 INJECTION INTRAVENOUS at 19:21

## 2023-07-20 RX ADMIN — MIDAZOLAM HYDROCHLORIDE 2 MG: 1 INJECTION, SOLUTION INTRAMUSCULAR; INTRAVENOUS at 06:46

## 2023-07-20 RX ADMIN — NICARDIPINE HYDROCHLORIDE 5 MG/HR: 25 INJECTION, SOLUTION INTRAVENOUS at 12:13

## 2023-07-20 RX ADMIN — HYDROCODONE BITARTRATE AND ACETAMINOPHEN 1 TABLET: 5; 325 TABLET ORAL at 11:56

## 2023-07-20 RX ADMIN — LOSARTAN POTASSIUM 100 MG: 50 TABLET, FILM COATED ORAL at 11:57

## 2023-07-20 NOTE — PLAN OF CARE
Goal Outcome Evaluation:   - Patient admitted to unit around 1200. No neuro deficits, alert & oriented x4, equal strength throughout all extremities.Patient reporting pain at surgical incision, medicated per MAR. Chloraseptic spray given for sore throat. Nitro gtt infusing for systolic <120 goal, nicardipine added when nitro gtt maxed. GUIDO drained 20cc. Patient and family updated on plan of care.

## 2023-07-20 NOTE — INTERVAL H&P NOTE
"Pre-Op H&P  Khoa Arnold  [id number]  1955    Chief complaint: \" I am here to have my right carotid cleared out.\"    Full history and physical from 7/17/2023 is available and up-to-date.    Patient reports he takes Plavix and that his last dose was on Monday, 7/17/2023 as directed.      Review of Systems:  General ROS: negative for chills, fever or skin lesions;  No changes since last office visit.  Neg for recent sick exposure  Cardiovascular ROS: Positive for dyspnea on exertion.  No chest pain .  Respiratory ROS: no cough, shortness of breath, or wheezing    Allergies: Denies allergy to latex or contrast dye.  Allergies   Allergen Reactions    Ranexa [Ranolazine Er] Nausea Only and Dizziness    Lisinopril Itching and Rash     On feet     Immunization History:  Influenza: No  Pneumococcal: No  Tetanus: Yes    Physical Exam:  General Appearance:    Alert, cooperative, no distress, appears stated age   Head:    Normocephalic, without obvious abnormality, atraumatic   Lungs:     Clear to auscultation bilaterally, respirations unlabored    Heart:   Regular rate and rhythm, S1 and S2 normal, no murmur, rub    or gallop    Abdomen:    Soft, nontender.  +bowel sounds   Breast Exam:    deferred   Genitalia:    deferred   Extremities:   Extremities normal, atraumatic, no cyanosis or edema   Skin:   Skin color, texture, turgor normal, no rashes or lesions   Neurologic:   Grossly intact   Results Review  LABS:  Lab Results   Component Value Date    WBC 4.97 07/19/2023    HGB 13.4 07/19/2023    HCT 40.3 07/19/2023    MCV 91.0 07/19/2023     07/19/2023    NEUTROABS 4.75 10/21/2022    GLUCOSE 153 (H) 07/19/2023    BUN 23 07/19/2023    CREATININE 1.33 (H) 07/19/2023    EGFRIFNONA 65 09/30/2021    EGFRIFAFRI 60 08/30/2022     07/19/2023    K 4.8 07/19/2023     07/19/2023    CO2 22.0 07/19/2023    MG 2.4 09/14/2019    PHOS 4.0 09/15/2019    CALCIUM 9.7 07/19/2023    ALBUMIN 4.4 01/06/2023    AST 17 " 01/06/2023    ALT 19 01/06/2023    BILITOT 0.4 01/06/2023    PTT 27.7 09/10/2019    INR 0.98 07/19/2023       RADIOLOGY:  Chest X-Ray PA & Lateral    Result Date: 7/19/2023  XR CHEST PA AND LATERAL Date of Exam: 7/19/2023 2:34 PM EDT Indication: Pre-Op Cardiac Surgery Comparison: Chest x-ray 10/19/2022 Findings: Redemonstration of prior CABG and aortic valve replacement with similar appearing multiple fractured sternotomy wires. Redemonstration of dual-lead cardiac pacer with right atrial and right ventricular leads and left chest pulse generator. Stable cardiomediastinal silhouette within normal limits. The lungs are grossly clear. No pleural effusion or pneumothorax. No acute osseous findings.     Impression: No acute cardiopulmonary findings. Electronically Signed: John Flowers  7/19/2023 2:46 PM EDT  Workstation ID: [id number]      I reviewed the patient's new clinical results.    Cancer Staging (if applicable)  Cancer Patient: __ yes __no __unknown; If yes, clinical stage T:__ N:__M:__, stage group or __N/A    Impression: Patient presents with carotid stenosis, symptomatic without infarct, right.    Plan: Dr. Goldsmith will perform right carotid endarterectomy with EEG-RIGHT.   I Denys Goldsmith agree the above.  Plan for right-sided carotid endarterectomy.  Patient is well apprised of the risk of stroke bleeding infection death and agrees to proceed.  He understands that although he had a very satisfactory result when I operated on the contralateral side of his neck, that in no way guarantees a satisfactory result on the surgery.  Gregorio Mcneil PA-C   07/20/23   6:49 AM EDT     H&P reviewed. The patient was examined and there are no changes to the H&P.

## 2023-07-20 NOTE — PLAN OF CARE
Goal Outcome Evaluation:   - Patient extubated around 1130am to bipap  - Transitioned to NC 3L from bipap  - Plan to get tunneled HD cath tomorrow, TF to be held at midnight  - Patient alert & oriented x3 after extubation but voice remains weak and hoarse  - Henning catheter removed per neph  - family and patient updated on plan of care

## 2023-07-20 NOTE — PROGRESS NOTES
Pulmonary/Critical Care History and Physical Exam     LOS: 0 days   Patient Care Team:  Shirlene Sharpe MD as PCP - General (Internal Medicine)  Sage Bonner PA-C as Physician Assistant (Endocrinology)  Jonathan Pineda MD as Consulting Physician (Cardiology)    Chief Complaint:  No chief complaint on file.      Subjective     HPI:   Khoa Arnold is a 68 year-old male with carotid artery disease s/p L CEA, CAD and valvular disease s/p CABG & AVR (2019), sternal wire fractures, SSS s/p PPM, HTN, hyperlipidemia, and T2DM that presents to [facility] ICU on 7/20/23 postoperatively from CEA with Dr. Goldsmith.    Patient underwent left CEA with Dr. Goldsmith approximately a year ago. Duplex at that point measured right carotid with 50% narrowing. Repeat duplex this year demonstrated 50-69% stenosis. Additionally, patient reported visual disturbances over the last couple of weeks. Surgical revascularization was recommended.    Post procedure he presents to the ICU for management.     Interval History:  Chart reviewed on arrival to ICU. Patient alert and oriented. VSS on 2L NC. Complains of minimal pain to right side of neck, Dressing applied. CDI, GUIDO drain in minimal serosanguinous fluid. Says he feels anxious.     History taken from: patient    Past Medical History:   Diagnosis Date    Aortic valve stenosis     Asthma     BPH (benign prostatic hyperplasia)     Carotid stenosis     COPD (chronic obstructive pulmonary disease)     Coronary artery disease     Diabetes mellitus     Edentulous     Hyperlipidemia     Hypertension     IBS (irritable bowel syndrome)     Stroke     2009, no residual    Type 2 diabetes mellitus with circulatory disorder, without long-term current use of insulin 09/06/2019    Wears dentures     full set    Wears glasses        Past Surgical History:   Procedure Laterality Date    CARDIAC CATHETERIZATION N/A 09/06/2019    Procedure: Left Heart Cath;  Surgeon: Jonathan Pineda MD;   Location:  [facility] CATH INVASIVE LOCATION;  Service: Cardiovascular    CARDIAC ELECTROPHYSIOLOGY PROCEDURE N/A 2021    Procedure: DEVICE IMPLANT;  Surgeon: Jonathan Pineda MD;  Location: [facility] CATH INVASIVE LOCATION;  Service: Cardiovascular;  Laterality: N/A;    CAROTID ENDARTERECTOMY Left 10/20/2022    Procedure: CAROTID ENDARTERECTOMY WITH EEG LEFT;  Surgeon: Denys Goldsmith MD;  Location: [facility] OR;  Service: Vascular;  Laterality: Left;    CHOLECYSTECTOMY      COLONOSCOPY      CORONARY ANGIOPLASTY WITH STENT PLACEMENT      x 3, last one     CORONARY ARTERY BYPASS GRAFT WITH AORTIC VALVE REPAIR/REPLACEMENT N/A 09/10/2019    Procedure: CARLYN PER ANESTHESIA, MEDIAN STERNOTOMY, CORONARY ARTERY BYPASS GRAFT X 4, UTILIZING THE LEFT INTERNAL MAMMARY ARTERY, AND EVH OF THE RIGHT GREATER SAPHENOUS VEIN,   AORTIC VALVE REPLACEMENT;  Surgeon: Denys Goldsmith MD;  Location: [facility] OR;  Service: Cardiothoracic    EYE SURGERY Bilateral     cataracts    INGUINAL HERNIA REPAIR Right     PACEMAKER IMPLANTATION  2021    UMBILICAL HERNIA REPAIR         Family History   Problem Relation Age of Onset    Coronary artery disease Mother     Diabetes Mother     Coronary artery disease Father     Diabetes Father        Social History     Socioeconomic History    Marital status:     Number of children: 1   Tobacco Use    Smoking status: Former     Types: Cigars     Quit date: 10/14/1979     Years since quittin.7    Smokeless tobacco: Former     Types: Chew     Quit date: 2004   Vaping Use    Vaping Use: Never used   Substance and Sexual Activity    Alcohol use: No    Drug use: No    Sexual activity: Defer       Allergies   Allergen Reactions    Ranexa [Ranolazine Er] Nausea Only and Dizziness    Lisinopril Itching and Rash     On feet       PMH/FH/SocH were reviewed by me and updates were made.     Review of Systems:    All systems were reviewed and negative except as noted in  subjective.    Objective     Vital Signs  Temp:  [97.2 °F (36.2 °C)-97.8 °F (36.6 °C)] 97.8 °F (36.6 °C)  Heart Rate:  [60-77] 62  Resp:  [16] 16  BP: (106-157)/(58-81) 112/67    Physical Exam:     General Appearance:    Resting in bed. Alert, cooperative, in no acute distress   Head:    Normocephalic, without obvious abnormality, atraumatic   Eyes:            Lids and lashes normal, conjunctivae and sclerae normal, no   icterus, no pallor, corneas clear, PERRLA   ENMT:   Right IJ Dressing CDI. GUIDO drain with serosanguinous drainage. Ears appear intact with no abnormalities noted      No oral lesions, no thrush, oral mucosa moist      No adenopathy, supple, trachea midline, no thyromegaly, no   carotid bruit, no JVD       Lungs/resp:     Normal effort, symmetric chest rise, no crepitus, clear to      auscultation bilaterally, no chest wall tenderness, resonant to percussion throughout.                  Heart/CV:    Mechanica valve. Regular rhythm and normal rate, normal S1 and S2, no            murmur, no gallop, no rub, no click   Abdomen/GI:     Normal bowel sounds, no masses, no organomegaly, soft        non-tender, non-distended, no guarding, no rebound                tenderness   G/U:     Deferred   Extremities/MSK:   Right radial a.line. No clubbing, cyanosis or edema.  No deformities.    Pulses:   Pulses palpable and equal bilaterally   Skin:   No bleeding, bruising or rash   Hem/Lymph:   No cervical or supraclavicular adenopathy.    Neurologic:   Moves all extremities with no obvious focal motor deficit.  Cranial nerves 2 - 12 grossly intact            Psychiatric:  Normal mood and affect, oriented x 3.      Results Review:     I reviewed the patient's new clinical results.   Results from last 7 days   Lab Units 07/19/23  1326   SODIUM mmol/L 138   POTASSIUM mmol/L 4.8   CHLORIDE mmol/L 103   CO2 mmol/L 22.0   BUN mg/dL 23   CREATININE mg/dL 1.33*   CALCIUM mg/dL 9.7   GLUCOSE mg/dL 153*     Results from last  7 days   Lab Units 07/19/23  1326   WBC 10*3/mm3 4.97   HEMOGLOBIN g/dL 13.4   HEMATOCRIT % 40.3   PLATELETS 10*3/mm3 183           I reviewed the patient's new imaging including images and reports.    Medication Review:   aspirin, 81 mg, Oral, Daily  busPIRone, 10 mg, Oral, TID  ceFAZolin, 2,000 mg, Intravenous, Q8H  [START ON 7/21/2023] clopidogrel, 75 mg, Oral, Daily  empagliflozin, 10 mg, Oral, Daily  losartan, 100 mg, Oral, Daily  metFORMIN, 1,000 mg, Oral, BID With Meals  metoprolol tartrate, 100 mg, Oral, Q12H  multivitamin, 1 tablet, Oral, Daily  mupirocin, 1 application , Each Nare, Q12H  pantoprazole, 40 mg, Oral, Q AM  potassium chloride, 10 mEq, Oral, Daily  rosuvastatin, 40 mg, Oral, Daily  sodium chloride, 10 mL, Intravenous, Q12H  tamsulosin, 0.4 mg, Oral, BID  vitamin B-12, 250 mcg, Oral, Daily      lactated ringers, 9 mL/hr  niCARdipine, 5-15 mg/hr  nitroglycerin, 10-50 mcg/min, Last Rate: 20 mcg/min (07/20/23 0844)  phenylephrine, 0.5-3 mcg/kg/min  sodium chloride, 30 mL/hr, Last Rate: 30 mL/hr (07/20/23 1111)  sodium chloride, 9 mL/hr, Last Rate: 9 mL/hr (07/20/23 0657)        Assessment & Plan       Carotid stenosis, symptomatic w/o infarct, right    Type 2 diabetes mellitus with circulatory disorder, without long-term current use of insulin    Benign essential HTN    SSS s/p PPM    Mixed hyperlipidemia    Carotid artery stenosis, symptomatic, right    Admit to ICU per surgical team  Neurochecks per protocol  BP management; Nicardipine as needed  Aggressive pulmonary toilet  Pain management with PRN Norco  ASA/Statin/Plavix  Advance diet as tolerated  Mobilize patient per protocl  SCDs for DVT ppx  Protonix  Diabetes management with home medications ordered per primary team  Tobacco use  Home meds per primary team  AM labs per primary team    ELENO Gordon  07/20/23  11:26 EDT      Time:  35 minutes  Electronically signed by ELENO Gordon, 07/20/23, 11:26 AM EDT.    Copied text in this  note has been reviewed and is accurate as of 07/20/23.

## 2023-07-20 NOTE — OP NOTE
Operative Report    Preop Diagnosis: Symptomatic right internal carotid artery stenosis.  Previous left carotid endarterectomy.  Previous aortic valve replacement coronary bypass grafting surgery        Postoperative Diagnosis: Same      Procedure: Right internal carotid endarterectomy with pericardial patch closure and EEG monitoring.        Surgeons: Denys Goldsmith MD      Assistant: Bette Rai PA-C    The Assistant provided services of suctioning, irrigation and retraction.  Also, assisted in suture closure of parts of the skin incision.      Indication: This patient is fairly well-known to me performed previous aortic valve replacement surgery and coronary bypass grafting.  He also had symptomatic left internal carotid stenosis which I had treated over a year ago.  He now is referred back to me with vision disturbances in the right eye and right-sided carotid stenosis.  He understood that surgery carries with the risk of stroke bleeding infection death and agreed to proceed.  No guarantees were made as to outcome.  I indicated that although he had a very satisfactory result with surgery on the left carotid, that in no way guarantees success on the right side.  He voiced his understanding        Description: Supine position sterile prep and drape antibiotics given general tracheal anesthesia.  EEG monitoring employed.  An incision made on the right neck anterior to the sternocleidomastoid and through the platysma care was taken to identify not injure the hypoglossal and vagus nerves and heparin was given.  The internal, external, and common carotid arteries were clamped and no deleterious EEG changes were encountered.  Arteries were opened and endarterectomized to a smooth surface and the plaque sent to pathology.  A pericardial patch was sutured in place with 6-0 Prolene.  The clamps were released in the proper sequential fashion good hemostasis was obtained a small Eddi-Becerra drain was also placed.   The incision was closed with multiple layers of Vicryl suture sponge and needle count reported as correct.  Blood loss less than 75 mL.  And there was no apparent early complications      Please note that portions of this note were completed with a voice recognition program. Efforts were made to edit the dictations, but occasionally words are mistranscribed.

## 2023-07-21 VITALS
TEMPERATURE: 98.2 F | HEART RATE: 78 BPM | DIASTOLIC BLOOD PRESSURE: 77 MMHG | BODY MASS INDEX: 33.88 KG/M2 | WEIGHT: 215.83 LBS | SYSTOLIC BLOOD PRESSURE: 151 MMHG | RESPIRATION RATE: 18 BRPM | OXYGEN SATURATION: 94 % | HEIGHT: 67 IN

## 2023-07-21 LAB
ANION GAP SERPL CALCULATED.3IONS-SCNC: 11 MMOL/L (ref 5–15)
BASOPHILS # BLD AUTO: 0.04 10*3/MM3 (ref 0–0.2)
BASOPHILS NFR BLD AUTO: 0.5 % (ref 0–1.5)
BUN SERPL-MCNC: 21 MG/DL (ref 8–23)
BUN/CREAT SERPL: 18.8 (ref 7–25)
CALCIUM SPEC-SCNC: 8.4 MG/DL (ref 8.6–10.5)
CHLORIDE SERPL-SCNC: 104 MMOL/L (ref 98–107)
CO2 SERPL-SCNC: 22 MMOL/L (ref 22–29)
CREAT SERPL-MCNC: 1.12 MG/DL (ref 0.76–1.27)
CYTO UR: NORMAL
DEPRECATED RDW RBC AUTO: 44.3 FL (ref 37–54)
EGFRCR SERPLBLD CKD-EPI 2021: 71.1 ML/MIN/1.73
EOSINOPHIL # BLD AUTO: 0.22 10*3/MM3 (ref 0–0.4)
EOSINOPHIL NFR BLD AUTO: 2.8 % (ref 0.3–6.2)
ERYTHROCYTE [DISTWIDTH] IN BLOOD BY AUTOMATED COUNT: 13.2 % (ref 12.3–15.4)
GLUCOSE BLDC GLUCOMTR-MCNC: 142 MG/DL (ref 70–130)
GLUCOSE SERPL-MCNC: 159 MG/DL (ref 65–99)
HCT VFR BLD AUTO: 35.7 % (ref 37.5–51)
HGB BLD-MCNC: 11.4 G/DL (ref 13–17.7)
IMM GRANULOCYTES # BLD AUTO: 0.02 10*3/MM3 (ref 0–0.05)
IMM GRANULOCYTES NFR BLD AUTO: 0.3 % (ref 0–0.5)
LAB AP CASE REPORT: NORMAL
LAB AP CLINICAL INFORMATION: NORMAL
LYMPHOCYTES # BLD AUTO: 0.96 10*3/MM3 (ref 0.7–3.1)
LYMPHOCYTES NFR BLD AUTO: 12.1 % (ref 19.6–45.3)
MCH RBC QN AUTO: 29.2 PG (ref 26.6–33)
MCHC RBC AUTO-ENTMCNC: 31.9 G/DL (ref 31.5–35.7)
MCV RBC AUTO: 91.5 FL (ref 79–97)
MONOCYTES # BLD AUTO: 0.77 10*3/MM3 (ref 0.1–0.9)
MONOCYTES NFR BLD AUTO: 9.7 % (ref 5–12)
NEUTROPHILS NFR BLD AUTO: 5.91 10*3/MM3 (ref 1.7–7)
NEUTROPHILS NFR BLD AUTO: 74.6 % (ref 42.7–76)
NRBC BLD AUTO-RTO: 0 /100 WBC (ref 0–0.2)
PATH REPORT.FINAL DX SPEC: NORMAL
PATH REPORT.GROSS SPEC: NORMAL
PLATELET # BLD AUTO: 163 10*3/MM3 (ref 140–450)
PMV BLD AUTO: 9.1 FL (ref 6–12)
POTASSIUM SERPL-SCNC: 4.3 MMOL/L (ref 3.5–5.2)
RBC # BLD AUTO: 3.9 10*6/MM3 (ref 4.14–5.8)
SODIUM SERPL-SCNC: 137 MMOL/L (ref 136–145)
WBC NRBC COR # BLD: 7.92 10*3/MM3 (ref 3.4–10.8)

## 2023-07-21 PROCEDURE — 80048 BASIC METABOLIC PNL TOTAL CA: CPT | Performed by: PHYSICIAN ASSISTANT

## 2023-07-21 PROCEDURE — 94799 UNLISTED PULMONARY SVC/PX: CPT

## 2023-07-21 PROCEDURE — 82948 REAGENT STRIP/BLOOD GLUCOSE: CPT

## 2023-07-21 PROCEDURE — 99232 SBSQ HOSP IP/OBS MODERATE 35: CPT

## 2023-07-21 PROCEDURE — 25010000002 FUROSEMIDE PER 20 MG: Performed by: INTERNAL MEDICINE

## 2023-07-21 PROCEDURE — 99024 POSTOP FOLLOW-UP VISIT: CPT | Performed by: THORACIC SURGERY (CARDIOTHORACIC VASCULAR SURGERY)

## 2023-07-21 PROCEDURE — 94640 AIRWAY INHALATION TREATMENT: CPT

## 2023-07-21 PROCEDURE — 85025 COMPLETE CBC W/AUTO DIFF WBC: CPT | Performed by: PHYSICIAN ASSISTANT

## 2023-07-21 RX ORDER — IPRATROPIUM BROMIDE AND ALBUTEROL SULFATE 2.5; .5 MG/3ML; MG/3ML
3 SOLUTION RESPIRATORY (INHALATION)
Status: DISCONTINUED | OUTPATIENT
Start: 2023-07-21 | End: 2023-07-21 | Stop reason: HOSPADM

## 2023-07-21 RX ORDER — FUROSEMIDE 10 MG/ML
40 INJECTION INTRAMUSCULAR; INTRAVENOUS ONCE
Status: COMPLETED | OUTPATIENT
Start: 2023-07-21 | End: 2023-07-21

## 2023-07-21 RX ADMIN — VITAM B12 250 MCG: 100 TAB at 08:25

## 2023-07-21 RX ADMIN — Medication 2000 MG: at 00:57

## 2023-07-21 RX ADMIN — Medication 10 ML: at 08:23

## 2023-07-21 RX ADMIN — NICARDIPINE HYDROCHLORIDE 5 MG/HR: 25 INJECTION, SOLUTION INTRAVENOUS at 02:18

## 2023-07-21 RX ADMIN — METFORMIN HYDROCHLORIDE 1000 MG: 1000 TABLET, FILM COATED ORAL at 08:25

## 2023-07-21 RX ADMIN — MULTIVITAMIN TABLET 1 TABLET: TABLET at 08:21

## 2023-07-21 RX ADMIN — CLOPIDOGREL BISULFATE 75 MG: 75 TABLET ORAL at 08:21

## 2023-07-21 RX ADMIN — PANTOPRAZOLE SODIUM 40 MG: 40 TABLET, DELAYED RELEASE ORAL at 05:16

## 2023-07-21 RX ADMIN — BUSPIRONE HYDROCHLORIDE 10 MG: 10 TABLET ORAL at 08:21

## 2023-07-21 RX ADMIN — POTASSIUM CHLORIDE 10 MEQ: 750 CAPSULE, EXTENDED RELEASE ORAL at 08:22

## 2023-07-21 RX ADMIN — OXYCODONE HYDROCHLORIDE 5 MG: 5 TABLET ORAL at 05:16

## 2023-07-21 RX ADMIN — METOPROLOL TARTRATE 100 MG: 100 TABLET, FILM COATED ORAL at 08:21

## 2023-07-21 RX ADMIN — IPRATROPIUM BROMIDE AND ALBUTEROL SULFATE 3 ML: .5; 3 SOLUTION RESPIRATORY (INHALATION) at 11:45

## 2023-07-21 RX ADMIN — ASPIRIN 81 MG: 81 TABLET, COATED ORAL at 08:21

## 2023-07-21 RX ADMIN — TAMSULOSIN HYDROCHLORIDE 0.4 MG: 0.4 CAPSULE ORAL at 08:21

## 2023-07-21 RX ADMIN — Medication 5 MG: at 00:57

## 2023-07-21 RX ADMIN — EMPAGLIFLOZIN 10 MG: 10 TABLET, FILM COATED ORAL at 08:21

## 2023-07-21 RX ADMIN — NICARDIPINE HYDROCHLORIDE 5 MG/HR: 25 INJECTION, SOLUTION INTRAVENOUS at 07:49

## 2023-07-21 RX ADMIN — LOSARTAN POTASSIUM 100 MG: 50 TABLET, FILM COATED ORAL at 08:21

## 2023-07-21 RX ADMIN — FUROSEMIDE 40 MG: 10 INJECTION, SOLUTION INTRAMUSCULAR; INTRAVENOUS at 09:58

## 2023-07-21 NOTE — PLAN OF CARE
Goal Outcome Evaluation:  VSS on 2-4L nc  Excellent UOP  Afebrile  Pain treated with PRN Norco & oxycodone  SBP <120 with Nitro & cardene gtt  A/Ox4

## 2023-07-21 NOTE — PROGRESS NOTES
CTS Progress Note       LOS: 1 day   Patient Care Team:  Shirlene Sharpe MD as PCP - General (Internal Medicine)  Sage Bonner PA-C as Physician Assistant (Endocrinology)  Jonathan Pineda MD as Consulting Physician (Cardiology)    Chief Complaint: Carotid stenosis, symptomatic w/o infarct, right    Vital Signs:  Temp:  [97.2 °F (36.2 °C)-98.3 °F (36.8 °C)] 97.6 °F (36.4 °C)  Heart Rate:  [59-77] 62  Resp:  [14-23] 14  BP: ()/(47-98) 107/54    Physical Exam: Neck is satisfactory.  Tongue is midline.  No focal motor or sensory neurologic deficits       Results:     Results from last 7 days   Lab Units 07/21/23  0507   WBC 10*3/mm3 7.92   HEMOGLOBIN g/dL 11.4*   HEMATOCRIT % 35.7*   PLATELETS 10*3/mm3 163     Results from last 7 days   Lab Units 07/21/23  0507   SODIUM mmol/L 137   POTASSIUM mmol/L 4.3   CHLORIDE mmol/L 104   CO2 mmol/L 22.0   BUN mg/dL 21   CREATININE mg/dL 1.12   GLUCOSE mg/dL 159*   CALCIUM mg/dL 8.4*           Imaging Results (Last 24 Hours)       ** No results found for the last 24 hours. **            Assessment      Carotid stenosis, symptomatic w/o infarct, right    Type 2 diabetes mellitus with circulatory disorder, without long-term current use of insulin    Benign essential HTN    SSS s/p PPM    Mixed hyperlipidemia    Carotid artery stenosis, symptomatic, right        Plan   Give morning blood pressure medicines now  Wean and DC Cardene for blood pressure 155 systolic  Discontinue Eddi-Becerra drain  Discharge home at 2 PM today if blood pressure satisfactory    Please note that portions of this note were completed with a voice recognition program. Efforts were made to edit the dictations, but occasionally words are mistranscribed.    Denys Goldsmith MD  07/21/23  06:59 EDT

## 2023-07-21 NOTE — PROGRESS NOTES
Intensive Care Follow-up     Hospital:  LOS: 1 day   Mr. Khoa Arnold, 68 y.o. male is followed for:   Carotid stenosis, symptomatic w/o infarct, right      Subjective   Interval History:  Chart reviewed. VSS. Patient alert and oriented, pleasant and conversant, sitting on the edge of the bed. Patient states he is ready to go home. Currently on nitroglycerin for BP control. Home medications administered. Denies pain, chest pain, shortness of breath, nausea/vomiting. No complaints at this time. Wife at bedside.      The patient's past medical, surgical and social history were reviewed and updated in Epic as appropriate.     Objective     Infusions:  lactated ringers, 9 mL/hr  niCARdipine, 5-15 mg/hr, Last Rate: Stopped (07/21/23 0854)  nitroglycerin, 10-50 mcg/min, Last Rate: 35 mcg/min (07/21/23 0217)  phenylephrine, 0.5-3 mcg/kg/min  sodium chloride, 30 mL/hr, Last Rate: 30 mL/hr (07/20/23 1111)  sodium chloride, 9 mL/hr, Last Rate: 9 mL/hr (07/20/23 0657)      Medications:  aspirin, 81 mg, Oral, Daily  busPIRone, 10 mg, Oral, TID  clopidogrel, 75 mg, Oral, Daily  empagliflozin, 10 mg, Oral, Daily  losartan, 100 mg, Oral, Daily  metFORMIN, 1,000 mg, Oral, BID With Meals  metoprolol tartrate, 100 mg, Oral, Q12H  multivitamin, 1 tablet, Oral, Daily  pantoprazole, 40 mg, Oral, Q AM  potassium chloride, 10 mEq, Oral, Daily  rosuvastatin, 40 mg, Oral, Nightly  sodium chloride, 10 mL, Intravenous, Q12H  tamsulosin, 0.4 mg, Oral, BID  vitamin B-12, 250 mcg, Oral, Daily      I reviewed the patient's medications.    Vital Sign Min/Max for last 24 hours  Temp  Min: 97.3 °F (36.3 °C)  Max: 98.3 °F (36.8 °C)   BP  Min: 97/71  Max: 148/55   Pulse  Min: 59  Max: 77   Resp  Min: 14  Max: 23   SpO2  Min: 88 %  Max: 99 %   Flow (L/min)  Min: 2  Max: 4       Input/Output for last 24 hour shift  07/20 0701 - 07/21 0700  In: 3117.9 [P.O.:150; I.V.:2667.9]  Out: 2585 [Urine:2515; Drains:70]      Physical Exam:  GENERAL: Patient  sitting up in bed and conversant. No acute distress.   HEENT: Normocephalic and atraumatic. Trachea midline. PER. EOM WNL. Right neck incision C/D/I. GUIDO drain in place with serosanguinous drainage.   LUNGS: Chest rise of normal depth and symmetric. Lungs clear to auscultation bilaterally. No wheezes, rhonchi, or rales.   HEART: S1,S2 detected. Regular rate and rhythm. Mechanical valve.   ABDOMEN: Soft, round, nondistended, and nontender. Bowel sounds present.   EXTREMITIES: No clubbing, edema, or cyanosis. Peripheral pulses present. Skin warm and dry. Right radial arterial line in place.    NEURO/PSYCH: Alert and oriented. Follows commands. Moves all extremities. No focal deficits.      Results from last 7 days   Lab Units 07/21/23  0507 07/19/23  1326   WBC 10*3/mm3 7.92 4.97   HEMOGLOBIN g/dL 11.4* 13.4   PLATELETS 10*3/mm3 163 183     Results from last 7 days   Lab Units 07/21/23  0507 07/19/23  1326   SODIUM mmol/L 137 138   POTASSIUM mmol/L 4.3 4.8   CO2 mmol/L 22.0 22.0   BUN mg/dL 21 23   CREATININE mg/dL 1.12 1.33*   GLUCOSE mg/dL 159* 153*     Estimated Creatinine Clearance: 68.9 mL/min (by C-G formula based on SCr of 1.12 mg/dL).    Assessment & Plan   Impression      Carotid stenosis, symptomatic w/o infarct, right    Type 2 diabetes mellitus with circulatory disorder, without long-term current use of insulin    Benign essential HTN    SSS s/p PPM    Mixed hyperlipidemia    Carotid artery stenosis, symptomatic, right       Plan        Khoa Arnold is a 68 year-old male with carotid artery disease s/p L CEA, CAD and valvular disease s/p CABG & AVR (2019), sternal wire fractures, SSS s/p PPM, HTN, hyperlipidemia, and T2DM that presents to [facility] ICU on 7/20/23 postoperatively from CEA with Dr. Goldsmith.     Patient underwent left CEA with Dr. Goldsmith approximately a year ago. Duplex at that point measured right carotid with 50% narrowing. Repeat duplex this year demonstrated 50-69% stenosis. Additionally,  patient reported visual disturbances over the last couple of weeks. Surgical revascularization was recommended.     He was transferred to the ICU post-operatively and has had an uncomplicated clinical course.     Carotid stenosis, symptomatic w/o infarct, right s/p CEA per Dr. Goldsmith 7/20/23  Carotid artery stenosis, symptomatic, right  Post-operative orders per CTS  ASA/Plavix  GUIDO drain with minimal serosanguinous fluid  D/C art line and GUIDO drain per CTS    Type 2 diabetes mellitus with circulatory disorder, without long-term current use of insulin  Continued on home metformin and Jardiance    Benign essential HTN  SSS s/p PPM  Resume home medications  Discontinue nitroglycerin when able    Mixed hyperlipidemia  Continue Crestor    DVT Prophylaxis: Mechanical  GI Prophylaxis: PPI  Dispo: OK for discharge home per primary care team    Time spent: 36 minutes  Plan of care and goals reviewed with multidisciplinary/antibiotic stewardship team during rounds.   I discussed the patient's findings and my recommendations with patient, family, and nursing staff     Liz Morales, MSN, APRN, ACNPC-AG  Pulmonary and Critical Care Medicine  Electronically signed by ELENO Collins, 07/21/23, 8:58 AM EDT.

## 2023-07-21 NOTE — DISCHARGE SUMMARY
"     [facility] Cardiothoracic Surgery Discharge Summary    Name:  Khoa Arnold  MRN Number:  [id number]  Date of Admission: 7/20/2023  Date of Discharge:  7/21/2023    Referred By: Denys Goldsmith MD  PCP: Shirlene Sharpe MD  IP Care Team:  Patient Care Team:  Shirlene Sharpe MD as PCP - General (Internal Medicine)  Sage Bonner PA-C as Physician Assistant (Endocrinology)  Jonathan Pineda MD as Consulting Physician (Cardiology)      Discharge Diagnosis:  Carotid stenosis, asymptomatic, right [I65.21]  Carotid artery stenosis, symptomatic, right [I65.21]    Carotid stenosis, symptomatic w/o infarct, right    Type 2 diabetes mellitus with circulatory disorder, without long-term current use of insulin    Benign essential HTN    SSS s/p PPM    Mixed hyperlipidemia    Carotid artery stenosis, symptomatic, right    Past Medical History:   Diagnosis Date    Aortic valve stenosis     Asthma     BPH (benign prostatic hyperplasia)     Carotid stenosis     COPD (chronic obstructive pulmonary disease)     Coronary artery disease     Diabetes mellitus     Edentulous     Hyperlipidemia     Hypertension     IBS (irritable bowel syndrome)     Stroke     2009, no residual    Type 2 diabetes mellitus with circulatory disorder, without long-term current use of insulin 09/06/2019    Wears dentures     full set    Wears glasses        Procedures Performed:  Procedure(s):  CAROTID ENDARTERECTOMY WITH EEG RIGHT       Operative Pathology:    Clinical Information    Carotid stenosis, asymptomatic, right   Final Diagnosis   RIGHT CAROTID PLAQUE:               Calcific atherosclerotic disease   Electronically signed by Cha Brown MD on 7/21/2023 at 1209   Gross Description    1. Carotid Plaque.  Received in formalin labeled \"right carotid plaque\", is a 2.5 x 1.8 x 0.5 cm aggregate of yellow-tan, membranous and vascular soft tissue.  Sectioning reveals yellow-white, friable and moderately " calcified cut surfaces.  Representative sections are submitted in cassette 1A following decalcification.   MLA       History of Present Illness:    The patient is a 68-year-old male on whom I performed an aortic valve replacement and multivessel coronary bypass grafting in 2019.  I then performed a left carotid endarterectomy, approximately, 1 year ago.  At the time he had only 50% narrowing in the right sided carotid artery.  However, now a duplex scan demonstrates 50 to 69% narrowing but the main reason he was referred to me is that he is having visual disturbances and amaurosis fugax in the right eye.  He has had at least 3 of these events within the last 2 weeks.  He states this is similar to the ocular symptoms he had before surgery on his left eye, which totally resolved following his left carotid endarterectomy.     Hospital Course:  Patient was admitted to [facility] on 7/20/2023 for scheduled right carotid endarterectomy with Dr. Goldsmith.  Patient was extubated without complications and was sent to ICU in stable condition.  POD 1: No acute events.  Patient was weaned off of Cardene drip.  GUIDO drain was removed.  Patient was discharged home in stable condition.       Discharge Medications        Changes to Medications        Instructions Start Date   Ozempic (2 MG/DOSE) 8 MG/3ML solution pen-injector  Generic drug: Semaglutide (2 MG/DOSE)  What changed: additional instructions   2 mg, Subcutaneous, Weekly, Dose increase             Continue These Medications        Instructions Start Date   Accu-Chek Guide test strip  Generic drug: glucose blood  Notes to patient: Resume home regimen.   Use to check glucose TID      Accu-Chek Softclix Lancets lancets  Notes to patient: Resume home regimen.   Use to check glucose three times daily      albuterol sulfate  (90 Base) MCG/ACT inhaler  Commonly known as: PROVENTIL HFA;VENTOLIN HFA;PROAIR HFA  Notes to patient: Resume home regimen.   2 puffs,  Inhalation, Every 4 Hours PRN      aspirin 81 MG EC tablet  Notes to patient: Resume home regimen.   81 mg, Oral, Daily      busPIRone 10 MG tablet  Commonly known as: BUSPAR  Notes to patient: Resume home regimen.   10 mg, Oral, 3 Times Daily      cetirizine 10 MG tablet  Commonly known as: zyrTEC  Notes to patient: Resume home regimen.   10 mg, Oral, Daily PRN      clopidogrel 75 MG tablet  Commonly known as: PLAVIX  Notes to patient: Resume home regimen.   75 mg, Oral, Daily, Per Dr Goldsmith hold preop       empagliflozin 10 MG tablet tablet  Commonly known as: Jardiance  Notes to patient: Resume home regimen.   10 mg, Oral, Daily      losartan 100 MG tablet  Commonly known as: COZAAR   100 mg, Oral, Daily      metFORMIN 500 MG tablet  Commonly known as: GLUCOPHAGE  Notes to patient: Resume home regimen.   1,000 mg, Oral, 2 Times Daily With Meals      metoprolol tartrate 100 MG tablet  Commonly known as: LOPRESSOR  Notes to patient: Resume home regimen.   100 mg, Oral, Every 12 Hours Scheduled      multivitamin tablet tablet  Notes to patient: Resume home regimen.   1 tablet, Oral, Daily      nitroglycerin 0.4 MG SL tablet  Commonly known as: NITROSTAT  Notes to patient: Resume home regimen.   0.4 mg, Sublingual, Every 5 Minutes PRN, Take no more than 3 doses in 15 minutes.       omeprazole 40 MG capsule  Commonly known as: priLOSEC  Notes to patient: Resume home regimen.   40 mg, Oral, Daily      potassium chloride 10 MEQ CR tablet  Commonly known as: K-DUR,KLOR-CON  Notes to patient: Resume home regimen.   10 mEq, Oral, Daily      rosuvastatin 40 MG tablet  Commonly known as: CRESTOR  Notes to patient: Resume home regimen.   40 mg, Oral, Daily      tamsulosin 0.4 MG capsule 24 hr capsule  Commonly known as: FLOMAX  Notes to patient: Resume home regimen.   1 capsule, Oral, 2 Times Daily      temazepam 30 MG capsule  Commonly known as: RESTORIL  Notes to patient: Resume home regimen.   30 mg, Oral, Nightly PRN       vitamin B-12 250 MCG tablet  Commonly known as: CYANOCOBALAMIN  Notes to patient: Resume home regimen.   250 mcg, Oral, Daily               Allergies:  Allergies   Allergen Reactions    Ranexa [Ranolazine Er] Nausea Only and Dizziness    Lisinopril Itching and Rash     On feet       Discharge Diet:  Diet Instructions       Diet: Cardiac Diets; Healthy Heart (2-3 Na+); Regular Texture (IDDSI 7); Thin (IDDSI 0)      Discharge Diet: Cardiac Diets    Cardiac Diet: Healthy Heart (2-3 Na+)    Texture: Regular Texture (IDDSI 7)    Fluid Consistency: Thin (IDDSI 0)            Activity at Discharge:  Activity Instructions       Activity as Tolerated      Bathing Restrictions      You may shower    Type of Restriction: Bathing    Bathing Restrictions: No Tub Bath    Driving Restrictions      Type of Restriction: Driving    Driving Restrictions: No Driving While Taking Narcotics    Lifting Restrictions      Type of Restriction: Lifting    Lifting Restrictions: Lifting Restriction (Indicate Limit)    Weight Limit (Pounds): 10    Length of Lifting Restriction: until seen at next follow-up appointment            Discharge Education:  Wound Care:  Patient educated regarding care of post-operative wounds.  Patient is to wash with plain soap and water and pat dry.  Patient is not to use salves on the incision sites.  Patient instructed regarding the signs and symptoms of infection and when to call the office with any concerns.    Follow up Appointments:   Future Appointments   Date Time Provider Department Center   9/18/2023 11:00 AM Sage Bonner PA-C [facility]     Additional Instructions for the Follow-ups that You Need to Schedule       Call MD With Problems / Concerns   As directed      Instructions:   Please call the [facility] Surgery office with any questions or concerns you may have [phone]    Order Comments: Instructions: Please call the [facility] Surgery office with any questions or concerns you may have [phone]           Discharge Follow-up with PCP   As directed       Currently Documented PCP:    Shirlene Sharpe MD    PCP Phone Number:    [phone]     Follow Up Details: Follow-up with your PCP within 1-2 weeks         Discharge Follow-up with Specialty: Cardiothoracic Surgery   As directed      Specialty: Cardiothoracic Surgery    Follow Up Details: Follow-up within 2-4 weeks                   Cora Baugh, APRN  07/21/23  13:43 EDT    Time: I spent 15 minutes on this discharge activity which included: face-to-face encounter with the patient, reviewing the data in the system, coordination of the care with the nursing staff as well as consultants, documentation, and entering orders.

## 2023-07-26 ENCOUNTER — READMISSION MANAGEMENT (OUTPATIENT)
Dept: CALL CENTER | Facility: HOSPITAL | Age: 68
End: 2023-07-26
Payer: MEDICARE

## 2023-07-26 NOTE — OUTREACH NOTE
General Surgery Week 1 Survey      Flowsheet Row Responses   [facility] patient discharged from? [place]   Does the patient have one of the following disease processes/diagnoses(primary or secondary)? General Surgery   Week 1 attempt successful? Yes   Call start time 1616   Call end time 1617   Discharge diagnosis carotid endarterectomy   Meds reviewed with patient/caregiver? Yes   Does the patient have all medications related to this admission filled (includes all antibiotics, pain medications, etc.) N/A   Is the patient taking all medications as directed (includes completed medication regime)? Yes   Does the patient have a follow up appointment scheduled with their surgeon? Yes   Has the patient kept scheduled appointments due by today? N/A   Comments Patient will see his pcp tomorrow   Has home health visited the patient within 72 hours of discharge? N/A   Psychosocial issues? No   Did the patient receive a copy of their discharge instructions? Yes   Nursing interventions Reviewed instructions with patient   What is the patient's perception of their health status since discharge? Improving   Nursing interventions Nurse provided patient education   Is the patient /caregiver able to teach back basic post-op care? Drive as instructed by MD in discharge instructions, Take showers only when approved by MD-sponge bathe until then, No tub bath, swimming, or hot tub until instructed by MD, Lifting as instructed by MD in discharge instructions   Is the patient/caregiver able to teach back signs and symptoms of incisional infection? Increased redness, swelling or pain at the incisonal site, Increased drainage or bleeding, Incisional warmth, Pus or odor from incision, Fever   Is the patient/caregiver able to teach back steps to recovery at home? Set small, achievable goals for return to baseline health, Rest and rebuild strength, gradually increase activity, Make a list of questions for surgeon's appointment   If  the patient is a current smoker, are they able to teach back resources for cessation? Not a smoker   Is the patient/caregiver able to teach back the hierarchy of who to call/visit for symptoms/problems? PCP, Specialist, Home health nurse, Urgent Care, ED, 911 Yes   Week 1 call completed? Yes   Call end time 1617            Elizabeth JACOBS - Licensed Nurse

## 2023-07-27 LAB
QT INTERVAL: 444 MS
QTC INTERVAL: 446 MS

## 2023-08-03 ENCOUNTER — READMISSION MANAGEMENT (OUTPATIENT)
Dept: CALL CENTER | Facility: HOSPITAL | Age: 68
End: 2023-08-03
Payer: MEDICARE

## 2023-08-21 ENCOUNTER — OFFICE VISIT (OUTPATIENT)
Dept: CARDIAC SURGERY | Facility: CLINIC | Age: 68
End: 2023-08-21
Payer: MEDICARE

## 2023-08-21 VITALS
HEART RATE: 66 BPM | SYSTOLIC BLOOD PRESSURE: 146 MMHG | HEIGHT: 66 IN | WEIGHT: 216 LBS | DIASTOLIC BLOOD PRESSURE: 84 MMHG | OXYGEN SATURATION: 98 % | TEMPERATURE: 97.1 F | BODY MASS INDEX: 34.72 KG/M2

## 2023-08-21 DIAGNOSIS — I65.23 BILATERAL CAROTID ARTERY STENOSIS: Primary | ICD-10-CM

## 2023-08-21 PROCEDURE — 3077F SYST BP >= 140 MM HG: CPT | Performed by: NURSE PRACTITIONER

## 2023-08-21 PROCEDURE — 1159F MED LIST DOCD IN RCRD: CPT | Performed by: NURSE PRACTITIONER

## 2023-08-21 PROCEDURE — 1160F RVW MEDS BY RX/DR IN RCRD: CPT | Performed by: NURSE PRACTITIONER

## 2023-08-21 PROCEDURE — 3079F DIAST BP 80-89 MM HG: CPT | Performed by: NURSE PRACTITIONER

## 2023-08-21 PROCEDURE — 99024 POSTOP FOLLOW-UP VISIT: CPT | Performed by: NURSE PRACTITIONER

## 2023-09-12 ENCOUNTER — HOSPITAL ENCOUNTER (OUTPATIENT)
Dept: CT IMAGING | Facility: HOSPITAL | Age: 68
Discharge: HOME | End: 2023-09-12
Admitting: NURSE PRACTITIONER
Payer: MEDICARE

## 2023-09-12 DIAGNOSIS — I65.23 BILATERAL CAROTID ARTERY STENOSIS: ICD-10-CM

## 2023-09-12 LAB — CREAT BLDA-MCNC: 1.4 MG/DL (ref 0.6–1.3)

## 2023-09-12 PROCEDURE — 70498 CT ANGIOGRAPHY NECK: CPT

## 2023-09-12 PROCEDURE — 25510000001 IOPAMIDOL PER 1 ML: Performed by: NURSE PRACTITIONER

## 2023-09-12 PROCEDURE — 82565 ASSAY OF CREATININE: CPT

## 2023-09-12 RX ADMIN — IOPAMIDOL 85 ML: 755 INJECTION, SOLUTION INTRAVENOUS at 09:45

## 2023-09-12 NOTE — PROGRESS NOTES
"     [facility] Cardiothoracic Surgery Office Follow Up Note     Date of Encounter: 2023     Name: Khoa Arnold  : 1955     Referred By: No ref. provider found  PCP: Shirlene Sharpe MD    Chief Complaint:    Chief Complaint   Patient presents with    Carotid Artery Disease     Follow-up after CTA carotids. Patient has blurry vision in right eye. This worsens when he raises head up and down.        Subjective      History of Present Illness:    Khoa Arnold is a 68 y.o. male s/p right CEA per Dr. Goldsmith on 2023 preceded by left CEA per Dr. Goldsmith on 10/21/2022 for symptomatic bilateral carotid artery stenosis.  PMH: Aortic stenosis s/p AVR, CAD s/p CABG per Dr. Goldsmith , ROBERTO s/p bilateral CEAs, PPM, asthma, BPH, COPD, type 2 diabetes, hyperlipidemia, HTN, and CVA .      Following uncomplicated right CEA, patient was extubated and sent to the ICU in stable condition.  On POD #1 he was noted to have no neurological changes and was weaned off his Cardene drip.  GUIDO drain removed and patient was discharged home in stable condition.      Seen @ CT surgery clinic for scheduled 4-week postop visit 23 c/o recurrent (as in similar to pre-op symptoms) right eye vision blurring with \"flickering lights\" approximately 2 weeks postop. Patient also related feeling very dizzy and off-balance with worsening right eye blurriness/vision loss when bending forward or and standing back up.  He states today that these symptoms of vision changes and positional dizziness have been going on since before both CEA procedures.      S/p Cardiology evaluation (Dr. Pineda) including GXT and PPM check.  S/p Ophthalmologist exam w/ reported stable exam.  Compliant with aspirin, Plavix, and statin.  Follow up CTA carotids was ordered to re-assess post op carotid arterial flow for which he returns today to review results.  Patient also noted to have orthostatic hypotension last OV: hydration w/ " "discontinuation of caffeine intake was recommended.      Mr. Arnold states his BP is \"still up and down\"  and he feels more symptomatic when his BP is less than 110 mmHg systolic.      Review of Systems:  Review of Systems   Constitutional: Negative for chills, decreased appetite, diaphoresis, fever, malaise/fatigue, night sweats, weight gain and weight loss.   HENT:  Negative for hoarse voice.    Eyes:  Positive for blurred vision. Negative for double vision and visual disturbance.   Cardiovascular:  Negative for chest pain, claudication, dyspnea on exertion, irregular heartbeat, leg swelling, near-syncope, orthopnea, palpitations, paroxysmal nocturnal dyspnea and syncope.   Respiratory:  Negative for cough, hemoptysis, shortness of breath, sputum production and wheezing.    Hematologic/Lymphatic: Negative for adenopathy and bleeding problem. Does not bruise/bleed easily.   Skin:  Negative for color change, nail changes, poor wound healing and rash.   Musculoskeletal:  Positive for back pain. Negative for falls and muscle cramps.   Gastrointestinal:  Negative for abdominal pain, dysphagia and heartburn.   Genitourinary:  Negative for flank pain.   Neurological:  Positive for dizziness and light-headedness. Negative for brief paralysis, disturbances in coordination, focal weakness, headaches, loss of balance, numbness, paresthesias, sensory change, vertigo and weakness.   Psychiatric/Behavioral:  Negative for depression and suicidal ideas. The patient is nervous/anxious.    Allergic/Immunologic: Negative for persistent infections.     I have reviewed the following portions of the patient's history: problem list, current medications, allergies, past surgical history, past medical history, past social history, past family history, and ROS and confirm it's accurate.    Allergies:  Allergies   Allergen Reactions    Ranexa [Ranolazine Er] Nausea Only and Dizziness    Lisinopril Itching and Rash     On feet "       Medications:      Current Outpatient Medications:     Accu-Chek Guide test strip, Use to check glucose TID, Disp: , Rfl:     Accu-Chek Softclix Lancets lancets, Use to check glucose three times daily, Disp: , Rfl:     albuterol sulfate  (90 Base) MCG/ACT inhaler, Inhale 2 puffs Every 4 (Four) Hours As Needed for Wheezing or Shortness of Air., Disp: , Rfl:     aspirin 81 MG EC tablet, Take 1 tablet by mouth Daily., Disp: , Rfl:     busPIRone (BUSPAR) 10 MG tablet, Take 1 tablet by mouth 3 (Three) Times a Day., Disp: , Rfl:     cetirizine (zyrTEC) 10 MG tablet, Take 1 tablet by mouth Daily As Needed for Allergies or Rhinitis., Disp: , Rfl:     clopidogrel (PLAVIX) 75 MG tablet, Take 1 tablet by mouth Daily. Per Dr Agus valentineop, Disp: , Rfl:     empagliflozin (Jardiance) 10 MG tablet tablet, Take 1 tablet by mouth Daily., Disp: 90 tablet, Rfl: 1    losartan (COZAAR) 100 MG tablet, Take 1 tablet by mouth Daily., Disp: , Rfl:     metFORMIN (GLUCOPHAGE) 500 MG tablet, Take 2 tablets by mouth 2 (Two) Times a Day With Meals., Disp: , Rfl:     metoprolol tartrate (LOPRESSOR) 100 MG tablet, Take 1 tablet by mouth Every 12 (Twelve) Hours., Disp: 60 tablet, Rfl: 11    multivitamin (THERAGRAN) tablet tablet, Take 1 tablet by mouth Daily., Disp: , Rfl:     nitroglycerin (NITROSTAT) 0.4 MG SL tablet, Place 1 tablet under the tongue Every 5 (Five) Minutes As Needed for Chest Pain. Take no more than 3 doses in 15 minutes., Disp: , Rfl:     omeprazole (priLOSEC) 40 MG capsule, Take 1 capsule by mouth Daily., Disp: , Rfl:     potassium chloride (K-DUR,KLOR-CON) 10 MEQ CR tablet, Take 1 tablet by mouth Daily., Disp: , Rfl: 0    rosuvastatin (CRESTOR) 40 MG tablet, Take 1 tablet by mouth Daily., Disp: , Rfl:     Semaglutide, 2 MG/DOSE, (Ozempic, 2 MG/DOSE,) 8 MG/3ML solution pen-injector, Inject 2 mg under the skin into the appropriate area as directed 1 (One) Time Per Week. Dose increase, Disp: 9 mL, Rfl: 1     tamsulosin (FLOMAX) 0.4 MG capsule 24 hr capsule, Take 1 capsule by mouth 2 (Two) Times a Day., Disp: , Rfl:     temazepam (RESTORIL) 30 MG capsule, Take 1 capsule by mouth At Night As Needed for Sleep., Disp: , Rfl:     vitamin B-12 (CYANOCOBALAMIN) 250 MCG tablet, Take 1 tablet by mouth Daily., Disp: , Rfl:     History:   Past Medical History:   Diagnosis Date    Aortic valve stenosis     Asthma     BPH (benign prostatic hyperplasia)     Carotid stenosis     COPD (chronic obstructive pulmonary disease)     Coronary artery disease     Diabetes mellitus     Edentulous     Hyperlipidemia     Hypertension     IBS (irritable bowel syndrome)     Stroke     2009, no residual    Type 2 diabetes mellitus with circulatory disorder, without long-term current use of insulin 09/06/2019    Wears dentures     full set    Wears glasses        Past Surgical History:   Procedure Laterality Date    CARDIAC CATHETERIZATION N/A 09/06/2019    Procedure: Left Heart Cath;  Surgeon: Jonathan Pineda MD;  Location:  [facility];  Service: Cardiovascular    CARDIAC ELECTROPHYSIOLOGY PROCEDURE N/A 01/19/2021    Procedure: DEVICE IMPLANT;  Surgeon: Jonathan Pineda MD;  Location:  [facility];  Service: Cardiovascular;  Laterality: N/A;    CAROTID ENDARTERECTOMY Left 10/20/2022    Procedure: CAROTID ENDARTERECTOMY WITH EEG LEFT;  Surgeon: Denys Goldsmith MD;  Location:  [facility];  Service: Vascular;  Laterality: Left;    CAROTID ENDARTERECTOMY Right 7/20/2023    Procedure: CAROTID ENDARTERECTOMY WITH EEG RIGHT;  Surgeon: Denys Goldsmith MD;  Location:  [facility];  Service: Vascular;  Laterality: Right;  clamp on 0720; clamp off 0742    CHOLECYSTECTOMY      COLONOSCOPY      CORONARY ANGIOPLASTY WITH STENT PLACEMENT      x 3, last one 2014    CORONARY ARTERY BYPASS GRAFT WITH AORTIC VALVE REPAIR/REPLACEMENT N/A 09/10/2019    Procedure: CARYLN PER ANESTHESIA, MEDIAN STERNOTOMY, CORONARY ARTERY  BYPASS GRAFT X 4, UTILIZING THE LEFT INTERNAL MAMMARY ARTERY, AND EVH OF THE RIGHT GREATER SAPHENOUS VEIN,   AORTIC VALVE REPLACEMENT;  Surgeon: Denys Goldsmith MD;  Location: [facility];  Service: Cardiothoracic    EYE SURGERY Bilateral     cataracts    INGUINAL HERNIA REPAIR Right     PACEMAKER IMPLANTATION  2021    UMBILICAL HERNIA REPAIR         Social History     Socioeconomic History    Marital status:     Number of children: 1   Tobacco Use    Smoking status: Former     Types: Cigars     Quit date: 10/14/1979     Years since quittin.9    Smokeless tobacco: Former     Types: Chew     Quit date: 2004   Vaping Use    Vaping Use: Never used   Substance and Sexual Activity    Alcohol use: No    Drug use: No    Sexual activity: Defer        Family History   Problem Relation Age of Onset    Coronary artery disease Mother     Diabetes Mother     Coronary artery disease Father     Diabetes Father        Objective   Physical Exam:  Vitals:    23 0918 23 09   BP: 160/60 150/65   BP Location: Right arm Left arm   Patient Position: Sitting Sitting   Pulse: 60    Temp: 97.8 °F (36.6 °C)    SpO2: 98%    Weight: 98.9 kg (218 lb)       Body mass index is 35.19 kg/m².    Physical Exam  Vitals reviewed.   Constitutional:       General: He is not in acute distress.     Appearance: He is not toxic-appearing.   HENT:      Head: Normocephalic and atraumatic.   Eyes:      General: Lids are normal. Vision grossly intact. Gaze aligned appropriately.      Extraocular Movements: Extraocular movements intact.      Right eye: No nystagmus.      Left eye: No nystagmus.      Conjunctiva/sclera: Conjunctivae normal.      Pupils: Pupils are equal, round, and reactive to light.   Neck:      Vascular: No carotid bruit.      Trachea: Trachea and phonation normal.      Comments: Well healed bilateral carotid endarterectomy incisions  Cardiovascular:      Rate and Rhythm: Regular rhythm. Bradycardia present.       Pulses:           Dorsalis pedis pulses are 2+ on the right side and 2+ on the left side.        Posterior tibial pulses are 2+ on the right side and 2+ on the left side.      Heart sounds: S1 normal and S2 normal. Murmur heard.   Systolic murmur is present with a grade of 2/6.   Pulmonary:      Effort: Pulmonary effort is normal. No respiratory distress.      Breath sounds: Normal breath sounds.   Musculoskeletal:         General: Normal range of motion.      Cervical back: Normal range of motion and neck supple.      Right lower leg: No edema.      Left lower leg: No edema.   Lymphadenopathy:      Cervical: No cervical adenopathy.   Skin:     General: Skin is warm and dry.      Capillary Refill: Capillary refill takes less than 2 seconds.   Neurological:      General: No focal deficit present.      Mental Status: He is alert and oriented to person, place, and time.      GCS: GCS eye subscore is 4. GCS verbal subscore is 5. GCS motor subscore is 6.      Cranial Nerves: Cranial nerves 2-12 are intact. No dysarthria or facial asymmetry.      Gait: Gait is intact.   Psychiatric:         Attention and Perception: Attention normal.         Mood and Affect: Mood normal.         Speech: Speech normal.         Behavior: Behavior is cooperative.         Cognition and Memory: Cognition normal.       Imaging/Labs:  CT Angiogram Carotids: 9/12/2023 (Personally reviewed and agree w/ Radiologist assessment)  Impression: Postoperative changes are present consistent with prior bilateral carotid endarterectomy. There is no evidence of significant recurrent narrowing, with 0% stenosis present at both ICA origins. Some stable areas of atherosclerotic change are present involving the internal carotid arteries more distally in the neck, without evidence of new high-grade stenosis. Electronically Signed: Vj Mcrae MD  9/12/2023 10:16 AM EDT  Workstation ID: [id number]         CT ANGIOGRAM CAROTIDS- 9/21/22  FINDINGS:  The lung  apices are grossly clear. Evaluation of the neck soft tissues  demonstrates no pathologic cervical adenopathy or unexpected  aerodigestive tract mass. Evaluation of the osseous structures  demonstrates multilevel spondylosis, otherwise without evidence of acute  fracture or aggressive osseous lesion. Limited intracranial evaluation  demonstrates no acute findings. The carotid siphons are incidentally  imaged with prominent calcific atherosclerotic change resulting in mild  narrowing of the supraclinoid segments bilaterally. Patent aortic arch  with three-vessel branching pattern. On the right, there is calcific  atherosclerotic change in addition to eccentric soft plaque which  results in minimal, less than 50% narrowing of the ICA origin. There is  calcific plaque present involving the left ICA origin, with focal  approximate 80% stenosis by NASCET criteria. Eccentric plaque is also  present along the more distal left ICA just proximal to the skull base,  with less than 50% narrowing present at this location. The vertebral  arteries demonstrate no evidence of flow-limiting stenosis.     IMPRESSION:  Minimal, less than 50% narrowing of the right ICA origin, with  combination of eccentric soft plaque and calcific atherosclerotic  change.   Prominent calcific atherosclerotic change of the left ICA origin, with  focal approximate 80% stenosis.No acute nonvascular findings.   This report was finalized on 9/21/2022 12:05 PM by Jonathan Mcrae.    Assessment / Plan      Assessment / Plan:  1. Bilateral carotid artery stenosis s/p right and left CEA (Primary)  - 68 y.o. male s/p right CEA per Dr. Goldsmith on 7/20/2023 preceded by left CEA per Dr. Goldsmith on 10/21/2022 for symptomatic bilateral carotid artery stenosis.    - PMH: Aortic stenosis s/p AVR, CAD s/p CABG per Dr. Goldsmith 2019, ROBERTO s/p bilateral CEAs, PPM, asthma, BPH, COPD, type 2 diabetes, hyperlipidemia, HTN, and CVA 2009.    - Following uncomplicated right  "CEA, patient was extubated and sent to the ICU in stable condition.  On POD #1 he was noted to have no neurological changes and was weaned off his Cardene drip.  GUIDO drain removed and patient was discharged home in stable condition.    - Seen @ CT surgery clinic for scheduled 4-week postop visit 8/21/23 reporting  recurrent (as in similar to pre-op symptoms) right eye vision blurring with \"flickering lights\" w/ associated positional dizziness and visual changes.  He states today that these symptoms of vision changes and positional dizziness have been going on since before both CEA procedures but he cannot say exactly how long.  - S/p Cardiology evaluation (Dr. Pineda) including GXT and PPM check.    - S/p Ophthalmologist exam w/ reported stable exam.    - Compliant with aspirin, Plavix, and statin.    - Follow up CTA carotids: no evidence of significant recurrent narrowing, with 0% stenosis present at both ICA origins, stable areas of atherosclerotic change  present without evidence of new high-grade stenosis.    - Stable neurologic exam today  - Recommend see PCP to discuss local Neurology referral for further evaluation  - May also need follow up with Cardiology for BP management.        Follow Up:   Return in about 1 year (around 9/13/2024) for carotid duplex.   Or sooner for any further concerns or worsening sign and symptoms. If unable to reach us in the office please dial 911 or go to the nearest emergency department.      ELENO Gaviria  [facility] Cardiothoracic Surgery    Time Spent: I spent 40 minutes caring for Khoa on this date of service. This time includes time spent by me in the following activities: preparing for the visit, reviewing tests, obtaining and/or reviewing a separately obtained history, performing a medically appropriate examination and/or evaluation, counseling and educating the patient/family/caregiver, documenting information in the medical record, independently interpreting " results and communicating that information with the patient/family/caregiver, and care coordination.

## 2023-09-13 ENCOUNTER — OFFICE VISIT (OUTPATIENT)
Dept: CARDIAC SURGERY | Facility: CLINIC | Age: 68
End: 2023-09-13
Payer: MEDICARE

## 2023-09-13 VITALS
HEART RATE: 60 BPM | TEMPERATURE: 97.8 F | WEIGHT: 218 LBS | OXYGEN SATURATION: 98 % | DIASTOLIC BLOOD PRESSURE: 65 MMHG | SYSTOLIC BLOOD PRESSURE: 150 MMHG | BODY MASS INDEX: 35.19 KG/M2

## 2023-09-13 DIAGNOSIS — I65.23 BILATERAL CAROTID ARTERY STENOSIS: Primary | ICD-10-CM

## 2023-09-18 ENCOUNTER — OFFICE VISIT (OUTPATIENT)
Dept: ENDOCRINOLOGY | Facility: CLINIC | Age: 68
End: 2023-09-18
Payer: MEDICARE

## 2023-09-18 VITALS
HEART RATE: 72 BPM | OXYGEN SATURATION: 98 % | WEIGHT: 217 LBS | DIASTOLIC BLOOD PRESSURE: 66 MMHG | HEIGHT: 67 IN | BODY MASS INDEX: 34.06 KG/M2 | SYSTOLIC BLOOD PRESSURE: 110 MMHG

## 2023-09-18 DIAGNOSIS — E78.2 MIXED HYPERLIPIDEMIA: Chronic | ICD-10-CM

## 2023-09-18 DIAGNOSIS — E11.65 CONTROLLED TYPE 2 DIABETES MELLITUS WITH HYPERGLYCEMIA, WITHOUT LONG-TERM CURRENT USE OF INSULIN: Primary | Chronic | ICD-10-CM

## 2023-09-18 LAB
EXPIRATION DATE: ABNORMAL
GLUCOSE BLDC GLUCOMTR-MCNC: 199 MG/DL (ref 70–130)
Lab: ABNORMAL

## 2023-09-18 PROCEDURE — 82947 ASSAY GLUCOSE BLOOD QUANT: CPT | Performed by: PHYSICIAN ASSISTANT

## 2023-09-18 PROCEDURE — 3074F SYST BP LT 130 MM HG: CPT | Performed by: PHYSICIAN ASSISTANT

## 2023-09-18 PROCEDURE — 1160F RVW MEDS BY RX/DR IN RCRD: CPT | Performed by: PHYSICIAN ASSISTANT

## 2023-09-18 PROCEDURE — 3078F DIAST BP <80 MM HG: CPT | Performed by: PHYSICIAN ASSISTANT

## 2023-09-18 PROCEDURE — 99214 OFFICE O/P EST MOD 30 MIN: CPT | Performed by: PHYSICIAN ASSISTANT

## 2023-09-18 PROCEDURE — 1159F MED LIST DOCD IN RCRD: CPT | Performed by: PHYSICIAN ASSISTANT

## 2023-09-18 PROCEDURE — 3044F HG A1C LEVEL LT 7.0%: CPT | Performed by: PHYSICIAN ASSISTANT

## 2023-09-18 PROCEDURE — 36415 COLL VENOUS BLD VENIPUNCTURE: CPT | Performed by: PHYSICIAN ASSISTANT

## 2023-09-18 NOTE — PROGRESS NOTES
Chief Complaint  F/u for Diabetes Mellitus.    HPI   Khoa Arnold is a 68 y.o. male with htn, hyperlipidemia, sick sinus syndrome s/p PPM, CAD s/p CABG with AVR, vit d deficiency who is here today for f/u of Diabetes Mellitus type 2. The initial diagnosis of diabetes was made in his early 60s.     Presents to appointment with wife.  Has been eating more sweets.     Diabetic complications: cardiovascular disease and cerebrovascular disease. CAD s/p cabg, hx of 2 CVAs, mild peripheral neuropathy  Eye exam current (within one year): utd 4/2023 domo     Current diabetic medications include:  Metformin 500 mg 2 tabs twice daily  Jardiance 10 mg daily  Ozempic 2 mg weekly    Statin: Crestor 40 mg - hyperlipidemia    Past medications: Januvia, glimepiride    Diabetic Monitoring  - checks glucose 2-3x/day  Glucose is averaging- fasting -168, during the day 107-301, evening 111-204  Hypoglycemia- none  Home blood sugar records: glucometer downloaded, data reviewed and scanned to chart    The following portions of the patient's history were reviewed and updated by me as appropriate: allergies, current medications, past family history, past social history, past surgical history and problem list.    Past Medical History:   Diagnosis Date    Aortic valve stenosis     Asthma     BPH (benign prostatic hyperplasia)     Carotid stenosis     COPD (chronic obstructive pulmonary disease)     Coronary artery disease     Diabetes mellitus     Edentulous     Hyperlipidemia     Hypertension     IBS (irritable bowel syndrome)     Stroke     2009, no residual    Type 2 diabetes mellitus with circulatory disorder, without long-term current use of insulin 09/06/2019    Wears dentures     full set    Wears glasses        Medications    Current Outpatient Medications:     Accu-Chek Guide test strip, Use to check glucose TID, Disp: , Rfl:     Accu-Chek Softclix Lancets lancets, Use to check glucose three times daily,  Disp: , Rfl:     albuterol sulfate  (90 Base) MCG/ACT inhaler, Inhale 2 puffs Every 4 (Four) Hours As Needed for Wheezing or Shortness of Air., Disp: , Rfl:     aspirin 81 MG EC tablet, Take 1 tablet by mouth Daily., Disp: , Rfl:     busPIRone (BUSPAR) 10 MG tablet, Take 1 tablet by mouth 3 (Three) Times a Day., Disp: , Rfl:     cetirizine (zyrTEC) 10 MG tablet, Take 1 tablet by mouth Daily As Needed for Allergies or Rhinitis., Disp: , Rfl:     clopidogrel (PLAVIX) 75 MG tablet, Take 1 tablet by mouth Daily. Per Dr Agus dorsey preop, Disp: , Rfl:     empagliflozin (Jardiance) 10 MG tablet tablet, Take 1 tablet by mouth Daily., Disp: 90 tablet, Rfl: 1    losartan (COZAAR) 100 MG tablet, Take 1 tablet by mouth Daily., Disp: , Rfl:     metFORMIN (GLUCOPHAGE) 500 MG tablet, Take 2 tablets by mouth 2 (Two) Times a Day With Meals., Disp: , Rfl:     metoprolol tartrate (LOPRESSOR) 100 MG tablet, Take 1 tablet by mouth Every 12 (Twelve) Hours., Disp: 60 tablet, Rfl: 11    multivitamin (THERAGRAN) tablet tablet, Take 1 tablet by mouth Daily., Disp: , Rfl:     nitroglycerin (NITROSTAT) 0.4 MG SL tablet, Place 1 tablet under the tongue Every 5 (Five) Minutes As Needed for Chest Pain. Take no more than 3 doses in 15 minutes., Disp: , Rfl:     omeprazole (priLOSEC) 40 MG capsule, Take 1 capsule by mouth Daily., Disp: , Rfl:     potassium chloride (K-DUR,KLOR-CON) 10 MEQ CR tablet, Take 1 tablet by mouth Daily., Disp: , Rfl: 0    rosuvastatin (CRESTOR) 40 MG tablet, Take 1 tablet by mouth Daily., Disp: , Rfl:     Semaglutide, 2 MG/DOSE, (Ozempic, 2 MG/DOSE,) 8 MG/3ML solution pen-injector, Inject 2 mg under the skin into the appropriate area as directed 1 (One) Time Per Week. Dose increase, Disp: 9 mL, Rfl: 1    tamsulosin (FLOMAX) 0.4 MG capsule 24 hr capsule, Take 1 capsule by mouth 2 (Two) Times a Day., Disp: , Rfl:     temazepam (RESTORIL) 30 MG capsule, Take 1 capsule by mouth At Night As Needed for Sleep., Disp: ,  "Rfl:     vitamin B-12 (CYANOCOBALAMIN) 250 MCG tablet, Take 1 tablet by mouth Daily., Disp: , Rfl:     Review of Systems  Review of Systems     Physical Exam    /66   Pulse 72   Ht 168.9 cm (66.5\")   Wt 98.4 kg (217 lb)   SpO2 98%   BMI 34.50 kg/m² Body mass index is 34.5 kg/m².  Physical Exam  Constitutional:       General: He is not in acute distress.     Appearance: He is well-developed. He is not diaphoretic.   Neck:      Thyroid: No thyromegaly.      Vascular: No JVD.      Trachea: No tracheal deviation.   Cardiovascular:      Rate and Rhythm: Normal rate and regular rhythm.      Pulses:           Dorsalis pedis pulses are 2+ on the right side and 2+ on the left side.        Posterior tibial pulses are 2+ on the right side and 2+ on the left side.      Heart sounds: Normal heart sounds. Murmur heard.   Pulmonary:      Effort: Pulmonary effort is normal. No respiratory distress.      Breath sounds: Normal breath sounds. No wheezing.   Musculoskeletal:         General: No tenderness.      Cervical back: Neck supple.      Right foot: No deformity or Charcot foot.      Left foot: No deformity or Charcot foot.   Feet:      Right foot:      Protective Sensation: 5 sites tested.  2 sites sensed.      Skin integrity: No ulcer, blister, skin breakdown, erythema, warmth or callus.      Left foot:      Protective Sensation: 5 sites tested.  2 sites sensed.      Skin integrity: No ulcer, blister, skin breakdown, erythema, warmth or callus.      Comments: Diabetic Foot Exam Performed and Monofilament Test Performed      Skin:     General: Skin is warm and dry.      Findings: No erythema or rash.   Neurological:      Mental Status: He is alert and oriented to person, place, and time.   Psychiatric:         Behavior: Behavior normal.         Thought Content: Thought content normal.         Judgment: Judgment normal.       Labs and Imaging   Lab Results   Component Value Date    HGBA1C 6.80 (H) 07/19/2023    HGBA1C " 7.1 05/08/2023    HGBA1C 6.8 01/06/2023     CMP          7/19/2023    13:26 7/21/2023    05:07 9/12/2023    09:40   CMP   Glucose 153  159     BUN 23  21     Creatinine 1.33  1.12  1.40    EGFR 57.9  71.1     Sodium 138  137     Potassium 4.8  4.3     Chloride 103  104     Calcium 9.7  8.4     BUN/Creatinine Ratio 17.3  18.8     Anion Gap 13.0  11.0       CBC          10/21/2022    02:20 7/19/2023    13:26 7/21/2023    05:07   CBC   WBC 6.74  4.97  7.92    RBC 3.76  4.43  3.90    Hemoglobin 11.3  13.4  11.4    Hematocrit 34.0  40.3  35.7    MCV 90.4  91.0  91.5    MCH 30.1  30.2  29.2    MCHC 33.2  33.3  31.9    RDW 13.6  12.9  13.2    Platelets 141  183  163      Lipid Panel          1/6/2023    12:00   Lipid Panel   Total Cholesterol 90    Triglycerides 84    HDL Cholesterol 35    VLDL Cholesterol 17    LDL Cholesterol  38      TSH          1/6/2023    12:00   TSH   TSH 1.620      Most Recent A1C          7/19/2023    13:26   HGBA1C Most Recent   Hemoglobin A1C 6.80            Assessment / Plan   Diagnoses and all orders for this visit:    1. Controlled type 2 diabetes mellitus with hyperglycemia, without long-term current use of insulin (Primary)  -     POC Glucose, Blood  -     Microalbumin / Creatinine Urine Ratio - Urine, Clean Catch  -     Comprehensive Metabolic Panel  -     Lipid Panel  -     TSH  -     Cancel: Vitamin B12  -     Vitamin B12; Future  -     Vitamin B12    2. Mixed hyperlipidemia  -     Comprehensive Metabolic Panel  -     Lipid Panel        Diabetes Mellitus 2 is under fair control.  -with peripheral neuropathy, history of CAD and CVA  -A1c 6.8 7/19/23 (too soon to repeat)  - during visit  -no hypoglycemia  -continue Ozempic 2 mg weekly  -continue Jardiance 10 mg daily  -continue metformin 500 mg BID  -he will work on decreasing intake of sweets  -Foot exam and labs updated today, eye exam updated 4/2023    Hyperlipidemia  -LDL within guidelines  -continue crestor 40 mg qhs    There  are no Patient Instructions on file for this visit.    Follow up: Return in about 4 months (around 1/18/2024).    Signed by: Sage Bonner PA-C  Endocrinology

## 2023-09-19 LAB
ALBUMIN SERPL-MCNC: 4.5 G/DL (ref 3.5–5.2)
ALBUMIN/CREAT UR: 10 MG/G CREAT (ref 0–29)
ALBUMIN/GLOB SERPL: 2 G/DL
ALP SERPL-CCNC: 52 U/L (ref 39–117)
ALT SERPL-CCNC: 21 U/L (ref 1–41)
AST SERPL-CCNC: 19 U/L (ref 1–40)
BILIRUB SERPL-MCNC: 0.3 MG/DL (ref 0–1.2)
BUN SERPL-MCNC: 29 MG/DL (ref 8–23)
BUN/CREAT SERPL: 26.6 (ref 7–25)
CALCIUM SERPL-MCNC: 9.5 MG/DL (ref 8.6–10.5)
CHLORIDE SERPL-SCNC: 105 MMOL/L (ref 98–107)
CHOLEST SERPL-MCNC: 98 MG/DL (ref 0–200)
CO2 SERPL-SCNC: 27.2 MMOL/L (ref 22–29)
CREAT SERPL-MCNC: 1.09 MG/DL (ref 0.76–1.27)
CREAT UR-MCNC: 42 MG/DL
EGFRCR SERPLBLD CKD-EPI 2021: 73.5 ML/MIN/1.73
GLOBULIN SER CALC-MCNC: 2.3 GM/DL
GLUCOSE SERPL-MCNC: 144 MG/DL (ref 65–99)
HDLC SERPL-MCNC: 36 MG/DL (ref 40–60)
LDLC SERPL CALC-MCNC: 45 MG/DL (ref 0–100)
MICROALBUMIN UR-MCNC: 4.3 UG/ML
POTASSIUM SERPL-SCNC: 4.7 MMOL/L (ref 3.5–5.2)
PROT SERPL-MCNC: 6.8 G/DL (ref 6–8.5)
SODIUM SERPL-SCNC: 142 MMOL/L (ref 136–145)
TRIGL SERPL-MCNC: 85 MG/DL (ref 0–150)
TSH SERPL DL<=0.005 MIU/L-ACNC: 2.72 UIU/ML (ref 0.27–4.2)
VIT B12 SERPL-MCNC: 708 PG/ML (ref 211–946)
VLDLC SERPL CALC-MCNC: 17 MG/DL (ref 5–40)

## 2023-11-15 ENCOUNTER — TELEPHONE (OUTPATIENT)
Dept: ENDOCRINOLOGY | Facility: CLINIC | Age: 68
End: 2023-11-15
Payer: MEDICARE

## 2023-11-15 NOTE — TELEPHONE ENCOUNTER
Spoke with patient and his wife, advised that the pt assistance has not came yet but check back in a few days or next week to see. They voiced understanding.

## 2023-11-20 ENCOUNTER — TELEPHONE (OUTPATIENT)
Dept: ENDOCRINOLOGY | Facility: CLINIC | Age: 68
End: 2023-11-20
Payer: MEDICARE

## 2023-11-20 NOTE — TELEPHONE ENCOUNTER
Advised patients wife that we do not have any 2 mg samples of Ozempic. She is going to call [employer] to see where the pt shipment is then let me know.

## 2023-11-22 ENCOUNTER — TELEPHONE (OUTPATIENT)
Dept: ENDOCRINOLOGY | Facility: CLINIC | Age: 68
End: 2023-11-22
Payer: MEDICARE

## 2023-11-22 NOTE — TELEPHONE ENCOUNTER
Spoke with patient and advised we do not. She was able to speak to [employer] and they are shipping his last refill for this year. She said he was approved for this coming year but they will need the front and back of his insurance card. Will fax insurance card to [employer].

## 2023-11-29 ENCOUNTER — TELEPHONE (OUTPATIENT)
Dept: ENDOCRINOLOGY | Facility: CLINIC | Age: 68
End: 2023-11-29
Payer: MEDICARE

## 2024-02-07 ENCOUNTER — OFFICE VISIT (OUTPATIENT)
Dept: ENDOCRINOLOGY | Facility: CLINIC | Age: 69
End: 2024-02-07
Payer: MEDICARE

## 2024-02-07 VITALS
DIASTOLIC BLOOD PRESSURE: 70 MMHG | HEIGHT: 67 IN | OXYGEN SATURATION: 98 % | BODY MASS INDEX: 32.49 KG/M2 | HEART RATE: 72 BPM | WEIGHT: 207 LBS | SYSTOLIC BLOOD PRESSURE: 124 MMHG

## 2024-02-07 DIAGNOSIS — E78.2 MIXED HYPERLIPIDEMIA: Chronic | ICD-10-CM

## 2024-02-07 DIAGNOSIS — E11.65 CONTROLLED TYPE 2 DIABETES MELLITUS WITH HYPERGLYCEMIA, WITHOUT LONG-TERM CURRENT USE OF INSULIN: Primary | Chronic | ICD-10-CM

## 2024-02-07 LAB
EXPIRATION DATE: ABNORMAL
EXPIRATION DATE: ABNORMAL
GLUCOSE BLDC GLUCOMTR-MCNC: 135 MG/DL (ref 70–130)
HBA1C MFR BLD: 6.8 % (ref 4.5–5.7)
Lab: ABNORMAL
Lab: ABNORMAL

## 2024-02-07 RX ORDER — POTASSIUM CHLORIDE 750 MG/1
10 CAPSULE, EXTENDED RELEASE ORAL DAILY
COMMUNITY
Start: 2024-01-08

## 2024-02-07 RX ORDER — LINACLOTIDE 290 UG/1
290 CAPSULE, GELATIN COATED ORAL
COMMUNITY
Start: 2024-01-13

## 2024-02-07 RX ORDER — ZOLPIDEM TARTRATE 10 MG/1
10 TABLET ORAL NIGHTLY PRN
COMMUNITY
Start: 2024-01-31

## 2024-02-07 NOTE — PROGRESS NOTES
Chief Complaint  F/u for Diabetes Mellitus.    HPI   Khoa Arnold is a 68 y.o. male with htn, hyperlipidemia, sick sinus syndrome s/p PPM, CAD s/p CABG with AVR, vit d deficiency who is here today for f/u of Diabetes Mellitus type 2. The initial diagnosis of diabetes was made in his early 60s.     Presents to appointment with wife.  He has [employer] Medicare. He will call the insurance to see if it is possible for him to stay with [facility].     Diabetic complications: cardiovascular disease and cerebrovascular disease. CAD s/p cabg, hx of 2 CVAs, mild peripheral neuropathy  Eye exam current (within one year): utd 4/2023 domo     Current diabetic medications include:  Metformin 500 mg 2 tabs twice daily  Jardiance 10 mg daily  Ozempic 2 mg weekly - patient assistance    Statin: Crestor 40 mg - hyperlipidemia    Past medications: Januvia, glimepiride    Diabetic Monitoring  - checks glucose 2-x/day  Glucose is averaging- fasting BG 130s-150s, during the day/evening 90s-180s  Hypoglycemia- none  Home blood sugar records: glucometer downloaded, data reviewed and scanned to chart    The following portions of the patient's history were reviewed and updated by me as appropriate: allergies, current medications, past family history, past social history, past surgical history and problem list.    Past Medical History:   Diagnosis Date    Aortic valve stenosis     Asthma     BPH (benign prostatic hyperplasia)     Carotid stenosis     COPD (chronic obstructive pulmonary disease)     Coronary artery disease     Diabetes mellitus     Edentulous     Hyperlipidemia     Hypertension     IBS (irritable bowel syndrome)     Stroke     2009, no residual    Type 2 diabetes mellitus with circulatory disorder, without long-term current use of insulin 09/06/2019    Wears dentures     full set    Wears glasses        Medications    Current Outpatient Medications:     Accu-Chek Guide test strip, Use to check glucose TID, Disp:  , Rfl:     Accu-Chek Softclix Lancets lancets, Use to check glucose three times daily, Disp: , Rfl:     albuterol sulfate  (90 Base) MCG/ACT inhaler, Inhale 2 puffs Every 4 (Four) Hours As Needed for Wheezing or Shortness of Air., Disp: , Rfl:     aspirin 81 MG EC tablet, Take 1 tablet by mouth Daily., Disp: , Rfl:     busPIRone (BUSPAR) 10 MG tablet, Take 1 tablet by mouth 3 (Three) Times a Day., Disp: , Rfl:     clopidogrel (PLAVIX) 75 MG tablet, Take 1 tablet by mouth Daily. Per Dr Agus dorsey preop, Disp: , Rfl:     empagliflozin (Jardiance) 10 MG tablet tablet, Take 1 tablet by mouth Daily., Disp: 90 tablet, Rfl: 1    Linzess 290 MCG capsule capsule, Take 1 capsule by mouth Every Morning Before Breakfast., Disp: , Rfl:     losartan (COZAAR) 100 MG tablet, Take 1 tablet by mouth Daily., Disp: , Rfl:     metFORMIN (GLUCOPHAGE) 500 MG tablet, Take 2 tablets by mouth 2 (Two) Times a Day With Meals., Disp: , Rfl:     metoprolol tartrate (LOPRESSOR) 100 MG tablet, Take 1 tablet by mouth Every 12 (Twelve) Hours., Disp: 60 tablet, Rfl: 11    multivitamin (THERAGRAN) tablet tablet, Take 1 tablet by mouth Daily., Disp: , Rfl:     nitroglycerin (NITROSTAT) 0.4 MG SL tablet, Place 1 tablet under the tongue Every 5 (Five) Minutes As Needed for Chest Pain. Take no more than 3 doses in 15 minutes., Disp: , Rfl:     omeprazole (priLOSEC) 40 MG capsule, Take 1 capsule by mouth Daily., Disp: , Rfl:     potassium chloride (K-DUR,KLOR-CON) 10 MEQ CR tablet, Take 1 tablet by mouth Daily., Disp: , Rfl: 0    potassium chloride (MICRO-K) 10 MEQ CR capsule, Take 1 capsule by mouth Daily., Disp: , Rfl:     rosuvastatin (CRESTOR) 40 MG tablet, Take 1 tablet by mouth Daily., Disp: , Rfl:     Semaglutide, 2 MG/DOSE, (Ozempic, 2 MG/DOSE,) 8 MG/3ML solution pen-injector, Inject 2 mg under the skin into the appropriate area as directed 1 (One) Time Per Week. Dose increase, Disp: 9 mL, Rfl: 1    tamsulosin (FLOMAX) 0.4 MG capsule 24  "hr capsule, Take 1 capsule by mouth 2 (Two) Times a Day., Disp: , Rfl:     temazepam (RESTORIL) 30 MG capsule, Take 1 capsule by mouth At Night As Needed for Sleep., Disp: , Rfl:     vitamin B-12 (CYANOCOBALAMIN) 250 MCG tablet, Take 1 tablet by mouth Daily., Disp: , Rfl:     zolpidem (AMBIEN) 10 MG tablet, Take 1 tablet by mouth At Night As Needed., Disp: , Rfl:     Review of Systems  Review of Systems     Physical Exam    /70   Pulse 72   Ht 168.9 cm (66.5\")   Wt 93.9 kg (207 lb)   SpO2 98%   BMI 32.91 kg/m² Body mass index is 32.91 kg/m².  Physical Exam  Constitutional:       General: He is not in acute distress.     Appearance: He is well-developed. He is not diaphoretic.   Neck:      Thyroid: No thyromegaly.      Vascular: No JVD.      Trachea: No tracheal deviation.   Cardiovascular:      Rate and Rhythm: Normal rate and regular rhythm.      Heart sounds: Murmur heard.   Pulmonary:      Effort: Pulmonary effort is normal. No respiratory distress.      Breath sounds: Normal breath sounds. No wheezing.   Musculoskeletal:         General: No tenderness.      Cervical back: Neck supple.   Feet:      Comments:       Skin:     General: Skin is warm and dry.      Findings: No erythema or rash.   Neurological:      Mental Status: He is alert and oriented to person, place, and time.   Psychiatric:         Behavior: Behavior normal.         Thought Content: Thought content normal.         Judgment: Judgment normal.         Labs and Imaging   Lab Results   Component Value Date    HGBA1C 6.8 (A) 02/07/2024    HGBA1C 6.80 (H) 07/19/2023    HGBA1C 7.1 05/08/2023     CMP          9/12/2023    09:40 9/18/2023    11:30 12/15/2023    09:46   CMP   Glucose  144     BUN  29     Creatinine 1.40  1.09     EGFR  Am   59       Sodium  142     Potassium  4.7     Chloride  105     Calcium  9.5     Total Protein  6.8     Albumin  4.5     Globulin  2.3     Total Bilirubin  0.3     Alkaline Phosphatase  52     AST (SGOT) "  19     ALT (SGPT)  21     BUN/Creatinine Ratio  26.6        Details          This result is from an external source.             CBC          7/19/2023    13:26 7/21/2023    05:07   CBC   WBC 4.97  7.92    RBC 4.43  3.90    Hemoglobin 13.4  11.4    Hematocrit 40.3  35.7    MCV 91.0  91.5    MCH 30.2  29.2    MCHC 33.3  31.9    RDW 12.9  13.2    Platelets 183  163      Lipid Panel          9/18/2023    11:30   Lipid Panel   Total Cholesterol 98    Triglycerides 85    HDL Cholesterol 36    VLDL Cholesterol 17    LDL Cholesterol  45      TSH          9/18/2023    11:30   TSH   TSH 2.720      Most Recent A1C          2/7/2024    09:25   HGBA1C Most Recent   Hemoglobin A1C 6.8      Microalbumin          9/18/2023    11:30   Microalbumin   Microalbumin, Urine 4.3          Assessment / Plan   Diagnoses and all orders for this visit:    1. Controlled type 2 diabetes mellitus with hyperglycemia, without long-term current use of insulin (Primary)  -     POC Glycosylated Hemoglobin (Hb A1C)  -     POC Glucose, Blood    2. Mixed hyperlipidemia        Diabetes Mellitus 2 is under good control.  -with peripheral neuropathy, history of CAD and CVA  -A1c 6.8   - during visit  -no hypoglycemia  -continue Ozempic 2 mg weekly  -continue Jardiance 10 mg daily  -continue metformin 500 mg BID  -Foot exam and labs updated 9/2023, eye exam updated 4/2023    Hyperlipidemia  -LDL within guidelines  -continue crestor 40 mg qhs    There are no Patient Instructions on file for this visit.    Follow up: Return in about 6 months (around 8/7/2024).    Signed by: Sage Bonner PA-C  Endocrinology

## 2024-02-28 ENCOUNTER — TELEPHONE (OUTPATIENT)
Dept: ENDOCRINOLOGY | Facility: CLINIC | Age: 69
End: 2024-02-28
Payer: MEDICARE

## 2024-06-05 ENCOUNTER — TELEPHONE (OUTPATIENT)
Dept: ENDOCRINOLOGY | Facility: CLINIC | Age: 69
End: 2024-06-05
Payer: MEDICARE

## 2024-07-25 DIAGNOSIS — I65.23 BILATERAL CAROTID ARTERY STENOSIS: Primary | ICD-10-CM

## 2024-08-07 ENCOUNTER — OFFICE VISIT (OUTPATIENT)
Dept: ENDOCRINOLOGY | Facility: CLINIC | Age: 69
End: 2024-08-07
Payer: MEDICARE

## 2024-08-07 VITALS
BODY MASS INDEX: 34.36 KG/M2 | HEART RATE: 65 BPM | SYSTOLIC BLOOD PRESSURE: 124 MMHG | DIASTOLIC BLOOD PRESSURE: 80 MMHG | OXYGEN SATURATION: 98 % | WEIGHT: 213.8 LBS | HEIGHT: 66 IN

## 2024-08-07 DIAGNOSIS — E78.2 MIXED HYPERLIPIDEMIA: Chronic | ICD-10-CM

## 2024-08-07 DIAGNOSIS — R25.2 FOOT CRAMPS: ICD-10-CM

## 2024-08-07 DIAGNOSIS — E11.65 CONTROLLED TYPE 2 DIABETES MELLITUS WITH HYPERGLYCEMIA, WITHOUT LONG-TERM CURRENT USE OF INSULIN: Primary | Chronic | ICD-10-CM

## 2024-08-07 LAB
EXPIRATION DATE: ABNORMAL
EXPIRATION DATE: ABNORMAL
GLUCOSE BLDC GLUCOMTR-MCNC: 133 MG/DL (ref 70–130)
HBA1C MFR BLD: 6.6 % (ref 4.5–5.7)
Lab: ABNORMAL
Lab: ABNORMAL

## 2024-08-07 PROCEDURE — 99214 OFFICE O/P EST MOD 30 MIN: CPT | Performed by: PHYSICIAN ASSISTANT

## 2024-08-07 PROCEDURE — 82043 UR ALBUMIN QUANTITATIVE: CPT | Performed by: PHYSICIAN ASSISTANT

## 2024-08-07 PROCEDURE — 83036 HEMOGLOBIN GLYCOSYLATED A1C: CPT | Performed by: PHYSICIAN ASSISTANT

## 2024-08-07 PROCEDURE — 80061 LIPID PANEL: CPT | Performed by: PHYSICIAN ASSISTANT

## 2024-08-07 PROCEDURE — 80053 COMPREHEN METABOLIC PANEL: CPT | Performed by: PHYSICIAN ASSISTANT

## 2024-08-07 PROCEDURE — 3074F SYST BP LT 130 MM HG: CPT | Performed by: PHYSICIAN ASSISTANT

## 2024-08-07 PROCEDURE — 84443 ASSAY THYROID STIM HORMONE: CPT | Performed by: PHYSICIAN ASSISTANT

## 2024-08-07 PROCEDURE — 82947 ASSAY GLUCOSE BLOOD QUANT: CPT | Performed by: PHYSICIAN ASSISTANT

## 2024-08-07 PROCEDURE — 83735 ASSAY OF MAGNESIUM: CPT | Performed by: PHYSICIAN ASSISTANT

## 2024-08-07 PROCEDURE — 1159F MED LIST DOCD IN RCRD: CPT | Performed by: PHYSICIAN ASSISTANT

## 2024-08-07 PROCEDURE — 82570 ASSAY OF URINE CREATININE: CPT | Performed by: PHYSICIAN ASSISTANT

## 2024-08-07 PROCEDURE — 3079F DIAST BP 80-89 MM HG: CPT | Performed by: PHYSICIAN ASSISTANT

## 2024-08-07 PROCEDURE — 82607 VITAMIN B-12: CPT | Performed by: PHYSICIAN ASSISTANT

## 2024-08-07 PROCEDURE — 3044F HG A1C LEVEL LT 7.0%: CPT | Performed by: PHYSICIAN ASSISTANT

## 2024-08-07 PROCEDURE — 1160F RVW MEDS BY RX/DR IN RCRD: CPT | Performed by: PHYSICIAN ASSISTANT

## 2024-08-07 NOTE — PROGRESS NOTES
Chief Complaint  F/u for Diabetes Mellitus.    HPI   Khoa Arnold is a 69 y.o. male with htn, hyperlipidemia, sick sinus syndrome s/p PPM, CAD s/p CABG with AVR, vit d deficiency who is here today for f/u of Diabetes Mellitus type 2. The initial diagnosis of diabetes was made in his early 60s.     Presents to appointment with wife.  Has been eating more sweets.   His feet have been cramping at night. This is new for him. Drinks lots of water.     Diabetic complications: cardiovascular disease and cerebrovascular disease. CAD s/p cabg, hx of 2 CVAs, mild peripheral neuropathy  Eye exam current (within one year): utd 4/2024 kya and kya     Current diabetic medications include:  Metformin 500 mg 2 tabs twice daily  Jardiance 10 mg daily  Ozempic 2 mg weekly - patient assistance    Statin: Crestor 40 mg - hyperlipidemia    Past medications: Januvia, glimepiride    Diabetic Monitoring  - checks glucose 2-x/day  Glucose is averaging- fasting BG 130s-160s, during the day/evening   Hypoglycemia- none  Home blood sugar records: glucometer downloaded, data reviewed and scanned to chart    The following portions of the patient's history were reviewed and updated by me as appropriate: allergies, current medications, past family history, past social history, past surgical history and problem list.    Past Medical History:   Diagnosis Date    Aortic valve stenosis     Asthma     BPH (benign prostatic hyperplasia)     Carotid stenosis     COPD (chronic obstructive pulmonary disease)     Coronary artery disease     Diabetes mellitus     Edentulous     Hyperlipidemia     Hypertension     IBS (irritable bowel syndrome)     Stroke     2009, no residual    Type 2 diabetes mellitus with circulatory disorder, without long-term current use of insulin 09/06/2019    Wears dentures     full set    Wears glasses        Medications    Current Outpatient Medications:     Accu-Chek Guide test strip, Use to check glucose TID,  Disp: , Rfl:     Accu-Chek Softclix Lancets lancets, Use to check glucose three times daily, Disp: , Rfl:     albuterol sulfate  (90 Base) MCG/ACT inhaler, Inhale 2 puffs Every 4 (Four) Hours As Needed for Wheezing or Shortness of Air., Disp: , Rfl:     aspirin 81 MG EC tablet, Take 1 tablet by mouth Daily., Disp: , Rfl:     busPIRone (BUSPAR) 10 MG tablet, Take 1 tablet by mouth 3 (Three) Times a Day., Disp: , Rfl:     empagliflozin (Jardiance) 10 MG tablet tablet, Take 1 tablet by mouth Daily., Disp: 90 tablet, Rfl: 1    Linzess 290 MCG capsule capsule, Take 1 capsule by mouth Every Morning Before Breakfast., Disp: , Rfl:     losartan (COZAAR) 100 MG tablet, Take 1 tablet by mouth Daily., Disp: , Rfl:     metFORMIN (GLUCOPHAGE) 500 MG tablet, Take 2 tablets by mouth 2 (Two) Times a Day With Meals., Disp: , Rfl:     multivitamin (THERAGRAN) tablet tablet, Take 1 tablet by mouth Daily., Disp: , Rfl:     nitroglycerin (NITROSTAT) 0.4 MG SL tablet, Place 1 tablet under the tongue Every 5 (Five) Minutes As Needed for Chest Pain. Take no more than 3 doses in 15 minutes., Disp: , Rfl:     omeprazole (priLOSEC) 40 MG capsule, Take 1 capsule by mouth Daily., Disp: , Rfl:     potassium chloride (MICRO-K) 10 MEQ CR capsule, Take 1 capsule by mouth Daily., Disp: , Rfl:     rosuvastatin (CRESTOR) 40 MG tablet, Take 1 tablet by mouth Daily., Disp: , Rfl:     Semaglutide, 2 MG/DOSE, (Ozempic, 2 MG/DOSE,) 8 MG/3ML solution pen-injector, Inject 2 mg under the skin into the appropriate area as directed 1 (One) Time Per Week. Dose increase, Disp: 9 mL, Rfl: 1    tamsulosin (FLOMAX) 0.4 MG capsule 24 hr capsule, Take 1 capsule by mouth 2 (Two) Times a Day., Disp: , Rfl:     vitamin B-12 (CYANOCOBALAMIN) 250 MCG tablet, Take 1 tablet by mouth Daily., Disp: , Rfl:     zolpidem (AMBIEN) 10 MG tablet, Take 1 tablet by mouth At Night As Needed., Disp: , Rfl:     Review of Systems  Review of Systems   Musculoskeletal:         Foot  "cramps   All other systems reviewed and are negative.       Physical Exam    /80 (BP Location: Left arm, Patient Position: Sitting, Cuff Size: Adult)   Pulse 65   Ht 167.6 cm (66\")   Wt 97 kg (213 lb 12.8 oz)   SpO2 98%   BMI 34.51 kg/m² Body mass index is 34.51 kg/m².  Physical Exam  Constitutional:       General: He is not in acute distress.     Appearance: He is well-developed. He is not diaphoretic.   Neck:      Thyroid: No thyromegaly.      Vascular: No JVD.      Trachea: No tracheal deviation.   Cardiovascular:      Rate and Rhythm: Normal rate and regular rhythm.      Pulses:           Dorsalis pedis pulses are 2+ on the right side and 2+ on the left side.        Posterior tibial pulses are 2+ on the right side and 2+ on the left side.      Heart sounds: Murmur heard.   Pulmonary:      Effort: Pulmonary effort is normal.      Breath sounds: Normal breath sounds.   Musculoskeletal:         General: No tenderness.      Cervical back: Neck supple.      Right foot: No deformity or Charcot foot.      Left foot: No deformity or Charcot foot.   Feet:      Right foot:      Protective Sensation: 5 sites tested.   1 site sensed.     Skin integrity: No ulcer, blister, skin breakdown, erythema, warmth or callus.      Left foot:      Protective Sensation: 5 sites tested.   1 site sensed.     Skin integrity: No ulcer, blister, skin breakdown, erythema, warmth or callus.      Comments:       Skin:     General: Skin is warm and dry.      Findings: No erythema or rash.   Neurological:      Mental Status: He is alert and oriented to person, place, and time.   Psychiatric:         Behavior: Behavior normal.         Thought Content: Thought content normal.         Judgment: Judgment normal.         Labs and Imaging   Lab Results   Component Value Date    HGBA1C 6.6 (A) 08/07/2024    HGBA1C 6.8 (A) 02/07/2024    HGBA1C 6.80 (H) 07/19/2023     CMP          9/12/2023    09:40 9/18/2023    11:30 12/15/2023    09:46   CMP "   Glucose  144     BUN  29     Creatinine 1.40  1.09     EGFR  Am   59       Sodium  142     Potassium  4.7     Chloride  105     Calcium  9.5     Total Protein  6.8     Albumin  4.5     Globulin  2.3     Total Bilirubin  0.3     Alkaline Phosphatase  52     AST (SGOT)  19     ALT (SGPT)  21     BUN/Creatinine Ratio  26.6        Details          This result is from an external source.                 Lipid Panel          9/18/2023    11:30   Lipid Panel   Total Cholesterol 98    Triglycerides 85    HDL Cholesterol 36    VLDL Cholesterol 17    LDL Cholesterol  45      TSH          9/18/2023    11:30   TSH   TSH 2.720      Most Recent A1C          8/7/2024    09:18   HGBA1C Most Recent   Hemoglobin A1C 6.6      Microalbumin          9/18/2023    11:30   Microalbumin   Microalbumin, Urine 4.3          Assessment / Plan   Diagnoses and all orders for this visit:    1. Controlled type 2 diabetes mellitus with hyperglycemia, without long-term current use of insulin (Primary)  -     POC Glycosylated Hemoglobin (Hb A1C)  -     POC Glucose, Blood  -     Microalbumin / Creatinine Urine Ratio - Urine, Clean Catch  -     Comprehensive Metabolic Panel  -     Lipid Panel  -     TSH  -     Vitamin B12    2. Foot cramps  -     Comprehensive Metabolic Panel  -     Magnesium    3. Mixed hyperlipidemia  -     Comprehensive Metabolic Panel  -     Lipid Panel        Diabetes Mellitus 2 is under good control.  -with peripheral neuropathy, history of CAD and CVA  -A1c 6.6  -BGs higher from last visit, per meter review   -he will work on diet  -continue Ozempic 2 mg weekly  -continue Jardiance 10 mg daily  -continue metformin 500 mg BID  -Foot exam and labs updated today, eye exam updated 4/2024    Hyperlipidemia  -check lipids  -continue crestor 40 mg qhs    Foot cramps  -seems to be hydrating well  -discussed adding some electrolytes  -if no improvement can try CoQ 10  -check potassium and magnesium     There are no Patient  Instructions on file for this visit.    Follow up: Return in about 3 months (around 11/7/2024).    Signed by: Sage Bonner PA-C  Endocrinology

## 2024-08-08 LAB
ALBUMIN SERPL-MCNC: 4.1 G/DL (ref 3.5–5.2)
ALBUMIN UR-MCNC: 1.6 MG/DL
ALBUMIN/GLOB SERPL: 1.4 G/DL
ALP SERPL-CCNC: 60 U/L (ref 39–117)
ALT SERPL W P-5'-P-CCNC: 18 U/L (ref 1–41)
ANION GAP SERPL CALCULATED.3IONS-SCNC: 11.7 MMOL/L (ref 5–15)
AST SERPL-CCNC: 18 U/L (ref 1–40)
BILIRUB SERPL-MCNC: 0.3 MG/DL (ref 0–1.2)
BUN SERPL-MCNC: 31 MG/DL (ref 8–23)
BUN/CREAT SERPL: 23.5 (ref 7–25)
CALCIUM SPEC-SCNC: 9.6 MG/DL (ref 8.6–10.5)
CHLORIDE SERPL-SCNC: 104 MMOL/L (ref 98–107)
CHOLEST SERPL-MCNC: 96 MG/DL (ref 0–200)
CO2 SERPL-SCNC: 22.3 MMOL/L (ref 22–29)
CREAT SERPL-MCNC: 1.32 MG/DL (ref 0.76–1.27)
CREAT UR-MCNC: 60.4 MG/DL
EGFRCR SERPLBLD CKD-EPI 2021: 58 ML/MIN/1.73
GLOBULIN UR ELPH-MCNC: 2.9 GM/DL
GLUCOSE SERPL-MCNC: 116 MG/DL (ref 65–99)
HDLC SERPL-MCNC: 35 MG/DL (ref 40–60)
LDLC SERPL CALC-MCNC: 44 MG/DL (ref 0–100)
LDLC/HDLC SERPL: 1.27 {RATIO}
MAGNESIUM SERPL-MCNC: 2.2 MG/DL (ref 1.6–2.4)
MICROALBUMIN/CREAT UR: 26.5 MG/G (ref 0–29)
POTASSIUM SERPL-SCNC: 4.5 MMOL/L (ref 3.5–5.2)
PROT SERPL-MCNC: 7 G/DL (ref 6–8.5)
SODIUM SERPL-SCNC: 138 MMOL/L (ref 136–145)
TRIGL SERPL-MCNC: 82 MG/DL (ref 0–150)
TSH SERPL DL<=0.05 MIU/L-ACNC: 1.33 UIU/ML (ref 0.27–4.2)
VIT B12 BLD-MCNC: 780 PG/ML (ref 211–946)
VLDLC SERPL-MCNC: 17 MG/DL (ref 5–40)

## 2024-08-16 ENCOUNTER — TELEPHONE (OUTPATIENT)
Dept: CARDIAC SURGERY | Facility: CLINIC | Age: 69
End: 2024-08-16

## 2024-08-22 ENCOUNTER — HOSPITAL ENCOUNTER (OUTPATIENT)
Dept: CARDIOLOGY | Facility: HOSPITAL | Age: 69
Discharge: HOME | End: 2024-08-22
Admitting: NURSE PRACTITIONER
Payer: MEDICARE

## 2024-08-22 VITALS — BODY MASS INDEX: 34.37 KG/M2 | WEIGHT: 213.85 LBS | HEIGHT: 66 IN

## 2024-08-22 DIAGNOSIS — I65.23 BILATERAL CAROTID ARTERY STENOSIS: ICD-10-CM

## 2024-08-22 LAB
BH CV XLRA MEAS LEFT DIST CCA EDV: 11.4 CM/SEC
BH CV XLRA MEAS LEFT DIST CCA PSV: 60 CM/SEC
BH CV XLRA MEAS LEFT DIST ICA EDV: 22.1 CM/SEC
BH CV XLRA MEAS LEFT DIST ICA PSV: 73.1 CM/SEC
BH CV XLRA MEAS LEFT ICA/CCA RATIO: 1.43
BH CV XLRA MEAS LEFT MID CCA EDV: 11.7 CM/SEC
BH CV XLRA MEAS LEFT MID CCA PSV: 64.5 CM/SEC
BH CV XLRA MEAS LEFT MID ICA EDV: 19 CM/SEC
BH CV XLRA MEAS LEFT MID ICA PSV: 84.8 CM/SEC
BH CV XLRA MEAS LEFT PROX CCA EDV: 10.4 CM/SEC
BH CV XLRA MEAS LEFT PROX CCA PSV: 82.2 CM/SEC
BH CV XLRA MEAS LEFT PROX ECA EDV: 9.1 CM/SEC
BH CV XLRA MEAS LEFT PROX ECA PSV: 114.2 CM/SEC
BH CV XLRA MEAS LEFT PROX ICA EDV: 19.5 CM/SEC
BH CV XLRA MEAS LEFT PROX ICA PSV: 92 CM/SEC
BH CV XLRA MEAS LEFT PROX SCLA PSV: 144.54 CM/SEC
BH CV XLRA MEAS LEFT VERTEBRAL A PSV: 47.8 CM/SEC
BH CV XLRA MEAS RIGHT DIST CCA EDV: 11.8 CM/SEC
BH CV XLRA MEAS RIGHT DIST CCA PSV: 65.8 CM/SEC
BH CV XLRA MEAS RIGHT DIST ICA EDV: 16.2 CM/SEC
BH CV XLRA MEAS RIGHT DIST ICA PSV: 66.3 CM/SEC
BH CV XLRA MEAS RIGHT ICA/CCA RATIO: 1.23
BH CV XLRA MEAS RIGHT MID CCA EDV: 11.4 CM/SEC
BH CV XLRA MEAS RIGHT MID CCA PSV: 67 CM/SEC
BH CV XLRA MEAS RIGHT MID ICA EDV: 17.1 CM/SEC
BH CV XLRA MEAS RIGHT MID ICA PSV: 82.6 CM/SEC
BH CV XLRA MEAS RIGHT PROX CCA EDV: 7.9 CM/SEC
BH CV XLRA MEAS RIGHT PROX CCA PSV: 76.8 CM/SEC
BH CV XLRA MEAS RIGHT PROX ECA EDV: 5.2 CM/SEC
BH CV XLRA MEAS RIGHT PROX ECA PSV: 171.4 CM/SEC
BH CV XLRA MEAS RIGHT PROX ICA EDV: 15.8 CM/SEC
BH CV XLRA MEAS RIGHT PROX ICA PSV: 81 CM/SEC
BH CV XLRA MEAS RIGHT PROX SCLA PSV: 141.75 CM/SEC
BH CV XLRA MEAS RIGHT VERTEBRAL A PSV: 41.5 CM/SEC
LEFT ARM BP: NORMAL MMHG
RIGHT ARM BP: NORMAL MMHG

## 2024-08-22 PROCEDURE — 93880 EXTRACRANIAL BILAT STUDY: CPT

## 2024-09-06 ENCOUNTER — TELEPHONE (OUTPATIENT)
Dept: CARDIAC SURGERY | Facility: CLINIC | Age: 69
End: 2024-09-06
Payer: MEDICARE

## 2024-09-06 NOTE — TELEPHONE ENCOUNTER
Patient requested to speak with ELENO Gaviria. He recently had a carotid duplex. He says he has been having a lot of medical appointments recently and can get difficult and expensive. I discussed with patient that the appt is needed to discuss results of carotid duplex and our office would not be able to order testing on him without seeing him in the future.   At the end of our conversation, he again asked to speak with Angella.

## 2024-09-10 NOTE — PROGRESS NOTES
[facility] Cardiothoracic Surgery Office Follow Up Note     Date of Encounter: 2024     Name: Khoa Arnold  : 1955     Referred By: No ref. provider found  PCP: Shirlene Sharpe MD    Chief Complaint:    Chief Complaint   Patient presents with    Carotid Artery Disease     1 year follow up for carotid artery disease.       Subjective      History of Present Illness:    Khoa Arnold is a 69 y.o. male s/p right CEA per Dr. Goldsmith on 2023 preceded by left CEA per Dr. Goldsmith on 10/21/2022 for symptomatic bilateral carotid artery stenosis.  PMH: Aortic stenosis s/p AVR with CAD s/p CABG per Dr. Goldsmith  (two chronically fractured sternal wires), ROBERTO s/p now bilateral CEAs, asthma, BPH, COPD, type 2 diabetes, hyperlipidemia, HTN, and CVA.  He states compliance with aspirin, and statin. Last seen in clinic 23 with review of post op CTA carotids due to increased visual disturbance symptoms.  This study demonstrated no stenosis present in either ICA.  Patient returns for one year post op carotid duplex.  Mr. Arnold continues to follow with [facility] ophthalmology/ neurology/ vascular surgery regarding persistent visual disturbance.  Visual disturbance has been present since 2022.  He also follows with Dr. Pineda, Cardiology.       Review of Systems:  Review of Systems   Constitutional: Negative. Negative for chills, decreased appetite, diaphoresis, fever, malaise/fatigue, night sweats and weight loss.   HENT: Negative.  Negative for congestion, hoarse voice and sore throat.    Cardiovascular:  Positive for dyspnea on exertion. Negative for chest pain, irregular heartbeat, leg swelling, near-syncope, orthopnea, palpitations, paroxysmal nocturnal dyspnea and syncope.   Respiratory:  Positive for cough. Negative for hemoptysis, shortness of breath, sleep disturbances due to breathing, snoring, sputum production and wheezing.    Hematologic/Lymphatic: Negative.   Negative for adenopathy and bleeding problem. Does not bruise/bleed easily.   Skin: Negative.  Negative for color change, dry skin, itching, poor wound healing and rash.   Musculoskeletal:  Positive for back pain and muscle cramps. Negative for falls and muscle weakness.   Gastrointestinal: Negative.  Negative for abdominal pain, anorexia, constipation, diarrhea, nausea and vomiting.   Genitourinary: Negative.  Negative for dysuria and frequency.   Neurological:  Positive for dizziness, light-headedness and loss of balance. Negative for difficulty with concentration, headaches, numbness, paresthesias, seizures and weakness.   Psychiatric/Behavioral: Negative.  Negative for altered mental status, depression and memory loss. The patient does not have insomnia and is not nervous/anxious.    Allergic/Immunologic: Negative.  Negative for persistent infections.       I have reviewed the following portions of the patient's history: problem list, current medications, allergies, past surgical history, past medical history, past social history, past family history, and ROS and confirm it's accurate.    Allergies:  Allergies   Allergen Reactions    Ranexa [Ranolazine Er] Nausea Only and Dizziness    Lisinopril Itching and Rash     On feet       Medications:      Current Outpatient Medications:     Accu-Chek Guide test strip, Use to check glucose TID, Disp: , Rfl:     Accu-Chek Softclix Lancets lancets, Use to check glucose three times daily, Disp: , Rfl:     albuterol sulfate  (90 Base) MCG/ACT inhaler, Inhale 2 puffs Every 4 (Four) Hours As Needed for Wheezing or Shortness of Air., Disp: , Rfl:     aspirin 81 MG EC tablet, Take 1 tablet by mouth Daily., Disp: , Rfl:     busPIRone (BUSPAR) 10 MG tablet, Take 1 tablet by mouth 3 (Three) Times a Day., Disp: , Rfl:     empagliflozin (Jardiance) 10 MG tablet tablet, Take 1 tablet by mouth Daily., Disp: 90 tablet, Rfl: 1    Linzess 290 MCG capsule capsule, Take 1 capsule by  mouth Every Morning Before Breakfast., Disp: , Rfl:     losartan (COZAAR) 100 MG tablet, Take 1 tablet by mouth Daily., Disp: , Rfl:     metFORMIN (GLUCOPHAGE) 500 MG tablet, Take 2 tablets by mouth 2 (Two) Times a Day With Meals., Disp: , Rfl:     multivitamin (THERAGRAN) tablet tablet, Take 1 tablet by mouth Daily., Disp: , Rfl:     nitroglycerin (NITROSTAT) 0.4 MG SL tablet, Place 1 tablet under the tongue Every 5 (Five) Minutes As Needed for Chest Pain. Take no more than 3 doses in 15 minutes., Disp: , Rfl:     omeprazole (priLOSEC) 40 MG capsule, Take 1 capsule by mouth Daily., Disp: , Rfl:     rosuvastatin (CRESTOR) 40 MG tablet, Take 1 tablet by mouth Daily., Disp: , Rfl:     Semaglutide, 2 MG/DOSE, (Ozempic, 2 MG/DOSE,) 8 MG/3ML solution pen-injector, Inject 2 mg under the skin into the appropriate area as directed 1 (One) Time Per Week. Dose increase, Disp: 9 mL, Rfl: 1    tamsulosin (FLOMAX) 0.4 MG capsule 24 hr capsule, Take 1 capsule by mouth 2 (Two) Times a Day., Disp: , Rfl:     vitamin B-12 (CYANOCOBALAMIN) 250 MCG tablet, Take 1 tablet by mouth Daily., Disp: , Rfl:     zolpidem (AMBIEN) 10 MG tablet, Take 1 tablet by mouth At Night As Needed., Disp: , Rfl:     potassium chloride (MICRO-K) 10 MEQ CR capsule, Take 1 capsule by mouth Daily. (Patient not taking: Reported on 9/11/2024), Disp: , Rfl:     History:   Past Medical History:   Diagnosis Date    Aortic valve stenosis     Asthma     BPH (benign prostatic hyperplasia)     Carotid stenosis     COPD (chronic obstructive pulmonary disease)     Coronary artery disease     Diabetes mellitus     Edentulous     Hyperlipidemia     Hypertension     IBS (irritable bowel syndrome)     Stroke     2009, no residual    Type 2 diabetes mellitus with circulatory disorder, without long-term current use of insulin 09/06/2019    Wears dentures     full set    Wears glasses        Past Surgical History:   Procedure Laterality Date    BLADDER SURGERY      CARDIAC  CATHETERIZATION N/A 2019    Procedure: Left Heart Cath;  Surgeon: Jonathan Pineda MD;  Location:  [facility];  Service: Cardiovascular    CARDIAC ELECTROPHYSIOLOGY PROCEDURE N/A 2021    Procedure: DEVICE IMPLANT;  Surgeon: Jonathan Pineda MD;  Location:  [facility];  Service: Cardiovascular;  Laterality: N/A;    CAROTID ENDARTERECTOMY Left 10/20/2022    Procedure: CAROTID ENDARTERECTOMY WITH EEG LEFT;  Surgeon: Denys Goldsmith MD;  Location:  [facility] OR;  Service: Vascular;  Laterality: Left;    CAROTID ENDARTERECTOMY Right 2023    Procedure: CAROTID ENDARTERECTOMY WITH EEG RIGHT;  Surgeon: Denys Goldsmith MD;  Location:  [facility];  Service: Vascular;  Laterality: Right;  clamp on 0720; clamp off 0742    CHOLECYSTECTOMY      COLONOSCOPY      CORONARY ANGIOPLASTY WITH STENT PLACEMENT      x 3, last one     CORONARY ARTERY BYPASS GRAFT WITH AORTIC VALVE REPAIR/REPLACEMENT N/A 09/10/2019    Procedure: CARLYN PER ANESTHESIA, MEDIAN STERNOTOMY, CORONARY ARTERY BYPASS GRAFT X 4, UTILIZING THE LEFT INTERNAL MAMMARY ARTERY, AND EVH OF THE RIGHT GREATER SAPHENOUS VEIN,   AORTIC VALVE REPLACEMENT;  Surgeon: Denys Goldsmith MD;  Location:  [facility];  Service: Cardiothoracic    EYE SURGERY Bilateral     cataracts    INGUINAL HERNIA REPAIR Right     PACEMAKER IMPLANTATION  2021    UMBILICAL HERNIA REPAIR         Social History     Socioeconomic History    Marital status:     Number of children: 1   Tobacco Use    Smoking status: Former     Types: Cigars     Quit date: 10/14/1979     Years since quittin.9    Smokeless tobacco: Former     Types: Chew     Quit date: 2004   Vaping Use    Vaping status: Never Used   Substance and Sexual Activity    Alcohol use: No    Drug use: No    Sexual activity: Defer        Family History   Problem Relation Age of Onset    Coronary artery disease Mother     Diabetes Mother     Coronary artery  "disease Father     Diabetes Father        Objective   Physical Exam:  Vitals:    09/11/24 1056 09/11/24 1057   BP: 124/72 135/71   BP Location: Right arm Left arm   Patient Position: Sitting Sitting   Pulse: 60    Temp: 97.7 °F (36.5 °C)    SpO2: 98%    Weight: 97.1 kg (214 lb)    Height: 167.6 cm (66\")  Comment: patient reports       Body mass index is 34.54 kg/m².    Physical Exam  Vitals reviewed.   Constitutional:       General: He is not in acute distress.     Appearance: He is well-developed and well-groomed.   HENT:      Head: Normocephalic and atraumatic.   Eyes:      General: Lids are normal.      Conjunctiva/sclera: Conjunctivae normal.      Pupils: Pupils are equal, round, and reactive to light.   Neck:      Vascular: Carotid bruit present. No JVD.      Comments: Cardiac murmur radiates to both carotids.  Well healed bilateral CEA incisions  Cardiovascular:      Rate and Rhythm: Normal rate and regular rhythm.      Pulses:           Carotid pulses are 2+ on the right side and 2+ on the left side.       Radial pulses are 2+ on the right side and 2+ on the left side.        Dorsalis pedis pulses are 2+ on the right side and 2+ on the left side.        Posterior tibial pulses are 2+ on the right side and 2+ on the left side.      Heart sounds: S1 normal and S2 normal. Murmur heard.      Systolic murmur is present with a grade of 2/6.   Pulmonary:      Effort: Pulmonary effort is normal. No respiratory distress.      Breath sounds: Normal breath sounds.   Chest:      Comments: Chronic mid-sternal instability/ stable as compared to prior exam.  Well healed sternotomy scar  Musculoskeletal:         General: Normal range of motion.      Cervical back: Normal range of motion and neck supple.      Right lower leg: No edema.      Left lower leg: No edema.   Skin:     General: Skin is warm and dry.      Capillary Refill: Capillary refill takes less than 2 seconds.   Neurological:      General: No focal deficit " present.      Mental Status: He is alert and oriented to person, place, and time.   Psychiatric:         Attention and Perception: Attention normal.         Mood and Affect: Mood normal.         Speech: Speech normal.         Behavior: Behavior normal. Behavior is cooperative.         Imaging/Labs:  Duplex Carotid Ultrasound 8/22/2024  Interpretation Summary    Right internal carotid artery demonstrates a less than 50% stenosis.    Antegrade right vertebral flow.    Left internal carotid artery demonstrates normal flow without evidence of hemodynamically significant stenosis.    Antegrade left vertebral flow.    CT Angiogram Carotids: 9/12/2023   Impression: Postoperative changes are present consistent with prior bilateral carotid endarterectomy. There is no evidence of significant recurrent narrowing, with 0% stenosis present at both ICA origins. Some stable areas of atherosclerotic change are present involving the internal carotid arteries more distally in the neck, without evidence of new high-grade stenosis. Electronically Signed: Vj Mcrae MD  9/12/2023 10:16 AM EDT  Workstation ID: [id number]            Assessment / Plan      Assessment / Plan:  1. Bilateral carotid artery stenosis s/p right and left CEA  - 69 y.o. male s/p right CEA per Dr. Goldsmith on 7/20/2023 preceded by left CEA per Dr. Goldsmith on 10/21/2022 for symptomatic bilateral carotid artery stenosis.    - PMH: Aortic stenosis s/p AVR with CAD s/p CABG per Dr. Goldsmith 2019 (two chronically fractured sternal wires), ROBERTO s/p now bilateral CEAs, asthma, BPH, COPD, type 2 diabetes, hyperlipidemia, HTN, and CVA.    - States compliance with aspirin, and statin.  - Returns for one year post op carotid duplex: no significant re-accumulation of  carotid plaque/stenosis  - Resume surveillance per Cardiology, Dr. Pineda  - Continues to follow with [facility] ophthalmology/ neurology/ vascular surgery regarding persistent visual disturbance (present since  October 2022).    - Continue to follow aortic valve and CAD surveillance per Dr. Pineda    Patient Education: Report any neurological symptoms promptly or call 911.  Be FAST: Balance issues, Eyesight loss, Face drooping, Arm weakness, Speech changes, Time to get help       Follow Up:   Return PRN per Cardiology request.   Or sooner for any further concerns or worsening sign and symptoms. If unable to reach us in the office please dial 911 or go to the nearest emergency department.      ELENO Gaviria  [facility] Cardiothoracic Surgery    Time Spent: I spent 28 minutes caring for Khoa on this date of service. This time includes time spent by me in the following activities: preparing for the visit, reviewing tests, obtaining and/or reviewing a separately obtained history, performing a medically appropriate examination and/or evaluation, documenting information in the medical record, and care coordination.

## 2024-09-11 ENCOUNTER — OFFICE VISIT (OUTPATIENT)
Dept: CARDIAC SURGERY | Facility: CLINIC | Age: 69
End: 2024-09-11
Payer: MEDICARE

## 2024-09-11 VITALS
HEART RATE: 60 BPM | OXYGEN SATURATION: 98 % | DIASTOLIC BLOOD PRESSURE: 71 MMHG | WEIGHT: 214 LBS | SYSTOLIC BLOOD PRESSURE: 135 MMHG | BODY MASS INDEX: 34.39 KG/M2 | TEMPERATURE: 97.7 F | HEIGHT: 66 IN

## 2024-09-11 DIAGNOSIS — I65.23 BILATERAL CAROTID ARTERY STENOSIS: Primary | ICD-10-CM

## 2024-09-11 PROCEDURE — 1160F RVW MEDS BY RX/DR IN RCRD: CPT | Performed by: NURSE PRACTITIONER

## 2024-09-11 PROCEDURE — 99213 OFFICE O/P EST LOW 20 MIN: CPT | Performed by: NURSE PRACTITIONER

## 2024-09-11 PROCEDURE — 3078F DIAST BP <80 MM HG: CPT | Performed by: NURSE PRACTITIONER

## 2024-09-11 PROCEDURE — 1159F MED LIST DOCD IN RCRD: CPT | Performed by: NURSE PRACTITIONER

## 2024-09-11 PROCEDURE — 3074F SYST BP LT 130 MM HG: CPT | Performed by: NURSE PRACTITIONER

## 2024-09-19 ENCOUNTER — TELEPHONE (OUTPATIENT)
Dept: ENDOCRINOLOGY | Facility: CLINIC | Age: 69
End: 2024-09-19
Payer: MEDICARE

## 2024-10-16 ENCOUNTER — TRANSCRIBE ORDERS (OUTPATIENT)
Dept: ADMINISTRATIVE | Facility: HOSPITAL | Age: 69
End: 2024-10-16
Payer: MEDICARE

## 2024-10-16 DIAGNOSIS — H53.9 VISION DISTURBANCE: Primary | ICD-10-CM

## 2024-10-21 ENCOUNTER — TELEPHONE (OUTPATIENT)
Dept: ENDOCRINOLOGY | Facility: CLINIC | Age: 69
End: 2024-10-21
Payer: MEDICARE

## 2024-10-21 NOTE — TELEPHONE ENCOUNTER
Called the pt and told her I mail them an guillermo. They are going to do their part and bring it back to the office to be finished.

## 2024-11-26 ENCOUNTER — HOSPITAL ENCOUNTER (OUTPATIENT)
Dept: MRI IMAGING | Facility: HOSPITAL | Age: 69
Discharge: HOME | End: 2024-11-26
Payer: MEDICARE

## 2024-11-26 DIAGNOSIS — H53.9 VISION DISTURBANCE: ICD-10-CM

## 2025-01-23 ENCOUNTER — TELEPHONE (OUTPATIENT)
Dept: ENDOCRINOLOGY | Facility: CLINIC | Age: 70
End: 2025-01-23
Payer: MEDICARE

## 2025-01-23 NOTE — TELEPHONE ENCOUNTER
Spoke with patient.  Informed patient assistance available for pickup.  Patient has an appt next month.  May not  until appt time. 02/17/25 with ROSANNE Shipley PA-C

## 2025-02-17 ENCOUNTER — OFFICE VISIT (OUTPATIENT)
Dept: ENDOCRINOLOGY | Facility: CLINIC | Age: 70
End: 2025-02-17
Payer: MEDICARE

## 2025-02-17 VITALS
HEIGHT: 66 IN | BODY MASS INDEX: 34.55 KG/M2 | DIASTOLIC BLOOD PRESSURE: 74 MMHG | HEART RATE: 72 BPM | SYSTOLIC BLOOD PRESSURE: 142 MMHG | WEIGHT: 215 LBS

## 2025-02-17 DIAGNOSIS — I10 BENIGN ESSENTIAL HTN: Chronic | ICD-10-CM

## 2025-02-17 DIAGNOSIS — E11.65 CONTROLLED TYPE 2 DIABETES MELLITUS WITH HYPERGLYCEMIA, WITHOUT LONG-TERM CURRENT USE OF INSULIN: Primary | ICD-10-CM

## 2025-02-17 DIAGNOSIS — N18.9 CHRONIC KIDNEY DISEASE, UNSPECIFIED CKD STAGE: ICD-10-CM

## 2025-02-17 LAB
EXPIRATION DATE: ABNORMAL
EXPIRATION DATE: ABNORMAL
GLUCOSE BLDC GLUCOMTR-MCNC: 150 MG/DL (ref 70–130)
HBA1C MFR BLD: 7 % (ref 4.5–5.7)
Lab: ABNORMAL
Lab: ABNORMAL

## 2025-02-17 PROCEDURE — 3078F DIAST BP <80 MM HG: CPT | Performed by: PHYSICIAN ASSISTANT

## 2025-02-17 PROCEDURE — 3051F HG A1C>EQUAL 7.0%<8.0%: CPT | Performed by: PHYSICIAN ASSISTANT

## 2025-02-17 PROCEDURE — 83036 HEMOGLOBIN GLYCOSYLATED A1C: CPT | Performed by: PHYSICIAN ASSISTANT

## 2025-02-17 PROCEDURE — 99214 OFFICE O/P EST MOD 30 MIN: CPT | Performed by: PHYSICIAN ASSISTANT

## 2025-02-17 PROCEDURE — 3077F SYST BP >= 140 MM HG: CPT | Performed by: PHYSICIAN ASSISTANT

## 2025-02-17 PROCEDURE — 82947 ASSAY GLUCOSE BLOOD QUANT: CPT | Performed by: PHYSICIAN ASSISTANT

## 2025-02-17 PROCEDURE — 1160F RVW MEDS BY RX/DR IN RCRD: CPT | Performed by: PHYSICIAN ASSISTANT

## 2025-02-17 PROCEDURE — 1159F MED LIST DOCD IN RCRD: CPT | Performed by: PHYSICIAN ASSISTANT

## 2025-02-17 RX ORDER — HYDROCHLOROTHIAZIDE 12.5 MG/1
12.5 CAPSULE ORAL DAILY
Qty: 30 CAPSULE | Refills: 1 | Status: SHIPPED | OUTPATIENT
Start: 2025-02-17

## 2025-02-17 NOTE — ASSESSMENT & PLAN NOTE
Diabetes is at goal.   A1C is 7%; increased from past visits    Discussed possible Ozempic contributor to abnormal symptoms; patient would like to continue with use at this time due to improvements with diabetes control; advised attempt avoid carbonated beverages and use injection in thigh as opposed to abdomen to see if symptoms improve.  Encourage increase fiber intake in diet.  May use over-the-counter Metamucil, MiraLAX, or Colace as needed.  Advise consider stopping/alternative if continues without relief.  New paperwork for Ozempic 2 mg through PAP completed today    Rx: Increase Jardiance 25 mg, continue Ozempic 2 mg and metformin 500 mg 2 tablets twice a day.  Cautioned risk for hypoglycemia; advised glucose supplement as needed.  Cautioned risk for UTI/yeast infection, hypotension, changes in kidney function with increasing Jardiance.     Foot exam due 8/2025   Microalbumin due 8/2025  Eye exam scheduled next month  LDL at goal <100; continue statin  BP goal <130/80    Discussed complications associated with diabetes.   Discussed BG goals  fasting, <180 2-hours postprandial.   Encouraged adherence with taking medications;    Encouraged continued BG monitoring.   Encouraged continued effort toward lifestyle management:         Increase lean protein and vegetable intake  Avoid concentrated sugar drinks, such as sodas, and processed carbs including crackers, cookies, cakes  Routine physical activity.

## 2025-02-17 NOTE — ASSESSMENT & PLAN NOTE
Mild reduced GFR in past with varied readings.  Possible hydration vs underlying conditions/medications contributor.  Advised repeat labs next OV for continued monitoring.

## 2025-02-17 NOTE — PROGRESS NOTES
Chief Complaint   Patient presents with    Diabetes        HPI  Khoa Arnold is a 69 y.o. male presents today for follow-up evaluation of type 2 diabetes. They were last seen for this 8/7/2024 by another provider in clinic. Not changes at that time.    Accompanied by wife at visit today.    Reports receiving Ozempic through patient assistance program.  He recently received a prescription for 0.25/0.5 mg pens.  He has been continuing to take the Ozempic 2 mg.     Reports was previously taking HCTZ for BP until stopped per cardiology about 3 months ago due to lower readings. Reports BP at home: 170-190 SP before medication 150 SP after taking; he has follow-up with cardiology again next month.    Admits chronic constipation since 2024. Taking Linzess with some relief, but only taking about every other day. Doculax as needed.  Admits heartburn/reflux improved with omeprazole.  Denies diarrhea, abdominal pain.  - BG at home: 120-150s    -Denies hypoglycemia.   -Denies vision changes.   -Denies numbness.   -Denies increased thirst or urination.   -Denies burning with urination.   -Denies SOB, chest pain.    Type 2 Diabetes:   Complications: CAD s/p cabg, hx of 2 CVAs, mild peripheral neuropathy     Current regimen includes:   - Metformin 500 mg 2 tabs twice daily  - Jardiance 10 mg daily   - Ozempic 2 mg weekly - patient assistance  Has tried: Januvia, glimepiride   Compliance with medications is good.  - HTN: losartan 100 mg   - HLD: crestor 40 mg   - Aspirin: 81 mg     DM Health Maintenance:  Ophthalmology: 4/2024; scheduled 3/2025; loss of vision with certain motions; possible retinal occlusion per patient  Monofilament / Foot exam: 8/7/2024   GFR: 58, Urine microalbumin: cr: normal 8/7/2024   LDL: 44 8/7/2024     Social Hx:  Home: lives with wife  Occupation: unemployed; outside in summer more   Diet: Meals: 1-2x/day Breakfast: occasional eggs, sausage or oatmeal Lunch: Dinner: (5-6 pm) meat + carb; not many  vegetables Snacks/Dessert: more sweets at night Drinks: water, diet soda/juice/Gatorade  Exercise: minimal; weights occasional    The following portions of the patient's history were reviewed and updated as appropriate: allergies, current medications, past family history, past medical history, past social history, past surgical history and problem list.    Past Medical History:   Diagnosis Date    Aortic valve stenosis     Asthma     BPH (benign prostatic hyperplasia)     Carotid stenosis     COPD (chronic obstructive pulmonary disease)     Coronary artery disease     Diabetes mellitus     Edentulous     Hyperlipidemia     Hypertension     IBS (irritable bowel syndrome)     Stroke     2009, no residual    Type 2 diabetes mellitus with circulatory disorder, without long-term current use of insulin 09/06/2019    Wears dentures     full set    Wears glasses      Past Surgical History:   Procedure Laterality Date    BLADDER SURGERY      CARDIAC CATHETERIZATION N/A 09/06/2019    Procedure: Left Heart Cath;  Surgeon: Jonathan Pineda MD;  Location:  [facility];  Service: Cardiovascular    CARDIAC ELECTROPHYSIOLOGY PROCEDURE N/A 01/19/2021    Procedure: DEVICE IMPLANT;  Surgeon: Jonathan Pineda MD;  Location:  [facility];  Service: Cardiovascular;  Laterality: N/A;    CAROTID ENDARTERECTOMY Left 10/20/2022    Procedure: CAROTID ENDARTERECTOMY WITH EEG LEFT;  Surgeon: Denys Goldsmith MD;  Location:  [facility];  Service: Vascular;  Laterality: Left;    CAROTID ENDARTERECTOMY Right 07/20/2023    Procedure: CAROTID ENDARTERECTOMY WITH EEG RIGHT;  Surgeon: Denys Goldsmith MD;  Location:  [facility];  Service: Vascular;  Laterality: Right;  clamp on 0720; clamp off 0742    CHOLECYSTECTOMY      COLONOSCOPY      CORONARY ANGIOPLASTY WITH STENT PLACEMENT      x 3, last one 2014    CORONARY ARTERY BYPASS GRAFT WITH AORTIC VALVE REPAIR/REPLACEMENT N/A 09/10/2019    Procedure: CARLYN  PER ANESTHESIA, MEDIAN STERNOTOMY, CORONARY ARTERY BYPASS GRAFT X 4, UTILIZING THE LEFT INTERNAL MAMMARY ARTERY, AND EVH OF THE RIGHT GREATER SAPHENOUS VEIN,   AORTIC VALVE REPLACEMENT;  Surgeon: Denys Goldsmith MD;  Location: [facility];  Service: Cardiothoracic    EYE SURGERY Bilateral     cataracts    INGUINAL HERNIA REPAIR Right     PACEMAKER IMPLANTATION  2021    UMBILICAL HERNIA REPAIR        Family History   Problem Relation Age of Onset    Coronary artery disease Mother     Diabetes Mother     Coronary artery disease Father     Diabetes Father       Social History     Socioeconomic History    Marital status:     Number of children: 1   Tobacco Use    Smoking status: Former     Types: Cigars     Quit date: 10/14/1979     Years since quittin.3    Smokeless tobacco: Former     Types: Chew     Quit date: 2004   Vaping Use    Vaping status: Never Used   Substance and Sexual Activity    Alcohol use: No    Drug use: No    Sexual activity: Defer      Allergies   Allergen Reactions    Ranexa [Ranolazine Er] Nausea Only and Dizziness    Lisinopril Itching and Rash     On feet      Current Outpatient Medications on File Prior to Visit   Medication Sig Dispense Refill    Accu-Chek Guide test strip Use to check glucose TID      Accu-Chek Softclix Lancets lancets Use to check glucose three times daily      albuterol sulfate  (90 Base) MCG/ACT inhaler Inhale 2 puffs Every 4 (Four) Hours As Needed for Wheezing or Shortness of Air.      aspirin 81 MG EC tablet Take 1 tablet by mouth Daily.      busPIRone (BUSPAR) 10 MG tablet Take 1 tablet by mouth 3 (Three) Times a Day.      Linzess 290 MCG capsule capsule Take 1 capsule by mouth Every Morning Before Breakfast.      losartan (COZAAR) 100 MG tablet Take 1 tablet by mouth Daily.      multivitamin (THERAGRAN) tablet tablet Take 1 tablet by mouth Daily.      nitroglycerin (NITROSTAT) 0.4 MG SL tablet Place 1 tablet under the tongue Every 5 (Five)  "Minutes As Needed for Chest Pain. Take no more than 3 doses in 15 minutes.      omeprazole (priLOSEC) 40 MG capsule Take 1 capsule by mouth Daily.      rosuvastatin (CRESTOR) 40 MG tablet Take 1 tablet by mouth Daily.      Semaglutide, 2 MG/DOSE, (Ozempic, 2 MG/DOSE,) 8 MG/3ML solution pen-injector Inject 2 mg under the skin into the appropriate area as directed 1 (One) Time Per Week. Dose increase 9 mL 1    tamsulosin (FLOMAX) 0.4 MG capsule 24 hr capsule Take 1 capsule by mouth 2 (Two) Times a Day.      vitamin B-12 (CYANOCOBALAMIN) 250 MCG tablet Take 1 tablet by mouth Daily.      zolpidem (AMBIEN) 10 MG tablet Take 1 tablet by mouth At Night As Needed.      [DISCONTINUED] empagliflozin (Jardiance) 10 MG tablet tablet Take 1 tablet by mouth Daily. 90 tablet 1    [DISCONTINUED] metFORMIN (GLUCOPHAGE) 500 MG tablet Take 2 tablets by mouth 2 (Two) Times a Day With Meals.      [DISCONTINUED] potassium chloride (MICRO-K) 10 MEQ CR capsule Take 1 capsule by mouth Daily. (Patient not taking: Reported on 2/17/2025)       No current facility-administered medications on file prior to visit.        Review of Systems   Constitutional:  Negative for activity change, appetite change, unexpected weight gain and unexpected weight loss.   Eyes:  Negative for visual disturbance.   Respiratory:  Negative for shortness of breath.    Cardiovascular:  Negative for chest pain.   Gastrointestinal:  Positive for constipation and GERD. Negative for abdominal pain and diarrhea.   Endocrine: Negative for polydipsia and polyuria.   Skin:  Negative for wound.   Neurological:  Negative for dizziness and numbness.        /74   Pulse 72   Ht 167.6 cm (65.98\")   Wt 97.5 kg (215 lb)   BMI 34.72 kg/m²      Physical Exam  Constitutional:       Appearance: Normal appearance. He is obese.   Cardiovascular:      Rate and Rhythm: Normal rate.   Pulmonary:      Effort: Pulmonary effort is normal.   Musculoskeletal:         General: Normal range " of motion.   Skin:     General: Skin is warm and dry.   Neurological:      General: No focal deficit present.      Mental Status: He is alert and oriented to person, place, and time.   Psychiatric:         Mood and Affect: Mood normal.         Behavior: Behavior normal.         LABS AND IMAGING  CMP:  Lab Results   Component Value Date    Glucose 150 (A) 02/17/2025    BUN 31 (H) 08/07/2024    BUN 22 09/06/2019    BUN/Creatinine Ratio 23.5 08/07/2024    Creatinine 1.32 (H) 08/07/2024    Creatinine 1.3 12/15/2023    Creatinine, Urine 60.4 08/07/2024    eGFR 58.0 (L) 08/07/2024    eGFR  Am 59 12/15/2023    eGFR Non  Am 65 09/30/2021    eGFR Non African Amer 50 (L) 01/19/2021    EGFR Result 73.5 09/18/2023    CO2 22.3 08/07/2024    CO2 Content 26.2 09/10/2019    Calcium 9.6 08/07/2024    Albumin 4.1 08/07/2024    AST (SGOT) 18 08/07/2024    ALT (SGPT) 18 08/07/2024     Lipid Panel:  Lab Results   Component Value Date    CHOL 96 08/07/2024    TRIG 82 08/07/2024    HDL 35 (L) 08/07/2024    VLDL 17 08/07/2024    LDL 44 08/07/2024     HbA1c:  Hemoglobin A1C   Date Value Ref Range Status   02/17/2025 7.0 (A) 4.5 - 5.7 % Final     Glucose:  Lab Results   Component Value Date    POCGLU 150 (A) 02/17/2025     Microalbumin:  Lab Results   Component Value Date    MALBCRERATIO 26.5 08/07/2024     TSH:  Lab Results   Component Value Date    TSH 1.330 08/07/2024       Assessment and Plan    Diagnoses and all orders for this visit:    1. Controlled type 2 diabetes mellitus with hyperglycemia, without long-term current use of insulin (Primary)  Assessment & Plan:  Diabetes is at goal.   A1C is 7%; increased from past visits    Discussed possible Ozempic contributor to abnormal symptoms; patient would like to continue with use at this time due to improvements with diabetes control; advised attempt avoid carbonated beverages and use injection in thigh as opposed to abdomen to see if symptoms improve.  Encourage increase  fiber intake in diet.  May use over-the-counter Metamucil, MiraLAX, or Colace as needed.  Advise consider stopping/alternative if continues without relief.  New paperwork for Ozempic 2 mg through PAP completed today    Rx: Increase Jardiance 25 mg, continue Ozempic 2 mg and metformin 500 mg 2 tablets twice a day.  Cautioned risk for hypoglycemia; advised glucose supplement as needed.  Cautioned risk for UTI/yeast infection, hypotension, changes in kidney function with increasing Jardiance.     Foot exam due 8/2025   Microalbumin due 8/2025  Eye exam scheduled next month  LDL at goal <100; continue statin  BP goal <130/80    Discussed complications associated with diabetes.   Discussed BG goals  fasting, <180 2-hours postprandial.   Encouraged adherence with taking medications;    Encouraged continued BG monitoring.   Encouraged continued effort toward lifestyle management:         Increase lean protein and vegetable intake  Avoid concentrated sugar drinks, such as sodas, and processed carbs including crackers, cookies, cakes  Routine physical activity.     Orders:  -     POC Glucose, Blood  -     POC Glycosylated Hemoglobin (Hb A1C)  -     hydroCHLOROthiazide (MICROZIDE) 12.5 MG capsule; Take 1 capsule by mouth Daily.  Dispense: 30 capsule; Refill: 1  -     empagliflozin (Jardiance) 25 MG tablet tablet; Take 1 tablet by mouth Daily.  Dispense: 90 tablet; Refill: 1    2. Benign essential HTN  Assessment & Plan:  Elevated in office today and readings at home.  Stopped HCTZ per cardiology after last visit; had been tolerating well for few years in past.     Discussed with patient unsure why he is not taking beta-blocker; advised to discuss with cardiology and next appointment.  Consider also switching losartan to more effective ARB.  Advise restart HCTZ at low-dose 12.5 for now, continue losartan and follow-up with cardiology for continued management.  Caution risk for hypotension with restarting medication and  increasing Jardiance.  Continue monitoring with BP goal 100-130/60-80    Orders:  -     metFORMIN (GLUCOPHAGE) 500 MG tablet; Take 2 tablets by mouth 2 (Two) Times a Day With Meals.  Dispense: 360 tablet; Refill: 1    3. Chronic kidney disease, unspecified CKD stage  Assessment & Plan:  Mild reduced GFR in past with varied readings.  Possible hydration vs underlying conditions/medications contributor.  Advised repeat labs next OV for continued monitoring.            Return in about 3 months (around 5/17/2025) for follow-up diabetes. The patient was instructed to contact the clinic with any interval questions or concerns.    Electronically signed by: Ema Shipley PA-C   Endocrinology     Please note that portions of this note were completed with a voice recognition program.

## 2025-02-17 NOTE — ASSESSMENT & PLAN NOTE
Elevated in office today and readings at home.  Stopped HCTZ per cardiology after last visit; had been tolerating well for few years in past.     Discussed with patient unsure why he is not taking beta-blocker; advised to discuss with cardiology and next appointment.  Consider also switching losartan to more effective ARB.  Advise restart HCTZ at low-dose 12.5 for now, continue losartan and follow-up with cardiology for continued management.  Caution risk for hypotension with restarting medication and increasing Jardiance.  Continue monitoring with BP goal 100-130/60-80

## 2025-03-04 RX ORDER — SEMAGLUTIDE 2.68 MG/ML
2 INJECTION, SOLUTION SUBCUTANEOUS WEEKLY
Qty: 3 ML | Refills: 2 | Status: SHIPPED | OUTPATIENT
Start: 2025-03-04

## 2025-03-04 NOTE — TELEPHONE ENCOUNTER
PT'S WIFE RAFAEL CALLED STATING THERE WAS AN ISSUE W/ THIS PT'S OZEMPIC RX. SHE STATED SHE HAS A VOUCHER AND REQUESTED WE SEND IN RX FOR OZEMPIC IN TO [facility] IN [place], [place].

## 2025-03-04 NOTE — TELEPHONE ENCOUNTER
Last office visit with prescribing clinician: 2/17/2025       Next office visit with prescribing clinician: 6/6/2025

## 2025-03-25 ENCOUNTER — TELEPHONE (OUTPATIENT)
Dept: ENDOCRINOLOGY | Facility: CLINIC | Age: 70
End: 2025-03-25
Payer: MEDICARE

## 2025-04-28 ENCOUNTER — TELEPHONE (OUTPATIENT)
Dept: ENDOCRINOLOGY | Facility: CLINIC | Age: 70
End: 2025-04-28
Payer: MEDICARE

## 2025-05-01 ENCOUNTER — TELEPHONE (OUTPATIENT)
Dept: ENDOCRINOLOGY | Facility: CLINIC | Age: 70
End: 2025-05-01
Payer: MEDICARE

## 2025-05-01 NOTE — TELEPHONE ENCOUNTER
Patient picked up ozempic pt assistance - it is for the starter dose.  The application filled out in October was marked for starter dose.  This is worng.  Spoke with [facility] -they will cancel the low dose application and will ship 2mg ozempic June 13th.  Spoke with his wife - she voiced understanding

## 2025-06-06 ENCOUNTER — OFFICE VISIT (OUTPATIENT)
Dept: ENDOCRINOLOGY | Facility: CLINIC | Age: 70
End: 2025-06-06
Payer: MEDICARE

## 2025-06-06 VITALS
HEART RATE: 66 BPM | WEIGHT: 207.8 LBS | OXYGEN SATURATION: 95 % | HEIGHT: 66 IN | BODY MASS INDEX: 33.4 KG/M2 | SYSTOLIC BLOOD PRESSURE: 130 MMHG | DIASTOLIC BLOOD PRESSURE: 68 MMHG

## 2025-06-06 DIAGNOSIS — E11.65 TYPE 2 DIABETES MELLITUS WITH HYPERGLYCEMIA, WITHOUT LONG-TERM CURRENT USE OF INSULIN: Primary | ICD-10-CM

## 2025-06-06 DIAGNOSIS — R25.2 LEG CRAMPING: ICD-10-CM

## 2025-06-06 DIAGNOSIS — E78.2 MIXED HYPERLIPIDEMIA: Chronic | ICD-10-CM

## 2025-06-06 LAB
ALBUMIN SERPL-MCNC: 4 G/DL (ref 3.5–5.2)
ALBUMIN UR-MCNC: <1.2 MG/DL
ALBUMIN/GLOB SERPL: 1.4 G/DL
ALP SERPL-CCNC: 53 U/L (ref 39–117)
ALT SERPL W P-5'-P-CCNC: 27 U/L (ref 1–41)
ANION GAP SERPL CALCULATED.3IONS-SCNC: 13 MMOL/L (ref 5–15)
AST SERPL-CCNC: 23 U/L (ref 1–40)
BILIRUB SERPL-MCNC: 0.3 MG/DL (ref 0–1.2)
BUN SERPL-MCNC: 27 MG/DL (ref 8–23)
BUN/CREAT SERPL: 19.3 (ref 7–25)
CALCIUM SPEC-SCNC: 9.5 MG/DL (ref 8.6–10.5)
CHLORIDE SERPL-SCNC: 103 MMOL/L (ref 98–107)
CHOLEST SERPL-MCNC: 103 MG/DL (ref 0–200)
CO2 SERPL-SCNC: 23 MMOL/L (ref 22–29)
CREAT SERPL-MCNC: 1.4 MG/DL (ref 0.76–1.27)
CREAT UR-MCNC: 50.8 MG/DL
DEPRECATED RDW RBC AUTO: 44.3 FL (ref 37–54)
EGFRCR SERPLBLD CKD-EPI 2021: 54.1 ML/MIN/1.73
ERYTHROCYTE [DISTWIDTH] IN BLOOD BY AUTOMATED COUNT: 13.5 % (ref 12.3–15.4)
EXPIRATION DATE: ABNORMAL
EXPIRATION DATE: ABNORMAL
GLOBULIN UR ELPH-MCNC: 2.9 GM/DL
GLUCOSE BLDC GLUCOMTR-MCNC: 199 MG/DL (ref 70–130)
GLUCOSE SERPL-MCNC: 143 MG/DL (ref 65–99)
HBA1C MFR BLD: 7.6 % (ref 4.5–5.7)
HCT VFR BLD AUTO: 36.9 % (ref 37.5–51)
HDLC SERPL-MCNC: 43 MG/DL (ref 40–60)
HGB BLD-MCNC: 12.2 G/DL (ref 13–17.7)
LDLC SERPL CALC-MCNC: 47 MG/DL (ref 0–100)
LDLC/HDLC SERPL: 1.14 {RATIO}
Lab: ABNORMAL
Lab: ABNORMAL
MCH RBC QN AUTO: 29.8 PG (ref 26.6–33)
MCHC RBC AUTO-ENTMCNC: 33.1 G/DL (ref 31.5–35.7)
MCV RBC AUTO: 90 FL (ref 79–97)
MICROALBUMIN/CREAT UR: NORMAL MG/G{CREAT}
PLATELET # BLD AUTO: 155 10*3/MM3 (ref 140–450)
PMV BLD AUTO: 9.2 FL (ref 6–12)
POTASSIUM SERPL-SCNC: 4.2 MMOL/L (ref 3.5–5.2)
PROT SERPL-MCNC: 6.9 G/DL (ref 6–8.5)
RBC # BLD AUTO: 4.1 10*6/MM3 (ref 4.14–5.8)
SODIUM SERPL-SCNC: 139 MMOL/L (ref 136–145)
TRIGL SERPL-MCNC: 55 MG/DL (ref 0–150)
TSH SERPL DL<=0.05 MIU/L-ACNC: 1.1 UIU/ML (ref 0.27–4.2)
VLDLC SERPL-MCNC: 13 MG/DL (ref 5–40)
WBC NRBC COR # BLD AUTO: 5.55 10*3/MM3 (ref 3.4–10.8)

## 2025-06-06 PROCEDURE — 80053 COMPREHEN METABOLIC PANEL: CPT | Performed by: PHYSICIAN ASSISTANT

## 2025-06-06 PROCEDURE — 84443 ASSAY THYROID STIM HORMONE: CPT | Performed by: PHYSICIAN ASSISTANT

## 2025-06-06 PROCEDURE — 85027 COMPLETE CBC AUTOMATED: CPT | Performed by: PHYSICIAN ASSISTANT

## 2025-06-06 PROCEDURE — 82043 UR ALBUMIN QUANTITATIVE: CPT | Performed by: PHYSICIAN ASSISTANT

## 2025-06-06 PROCEDURE — 82570 ASSAY OF URINE CREATININE: CPT | Performed by: PHYSICIAN ASSISTANT

## 2025-06-06 PROCEDURE — 80061 LIPID PANEL: CPT | Performed by: PHYSICIAN ASSISTANT

## 2025-06-06 RX ORDER — GLIMEPIRIDE 1 MG/1
1 TABLET ORAL DAILY PRN
Qty: 90 TABLET | Refills: 0 | Status: SHIPPED | OUTPATIENT
Start: 2025-06-06

## 2025-06-06 RX ORDER — PHENAZOPYRIDINE HYDROCHLORIDE 200 MG/1
1 TABLET, FILM COATED ORAL 3 TIMES DAILY
COMMUNITY
Start: 2025-03-23

## 2025-06-06 RX ORDER — FINASTERIDE 5 MG/1
1 TABLET, FILM COATED ORAL DAILY
COMMUNITY
Start: 2025-03-10

## 2025-06-06 NOTE — ASSESSMENT & PLAN NOTE
Decreased dorsalis pedis pulses bilaterally, hair loss.  Discussed possible dehydration, SE medications, PAD, diabetes, among others.  Encouraged hydration, electrolytes if out working/sweating.  Encouraged effort toward improved BG control.  Advised JUAN for further evaluation given decreased pulse, hx CAD.

## 2025-06-06 NOTE — ASSESSMENT & PLAN NOTE
Diabetes is not at goal.   A1C is 7.6%; increased from past visits    Encouraged avoidance of carbonated beverages with Ozempic use and discussed possibility of contributor to constipation; defers adjustment in medication at this time.  Discussed sulfonylurea, insulin for additional blood sugar control.  Expresses concerns for weight gain with medications and prefers to continue working on diet.    Discussed as needed glimepiride if blood sugars are more than 180 before meal given times of significant hyperglycemia is possibly contributor to symptoms; agreeable to this.    Rx:   Start glimepiride 1 mg before meal once daily as needed if blood sugars more than 180.  Continue Jardiance 25 mg, Ozempic 2 mg and metformin 500 mg 2 tablets twice a day.    Cautioned risk for hypoglycemia; advised glucose supplement as needed.     Foot exam today; sensation intact without ulceration.  Decreased dorsalis pedis pulses.  Microalbumin ordered today  Eye exam due 3/2026; continue following with ophthalmology every 6 months.  LDL at goal <100; continue statin  BP near goal <130/80; advise discuss with cardiology switching from losartan to longer acting ARB such as telmisartan or olmesartan for improved control.  Discussed HCTZ possible contributor to cramping/dehydration.     Discussed complications associated with diabetes.   Encouraged continued BG monitoring.   Encouraged continued effort toward lifestyle management:

## 2025-06-06 NOTE — PROGRESS NOTES
Chief Complaint   Patient presents with    Controlled type 2 diabetes mellitus with hyperglycemia     Follow up      HPI  Khoa Arnold is a 69 y.o. male presents today for follow-up evaluation of  diabetes. They were last seen for this 2/2025.     Hemoglobin A1C   Date Value Ref Range Status   06/06/2025 7.6 (A) 4.5 - 5.7 % Final   Taking occasions as prescribed and tolerating well aside from continued chronic constipation; unchanged.    Expresses concerns for increased muscle cramping in legs at night x 1 month.  Intermittent few days per week.  Has been outside more recently/yard work.     - BG at home: Readings.  Mostly mid 100s.  Occasional readings 200s to 300s.  Associates with high CHO diet at this times.  - BP at home: 140-150 SP; started HCTZ 1/2025     Accompanied by wife at visit today.     Admits chronic constipation; unchanged since 2024.   Taking Linzess with some relief, but only taking about every other day. Doculax as needed.  Admits heartburn/reflux improved with omeprazole.  Denies diarrhea, abdominal pain.     -Denies hypoglycemia.   -Denies vision changes.   -Denies numbness.   -Denies increased thirst or urination.   -Denies burning with urination.   -Denies SOB, chest pain.     Type 2 Diabetes:   Complications: CAD s/p cabg, hx of 2 CVAs, mild peripheral neuropathy      Current regimen includes:   - Metformin 500 mg 2 tabs twice daily  - Jardiance 25 mg daily   - Ozempic 2 mg weekly - patient assistance program   Previous medications include: Januvia, glimepiride   Compliance with medications is good.  - HTN: losartan 100 mg, HCTZ 12.5 mg - managed by cardiology   - HLD: crestor 40 mg - managed by cardiology   - Aspirin: 81 mg      DM Health Maintenance:  Ophthalmology:3/2025; loss of vision with certain motions; possible retinal occlusion per patient; follows every 6 months.   Monofilament / Foot exam: Performed today; sensation intact without ulceration, diminished bilateral dorsalis  pedis pulses.  GFR: 58, Urine microalbumin: cr: normal 8/7/2024   LDL: 44 8/7/2024      Social Hx:  Home: lives with wife  Occupation: unemployed; outside in summer more   Diet: Meals: 1-2x/day Breakfast: occasional eggs, sausage or oatmeal Lunch: Dinner: (5-6 pm) meat + carb; not many vegetables Snacks/Dessert: more sweets at night Drinks: water, diet soda/juice/Gatorade  Exercise: yard work; weights occasional     The following portions of the patient's history were reviewed and updated as appropriate: allergies, current medications, past family history, past medical history, past social history, past surgical history and problem list.    Past Medical History:   Diagnosis Date    Aortic valve stenosis     Asthma     BPH (benign prostatic hyperplasia)     Carotid stenosis     COPD (chronic obstructive pulmonary disease)     Coronary artery disease     Diabetes mellitus     Edentulous     Hyperlipidemia     Hypertension     IBS (irritable bowel syndrome)     Stroke     2009, no residual    Type 2 diabetes mellitus with circulatory disorder, without long-term current use of insulin 09/06/2019    Wears dentures     full set    Wears glasses      Past Surgical History:   Procedure Laterality Date    BLADDER SURGERY      CARDIAC CATHETERIZATION N/A 09/06/2019    Procedure: Left Heart Cath;  Surgeon: Jonathan Pineda MD;  Location:  [facility];  Service: Cardiovascular    CARDIAC ELECTROPHYSIOLOGY PROCEDURE N/A 01/19/2021    Procedure: DEVICE IMPLANT;  Surgeon: Jonathan Pineda MD;  Location:  [facility];  Service: Cardiovascular;  Laterality: N/A;    CAROTID ENDARTERECTOMY Left 10/20/2022    Procedure: CAROTID ENDARTERECTOMY WITH EEG LEFT;  Surgeon: Denys Goldsmith MD;  Location: [facility];  Service: Vascular;  Laterality: Left;    CAROTID ENDARTERECTOMY Right 07/20/2023    Procedure: CAROTID ENDARTERECTOMY WITH EEG RIGHT;  Surgeon: Denys Goldsmith MD;  Location:   [facility] OR;  Service: Vascular;  Laterality: Right;  clamp on 0720; clamp off 0742    CHOLECYSTECTOMY      COLONOSCOPY      CORONARY ANGIOPLASTY WITH STENT PLACEMENT      x 3, last one     CORONARY ARTERY BYPASS GRAFT WITH AORTIC VALVE REPAIR/REPLACEMENT N/A 09/10/2019    Procedure: CARLYN PER ANESTHESIA, MEDIAN STERNOTOMY, CORONARY ARTERY BYPASS GRAFT X 4, UTILIZING THE LEFT INTERNAL MAMMARY ARTERY, AND EVH OF THE RIGHT GREATER SAPHENOUS VEIN,   AORTIC VALVE REPLACEMENT;  Surgeon: Denys Goldsmith MD;  Location:  [facility] OR;  Service: Cardiothoracic    EYE SURGERY Bilateral     cataracts    INGUINAL HERNIA REPAIR Right     PACEMAKER IMPLANTATION  2021    UMBILICAL HERNIA REPAIR        Family History   Problem Relation Age of Onset    Coronary artery disease Mother     Diabetes Mother     Coronary artery disease Father     Diabetes Father       Social History     Socioeconomic History    Marital status:     Number of children: 1   Tobacco Use    Smoking status: Former     Types: Cigars     Quit date: 10/14/1979     Years since quittin.6    Smokeless tobacco: Former     Types: Chew     Quit date: 2004   Vaping Use    Vaping status: Never Used   Substance and Sexual Activity    Alcohol use: No    Drug use: No    Sexual activity: Defer      Allergies   Allergen Reactions    Ranexa [Ranolazine Er] Nausea Only and Dizziness    Lisinopril Itching and Rash     On feet      Current Outpatient Medications on File Prior to Visit   Medication Sig Dispense Refill    Accu-Chek Guide test strip Use to check glucose TID      Accu-Chek Softclix Lancets lancets Use to check glucose three times daily      albuterol sulfate  (90 Base) MCG/ACT inhaler Inhale 2 puffs Every 4 (Four) Hours As Needed for Wheezing or Shortness of Air.      aspirin 81 MG EC tablet Take 1 tablet by mouth Daily.      busPIRone (BUSPAR) 10 MG tablet Take 1 tablet by mouth 3 (Three) Times a Day.      empagliflozin (Jardiance) 25 MG  tablet tablet Take 1 tablet by mouth Daily. 90 tablet 1    finasteride (PROSCAR) 5 MG tablet Take 1 tablet by mouth Daily.      hydroCHLOROthiazide (MICROZIDE) 12.5 MG capsule Take 1 capsule by mouth Daily. 30 capsule 1    Linzess 290 MCG capsule capsule Take 1 capsule by mouth Every Morning Before Breakfast.      losartan (COZAAR) 100 MG tablet Take 1 tablet by mouth Daily.      metFORMIN (GLUCOPHAGE) 500 MG tablet Take 2 tablets by mouth 2 (Two) Times a Day With Meals. 360 tablet 1    multivitamin (THERAGRAN) tablet tablet Take 1 tablet by mouth Daily.      nitroglycerin (NITROSTAT) 0.4 MG SL tablet Place 1 tablet under the tongue Every 5 (Five) Minutes As Needed for Chest Pain. Take no more than 3 doses in 15 minutes.      omeprazole (priLOSEC) 40 MG capsule Take 1 capsule by mouth Daily.      phenazopyridine (PYRIDIUM) 200 MG tablet Take 1 tablet by mouth 3 times a day.      rosuvastatin (CRESTOR) 40 MG tablet Take 1 tablet by mouth Daily.      Semaglutide, 2 MG/DOSE, (Ozempic, 2 MG/DOSE,) 8 MG/3ML solution pen-injector Inject 2 mg under the skin into the appropriate area as directed 1 (One) Time Per Week. 3 mL 2    tamsulosin (FLOMAX) 0.4 MG capsule 24 hr capsule Take 1 capsule by mouth 2 (Two) Times a Day.      vitamin B-12 (CYANOCOBALAMIN) 250 MCG tablet Take 1 tablet by mouth Daily.      zolpidem (AMBIEN) 10 MG tablet Take 1 tablet by mouth At Night As Needed.       No current facility-administered medications on file prior to visit.        Review of Systems   Constitutional:  Negative for activity change, appetite change, unexpected weight gain and unexpected weight loss.   Eyes:  Positive for visual disturbance.   Respiratory:  Negative for shortness of breath.    Cardiovascular:  Negative for chest pain.   Gastrointestinal:  Positive for constipation and GERD. Negative for abdominal pain and diarrhea.   Endocrine: Negative for polydipsia and polyuria.   Musculoskeletal:  Positive for myalgias.   Skin:   "Negative for wound.   Neurological:  Positive for numbness. Negative for dizziness.        /68   Pulse 66   Ht 167.6 cm (66\")   Wt 94.3 kg (207 lb 12.8 oz)   SpO2 95%   BMI 33.54 kg/m²      Physical Exam  Constitutional:       Appearance: Normal appearance.   Cardiovascular:      Rate and Rhythm: Normal rate and regular rhythm.      Pulses:           Dorsalis pedis pulses are 0 on the right side and 0 on the left side.        Posterior tibial pulses are 2+ on the right side and 2+ on the left side.   Pulmonary:      Effort: Pulmonary effort is normal.   Musculoskeletal:         General: Normal range of motion.      Right foot: No deformity.      Left foot: No deformity.   Feet:      Right foot:      Protective Sensation: 5 sites tested.  5 sites sensed.      Skin integrity: Dry skin present. No ulcer.      Toenail Condition: Right toenails are normal.      Left foot:      Protective Sensation: 5 sites tested.  5 sites sensed.      Skin integrity: Dry skin present. No ulcer.      Toenail Condition: Left toenails are normal.      Comments: Diabetic Foot Exam Performed and Monofilament Test Performed    Hair loss bilateral lower legs  Skin:     General: Skin is warm and dry.   Neurological:      General: No focal deficit present.      Mental Status: He is alert and oriented to person, place, and time.   Psychiatric:         Mood and Affect: Mood normal.         Behavior: Behavior normal.         LABS AND IMAGING  CMP:  Lab Results   Component Value Date    Glucose 199 (A) 06/06/2025    BUN 31 (H) 08/07/2024    BUN 22 09/06/2019    BUN/Creatinine Ratio 23.5 08/07/2024    Creatinine 1.32 (H) 08/07/2024    Creatinine 1.3 12/15/2023    Creatinine, Urine 60.4 08/07/2024    eGFR 58.0 (L) 08/07/2024    eGFR  Am 59 12/15/2023    eGFR Non  Am 65 09/30/2021    eGFR Non African Amer 50 (L) 01/19/2021    EGFR Result 73.5 09/18/2023    CO2 22.3 08/07/2024    CO2 Content 26.2 09/10/2019    Calcium 9.6 " 08/07/2024    Albumin 4.1 08/07/2024    AST (SGOT) 18 08/07/2024    ALT (SGPT) 18 08/07/2024     Lipid Panel:  Lab Results   Component Value Date    CHOL 96 08/07/2024    TRIG 82 08/07/2024    HDL 35 (L) 08/07/2024    VLDL 17 08/07/2024    LDL 44 08/07/2024     HbA1c:  Hemoglobin A1C   Date Value Ref Range Status   06/06/2025 7.6 (A) 4.5 - 5.7 % Final     Glucose:  Lab Results   Component Value Date    POCGLU 199 (A) 06/06/2025     Microalbumin:  Lab Results   Component Value Date    MALBCRERATIO 26.5 08/07/2024     TSH:  Lab Results   Component Value Date    TSH 1.330 08/07/2024       Assessment and Plan    Diagnoses and all orders for this visit:    1. Type 2 diabetes mellitus with hyperglycemia, without long-term current use of insulin (Primary)  Assessment & Plan:  Diabetes is not at goal.   A1C is 7.6%; increased from past visits    Encouraged avoidance of carbonated beverages with Ozempic use and discussed possibility of contributor to constipation; defers adjustment in medication at this time.  Discussed sulfonylurea, insulin for additional blood sugar control.  Expresses concerns for weight gain with medications and prefers to continue working on diet.    Discussed as needed glimepiride if blood sugars are more than 180 before meal given times of significant hyperglycemia is possibly contributor to symptoms; agreeable to this.    Rx:   Start glimepiride 1 mg before meal once daily as needed if blood sugars more than 180.  Continue Jardiance 25 mg, Ozempic 2 mg and metformin 500 mg 2 tablets twice a day.    Cautioned risk for hypoglycemia; advised glucose supplement as needed.     Foot exam today; sensation intact without ulceration.  Decreased dorsalis pedis pulses.  Microalbumin ordered today  Eye exam due 3/2026; continue following with ophthalmology every 6 months.  LDL at goal <100; continue statin  BP near goal <130/80; advise discuss with cardiology switching from losartan to longer acting ARB such as  telmisartan or olmesartan for improved control.  Discussed HCTZ possible contributor to cramping/dehydration.     Discussed complications associated with diabetes.   Encouraged continued BG monitoring.   Encouraged continued effort toward lifestyle management:     Orders:  -     POC Glycosylated Hemoglobin (Hb A1C)  -     POC Glucose, Blood  -     Comprehensive Metabolic Panel  -     Microalbumin / Creatinine Urine Ratio - Urine, Clean Catch  -     Lipid Panel  -     TSH  -     CBC (No Diff)  -     glimepiride (AMARYL) 1 MG tablet; Take 1 tablet by mouth Daily As Needed (with meal if blood sugar is more than 180.).  Dispense: 90 tablet; Refill: 0  -     Doppler Ankle Brachial Index Single Level CAR; Future    2. Leg cramping  Assessment & Plan:  Decreased dorsalis pedis pulses bilaterally, hair loss.  Discussed possible dehydration, SE medications, PAD, diabetes, among others.  Encouraged hydration, electrolytes if out working/sweating.  Encouraged effort toward improved BG control.  Advised JUAN for further evaluation given decreased pulse, hx CAD.       Orders:  -     Doppler Ankle Brachial Index Single Level CAR; Future    3. Mixed hyperlipidemia         Return in about 3 months (around 9/6/2025) for follow-up diabetes. The patient was instructed to contact the clinic with any interval questions or concerns.    Electronically signed by: Ema Shipley PA-C   Endocrinology     Please note that portions of this note were completed with a voice recognition program.

## 2025-06-11 ENCOUNTER — RESULTS FOLLOW-UP (OUTPATIENT)
Dept: ENDOCRINOLOGY | Facility: CLINIC | Age: 70
End: 2025-06-11

## 2025-07-08 ENCOUNTER — TELEPHONE (OUTPATIENT)
Dept: ENDOCRINOLOGY | Facility: CLINIC | Age: 70
End: 2025-07-08
Payer: MEDICARE

## 2025-07-31 ENCOUNTER — HOSPITAL ENCOUNTER (OUTPATIENT)
Dept: CARDIOLOGY | Facility: HOSPITAL | Age: 70
Discharge: HOME | End: 2025-07-31
Admitting: PHYSICIAN ASSISTANT
Payer: MEDICARE

## 2025-07-31 VITALS — BODY MASS INDEX: 33.41 KG/M2 | HEIGHT: 66 IN | WEIGHT: 207.89 LBS

## 2025-07-31 DIAGNOSIS — R25.2 LEG CRAMPING: ICD-10-CM

## 2025-07-31 DIAGNOSIS — E11.65 TYPE 2 DIABETES MELLITUS WITH HYPERGLYCEMIA, WITHOUT LONG-TERM CURRENT USE OF INSULIN: ICD-10-CM

## 2025-07-31 LAB
BH CV LOWER ARTERIAL LEFT ABI RATIO: 1.04
BH CV LOWER ARTERIAL LEFT DORSALIS PEDIS SYS MAX: 147
BH CV LOWER ARTERIAL LEFT GREAT TOE SYS MAX: 84
BH CV LOWER ARTERIAL LEFT POST TIBIAL SYS MAX: 141
BH CV LOWER ARTERIAL LEFT TBI RATIO: 0.59
BH CV LOWER ARTERIAL RIGHT ABI RATIO: 1.13
BH CV LOWER ARTERIAL RIGHT DORSALIS PEDIS SYS MAX: 138
BH CV LOWER ARTERIAL RIGHT GREAT TOE SYS MAX: 89
BH CV LOWER ARTERIAL RIGHT POST TIBIAL SYS MAX: 161
BH CV LOWER ARTERIAL RIGHT TBI RATIO: 0.63
UPPER ARTERIAL LEFT ARM BRACHIAL SYS MAX: 132
UPPER ARTERIAL RIGHT ARM BRACHIAL SYS MAX: 142

## 2025-07-31 PROCEDURE — 93922 UPR/L XTREMITY ART 2 LEVELS: CPT

## 2025-08-19 ENCOUNTER — TELEPHONE (OUTPATIENT)
Dept: ENDOCRINOLOGY | Facility: CLINIC | Age: 70
End: 2025-08-19
Payer: MEDICARE

## (undated) DEVICE — SUT SILK 4/0 TIES 18IN A183H

## (undated) DEVICE — SUT PROLN 4/0 RB1 D/A 36IN 8557H

## (undated) DEVICE — ORGANIZER SUT GABBAY FRATER GFS10A PK/3

## (undated) DEVICE — TRAP FLD MINIVAC MEGADYNE 100ML

## (undated) DEVICE — STERILE PVP: Brand: MEDLINE INDUSTRIES, INC.

## (undated) DEVICE — 3M™ IOBAN™ 2 ANTIMICROBIAL INCISE DRAPE 6650EZ: Brand: IOBAN™ 2

## (undated) DEVICE — NDL HYPO ECLPS SFTY 22G 1 1/2IN

## (undated) DEVICE — SWAN-GANZ THERMODILUTION CATHETER: Brand: SWAN-GANZ

## (undated) DEVICE — SUT PROLN 4/0 SH D/A 36IN 8521H

## (undated) DEVICE — SUT SILK 3/0 TIES 18IN A184H

## (undated) DEVICE — CATH DIAG EXPO .045 FL3  5F 100CM

## (undated) DEVICE — SUT PDS 1 CTX 36IN VIO PDP371T

## (undated) DEVICE — SUT PROLN SURGILENE 5.0 24IN BLU 9702H

## (undated) DEVICE — TBG PENCL TELESCP MEGADYNE SMOKE EVAC 10FT

## (undated) DEVICE — ELECTRD BLD EZ CLN STD 2.5IN

## (undated) DEVICE — DECANTER BAG 9": Brand: MEDLINE INDUSTRIES, INC.

## (undated) DEVICE — SPNG GZ WOVN 4X4IN 12PLY 10/BX STRL

## (undated) DEVICE — GW INQWIRE FC PTFE STD J/1.5 .035 260

## (undated) DEVICE — ANTIBACTERIAL UNDYED BRAIDED (POLYGLACTIN 910), SYNTHETIC ABSORBABLE SUTURE: Brand: COATED VICRYL

## (undated) DEVICE — [HIGH FLOW INSUFFLATOR,  DO NOT USE IF PACKAGE IS DAMAGED,  KEEP DRY,  KEEP AWAY FROM SUNLIGHT,  PROTECT FROM HEAT AND RADIOACTIVE SOURCES.]: Brand: PNEUMOSURE

## (undated) DEVICE — SUT SILK 2 SUTUPAK TIE 60IN SA8H 2STRAND

## (undated) DEVICE — GLV SURG BIOGEL LTX PF 8

## (undated) DEVICE — SUT SILK 2/0 PS 18IN 1588H

## (undated) DEVICE — SUT SILK 0/0 CT2 18IN C027D

## (undated) DEVICE — BLD SCLPL BEAVR MINI STR 2BVL 180D LF

## (undated) DEVICE — SYR CONTRL LUERLOK 10CC

## (undated) DEVICE — SUT PROLN 3/0 8832H

## (undated) DEVICE — CONNECTOR PH 6-IN-1 Y ST: Brand: CARDINAL HEALTH

## (undated) DEVICE — 12 FOOT DISPOSABLE EXTENSION CABLE WITH SAFE CONNECT / SCREW-DOWN

## (undated) DEVICE — SUT PROLN 7/0 BV1 D/A 24IN 8702H

## (undated) DEVICE — SUT PROLN 5/0 C1 D/A 36IN 8720H

## (undated) DEVICE — SUCTION CANISTER, 2500CC, RIGID: Brand: DEROYAL

## (undated) DEVICE — LEX CATH LAB MINOR: Brand: MEDLINE INDUSTRIES, INC.

## (undated) DEVICE — SEALANT HEMO TACHOSIL FIBRIN PTCH 9.5X4.8CM

## (undated) DEVICE — PATIENT RETURN ELECTRODE, SINGLE-USE, CONTACT QUALITY MONITORING, ADULT, WITH 9FT CORD, FOR PATIENTS WEIGING OVER 33LBS. (15KG): Brand: MEGADYNE

## (undated) DEVICE — JACKSON-PRATT 100CC BULB RESERVOIR: Brand: CARDINAL HEALTH

## (undated) DEVICE — INTRAOPERATIVE COVER KIT, 10 PACK: Brand: SITE-RITE

## (undated) DEVICE — PENCL ROCKRSWCH MEGADYNE W/HOLSTR 10FT SS

## (undated) DEVICE — SUT PROLN 6/0 C1 D/A 30IN 8706H

## (undated) DEVICE — INTRO PEELAWAY SAFESHEATH II HEMO 8F23CM

## (undated) DEVICE — 3M™ STERI-DRAPE™ INSTRUMENT POUCH 1018: Brand: STERI-DRAPE™

## (undated) DEVICE — OASIS DRAIN, SINGLE, INLINE & ATS COMPATIBLE: Brand: OASIS

## (undated) DEVICE — TOWEL,OR,DSP,ST,BLUE,STD,8/PK,10PK/CS: Brand: MEDLINE

## (undated) DEVICE — PK VASC 10

## (undated) DEVICE — ST INF PRI SMRTSTE 20DRP 2VLV 24ML 117

## (undated) DEVICE — CVR HNDL LT SURG ACCSSRY BLU STRL

## (undated) DEVICE — LIMB HOLDER, WRIST/ANKLE: Brand: DEROYAL

## (undated) DEVICE — KT INTRO SHEATH PERC W/FULL DRP 9F

## (undated) DEVICE — CATH DIAG EXPO M/ PK 5F FL4/FR4 PIG

## (undated) DEVICE — PK CATH CARD 10

## (undated) DEVICE — SENSR CERBRL O2 SOMASENSOR ADHS A/ LF

## (undated) DEVICE — VASOVIEW HEMOPRO: Brand: VASOVIEW HEMOPRO

## (undated) DEVICE — SUT PROLN 7/0 CV BV1 24IN 8304H BX/36

## (undated) DEVICE — SUPPL CALC CITRATE CHEWY BITE CARAMEL

## (undated) DEVICE — GLV SURG BIOGEL LTX PF 7 1/2

## (undated) DEVICE — HEMOCONCENTRATOR CAPIOX PED SM W/TB

## (undated) DEVICE — DEV COMP RAD PRELUDESYNC 24CM

## (undated) DEVICE — BNDG ELAS ELITE V/CLOSE 4IN 5YD LF

## (undated) DEVICE — SYR CONTRL PRESS/LO FIX/M/LL W/THMB/RNG 10ML

## (undated) DEVICE — SOL NS 500ML

## (undated) DEVICE — MODEL AT P65, P/N 701554-001KIT CONTENTS: HAND CONTROLLER, 3-WAY HIGH-PRESSURE STOPCOCK WITH ROTATING END AND PREMIUM HIGH-PRESSURE TUBING: Brand: ANGIOTOUCH® KIT

## (undated) DEVICE — SUCTION CANISTER 2500CC: Brand: DEROYAL

## (undated) DEVICE — TRAP,MUCUS SPECIMEN,40CC: Brand: MEDLINE

## (undated) DEVICE — NDL PERC 1PRT THNWALL W/BASEPLT 18G 7CM

## (undated) DEVICE — 1 ML TUBERCULIN SYRINGE REGULAR TIP: Brand: MONOJECT

## (undated) DEVICE — SUT SILK 2/0 TIES 18IN A185H

## (undated) DEVICE — TUBING, SUCTION, 1/4" X 10', STRAIGHT: Brand: MEDLINE

## (undated) DEVICE — AVANTI + 4F STD W/GW: Brand: AVANTI

## (undated) DEVICE — 2963 MEDIPORE SOFT CLOTH TAPE 3 IN X 10 YD 12 RLS/CS: Brand: 3M™ MEDIPORE™

## (undated) DEVICE — PAD ARMBRD SURG CONVOL 7.5X20X2IN

## (undated) DEVICE — PK SPECIALTYCARE M/STATE 1 OH 1/2V CUST

## (undated) DEVICE — PCH INST SURG INVISISHIELD 2PCKT

## (undated) DEVICE — PRESSURE MONITORING ACCESSORY: Brand: TRUWAVE

## (undated) DEVICE — Device: Brand: JELCO

## (undated) DEVICE — ARM SLING II: Brand: DEROYAL

## (undated) DEVICE — PRESSURE MONITORING SET: Brand: TRUWAVE, VAMP

## (undated) DEVICE — AMD ANTIMICROBIAL GAUZE SPONGES,12 PLY USP TYPE VII, 0.2% POLYHEXAMETHYLENE BIGUANIDE HCI (PHMB): Brand: CURITY

## (undated) DEVICE — CATHETER,URETHRAL,REDRUBBER,STRL,18FR: Brand: MEDLINE

## (undated) DEVICE — SUT STL 2/0 CV SH 24IN TPW32

## (undated) DEVICE — SAFETY SCALPEL: Brand: DEROYAL

## (undated) DEVICE — DRAIN JACKSON PRATT ROUND 15FR: Brand: CARDINAL HEALTH

## (undated) DEVICE — INTRO SHEATH PRELUDE IDEAL SPRNG COIL 021 6F 23X80CM

## (undated) DEVICE — MODEL BT2000 P/N 700287-012KIT CONTENTS: MANIFOLD WITH SALINE AND CONTRAST PORTS, SALINE TUBING WITH SPIKE AND HAND SYRINGE, TRANSDUCER: Brand: BT2000 AUTOMATED MANIFOLD KIT

## (undated) DEVICE — PK HEART OPN 10

## (undated) DEVICE — Device

## (undated) DEVICE — SYS SKIN CLS DERMABOND PRINEO W/22CM MESH TP

## (undated) DEVICE — 3M™ TEGADERM™ CHG DRESSING 25/CARTON 4 CARTONS/CASE 1658: Brand: TEGADERM™

## (undated) DEVICE — BNDG ELAS ELITE V/CLOSE 6IN 5YD LF STRL

## (undated) DEVICE — CLTH CLENS READYCLEANSE PERI CARE PK/5

## (undated) DEVICE — DRSNG WND BORDR/ADHS NONADHR/GZ LF 4X14IN STRL

## (undated) DEVICE — IRRIGATOR BULB ASEPTO 60CC STRL

## (undated) DEVICE — SEALANT HEMO SURG COSEAL PREMIX 4ML

## (undated) DEVICE — SKIN PREP TRAY W/CHG: Brand: MEDLINE INDUSTRIES, INC.

## (undated) DEVICE — MEDI-VAC NON-CONDUCTIVE SUCTION TUBING: Brand: CARDINAL HEALTH